# Patient Record
Sex: FEMALE | Race: WHITE | NOT HISPANIC OR LATINO | Employment: FULL TIME | URBAN - METROPOLITAN AREA
[De-identification: names, ages, dates, MRNs, and addresses within clinical notes are randomized per-mention and may not be internally consistent; named-entity substitution may affect disease eponyms.]

---

## 2023-04-12 LAB
ALANINE AMINOTRANSFERASE (SGPT) (U/L) IN SER/PLAS: 11 U/L (ref 7–45)
ALBUMIN (G/DL) IN SER/PLAS: 4.6 G/DL (ref 3.4–5)
ALKALINE PHOSPHATASE (U/L) IN SER/PLAS: 101 U/L (ref 33–136)
ANION GAP IN SER/PLAS: 17 MMOL/L (ref 10–20)
ASPARTATE AMINOTRANSFERASE (SGOT) (U/L) IN SER/PLAS: 22 U/L (ref 9–39)
BASOPHILS (10*3/UL) IN BLOOD BY AUTOMATED COUNT: 0.07 X10E9/L (ref 0–0.1)
BASOPHILS/100 LEUKOCYTES IN BLOOD BY AUTOMATED COUNT: 0.6 % (ref 0–2)
BILIRUBIN TOTAL (MG/DL) IN SER/PLAS: 0.9 MG/DL (ref 0–1.2)
C REACTIVE PROTEIN (MG/L) IN SER/PLAS: 0.49 MG/DL
CALCIUM (MG/DL) IN SER/PLAS: 10 MG/DL (ref 8.6–10.6)
CARBON DIOXIDE, TOTAL (MMOL/L) IN SER/PLAS: 25 MMOL/L (ref 21–32)
CHLORIDE (MMOL/L) IN SER/PLAS: 102 MMOL/L (ref 98–107)
CREATINE KINASE (U/L) IN SER/PLAS: 163 U/L (ref 0–215)
CREATININE (MG/DL) IN SER/PLAS: 1.2 MG/DL (ref 0.5–1.05)
EOSINOPHILS (10*3/UL) IN BLOOD BY AUTOMATED COUNT: 0.09 X10E9/L (ref 0–0.7)
EOSINOPHILS/100 LEUKOCYTES IN BLOOD BY AUTOMATED COUNT: 0.8 % (ref 0–6)
ERYTHROCYTE DISTRIBUTION WIDTH (RATIO) BY AUTOMATED COUNT: 13 % (ref 11.5–14.5)
ERYTHROCYTE MEAN CORPUSCULAR HEMOGLOBIN CONCENTRATION (G/DL) BY AUTOMATED: 33 G/DL (ref 32–36)
ERYTHROCYTE MEAN CORPUSCULAR VOLUME (FL) BY AUTOMATED COUNT: 95 FL (ref 80–100)
ERYTHROCYTES (10*6/UL) IN BLOOD BY AUTOMATED COUNT: 4.2 X10E12/L (ref 4–5.2)
GFR FEMALE: 50 ML/MIN/1.73M2
GLUCOSE (MG/DL) IN SER/PLAS: 98 MG/DL (ref 74–99)
HEMATOCRIT (%) IN BLOOD BY AUTOMATED COUNT: 40 % (ref 36–46)
HEMOGLOBIN (G/DL) IN BLOOD: 13.2 G/DL (ref 12–16)
IMMATURE GRANULOCYTES/100 LEUKOCYTES IN BLOOD BY AUTOMATED COUNT: 0.4 % (ref 0–0.9)
LEUKOCYTES (10*3/UL) IN BLOOD BY AUTOMATED COUNT: 11.5 X10E9/L (ref 4.4–11.3)
LYMPHOCYTES (10*3/UL) IN BLOOD BY AUTOMATED COUNT: 3.24 X10E9/L (ref 1.2–4.8)
LYMPHOCYTES/100 LEUKOCYTES IN BLOOD BY AUTOMATED COUNT: 28.2 % (ref 13–44)
MONOCYTES (10*3/UL) IN BLOOD BY AUTOMATED COUNT: 1.12 X10E9/L (ref 0.1–1)
MONOCYTES/100 LEUKOCYTES IN BLOOD BY AUTOMATED COUNT: 9.7 % (ref 2–10)
NEUTROPHILS (10*3/UL) IN BLOOD BY AUTOMATED COUNT: 6.93 X10E9/L (ref 1.2–7.7)
NEUTROPHILS/100 LEUKOCYTES IN BLOOD BY AUTOMATED COUNT: 60.3 % (ref 40–80)
NRBC (PER 100 WBCS) BY AUTOMATED COUNT: 0 /100 WBC (ref 0–0)
PLATELETS (10*3/UL) IN BLOOD AUTOMATED COUNT: 247 X10E9/L (ref 150–450)
POTASSIUM (MMOL/L) IN SER/PLAS: 4.6 MMOL/L (ref 3.5–5.3)
PROTEIN TOTAL: 7.3 G/DL (ref 6.4–8.2)
RHEUMATOID FACTOR (IU/ML) IN SERUM OR PLASMA: 11 IU/ML (ref 0–15)
SEDIMENTATION RATE, ERYTHROCYTE: 21 MM/H (ref 0–30)
SODIUM (MMOL/L) IN SER/PLAS: 139 MMOL/L (ref 136–145)
UREA NITROGEN (MG/DL) IN SER/PLAS: 21 MG/DL (ref 6–23)

## 2023-04-13 LAB — ANTI-NUCLEAR ANTIBODY (ANA): NEGATIVE

## 2023-04-15 LAB — CITRULLINE ANTIBODY, IGG: 2 UNITS (ref 0–19)

## 2023-04-18 ENCOUNTER — OFFICE VISIT (OUTPATIENT)
Dept: PRIMARY CARE | Facility: CLINIC | Age: 68
End: 2023-04-18
Payer: COMMERCIAL

## 2023-04-18 VITALS
SYSTOLIC BLOOD PRESSURE: 137 MMHG | WEIGHT: 124 LBS | BODY MASS INDEX: 23.41 KG/M2 | OXYGEN SATURATION: 97 % | HEIGHT: 61 IN | DIASTOLIC BLOOD PRESSURE: 73 MMHG | HEART RATE: 73 BPM | RESPIRATION RATE: 19 BRPM

## 2023-04-18 DIAGNOSIS — G43.809 OTHER MIGRAINE WITHOUT STATUS MIGRAINOSUS, NOT INTRACTABLE: ICD-10-CM

## 2023-04-18 DIAGNOSIS — M54.50 CHRONIC LOW BACK PAIN, UNSPECIFIED BACK PAIN LATERALITY, UNSPECIFIED WHETHER SCIATICA PRESENT: ICD-10-CM

## 2023-04-18 DIAGNOSIS — F31.60 BIPOLAR AFFECTIVE DISORDER, CURRENT EPISODE MIXED, CURRENT EPISODE SEVERITY UNSPECIFIED (MULTI): ICD-10-CM

## 2023-04-18 DIAGNOSIS — M79.7 FIBROMYALGIA: Primary | ICD-10-CM

## 2023-04-18 DIAGNOSIS — G89.29 CHRONIC LOW BACK PAIN, UNSPECIFIED BACK PAIN LATERALITY, UNSPECIFIED WHETHER SCIATICA PRESENT: ICD-10-CM

## 2023-04-18 PROCEDURE — 1036F TOBACCO NON-USER: CPT | Performed by: STUDENT IN AN ORGANIZED HEALTH CARE EDUCATION/TRAINING PROGRAM

## 2023-04-18 RX ORDER — DICLOFENAC SODIUM 10 MG/G
4 GEL TOPICAL 4 TIMES DAILY PRN
Qty: 100 G | Refills: 2 | Status: SHIPPED | OUTPATIENT
Start: 2023-04-18 | End: 2023-07-07

## 2023-04-18 RX ORDER — DULOXETIN HYDROCHLORIDE 30 MG/1
CAPSULE, DELAYED RELEASE ORAL
Qty: 49 CAPSULE | Refills: 0 | Status: SHIPPED | OUTPATIENT
Start: 2023-04-18 | End: 2023-05-08

## 2023-04-18 RX ORDER — CYCLOBENZAPRINE HCL 10 MG
10 TABLET ORAL NIGHTLY PRN
Qty: 30 TABLET | Refills: 0 | Status: SHIPPED | OUTPATIENT
Start: 2023-04-18 | End: 2023-05-15

## 2023-04-18 RX ORDER — TOPIRAMATE 25 MG/1
25 TABLET ORAL 2 TIMES DAILY
Qty: 60 TABLET | Refills: 0 | Status: SHIPPED | OUTPATIENT
Start: 2023-04-18 | End: 2023-05-11

## 2023-04-18 RX ORDER — CLONAZEPAM 0.5 MG/1
0.5 TABLET ORAL 2 TIMES DAILY
Qty: 15 TABLET | Refills: 0 | Status: SHIPPED | OUTPATIENT
Start: 2023-04-18 | End: 2023-10-17 | Stop reason: DRUGHIGH

## 2023-04-18 NOTE — PROGRESS NOTES
Judith Bush is a 67 y.o. female seen in Clinic at [facility] by Dr. Kyler Marks on 04/18/23 for routine care, as well as for management of the following chronic medical conditions: HTN, s/p Mitral Valve Replacement (severe MR), OP (on Tymlos), IBS, Chronic Pain (s/p L3-L4 hemilaminectomy and discectomy in 01/2023), Bipolar Disorder, ADHD, Migraines. Patient presents regarding possibility of establishing care.     She has many concerns today including widespread pain, MSK complaints, brain fog, poor mental health, limited social supports, headaches, and severe fatigue. She has not had any recent fevers or unintentional weight loss.     Many of these symptoms have persisted over decades. She describes her medical history as very complex and is looking for an answer to fix all her chronic problems. She has recently been seen by Rheumatology, Dr. Oro, and follows with Cardiology, Dr. Wood. Her spine surgery care and recent procedure was done at the [facility].     #HTN  - BP in office today 137/73  - Current regimen: Diltiazem 180 daily   - follows with cardiology     #Mitral Regurgitation s/p MVR  - ASA daily   - follows with cardiology     #DLD  - Statin intolerant  - ASA, Repatha   - Lipid panel 02/2023 excellent   - follows with cardiology     #Chronic Pain, concern for Fibromyalgia   - seen by Rheumatology   - complicated psychiatric history as well, Bipolar disorder not currently on treatment   - discussed at length  - recent opiate prescriptions through orthopedics and ED   - discussed Duloxetine, agreeable after discussion  - topical Voltaren, short term muscle relaxant as adjunct     #Headache  - previously on Topamax, would like to re-try    #Bipolar Disorder  - Psychiatry referral   - Duloxetine as above more for pain     #Osteoporosis  - follows with Rheum on Tymlos      Past Medical History: as above   Past Medical History:   Diagnosis Date    Abdominal distension (gaseous) 08/02/2018     Bloating    Abdominal distension (gaseous) 08/13/2018    Bloating    Age-related nuclear cataract, left eye 08/20/2015    Age-related nuclear cataract of left eye    Age-related nuclear cataract, right eye 09/03/2015    Age-related nuclear cataract of right eye    Anxiety disorder, unspecified 08/02/2018    Anxiety disorder    Anxiety disorder, unspecified 08/13/2018    Anxiety disorder    Carpal tunnel syndrome, right upper limb 08/13/2018    Carpal tunnel syndrome of right wrist    Collagenous colitis 04/04/2018    Collagenous colitis    Collagenous colitis 08/02/2018    Collagenous colitis    Collagenous colitis 08/13/2018    Collagenous colitis    Collapsed vertebra, not elsewhere classified, site unspecified, initial encounter for fracture (CMS/[facility]) 08/02/2018    Compression fracture of spine    Collapsed vertebra, not elsewhere classified, site unspecified, initial encounter for fracture (CMS/[facility]) 08/13/2018    Compression fracture of spine    Cortical age-related cataract, left eye 08/20/2015    Cortical age-related cataract of left eye    Cortical age-related cataract, right eye 09/03/2015    Cortical age-related cataract of right eye    Depression, unspecified 08/02/2018    Depression    Depression, unspecified 08/13/2018    Depression    Dermatitis, unspecified 04/04/2018    Dermatitis, eczematoid    Dermatitis, unspecified 07/09/2020    Dermatitis, eczematoid    Dermatochalasis of unspecified eye, unspecified eyelid 10/25/2022    Dermatochalasis    Displaced comminuted fracture of left patella, initial encounter for closed fracture 08/02/2018    Closed displaced comminuted fracture of left patella, initial encounter    Displaced comminuted fracture of left patella, initial encounter for closed fracture 08/13/2018    Closed displaced comminuted fracture of left patella, initial encounter    Displaced comminuted fracture of right patella, initial encounter for closed fracture 08/02/2018    Closed displaced  comminuted fracture of right patella, initial encounter    Displaced comminuted fracture of right patella, initial encounter for closed fracture 08/13/2018    Closed displaced comminuted fracture of right patella, initial encounter    Hypokalemia 04/04/2018    Hypokalemia    Hypokalemia 08/02/2018    Hypokalemia    Hypokalemia 08/13/2018    Hypokalemia    Low back pain, unspecified 08/02/2018    Low back pain    Low back pain, unspecified 08/13/2018    Low back pain    Noninfective gastroenteritis and colitis, unspecified 04/04/2018    Chronic colitis    Noninfective gastroenteritis and colitis, unspecified 08/02/2018    Chronic colitis    Noninfective gastroenteritis and colitis, unspecified 08/13/2018    Chronic colitis    Nutritional deficiency, unspecified 08/02/2018    Poor diet    Nutritional deficiency, unspecified 08/13/2018    Poor diet    Old myocardial infarction 03/31/2014    History of myocardial infarction    Other chest pain 08/28/2017    Atypical chest pain    Other conditions influencing health status     Denial Of Any Significant Medical History    Other fracture of right patella, sequela 08/02/2018    Patellar sleeve fracture, right, sequela    Other fracture of right patella, sequela 08/13/2018    Patellar sleeve fracture, right, sequela    Other specified diseases of intestine 08/02/2018    Small intestinal bacterial overgrowth    Other specified diseases of intestine 08/13/2018    Small intestinal bacterial overgrowth    Other visual disturbances 04/04/2018    Blurred vision, bilateral    Pain in unspecified hip 03/31/2014    Hip pain    Pain, unspecified 08/02/2018    Pain    Pain, unspecified 08/13/2018    Pain    Personal history of other diseases of the circulatory system 09/16/2019    History of pericarditis    Personal history of other diseases of the respiratory system 08/27/2019    History of pleural effusion    Personal history of other specified conditions 07/22/2016    History of  dizziness    Personal history of other specified conditions 06/11/2018    History of headache    Polyosteoarthritis, unspecified 08/02/2018    Generalized osteoarthritis of multiple sites    Polyosteoarthritis, unspecified 08/13/2018    Generalized osteoarthritis of multiple sites    Presence of intraocular lens 09/20/2018    Pseudophakia of right eye    Unspecified fall, sequela 08/02/2018    Fall in home, sequela    Unspecified fall, sequela 08/13/2018    Fall in home, sequela    Unspecified fracture of right patella, subsequent encounter for closed fracture with routine healing 08/02/2018    Fracture of right patella with routine healing    Unspecified fracture of right patella, subsequent encounter for closed fracture with routine healing 08/13/2018    Fracture of right patella with routine healing     Subspecialty Medical Care: Cardiology, Orthopedics, Psychiatry referral today; Rheumatology, Pain Medicine, ENT    Past Surgical History:   Past Surgical History:   Procedure Laterality Date    CATARACT EXTRACTION  07/16/2018    Cataract Surgery    CT ABDOMEN ANGIOGRAM W AND/OR WO IV CONTRAST  7/28/2022    CT ABDOMEN ANGIOGRAM W AND/OR WO IV CONTRAST 7/28/2022 [facility]    FEMUR FRACTURE SURGERY  07/16/2018    Femur Repair    HIP SURGERY  07/16/2018    Hip Surgery    HYSTERECTOMY  07/16/2018    Hysterectomy    OTHER SURGICAL HISTORY  07/16/2018    Oophorectomy - Bilateral (Removal Of Both Ovaries)    OTHER SURGICAL HISTORY  05/27/2020    Mitral valve replacement    OTHER SURGICAL HISTORY  05/27/2020    Radius fracture repair    SHOULDER SURGERY  07/16/2018    Shoulder Surgery     Medications:  Current Outpatient Medications:     albuterol 90 mcg/actuation inhaler, Inhale 2 puffs every 4 hours if needed., Disp: , Rfl:     aspirin 81 mg EC tablet, Take 1 tablet (81 mg) by mouth once daily., Disp: , Rfl:     cholecalciferol (Vitamin D-3) 1,250 mcg (50,000 unit) capsule, Take 1 capsule (50,000 Units) by  mouth., Disp: , Rfl:     dilTIAZem CD (Cardizem CD) 180 mg 24 hr capsule, Take 1 capsule (180 mg) by mouth once daily., Disp: , Rfl:     Lidocaine Pain Relief 4 % patch, APPLY TOPICALLY TO AFFECTED AREA ONCE A DAY FOR 12 HOURS AS NEEDED, Disp: , Rfl:     loratadine (Claritin) 10 mg tablet, Take 1 tablet (10 mg) by mouth once daily., Disp: , Rfl:     multivitamin with folic acid (One Daily Multivitamin) 400 mcg tablet, Take 1 tablet by mouth once daily., Disp: , Rfl:     oxybutynin XL (Ditropan-XL) 10 mg 24 hr tablet, Take 1 tablet (10 mg) by mouth in the morning and 1 tablet (10 mg) before bedtime., Disp: , Rfl:     Repatha SureClick 140 mg/mL injection, Inject under the skin., Disp: , Rfl:     SUMAtriptan (Imitrex) 50 mg tablet, Take by mouth., Disp: , Rfl:     Tymlos 80 mcg (3,120 mcg/1.56 mL) pen injector, , Disp: , Rfl:     celecoxib (CeleBREX) 200 mg capsule, TAKE 1 CAPSULE BY MOUTH ONCE DAILY AS NEEDED. WHEN NOT TAKING MOTRIN, Disp: , Rfl:     clonazePAM (KlonoPIN) 0.5 mg tablet, Take 1 tablet (0.5 mg) by mouth in the morning and 1 tablet (0.5 mg) before bedtime. Do all this for 5 days., Disp: 15 tablet, Rfl: 0    cyclobenzaprine (Flexeril) 10 mg tablet, Take 1 tablet (10 mg) by mouth as needed at bedtime for muscle spasms., Disp: 30 tablet, Rfl: 0    diclofenac sodium (Voltaren) 1 % gel gel, Apply 1 Application topically 4 times a day as needed (pain)., Disp: 100 g, Rfl: 2    DULoxetine (Cymbalta) 30 mg DR capsule, Take 1 capsule (30 mg) by mouth once daily for 7 days, THEN 2 capsules (60 mg) once daily for 21 days. Do not crush or chew.., Disp: 49 capsule, Rfl: 0    topiramate (Topamax) 25 mg tablet, Take 1 tablet (25 mg) by mouth in the morning and 1 tablet (25 mg) before bedtime., Disp: 60 tablet, Rfl: 0  Pharmacy: [facility] ([place])    Allergies:   Allergies   Allergen Reactions    Penicillins Anaphylaxis    Cephalosporins Rash    Neomycin-Polymyxin-Gramicidin Rash     Immunizations: Tdap 2021, Flu and COVID  "booster recommended, will be due for PCV-20 at well visit, Shingrix     Family History:   No family history on file.    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  Activities:   Drug Use: tobacco use   Diet: no concerns  Depression/Anxiety: Bipolar disorder not currently connected to psych   Sexuality/Contraception/Menstrual History:   Sleep: impacted by chronic medical conditions, pain, mental health     Patient Information:  Health Insurance:   Transportation:   Healthcare POA/Guardian:   Contact Information:     Visit Vitals  /73 (BP Location: Left arm, Patient Position: Sitting)   Pulse 73   Resp 19   Ht 1.549 m (5' 1\")   Wt 56.2 kg (124 lb)   SpO2 97%   BMI 23.43 kg/m²   Smoking Status Never   BSA 1.56 m²      PHYSICAL EXAM:   General: NAD, intermittently tearful during encounter   HEENT: NCAT, MMM  CV: RRR  PULM: non-labored respirations   ABD: soft, NT, ND  EXT: WWP, no significant edema   SKIN: no rashes noted   NEURO: A&Ox4, symmetric facies, no gross motor or sensory deficits, normal gait  PSYCH: intermittently tearful during encounter, frustrated with chronic medical conditions     Assessment/Plan    Judith Bush is a 67 y.o. female seen in Clinic at [facility] by Dr. Kyler Marks on 04/18/23 for routine care, as well as for management of the following chronic medical conditions: HTN, s/p Mitral Valve Replacement (severe MR), OP (on Tymlos), IBS, Chronic Pain (s/p L3-L4 hemilaminectomy and discectomy in 01/2023), Bipolar Disorder, ADHD, Migraines. Patient presents regarding possibility of establishing care.     She has many concerns today including widespread pain, MSK complaints, brain fog, poor mental health, limited social supports, headaches, and severe fatigue. She has not had any recent fevers or unintentional weight loss.     Many of these symptoms have persisted over decades. She " describes her medical history as very complex and is looking for an answer to fix all her chronic problems. She has recently been seen by Rheumatology, Dr. Oro, and follows with Cardiology, Dr. Wood. Her spine surgery care and recent procedure was done at the [facility].     #HTN  - BP in office today 137/73  - Current regimen: Diltiazem 180 daily   - follows with cardiology     #Mitral Regurgitation s/p MVR  - ASA daily   - follows with cardiology     #DLD  - Statin intolerant  - ASA, Repatha   - Lipid panel 2023 excellent   - follows with cardiology     #Chronic Pain, concern for Fibromyalgia   - seen by Rheumatology   - complicated psychiatric history as well, Bipolar disorder not currently on treatment   - discussed at length  - recent opiate prescriptions through orthopedics and ED   - discussed Duloxetine, agreeable after discussion  - topical Voltaren, short term muscle relaxant as adjunct     #Headache  - previously on Topamax, would like to re-try    #Bipolar Disorder  - Psychiatry referral   - Duloxetine as above more for pain     #Osteoporosis  - follows with Rheum on Tymlos     #Health Maintenance    Cancer Screening  - Cervical Cancer Screening: last pap smear/HPV testin with negative HPV co-testing  - Mammography: 2021, order at [facility]   - Colorectal Cancer Screening: Dr. Garcia in , repeat in  (poor bowel prep)  - Lung Cancer Screening:     Laboratory Screening  - Lipid Screen: follows with cards on treatment   - ASCVD Score: on treatment   - A1C, glucose screen: recent labs reassuring   - STI, HIV, Hep B screen: discuss at [facility]   - Hep C screen: discuss at [facility]     Imaging Screening  - Osteoporosis/DEXA screening: known diagnosis, follows with Rheum    Immunizations: discuss at [facility], Tdap , Flu and COVID booster recommended, will be due for PCV-20 at well visit, Shingrix   - Influenza: annual recommended  - COVID: annual recommended   - Tdap:   - Prevnar,  Pneumovax: due for PCV-20 at [facility]   - Shingrix: recommend at [facility]     Other Screening  - Health Literacy Assessment: adequate   - Depression screen: known diagnosis, referral   - Home safety/partner violence screen: lives alone  - Hearing/Vision screens:   - Alcohol/tobacco/drug use screen:   - Healthcare POA/Advanced Directives:     Referrals: Psychiatry     Patient Discussion:    Please call back the office with any questions at [phone]. In the case of an emergency, please call 911 or go to the nearest Emergency Department.      Kyler Marks MD  Internal Medicine-Pediatrics  [facility] [address], [address]  P: [phone], F: [phone]

## 2023-04-19 LAB — HLAB27 TYPING: NEGATIVE

## 2023-04-24 RX ORDER — MULTIVITAMIN
1 TABLET ORAL DAILY
COMMUNITY
Start: 2021-05-04

## 2023-04-24 RX ORDER — EVOLOCUMAB 140 MG/ML
INJECTION, SOLUTION SUBCUTANEOUS
COMMUNITY
Start: 2022-10-27 | End: 2023-11-20 | Stop reason: WASHOUT

## 2023-04-24 RX ORDER — OXYBUTYNIN CHLORIDE 10 MG/1
1 TABLET, EXTENDED RELEASE ORAL 2 TIMES DAILY
COMMUNITY
Start: 2020-08-14 | End: 2024-02-19 | Stop reason: SDUPTHER

## 2023-04-24 RX ORDER — LORATADINE 10 MG/1
1 TABLET ORAL DAILY
COMMUNITY
Start: 2022-10-11 | End: 2024-02-19 | Stop reason: ALTCHOICE

## 2023-04-24 RX ORDER — DILTIAZEM HYDROCHLORIDE 180 MG/1
1 CAPSULE, COATED, EXTENDED RELEASE ORAL DAILY
COMMUNITY
Start: 2022-10-16 | End: 2024-02-19 | Stop reason: SDUPTHER

## 2023-04-24 RX ORDER — ASPIRIN 325 MG
50000 TABLET, DELAYED RELEASE (ENTERIC COATED) ORAL
COMMUNITY
Start: 2022-11-10 | End: 2024-04-15 | Stop reason: ENTERED-IN-ERROR

## 2023-04-24 RX ORDER — SUMATRIPTAN 50 MG/1
TABLET, FILM COATED ORAL
COMMUNITY
Start: 2020-09-02 | End: 2024-02-19 | Stop reason: SDUPTHER

## 2023-04-24 RX ORDER — ASPIRIN 81 MG/1
1 TABLET ORAL DAILY
COMMUNITY
Start: 2020-09-16 | End: 2024-04-23 | Stop reason: SDUPTHER

## 2023-04-24 RX ORDER — ALBUTEROL SULFATE 90 UG/1
2 AEROSOL, METERED RESPIRATORY (INHALATION) EVERY 4 HOURS PRN
COMMUNITY
Start: 2021-02-03

## 2023-04-24 RX ORDER — LIDOCAINE 4 G/100G
1 PATCH TOPICAL DAILY PRN
COMMUNITY
Start: 2022-07-24

## 2023-04-24 RX ORDER — ABALOPARATIDE 2000 UG/ML
1 INJECTION, SOLUTION SUBCUTANEOUS DAILY
COMMUNITY
Start: 2023-03-09

## 2023-04-24 RX ORDER — CELECOXIB 200 MG/1
200 CAPSULE ORAL NIGHTLY PRN
COMMUNITY

## 2023-05-08 DIAGNOSIS — M79.7 FIBROMYALGIA: ICD-10-CM

## 2023-05-08 RX ORDER — DULOXETIN HYDROCHLORIDE 60 MG/1
60 CAPSULE, DELAYED RELEASE ORAL DAILY
Qty: 90 CAPSULE | Refills: 1 | Status: SHIPPED | OUTPATIENT
Start: 2023-05-08 | End: 2023-08-15 | Stop reason: SINTOL

## 2023-05-10 DIAGNOSIS — G43.809 OTHER MIGRAINE WITHOUT STATUS MIGRAINOSUS, NOT INTRACTABLE: ICD-10-CM

## 2023-05-11 RX ORDER — TOPIRAMATE 25 MG/1
TABLET ORAL
Qty: 60 TABLET | Refills: 0 | Status: SHIPPED | OUTPATIENT
Start: 2023-05-11 | End: 2023-06-07

## 2023-05-14 DIAGNOSIS — G89.29 CHRONIC LOW BACK PAIN, UNSPECIFIED BACK PAIN LATERALITY, UNSPECIFIED WHETHER SCIATICA PRESENT: ICD-10-CM

## 2023-05-14 DIAGNOSIS — M54.50 CHRONIC LOW BACK PAIN, UNSPECIFIED BACK PAIN LATERALITY, UNSPECIFIED WHETHER SCIATICA PRESENT: ICD-10-CM

## 2023-05-15 RX ORDER — CYCLOBENZAPRINE HCL 10 MG
10 TABLET ORAL NIGHTLY PRN
Qty: 30 TABLET | Refills: 0 | Status: SHIPPED | OUTPATIENT
Start: 2023-05-15 | End: 2023-06-12

## 2023-06-06 DIAGNOSIS — G43.809 OTHER MIGRAINE WITHOUT STATUS MIGRAINOSUS, NOT INTRACTABLE: ICD-10-CM

## 2023-06-07 RX ORDER — TOPIRAMATE 25 MG/1
TABLET ORAL
Qty: 60 TABLET | Refills: 0 | Status: SHIPPED | OUTPATIENT
Start: 2023-06-07 | End: 2023-07-09

## 2023-06-12 DIAGNOSIS — G89.29 CHRONIC LOW BACK PAIN, UNSPECIFIED BACK PAIN LATERALITY, UNSPECIFIED WHETHER SCIATICA PRESENT: ICD-10-CM

## 2023-06-12 DIAGNOSIS — M54.50 CHRONIC LOW BACK PAIN, UNSPECIFIED BACK PAIN LATERALITY, UNSPECIFIED WHETHER SCIATICA PRESENT: ICD-10-CM

## 2023-06-12 RX ORDER — CYCLOBENZAPRINE HCL 10 MG
10 TABLET ORAL NIGHTLY PRN
Qty: 30 TABLET | Refills: 0 | Status: SHIPPED | OUTPATIENT
Start: 2023-06-12 | End: 2023-07-27

## 2023-07-07 DIAGNOSIS — M79.7 FIBROMYALGIA: ICD-10-CM

## 2023-07-07 RX ORDER — DICLOFENAC SODIUM 10 MG/G
4 GEL TOPICAL 4 TIMES DAILY PRN
Qty: 100 G | Refills: 2 | Status: SHIPPED | OUTPATIENT
Start: 2023-07-07 | End: 2024-02-05

## 2023-07-08 DIAGNOSIS — G43.809 OTHER MIGRAINE WITHOUT STATUS MIGRAINOSUS, NOT INTRACTABLE: ICD-10-CM

## 2023-07-09 RX ORDER — TOPIRAMATE 25 MG/1
TABLET ORAL
Qty: 60 TABLET | Refills: 0 | Status: SHIPPED | OUTPATIENT
Start: 2023-07-09 | End: 2023-08-01

## 2023-07-27 DIAGNOSIS — M54.50 CHRONIC LOW BACK PAIN, UNSPECIFIED BACK PAIN LATERALITY, UNSPECIFIED WHETHER SCIATICA PRESENT: ICD-10-CM

## 2023-07-27 DIAGNOSIS — G89.29 CHRONIC LOW BACK PAIN, UNSPECIFIED BACK PAIN LATERALITY, UNSPECIFIED WHETHER SCIATICA PRESENT: ICD-10-CM

## 2023-07-27 RX ORDER — CYCLOBENZAPRINE HCL 10 MG
10 TABLET ORAL NIGHTLY PRN
Qty: 30 TABLET | Refills: 0 | Status: SHIPPED | OUTPATIENT
Start: 2023-07-27 | End: 2023-09-14 | Stop reason: SDUPTHER

## 2023-08-01 DIAGNOSIS — G43.809 OTHER MIGRAINE WITHOUT STATUS MIGRAINOSUS, NOT INTRACTABLE: ICD-10-CM

## 2023-08-01 RX ORDER — TOPIRAMATE 25 MG/1
TABLET ORAL
Qty: 60 TABLET | Refills: 0 | Status: SHIPPED | OUTPATIENT
Start: 2023-08-01 | End: 2023-08-15 | Stop reason: SDUPTHER

## 2023-08-14 NOTE — PROGRESS NOTES
"Subjective   Patient ID: Judith Bush is a 67 y.o. female who presents for Establish Care. I have reviewed her past social, surgical, medical and family history.    HPI   The patient reports that she is taking diltiazem for her hypertension, Repatha for hyperlipidemia and Sumatriptan for her migraines. She has no side effects from these medications. She smokes 3-4 cigarettes daily and has smoked this amount for the past 40 years, and also smokes some marijuana for medical purposes. She has a history of mild emphysema, bipolar disorder, anxiety and depression. The patient complains of fatigue, an ongoing cough, migraines and insomnia and follows up with sleep medicine. She has tried 2 courses of antibiotics to treat the cough but neither worked; the Albuterol inhaler helps a bit. She mentions that she broke her right hip and femur years ago and since then she has been experiencing pain. The patient also had a laminectomy 8 months ago and following her recovery she started experiencing joint pain, followed up with Rheumatology and was diagnosed with fibromyalgia. Additionally, her back pain has come back despite having surgery to address it. She was started on duloxetine however, she has not started taking it as she tried it in the past and it did not alleviate her pain. She also tried Topamax. The patient mentions that she has intermittent itching under her bilateral feet which came on randomly.     Review of Systems  Constitutional: + fatigue. No fever or chills  Cardiovascular: no chest pain, no palpitations and no syncope.   Respiratory: + cough, no shortness of breath during exertion and no shortness of breath at rest.   Gastrointestinal: no abdominal pain, no nausea and no vomiting.  Neuro: + Headache, no dizziness  Psych: + insomnia.  MSK: + multiple joint pain, right hip pain.    Objective   /63   Pulse 60   Resp 16   Ht 1.549 m (5' 1\")   Wt 56.2 kg (124 lb)   BMI 23.43 kg/m²     Physical " Exam  Constitutional: Alert and in no acute distress. Well developed, well nourished  Head and Face: Head and face: Normal.    Cardiovascular: Heart rate and rhythm were normal, normal S1 and S2. No peripheral edema.   Pulmonary: No respiratory distress. Clear bilateral breath sounds.  Musculoskeletal: Gait and station: Normal. Muscle strength/tone: Normal.   Skin: Normal skin color and pigmentation, normal skin turgor, and no rash.    Psychiatric: Judgment and insight: Intact. Mood and affect: Normal.    Lab Results   Component Value Date    WBC 12.5 (H) 04/15/2023    HGB 12.8 04/15/2023    HCT 37.6 04/15/2023     04/15/2023    CHOL 136 02/15/2023    TRIG 136 02/15/2023    HDL 75.3 02/15/2023    ALT 11 04/15/2023    AST 19 04/15/2023     04/15/2023    K 4.2 04/15/2023     04/15/2023    CREATININE 0.88 04/15/2023    BUN 15 04/15/2023    CO2 28 04/15/2023    TSH 1.18 04/15/2023    INR 1.0 05/16/2022    HGBA1C 5.0 07/22/2019       XR chest 2 view  Narrative: Interpreted By:  CLEO PITTS MD  MRN: [id number]  Patient Name: DEX BELLE     STUDY:  TH CHEST 2 VIEW PA AND LAT;  6/27/2023 1:27 pm     INDICATION:  cough  J44.9: COPD (chronic obstructive pulmonary disease).     COMPARISON:  04/15/2023     ACCESSION NUMBER(S):  [id number]     ORDERING CLINICIAN:  GRICELDA PACK     FINDINGS:  Median sternotomy wires present. The cardiac silhouette is within  normal limits for size. The lungs are hyperinflated. There is no  consolidation. There is no effusion. There is no edema.     Impression: Mediastinum wires present. No other abnormality      Assessment/Plan   Diagnoses and all orders for this visit:  Bipolar affective disorder, rapid cycling (CMS/HCC)  Other specified anxiety disorders  Chronic low back pain, unspecified back pain laterality, unspecified whether sciatica present  -     Lead, blood; Future  Primary insomnia  Simple chronic bronchitis (CMS/HCC)  -     benzonatate (Tessalon) 100 mg  capsule; Take 1 capsule (100 mg) by mouth 3 times a day as needed for cough. Do not crush or chew.  Visit for screening mammogram  -     BI mammo bilateral screening tomosynthesis; Future  Other migraine without status migrainosus, not intractable  -     topiramate (Topamax) 25 mg tablet; Take 1 tablet (25 mg) by mouth 2 times a day.  Encounter for immunization  -     Pneumococcal conjugate vaccine, 20-valent, adult (PREVNAR 20)  Chronic rhinitis  -     fluticasone (Flonase) 50 mcg/actuation nasal spray; Administer 1 spray into each nostril once daily. Shake gently. Before first use, prime pump. After use, clean tip and replace cap.        Dear Judith Bush     It was my pleasure to take care of you today in the office. Below are the things we discussed today:    1. Hypertension: Continue Diltiazem.    2. Hyperlipidemia: Continue Repatha.     3. Flu shot: Please get flu shot in the fall.    4. COVID: Please get booster in the fall.    5. Prevnar: Given today.    6. Shingrix: Please get from the pharmacy.    7. Mammogram: Ordered.    8. Migraines: Continue Sumatriptan.    9. Right hip pain: The patient will follow up with Orthopedics for an opinion.    10. Cough: Start Tessalon pills. Start Flonase nasal spray. If there is no improvement please let me know.    11. Chronic pain: Start 25 mg Topamax and gradually increase to 75 mg, then 100 mg. We will go up to 200 mg if needed.     12. Lead exposure: Blood lead level ordered.    When you call the office for your yearly Physical, please ask them to inform me to order your blood work, so that you can get the fasting blood work before your appointment and we can discuss the results at your physical.      Please call me if any questions arise from now until your next visit. I will call you after I am done seeing patients. A Doctor is always available by phone when the office is closed. Please feel free to call for help with any problem that you feel shouldn't wait until  the office re-opens.     Scribe Attestation  By signing my name below, I, Agata Strong, Abad   attest that this documentation has been prepared under the direction and in the presence of Cee Coates MD.

## 2023-08-15 ENCOUNTER — OFFICE VISIT (OUTPATIENT)
Dept: PRIMARY CARE | Facility: CLINIC | Age: 68
End: 2023-08-15
Payer: COMMERCIAL

## 2023-08-15 VITALS
HEART RATE: 60 BPM | HEIGHT: 61 IN | SYSTOLIC BLOOD PRESSURE: 150 MMHG | BODY MASS INDEX: 23.41 KG/M2 | WEIGHT: 124 LBS | DIASTOLIC BLOOD PRESSURE: 63 MMHG | RESPIRATION RATE: 16 BRPM

## 2023-08-15 DIAGNOSIS — Z23 ENCOUNTER FOR IMMUNIZATION: ICD-10-CM

## 2023-08-15 DIAGNOSIS — J41.0 SIMPLE CHRONIC BRONCHITIS (MULTI): ICD-10-CM

## 2023-08-15 DIAGNOSIS — F31.9 BIPOLAR AFFECTIVE DISORDER, RAPID CYCLING (MULTI): Primary | ICD-10-CM

## 2023-08-15 DIAGNOSIS — F41.8 OTHER SPECIFIED ANXIETY DISORDERS: ICD-10-CM

## 2023-08-15 DIAGNOSIS — G89.29 CHRONIC LOW BACK PAIN, UNSPECIFIED BACK PAIN LATERALITY, UNSPECIFIED WHETHER SCIATICA PRESENT: ICD-10-CM

## 2023-08-15 DIAGNOSIS — Z12.31 VISIT FOR SCREENING MAMMOGRAM: ICD-10-CM

## 2023-08-15 DIAGNOSIS — J31.0 CHRONIC RHINITIS: ICD-10-CM

## 2023-08-15 DIAGNOSIS — F51.01 PRIMARY INSOMNIA: ICD-10-CM

## 2023-08-15 DIAGNOSIS — M54.50 CHRONIC LOW BACK PAIN, UNSPECIFIED BACK PAIN LATERALITY, UNSPECIFIED WHETHER SCIATICA PRESENT: ICD-10-CM

## 2023-08-15 DIAGNOSIS — G43.809 OTHER MIGRAINE WITHOUT STATUS MIGRAINOSUS, NOT INTRACTABLE: ICD-10-CM

## 2023-08-15 PROBLEM — G43.009 MIGRAINE WITHOUT AURA AND WITHOUT STATUS MIGRAINOSUS, NOT INTRACTABLE: Status: ACTIVE | Noted: 2023-08-15

## 2023-08-15 PROBLEM — Z95.2 S/P MVR (MITRAL VALVE REPLACEMENT): Status: ACTIVE | Noted: 2023-08-15

## 2023-08-15 PROBLEM — M81.0 OSTEOPOROSIS: Status: ACTIVE | Noted: 2023-08-15

## 2023-08-15 PROBLEM — E53.8 VITAMIN B12 DEFICIENCY: Status: ACTIVE | Noted: 2023-08-15

## 2023-08-15 PROBLEM — I34.1 MITRAL VALVE PROLAPSE SYNDROME: Status: ACTIVE | Noted: 2023-08-15

## 2023-08-15 PROBLEM — E78.5 HYPERLIPIDEMIA: Status: ACTIVE | Noted: 2023-08-15

## 2023-08-15 PROBLEM — M46.1 SACROILIITIS (CMS-HCC): Status: ACTIVE | Noted: 2023-08-15

## 2023-08-15 PROBLEM — I25.2 HISTORY OF NON-ST ELEVATION MYOCARDIAL INFARCTION (NSTEMI): Status: ACTIVE | Noted: 2023-08-15

## 2023-08-15 PROBLEM — I10 HYPERTENSION: Status: ACTIVE | Noted: 2023-08-15

## 2023-08-15 PROBLEM — G47.33 OBSTRUCTIVE SLEEP APNEA: Status: ACTIVE | Noted: 2023-08-15

## 2023-08-15 PROBLEM — G47.00 INSOMNIA: Status: ACTIVE | Noted: 2023-08-15

## 2023-08-15 PROBLEM — J44.9 COPD (CHRONIC OBSTRUCTIVE PULMONARY DISEASE) (MULTI): Status: ACTIVE | Noted: 2023-08-15

## 2023-08-15 PROBLEM — E55.9 VITAMIN D DEFICIENCY: Status: ACTIVE | Noted: 2023-08-15

## 2023-08-15 PROBLEM — F41.9 ANXIETY DISORDER: Status: ACTIVE | Noted: 2023-08-15

## 2023-08-15 PROBLEM — M62.89 PELVIC FLOOR DYSFUNCTION: Status: ACTIVE | Noted: 2023-08-15

## 2023-08-15 PROBLEM — M54.16 LUMBAR RADICULOPATHY: Status: ACTIVE | Noted: 2023-08-15

## 2023-08-15 PROCEDURE — 3078F DIAST BP <80 MM HG: CPT | Performed by: FAMILY MEDICINE

## 2023-08-15 PROCEDURE — 3077F SYST BP >= 140 MM HG: CPT | Performed by: FAMILY MEDICINE

## 2023-08-15 PROCEDURE — 1160F RVW MEDS BY RX/DR IN RCRD: CPT | Performed by: FAMILY MEDICINE

## 2023-08-15 PROCEDURE — 99204 OFFICE O/P NEW MOD 45 MIN: CPT | Performed by: FAMILY MEDICINE

## 2023-08-15 PROCEDURE — 90677 PCV20 VACCINE IM: CPT | Performed by: FAMILY MEDICINE

## 2023-08-15 PROCEDURE — 1159F MED LIST DOCD IN RCRD: CPT | Performed by: FAMILY MEDICINE

## 2023-08-15 PROCEDURE — G0009 ADMIN PNEUMOCOCCAL VACCINE: HCPCS | Performed by: FAMILY MEDICINE

## 2023-08-15 PROCEDURE — 1126F AMNT PAIN NOTED NONE PRSNT: CPT | Performed by: FAMILY MEDICINE

## 2023-08-15 PROCEDURE — 4004F PT TOBACCO SCREEN RCVD TLK: CPT | Performed by: FAMILY MEDICINE

## 2023-08-15 RX ORDER — GLYCOPYRROLATE AND FORMOTEROL FUMARATE 9; 4.8 UG/1; UG/1
2 AEROSOL, METERED RESPIRATORY (INHALATION) 2 TIMES DAILY
COMMUNITY

## 2023-08-15 RX ORDER — FLUTICASONE PROPIONATE 50 MCG
1 SPRAY, SUSPENSION (ML) NASAL DAILY
Qty: 16 G | Refills: 5 | Status: SHIPPED | OUTPATIENT
Start: 2023-08-15 | End: 2024-08-14

## 2023-08-15 RX ORDER — TOPIRAMATE 25 MG/1
25 TABLET ORAL 2 TIMES DAILY
Qty: 180 TABLET | Refills: 0 | Status: ON HOLD | OUTPATIENT
Start: 2023-08-15 | End: 2024-04-15 | Stop reason: ALTCHOICE

## 2023-08-15 RX ORDER — BENZONATATE 100 MG/1
100 CAPSULE ORAL 3 TIMES DAILY PRN
Qty: 42 CAPSULE | Refills: 0 | Status: SHIPPED | OUTPATIENT
Start: 2023-08-15 | End: 2023-09-14

## 2023-08-21 ENCOUNTER — LAB (OUTPATIENT)
Dept: LAB | Facility: LAB | Age: 68
End: 2023-08-21
Payer: COMMERCIAL

## 2023-08-21 DIAGNOSIS — M54.50 CHRONIC LOW BACK PAIN, UNSPECIFIED BACK PAIN LATERALITY, UNSPECIFIED WHETHER SCIATICA PRESENT: ICD-10-CM

## 2023-08-21 DIAGNOSIS — G89.29 CHRONIC LOW BACK PAIN, UNSPECIFIED BACK PAIN LATERALITY, UNSPECIFIED WHETHER SCIATICA PRESENT: ICD-10-CM

## 2023-08-21 PROCEDURE — 83655 ASSAY OF LEAD: CPT

## 2023-08-21 PROCEDURE — 36415 COLL VENOUS BLD VENIPUNCTURE: CPT

## 2023-08-24 LAB — LEAD (UG/DL) IN BLOOD: 1.7 MCG/DL

## 2023-09-07 DIAGNOSIS — J31.0 CHRONIC RHINITIS: ICD-10-CM

## 2023-09-07 RX ORDER — FLUTICASONE PROPIONATE 50 MCG
1 SPRAY, SUSPENSION (ML) NASAL DAILY
Qty: 16 ML | Refills: 5 | OUTPATIENT
Start: 2023-09-07 | End: 2024-09-06

## 2023-09-10 PROBLEM — L57.0 ACTINIC KERATOSIS: Status: ACTIVE | Noted: 2022-09-15

## 2023-09-10 PROBLEM — D18.01 HEMANGIOMA OF SKIN AND SUBCUTANEOUS TISSUE: Status: ACTIVE | Noted: 2022-09-15

## 2023-09-10 PROBLEM — L30.9 DERMATITIS, UNSPECIFIED: Status: ACTIVE | Noted: 2022-09-15

## 2023-09-10 PROBLEM — L72.0 EPIDERMAL CYST: Status: ACTIVE | Noted: 2022-09-15

## 2023-09-10 PROBLEM — L90.5 SCAR CONDITION AND FIBROSIS OF SKIN: Status: ACTIVE | Noted: 2022-09-15

## 2023-09-10 PROBLEM — F90.9 ATTENTION DEFICIT HYPERACTIVITY DISORDER: Status: ACTIVE | Noted: 2023-09-10

## 2023-09-10 PROBLEM — L72.3 SEBACEOUS CYST: Status: ACTIVE | Noted: 2022-09-15

## 2023-09-10 PROBLEM — D22.4 MELANOCYTIC NEVI OF SCALP AND NECK: Status: ACTIVE | Noted: 2022-09-15

## 2023-09-10 PROBLEM — D22.60 MELANOCYTIC NEVI OF UNSPECIFIED UPPER LIMB, INCLUDING SHOULDER: Status: ACTIVE | Noted: 2022-09-15

## 2023-09-10 PROBLEM — N95.2 ATROPHIC VAGINITIS: Status: ACTIVE | Noted: 2023-09-10

## 2023-09-10 PROBLEM — M16.11 ARTHRITIS OF RIGHT HIP: Status: ACTIVE | Noted: 2023-09-10

## 2023-09-10 PROBLEM — D22.39 MELANOCYTIC NEVI OF OTHER PARTS OF FACE: Status: ACTIVE | Noted: 2022-09-15

## 2023-09-10 PROBLEM — L82.1 OTHER SEBORRHEIC KERATOSIS: Status: ACTIVE | Noted: 2022-09-15

## 2023-09-10 PROBLEM — L82.0 INFLAMED SEBORRHEIC KERATOSIS: Status: ACTIVE | Noted: 2022-09-15

## 2023-09-10 PROBLEM — D69.2 OTHER NONTHROMBOCYTOPENIC PURPURA (CMS-HCC): Status: ACTIVE | Noted: 2022-09-15

## 2023-09-10 PROBLEM — L57.9 SKIN CHANGES DUE TO CHRONIC EXPOSURE TO NONIONIZING RADIATION, UNSPECIFIED: Status: ACTIVE | Noted: 2022-09-15

## 2023-09-10 PROBLEM — H52.203 ASTIGMATISM, BILATERAL: Status: ACTIVE | Noted: 2023-09-10

## 2023-09-10 PROBLEM — I78.1 NEVUS, NON-NEOPLASTIC: Status: ACTIVE | Noted: 2022-09-15

## 2023-09-10 PROBLEM — D22.5 MELANOCYTIC NEVI OF TRUNK: Status: ACTIVE | Noted: 2022-09-15

## 2023-09-10 PROBLEM — D14.1 BENIGN NEOPLASM OF VOCAL CORD: Status: ACTIVE | Noted: 2023-09-10

## 2023-09-10 PROBLEM — F31.61: Status: ACTIVE | Noted: 2023-09-10

## 2023-09-10 RX ORDER — OMEPRAZOLE 40 MG/1
40 CAPSULE, DELAYED RELEASE ORAL
COMMUNITY
End: 2024-02-19 | Stop reason: SDUPTHER

## 2023-09-10 RX ORDER — HYDROCODONE BITARTRATE AND HOMATROPINE METHYLBROMIDE ORAL SOLUTION 5; 1.5 MG/5ML; MG/5ML
LIQUID ORAL
COMMUNITY
Start: 2023-07-12 | End: 2024-02-19 | Stop reason: ALTCHOICE

## 2023-09-10 RX ORDER — DOXYCYCLINE HYCLATE 100 MG
1 TABLET ORAL 2 TIMES DAILY
COMMUNITY
Start: 2023-07-10 | End: 2023-10-17 | Stop reason: ALTCHOICE

## 2023-09-10 RX ORDER — IBUPROFEN 600 MG/1
600 TABLET ORAL EVERY 8 HOURS PRN
COMMUNITY
Start: 2023-09-05 | End: 2024-04-16

## 2023-09-10 RX ORDER — PROPRANOLOL HYDROCHLORIDE 20 MG/1
20 TABLET ORAL 2 TIMES DAILY PRN
COMMUNITY
Start: 2023-07-11

## 2023-09-10 RX ORDER — MUPIROCIN 20 MG/G
1 OINTMENT TOPICAL 2 TIMES DAILY PRN
COMMUNITY
Start: 2023-08-09 | End: 2024-04-23 | Stop reason: ALTCHOICE

## 2023-09-10 RX ORDER — ONDANSETRON 4 MG/1
4 TABLET, ORALLY DISINTEGRATING ORAL 3 TIMES DAILY PRN
COMMUNITY
End: 2024-04-23 | Stop reason: ALTCHOICE

## 2023-09-10 RX ORDER — CLONIDINE HYDROCHLORIDE 0.1 MG/1
1 TABLET ORAL 2 TIMES DAILY
COMMUNITY
Start: 2023-03-14 | End: 2024-04-15 | Stop reason: ENTERED-IN-ERROR

## 2023-09-10 RX ORDER — HYDROXYUREA 500 MG/1
500 CAPSULE ORAL
COMMUNITY
Start: 2018-01-05 | End: 2024-02-19 | Stop reason: ALTCHOICE

## 2023-09-14 DIAGNOSIS — G89.29 CHRONIC LOW BACK PAIN, UNSPECIFIED BACK PAIN LATERALITY, UNSPECIFIED WHETHER SCIATICA PRESENT: ICD-10-CM

## 2023-09-14 DIAGNOSIS — M54.50 CHRONIC LOW BACK PAIN, UNSPECIFIED BACK PAIN LATERALITY, UNSPECIFIED WHETHER SCIATICA PRESENT: ICD-10-CM

## 2023-09-14 RX ORDER — CYCLOBENZAPRINE HCL 10 MG
10 TABLET ORAL NIGHTLY PRN
Qty: 30 TABLET | Refills: 0 | OUTPATIENT
Start: 2023-09-14 | End: 2023-10-14

## 2023-09-14 RX ORDER — CYCLOBENZAPRINE HCL 10 MG
10 TABLET ORAL NIGHTLY PRN
Qty: 30 TABLET | Refills: 0 | Status: SHIPPED | OUTPATIENT
Start: 2023-09-14 | End: 2024-01-08

## 2023-10-11 ENCOUNTER — TELEPHONE (OUTPATIENT)
Dept: PULMONOLOGY | Facility: CLINIC | Age: 68
End: 2023-10-11
Payer: COMMERCIAL

## 2023-10-11 NOTE — TELEPHONE ENCOUNTER
Patient sts she has been sick since seeing you back in June. Patient sts she was diagnosed with pneumonia on Monday through the [facility] and wanted to make you aware.

## 2023-10-11 NOTE — TELEPHONE ENCOUNTER
The patient went to the [facility] urgent care with a respiratory flaring she has underlying COPD.  She is felt to have a left-sided pneumonia and will take the Augmentin that was prescribed and follow-up with me.  She was smoking up until the time of the diagnosis.

## 2023-10-13 ENCOUNTER — OFFICE VISIT (OUTPATIENT)
Dept: SLEEP MEDICINE | Facility: CLINIC | Age: 68
End: 2023-10-13
Payer: COMMERCIAL

## 2023-10-17 ENCOUNTER — OFFICE VISIT (OUTPATIENT)
Dept: PRIMARY CARE | Facility: CLINIC | Age: 68
End: 2023-10-17
Payer: COMMERCIAL

## 2023-10-17 VITALS
WEIGHT: 121 LBS | HEIGHT: 61 IN | BODY MASS INDEX: 22.84 KG/M2 | OXYGEN SATURATION: 94 % | HEART RATE: 85 BPM | DIASTOLIC BLOOD PRESSURE: 67 MMHG | SYSTOLIC BLOOD PRESSURE: 130 MMHG

## 2023-10-17 DIAGNOSIS — D69.2 OTHER NONTHROMBOCYTOPENIC PURPURA (CMS-HCC): ICD-10-CM

## 2023-10-17 DIAGNOSIS — M46.1 SACROILIITIS (CMS-HCC): ICD-10-CM

## 2023-10-17 DIAGNOSIS — J44.9 CHRONIC OBSTRUCTIVE PULMONARY DISEASE, UNSPECIFIED COPD TYPE (MULTI): Primary | ICD-10-CM

## 2023-10-17 DIAGNOSIS — F51.01 PRIMARY INSOMNIA: ICD-10-CM

## 2023-10-17 DIAGNOSIS — J18.9 PNEUMONIA DUE TO INFECTIOUS ORGANISM, UNSPECIFIED LATERALITY, UNSPECIFIED PART OF LUNG: ICD-10-CM

## 2023-10-17 PROCEDURE — 99213 OFFICE O/P EST LOW 20 MIN: CPT | Performed by: FAMILY MEDICINE

## 2023-10-17 PROCEDURE — 1125F AMNT PAIN NOTED PAIN PRSNT: CPT | Performed by: FAMILY MEDICINE

## 2023-10-17 PROCEDURE — 4004F PT TOBACCO SCREEN RCVD TLK: CPT | Performed by: FAMILY MEDICINE

## 2023-10-17 PROCEDURE — 3075F SYST BP GE 130 - 139MM HG: CPT | Performed by: FAMILY MEDICINE

## 2023-10-17 PROCEDURE — 1160F RVW MEDS BY RX/DR IN RCRD: CPT | Performed by: FAMILY MEDICINE

## 2023-10-17 PROCEDURE — 3078F DIAST BP <80 MM HG: CPT | Performed by: FAMILY MEDICINE

## 2023-10-17 PROCEDURE — 1159F MED LIST DOCD IN RCRD: CPT | Performed by: FAMILY MEDICINE

## 2023-10-17 RX ORDER — TRAZODONE HYDROCHLORIDE 50 MG/1
50 TABLET ORAL NIGHTLY PRN
Qty: 90 TABLET | Refills: 1 | Status: SHIPPED | OUTPATIENT
Start: 2023-10-17

## 2023-10-17 RX ORDER — BENZONATATE 100 MG/1
100 CAPSULE ORAL 3 TIMES DAILY PRN
Qty: 42 CAPSULE | Refills: 0 | Status: SHIPPED | OUTPATIENT
Start: 2023-10-17 | End: 2023-11-16

## 2023-10-17 ASSESSMENT — PAIN SCALES - GENERAL: PAINLEVEL: 9

## 2023-10-17 NOTE — PROGRESS NOTES
"Subjective   Patient ID: Judith Bush is a 68 y.o. female who presents for Follow-up (Pneumonia ) and Anxiety.    HPI   The patient mentions that she has been experiencing lethargy, nasal congestion, a cough and generally not feeling well. She mentions that 1 week ago she went to urgent care and was diagnosed with pneumonia and started on Moxifloxacin and has been taking it for the past 7 days. She also followed up with Dr. Burns and mentions that her symptoms have improved a bit since starting this medication. She thinks she may have small fiber neuropathy and her anxiety and depression are worsening and she is interested in starting Clonazepam for her anxiety however, the last time she purchased medical marijuana was in July 2023. The patient reports that she is also having difficulty sleeping. I explained to her the  policy on [place] on Marijuana use. She was upset about not being able to get Clonazepam.    Review of Systems  Constitutional: No fever or chills  Cardiovascular: no chest pain, no palpitations and no syncope.   Respiratory: no cough, no shortness of breath during exertion and no shortness of breath at rest.   Gastrointestinal: no abdominal pain, no nausea and no vomiting.  Neuro: No Headache, no dizziness  Psych: + sleep issues    Objective   /67   Pulse 85   Ht 1.549 m (5' 1\")   Wt 54.9 kg (121 lb)   SpO2 94%   BMI 22.86 kg/m²     Physical Exam  Constitutional: Alert and in no acute distress. Well developed, well nourished  Head and Face: Head and face: Normal.    Cardiovascular: Heart rate and rhythm were normal, normal S1 and S2. No peripheral edema.   Pulmonary: No respiratory distress. Clear bilateral breath sounds.  Musculoskeletal: Gait and station: Normal. Muscle strength/tone: Normal.   Skin: Normal skin color and pigmentation, normal skin turgor, and no rash.    Psychiatric: Judgment and insight: Intact. Mood and affect: Normal.    Lab Results   Component Value Date    WBC " 12.5 (H) 04/15/2023    HGB 12.8 04/15/2023    HCT 37.6 04/15/2023     04/15/2023    CHOL 136 02/15/2023    TRIG 136 02/15/2023    HDL 75.3 02/15/2023    ALT 11 04/15/2023    AST 19 04/15/2023     04/15/2023    K 4.2 04/15/2023     04/15/2023    CREATININE 0.88 04/15/2023    BUN 15 04/15/2023    CO2 28 04/15/2023    TSH 1.18 04/15/2023    INR 1.0 05/16/2022    HGBA1C 5.0 07/22/2019       XR chest 2 view  Narrative: Interpreted By:  CLEO PITTS MD  MRN: [id number]  Patient Name: DEX BUSH     STUDY:  TH CHEST 2 VIEW PA AND LAT;  6/27/2023 1:27 pm     INDICATION:  cough  J44.9: COPD (chronic obstructive pulmonary disease).     COMPARISON:  04/15/2023     ACCESSION NUMBER(S):  [id number]     ORDERING CLINICIAN:  GRICELDA PACK     FINDINGS:  Median sternotomy wires present. The cardiac silhouette is within  normal limits for size. The lungs are hyperinflated. There is no  consolidation. There is no effusion. There is no edema.     Impression: Mediastinum wires present. No other abnormality      Assessment/Plan   Diagnoses and all orders for this visit:  Chronic obstructive pulmonary disease, unspecified COPD type (CMS/HCC)  Pneumonia due to infectious organism, unspecified laterality, unspecified part of lung  -     benzonatate (Tessalon) 100 mg capsule; Take 1 capsule (100 mg) by mouth 3 times a day as needed for cough. Do not crush or chew.  -     XR chest 2 views; Future  Sacroiliitis (CMS/HCC)  Other nonthrombocytopenic purpura (CMS/HCC)  Primary insomnia  -     traZODone (Desyrel) 50 mg tablet; Take 1 tablet (50 mg) by mouth as needed at bedtime for sleep.        Dear Dex Bush     It was my pleasure to take care of you today in the office. Below are the things we discussed today:    1. Cough: Start Tessalon pills.    2. Pneumonia: The patient will get a repeat chest x-ray in 1- 2 weeks.    3. Anxiety: Discussed my inability to prescribe Clonazepam. She was very upset about it. We  discussed other medications but she was not comfortable with that Recommended seeing psych.     4. Sleep issues: Start Trazodone.      Please call me if any questions arise from now until your next visit. I will call you after I am done seeing patients. A Doctor is always available by phone when the office is closed. Please feel free to call for help with any problem that you feel shouldn't wait until the office re-opens.     Scribe Attestation  By signing my name below, I, Abad Alvarado   attest that this documentation has been prepared under the direction and in the presence of Cee Coates MD.

## 2023-10-24 ENCOUNTER — ANCILLARY PROCEDURE (OUTPATIENT)
Dept: RADIOLOGY | Facility: CLINIC | Age: 68
End: 2023-10-24
Payer: COMMERCIAL

## 2023-10-24 DIAGNOSIS — J18.9 PNEUMONIA DUE TO INFECTIOUS ORGANISM, UNSPECIFIED LATERALITY, UNSPECIFIED PART OF LUNG: ICD-10-CM

## 2023-10-24 PROCEDURE — 71046 X-RAY EXAM CHEST 2 VIEWS: CPT | Performed by: RADIOLOGY

## 2023-10-24 PROCEDURE — 71046 X-RAY EXAM CHEST 2 VIEWS: CPT | Mod: FY

## 2023-10-26 ENCOUNTER — TELEPHONE (OUTPATIENT)
Dept: PRIMARY CARE | Facility: CLINIC | Age: 68
End: 2023-10-26

## 2023-10-30 ENCOUNTER — TELEPHONE (OUTPATIENT)
Dept: PRIMARY CARE | Facility: CLINIC | Age: 68
End: 2023-10-30
Payer: COMMERCIAL

## 2023-10-30 DIAGNOSIS — J44.9 CHRONIC OBSTRUCTIVE PULMONARY DISEASE, UNSPECIFIED COPD TYPE (MULTI): Primary | ICD-10-CM

## 2023-10-30 RX ORDER — PREDNISONE 5 MG/1
TABLET ORAL
Qty: 30 TABLET | Refills: 0 | Status: SHIPPED | OUTPATIENT
Start: 2023-10-30 | End: 2024-02-19 | Stop reason: ALTCHOICE

## 2023-11-13 ENCOUNTER — APPOINTMENT (OUTPATIENT)
Dept: ORTHOPEDIC SURGERY | Facility: CLINIC | Age: 68
End: 2023-11-13
Payer: COMMERCIAL

## 2023-11-14 DIAGNOSIS — G47.33 OBSTRUCTIVE SLEEP APNEA: Primary | ICD-10-CM

## 2023-11-14 DIAGNOSIS — F31.9 BIPOLAR AFFECTIVE DISORDER, RAPID CYCLING (MULTI): ICD-10-CM

## 2023-11-14 DIAGNOSIS — J44.9 CHRONIC OBSTRUCTIVE PULMONARY DISEASE, UNSPECIFIED COPD TYPE (MULTI): ICD-10-CM

## 2023-11-14 DIAGNOSIS — G47.00 INSOMNIA, UNSPECIFIED TYPE: ICD-10-CM

## 2023-11-14 NOTE — PROGRESS NOTES
Will try reordering PAP machine through [facility] with Diagnostic study done in Sept.    Discussed with Judith today via phone.

## 2023-11-18 DIAGNOSIS — E78.00 PURE HYPERCHOLESTEROLEMIA: Primary | ICD-10-CM

## 2023-11-20 ENCOUNTER — APPOINTMENT (OUTPATIENT)
Dept: ORTHOPEDIC SURGERY | Facility: CLINIC | Age: 68
End: 2023-11-20
Payer: COMMERCIAL

## 2023-11-20 ENCOUNTER — PHARMACY VISIT (OUTPATIENT)
Dept: PHARMACY | Facility: CLINIC | Age: 68
End: 2023-11-20
Payer: COMMERCIAL

## 2023-11-20 RX ORDER — EVOLOCUMAB 140 MG/ML
INJECTION, SOLUTION SUBCUTANEOUS
Qty: 6 ML | Refills: 3 | Status: SHIPPED | OUTPATIENT
Start: 2023-11-20 | End: 2024-11-18

## 2023-11-27 ENCOUNTER — PHARMACY VISIT (OUTPATIENT)
Dept: PHARMACY | Facility: CLINIC | Age: 68
End: 2023-11-27
Payer: COMMERCIAL

## 2023-11-27 PROCEDURE — RXMED WILLOW AMBULATORY MEDICATION CHARGE

## 2023-12-04 DIAGNOSIS — M54.50 CHRONIC LOW BACK PAIN, UNSPECIFIED BACK PAIN LATERALITY, UNSPECIFIED WHETHER SCIATICA PRESENT: ICD-10-CM

## 2023-12-04 DIAGNOSIS — G89.29 CHRONIC LOW BACK PAIN, UNSPECIFIED BACK PAIN LATERALITY, UNSPECIFIED WHETHER SCIATICA PRESENT: ICD-10-CM

## 2023-12-04 RX ORDER — CYCLOBENZAPRINE HCL 10 MG
10 TABLET ORAL NIGHTLY PRN
Qty: 30 TABLET | Refills: 0 | OUTPATIENT
Start: 2023-12-04 | End: 2024-01-03

## 2024-01-02 ENCOUNTER — APPOINTMENT (OUTPATIENT)
Dept: OPHTHALMOLOGY | Facility: CLINIC | Age: 69
End: 2024-01-02
Payer: COMMERCIAL

## 2024-01-08 DIAGNOSIS — M54.50 CHRONIC LOW BACK PAIN, UNSPECIFIED BACK PAIN LATERALITY, UNSPECIFIED WHETHER SCIATICA PRESENT: ICD-10-CM

## 2024-01-08 DIAGNOSIS — G89.29 CHRONIC LOW BACK PAIN, UNSPECIFIED BACK PAIN LATERALITY, UNSPECIFIED WHETHER SCIATICA PRESENT: ICD-10-CM

## 2024-01-08 RX ORDER — CYCLOBENZAPRINE HCL 10 MG
10 TABLET ORAL NIGHTLY PRN
Qty: 30 TABLET | Refills: 0 | Status: SHIPPED | OUTPATIENT
Start: 2024-01-08 | End: 2024-02-19 | Stop reason: SDUPTHER

## 2024-01-11 ASSESSMENT — EXTERNAL EXAM - LEFT EYE: OS_EXAM: NORMAL

## 2024-01-11 ASSESSMENT — CUP TO DISC RATIO
OD_RATIO: .15
OS_RATIO: .15

## 2024-01-11 ASSESSMENT — EXTERNAL EXAM - RIGHT EYE: OD_EXAM: NORMAL

## 2024-01-11 ASSESSMENT — SLIT LAMP EXAM - LIDS
COMMENTS: GOOD POSITION
COMMENTS: GOOD POSITION

## 2024-01-12 ENCOUNTER — OFFICE VISIT (OUTPATIENT)
Dept: OPHTHALMOLOGY | Facility: CLINIC | Age: 69
End: 2024-01-12
Payer: COMMERCIAL

## 2024-01-12 DIAGNOSIS — H52.03 HYPEROPIA, BILATERAL: ICD-10-CM

## 2024-01-12 DIAGNOSIS — H01.001 BLEPHARITIS OF UPPER EYELIDS OF BOTH EYES, UNSPECIFIED TYPE: ICD-10-CM

## 2024-01-12 DIAGNOSIS — H26.492 PCO (POSTERIOR CAPSULAR OPACIFICATION), LEFT: ICD-10-CM

## 2024-01-12 DIAGNOSIS — Z96.1 PSEUDOPHAKIA: ICD-10-CM

## 2024-01-12 DIAGNOSIS — H26.491 PCO (POSTERIOR CAPSULAR OPACIFICATION), RIGHT: ICD-10-CM

## 2024-01-12 DIAGNOSIS — H53.30 BINOCULAR VISUAL DISTURBANCE: Primary | ICD-10-CM

## 2024-01-12 DIAGNOSIS — H02.834 DERMATOCHALASIS OF BOTH UPPER EYELIDS: ICD-10-CM

## 2024-01-12 DIAGNOSIS — H02.831 DERMATOCHALASIS OF BOTH UPPER EYELIDS: ICD-10-CM

## 2024-01-12 DIAGNOSIS — H52.4 PRESBYOPIA: ICD-10-CM

## 2024-01-12 DIAGNOSIS — H01.004 BLEPHARITIS OF UPPER EYELIDS OF BOTH EYES, UNSPECIFIED TYPE: ICD-10-CM

## 2024-01-12 DIAGNOSIS — H52.203 ASTIGMATISM OF BOTH EYES, UNSPECIFIED TYPE: ICD-10-CM

## 2024-01-12 PROCEDURE — 92083 EXTENDED VISUAL FIELD XM: CPT | Performed by: OPHTHALMOLOGY

## 2024-01-12 PROCEDURE — 92014 COMPRE OPH EXAM EST PT 1/>: CPT | Performed by: OPHTHALMOLOGY

## 2024-01-12 PROCEDURE — 92015 DETERMINE REFRACTIVE STATE: CPT | Performed by: OPHTHALMOLOGY

## 2024-01-12 PROCEDURE — 92134 CPTRZ OPH DX IMG PST SGM RTA: CPT | Mod: BILATERAL PROCEDURE | Performed by: OPHTHALMOLOGY

## 2024-01-12 ASSESSMENT — REFRACTION_MANIFEST
OD_CYLINDER: -1.75
OD_ADD: +3.00
OD_AXIS: 090
OS_AXIS: 100
OS_CYLINDER: -1.50
OS_SPHERE: +1.75
OS_ADD: +3.00
OD_SPHERE: +1.75

## 2024-01-12 ASSESSMENT — TONOMETRY
IOP_METHOD: GOLDMANN APPLANATION
OD_IOP_MMHG: 13
OS_IOP_MMHG: 15

## 2024-01-12 ASSESSMENT — REFRACTION_WEARINGRX
OD_AXIS: 090
OD_SPHERE: +1.75
OD_CYLINDER: -1.75
OS_CYLINDER: -1.50
OS_ADD: +2.50
OS_AXIS: 100
OS_SPHERE: +1.75
OD_ADD: +2.50

## 2024-01-12 ASSESSMENT — VISUAL ACUITY
OD_BAT_MED: 20/20
OS_CC+: -2
METHOD: SNELLEN - LINEAR
OS_CC: 20/20
OS_BAT_MED: 20/20
OD_CC: 20/20

## 2024-01-12 ASSESSMENT — ENCOUNTER SYMPTOMS: EYES NEGATIVE: 1

## 2024-01-22 ENCOUNTER — TELEPHONE (OUTPATIENT)
Dept: SLEEP MEDICINE | Facility: HOSPITAL | Age: 69
End: 2024-01-22
Payer: COMMERCIAL

## 2024-01-22 NOTE — TELEPHONE ENCOUNTER
PAP order, Sleep Study and office note faxed to [place] at [phone] from [facility].

## 2024-01-22 NOTE — TELEPHONE ENCOUNTER
Patient called to tell Valencia Navarrete she still has not heard from anyone from [facility] regarding her new CPAP that she ordered. I let patient know that I ws going to re-fax her notes, order and sleep study to [facility] now and let her know if she has not heard from them in the next 2 weeks to call the sleep nurse back at [phone] and let us know. Patient reminded she needs a 30-90 day follow-up with Valencia Navarrete or on of the providers that are covering her maternity leave. Patient prefers to see Valencia and is scheduled for 6/5/2024. Patient verbalized understanding.

## 2024-01-31 DIAGNOSIS — M54.50 CHRONIC LOW BACK PAIN, UNSPECIFIED BACK PAIN LATERALITY, UNSPECIFIED WHETHER SCIATICA PRESENT: ICD-10-CM

## 2024-01-31 DIAGNOSIS — G89.29 CHRONIC LOW BACK PAIN, UNSPECIFIED BACK PAIN LATERALITY, UNSPECIFIED WHETHER SCIATICA PRESENT: ICD-10-CM

## 2024-02-01 RX ORDER — CYCLOBENZAPRINE HCL 10 MG
10 TABLET ORAL NIGHTLY PRN
Qty: 30 TABLET | Refills: 0 | OUTPATIENT
Start: 2024-02-01 | End: 2024-03-02

## 2024-02-05 DIAGNOSIS — M79.7 FIBROMYALGIA: ICD-10-CM

## 2024-02-05 RX ORDER — DICLOFENAC SODIUM 10 MG/G
4 GEL TOPICAL 4 TIMES DAILY PRN
Qty: 100 G | Refills: 2 | Status: SHIPPED | OUTPATIENT
Start: 2024-02-05 | End: 2024-05-13

## 2024-02-06 ENCOUNTER — TELEPHONE (OUTPATIENT)
Dept: OTOLARYNGOLOGY | Facility: HOSPITAL | Age: 69
End: 2024-02-06
Payer: COMMERCIAL

## 2024-02-06 RX ORDER — MUPIROCIN 20 MG/G
OINTMENT TOPICAL
Qty: 22 G | Refills: 1 | OUTPATIENT
Start: 2024-02-06

## 2024-02-06 NOTE — TELEPHONE ENCOUNTER
Result Communication    No results found from the In Basket message.    8:53 AM      Results {WERE / WERE NOT:09947} successfully communicated with the {RHEUM PARENT/PATIENT:[id number]} and they {AMB Acknowledged/Did Not Acknowledge:32778} their understanding.  Result Communication    No results found from the In Basket message.    8:53 AM      Results {WERE / WERE NOT:89858} successfully communicated with the {RHEUM PARENT/PATIENT:[id number]} and they {AMB Acknowledged/Did Not Acknowledge:59635} their understanding.  Result Communication    No results found from the In Basket message.    8:51 AM      Results {WERE / WERE NOT:67256} successfully communicated with the {RHEUM PARENT/PATIENT:[id number]} and they {AMB Acknowledged/Did Not Acknowledge:39914} their understanding.

## 2024-02-19 ENCOUNTER — OFFICE VISIT (OUTPATIENT)
Dept: PRIMARY CARE | Facility: CLINIC | Age: 69
End: 2024-02-19
Payer: COMMERCIAL

## 2024-02-19 VITALS
SYSTOLIC BLOOD PRESSURE: 128 MMHG | OXYGEN SATURATION: 96 % | WEIGHT: 128.2 LBS | DIASTOLIC BLOOD PRESSURE: 73 MMHG | HEIGHT: 61 IN | HEART RATE: 68 BPM | BODY MASS INDEX: 24.2 KG/M2

## 2024-02-19 DIAGNOSIS — D69.2 OTHER NONTHROMBOCYTOPENIC PURPURA (CMS-HCC): ICD-10-CM

## 2024-02-19 DIAGNOSIS — F99 INSOMNIA DUE TO OTHER MENTAL DISORDER: ICD-10-CM

## 2024-02-19 DIAGNOSIS — Z00.00 MEDICARE ANNUAL WELLNESS VISIT, SUBSEQUENT: Primary | ICD-10-CM

## 2024-02-19 DIAGNOSIS — J44.9 CHRONIC OBSTRUCTIVE PULMONARY DISEASE, UNSPECIFIED COPD TYPE (MULTI): ICD-10-CM

## 2024-02-19 DIAGNOSIS — I25.2 HISTORY OF NON-ST ELEVATION MYOCARDIAL INFARCTION (NSTEMI): ICD-10-CM

## 2024-02-19 DIAGNOSIS — R73.9 ELEVATED BLOOD SUGAR: ICD-10-CM

## 2024-02-19 DIAGNOSIS — B00.9 HERPES: ICD-10-CM

## 2024-02-19 DIAGNOSIS — K21.9 GASTROESOPHAGEAL REFLUX DISEASE WITHOUT ESOPHAGITIS: ICD-10-CM

## 2024-02-19 DIAGNOSIS — G43.009 MIGRAINE WITHOUT AURA AND WITHOUT STATUS MIGRAINOSUS, NOT INTRACTABLE: ICD-10-CM

## 2024-02-19 DIAGNOSIS — N95.2 ATROPHIC VAGINITIS: ICD-10-CM

## 2024-02-19 DIAGNOSIS — F51.05 INSOMNIA DUE TO OTHER MENTAL DISORDER: ICD-10-CM

## 2024-02-19 DIAGNOSIS — E55.9 VITAMIN D DEFICIENCY: ICD-10-CM

## 2024-02-19 DIAGNOSIS — E53.8 VITAMIN B12 DEFICIENCY: ICD-10-CM

## 2024-02-19 DIAGNOSIS — K64.8 OTHER HEMORRHOIDS: ICD-10-CM

## 2024-02-19 DIAGNOSIS — F31.9 BIPOLAR AFFECTIVE DISORDER, RAPID CYCLING (MULTI): ICD-10-CM

## 2024-02-19 DIAGNOSIS — R94.6 ABNORMAL THYROID EXAM: ICD-10-CM

## 2024-02-19 DIAGNOSIS — M46.1 SACROILIITIS (CMS-HCC): ICD-10-CM

## 2024-02-19 DIAGNOSIS — M81.0 AGE-RELATED OSTEOPOROSIS WITHOUT CURRENT PATHOLOGICAL FRACTURE: ICD-10-CM

## 2024-02-19 DIAGNOSIS — I10 PRIMARY HYPERTENSION: ICD-10-CM

## 2024-02-19 DIAGNOSIS — G47.33 OBSTRUCTIVE SLEEP APNEA: ICD-10-CM

## 2024-02-19 DIAGNOSIS — E78.00 PURE HYPERCHOLESTEROLEMIA: ICD-10-CM

## 2024-02-19 DIAGNOSIS — Z12.31 ENCOUNTER FOR SCREENING MAMMOGRAM FOR BREAST CANCER: ICD-10-CM

## 2024-02-19 PROBLEM — I78.1 NEVUS, NON-NEOPLASTIC: Status: RESOLVED | Noted: 2022-09-15 | Resolved: 2024-02-19

## 2024-02-19 PROBLEM — L57.9 SKIN CHANGES DUE TO CHRONIC EXPOSURE TO NONIONIZING RADIATION, UNSPECIFIED: Status: RESOLVED | Noted: 2022-09-15 | Resolved: 2024-02-19

## 2024-02-19 PROBLEM — M54.50 CHRONIC LOWER BACK PAIN: Status: RESOLVED | Noted: 2023-08-15 | Resolved: 2024-02-19

## 2024-02-19 PROBLEM — L82.1 OTHER SEBORRHEIC KERATOSIS: Status: RESOLVED | Noted: 2022-09-15 | Resolved: 2024-02-19

## 2024-02-19 PROBLEM — D22.5 MELANOCYTIC NEVI OF TRUNK: Status: RESOLVED | Noted: 2022-09-15 | Resolved: 2024-02-19

## 2024-02-19 PROBLEM — D22.4 MELANOCYTIC NEVI OF SCALP AND NECK: Status: RESOLVED | Noted: 2022-09-15 | Resolved: 2024-02-19

## 2024-02-19 PROBLEM — L72.3 SEBACEOUS CYST: Status: RESOLVED | Noted: 2022-09-15 | Resolved: 2024-02-19

## 2024-02-19 PROBLEM — D22.60 MELANOCYTIC NEVI OF UNSPECIFIED UPPER LIMB, INCLUDING SHOULDER: Status: RESOLVED | Noted: 2022-09-15 | Resolved: 2024-02-19

## 2024-02-19 PROBLEM — I34.1 MITRAL VALVE PROLAPSE SYNDROME: Status: RESOLVED | Noted: 2023-08-15 | Resolved: 2024-02-19

## 2024-02-19 PROBLEM — G89.29 CHRONIC LOWER BACK PAIN: Status: RESOLVED | Noted: 2023-08-15 | Resolved: 2024-02-19

## 2024-02-19 PROBLEM — L30.9 DERMATITIS, UNSPECIFIED: Status: RESOLVED | Noted: 2022-09-15 | Resolved: 2024-02-19

## 2024-02-19 PROCEDURE — 1159F MED LIST DOCD IN RCRD: CPT | Performed by: FAMILY MEDICINE

## 2024-02-19 PROCEDURE — 3078F DIAST BP <80 MM HG: CPT | Performed by: FAMILY MEDICINE

## 2024-02-19 PROCEDURE — 1160F RVW MEDS BY RX/DR IN RCRD: CPT | Performed by: FAMILY MEDICINE

## 2024-02-19 PROCEDURE — 1170F FXNL STATUS ASSESSED: CPT | Performed by: FAMILY MEDICINE

## 2024-02-19 PROCEDURE — 1125F AMNT PAIN NOTED PAIN PRSNT: CPT | Performed by: FAMILY MEDICINE

## 2024-02-19 PROCEDURE — G0439 PPPS, SUBSEQ VISIT: HCPCS | Performed by: FAMILY MEDICINE

## 2024-02-19 PROCEDURE — 99214 OFFICE O/P EST MOD 30 MIN: CPT | Performed by: FAMILY MEDICINE

## 2024-02-19 PROCEDURE — 1123F ACP DISCUSS/DSCN MKR DOCD: CPT | Performed by: FAMILY MEDICINE

## 2024-02-19 PROCEDURE — 99397 PER PM REEVAL EST PAT 65+ YR: CPT | Performed by: FAMILY MEDICINE

## 2024-02-19 PROCEDURE — 3074F SYST BP LT 130 MM HG: CPT | Performed by: FAMILY MEDICINE

## 2024-02-19 RX ORDER — OXYBUTYNIN CHLORIDE 10 MG/1
10 TABLET, EXTENDED RELEASE ORAL 2 TIMES DAILY
Qty: 180 TABLET | Refills: 1 | Status: SHIPPED | OUTPATIENT
Start: 2024-02-19

## 2024-02-19 RX ORDER — DILTIAZEM HYDROCHLORIDE 180 MG/1
180 CAPSULE, COATED, EXTENDED RELEASE ORAL DAILY
Qty: 90 CAPSULE | Refills: 1 | Status: SHIPPED | OUTPATIENT
Start: 2024-02-19 | End: 2024-04-23 | Stop reason: SDUPTHER

## 2024-02-19 RX ORDER — CYCLOBENZAPRINE HCL 10 MG
10 TABLET ORAL NIGHTLY PRN
Qty: 90 TABLET | Refills: 1 | Status: SHIPPED | OUTPATIENT
Start: 2024-02-19

## 2024-02-19 RX ORDER — OMEPRAZOLE 40 MG/1
40 CAPSULE, DELAYED RELEASE ORAL
Qty: 90 CAPSULE | Refills: 1 | Status: SHIPPED | OUTPATIENT
Start: 2024-02-19 | End: 2024-04-16

## 2024-02-19 RX ORDER — QUETIAPINE FUMARATE 50 MG/1
50 TABLET, FILM COATED ORAL NIGHTLY
COMMUNITY
End: 2024-04-15 | Stop reason: ENTERED-IN-ERROR

## 2024-02-19 RX ORDER — VALACYCLOVIR HYDROCHLORIDE 1 G/1
1000 TABLET, FILM COATED ORAL 3 TIMES DAILY
Qty: 21 TABLET | Refills: 4 | Status: SHIPPED | OUTPATIENT
Start: 2024-02-19 | End: 2024-03-08 | Stop reason: SDUPTHER

## 2024-02-19 RX ORDER — DIAPER,BRIEF,INFANT-TODD,DISP
1 EACH MISCELLANEOUS 2 TIMES DAILY PRN
Qty: 30 G | Refills: 3 | Status: SHIPPED | OUTPATIENT
Start: 2024-02-19

## 2024-02-19 RX ORDER — SUMATRIPTAN 50 MG/1
50 TABLET, FILM COATED ORAL ONCE AS NEEDED
Qty: 9 TABLET | Refills: 2 | Status: SHIPPED | OUTPATIENT
Start: 2024-02-19 | End: 2024-04-16

## 2024-02-19 ASSESSMENT — COLUMBIA-SUICIDE SEVERITY RATING SCALE - C-SSRS
2. HAVE YOU ACTUALLY HAD ANY THOUGHTS OF KILLING YOURSELF?: NO
1. IN THE PAST MONTH, HAVE YOU WISHED YOU WERE DEAD OR WISHED YOU COULD GO TO SLEEP AND NOT WAKE UP?: NO

## 2024-02-19 ASSESSMENT — PATIENT HEALTH QUESTIONNAIRE - PHQ9
1. LITTLE INTEREST OR PLEASURE IN DOING THINGS: NOT AT ALL
SUM OF ALL RESPONSES TO PHQ9 QUESTIONS 1 AND 2: 1
10. IF YOU CHECKED OFF ANY PROBLEMS, HOW DIFFICULT HAVE THESE PROBLEMS MADE IT FOR YOU TO DO YOUR WORK, TAKE CARE OF THINGS AT HOME, OR GET ALONG WITH OTHER PEOPLE: NOT DIFFICULT AT ALL
2. FEELING DOWN, DEPRESSED OR HOPELESS: SEVERAL DAYS

## 2024-02-19 ASSESSMENT — ACTIVITIES OF DAILY LIVING (ADL)
GROCERY_SHOPPING: INDEPENDENT
BATHING: INDEPENDENT
MANAGING_FINANCES: INDEPENDENT
DRESSING: INDEPENDENT
TAKING_MEDICATION: INDEPENDENT
DOING_HOUSEWORK: INDEPENDENT

## 2024-02-19 NOTE — PROGRESS NOTES
"Subjective   Patient ID: Judith Bush is a 68 y.o. female who presents for Med Refill, blood presure check, and Medicare Annual Wellness Visit Subsequent.      HPI   The patient states that she is taking oxybutynin for her vaginal atrophy and overactive bladder, Tymlos for her osteoporosis, Topamax, propanolol as well as Sumatriptan as needed for her migraines, Zofran as needed for nausea, Celebrex, a muscle relaxer and ibuprofen as needed for chronic lower back pain, Trazodone for her insomnia, Repatha for hyperlipidemia and coronary artery disease and Diltiazem and clonidine for her hypertension. She is taking these medications as prescribed and has no side effects from them. She mentions that she was recently started on Seroquel by Psychiatry. She states that she is scheduled for hip replacement surgery. She is currently using the Bevespi inhaler daily as well as Albuterol as needed and does not think that her COPD is well- controlled at this time. In addition, the patient is using a CPAP machine for obstructive sleep apnea. She reports that she has a herpes flare on her buttock area.    Review of Systems  Constitutional: No fever or chills, No Night Sweats  Eyes: No Blurry Vision or Eye sight problems  ENT: + nasal congestion. No Nasal Discharge, Hoarseness, sore throat  Cardiovascular: no chest pain, no palpitations and no syncope.   Respiratory: no cough, no shortness of breath during exertion and no shortness of breath at rest.   Gastrointestinal: no abdominal pain, no nausea and no vomiting.   : No vaginal discharge, burning with urination, no blood in urine or stools  Skin: No Skin rashes or Lesions  Neuro: + Headache, no dizziness or Numbness or tingling  Psych: + Anxiety, depression or sleeping problems  Heme: No Easy bleeding or brusing.     Objective   /73   Pulse 68   Ht 1.549 m (5' 1\")   Wt 58.2 kg (128 lb 3.2 oz)   SpO2 96%   BMI 24.22 kg/m²     Physical Exam  Constitutional: Alert " and in no acute distress. Well developed, well nourished.   Head and Face: Head and face: Normal.    Eyes: Normal external exam. Pupils were equal in size, round, reactive to light (PERRL) with normal accommodation and extraocular movements intact (EOMI).   Ears, Nose, Mouth, and Throat: External inspection of ears and nose: Normal.  Hearing: Normal.  Nasal mucosa, septum, and turbinates: Normal.  Lips, teeth, and gums: Normal.  Oropharynx: Normal.   Neck: No neck mass was observed. Supple. Thyroid not enlarged and there were no palpable thyroid nodules.   Cardiovascular: Heart rate and rhythm were normal, normal S1 and S2. Pedal pulses: Normal. No peripheral edema.   Pulmonary: No respiratory distress. Clear bilateral breath sounds.   Abdomen: Soft nontender; no abdominal mass palpated. Normal bowel sounds. No organomegaly.   Musculoskeletal: No joint swelling seen, normal movements of all extremities. Range of motion: Normal.  Muscle strength/tone: Normal.    Skin: Normal skin color and pigmentation, normal skin turgor, and no rash.   Neurologic: Deep tendon reflexes were 2+ and symmetric.   Psychiatric: Judgment and insight: Intact. Mood and affect: Normal.  Lymphatic: No cervical lymphadenopathy. Palpation of lymph nodes in axillae: Normal.  Palpation of lymph nodes in groin: Normal.    Lab Results   Component Value Date    WBC 12.5 (H) 04/15/2023    HGB 12.8 04/15/2023    HCT 37.6 04/15/2023     04/15/2023    CHOL 136 02/15/2023    TRIG 136 02/15/2023    HDL 75.3 02/15/2023    ALT 11 04/15/2023    AST 19 04/15/2023     04/15/2023    K 4.2 04/15/2023     04/15/2023    CREATININE 0.88 04/15/2023    BUN 15 04/15/2023    CO2 28 04/15/2023    TSH 1.18 04/15/2023    INR 1.0 05/16/2022    HGBA1C 5.0 07/22/2019       Springer Visual Field - OU - Both Eyes  Right Eye  Threshold was 24-2. Strategy was [place]. Reliability was good.     Left Eye  Threshold was 24-2. Strategy was [place]. Reliability was good.      Notes  HVF 24-2 (1/12/24) - OD: 3%FP 1%FN. Scattered nasal depression. OS: 2/16FL   4%FP 8%FN. Scattered. Similar, improved pattern from 3/17/22.   OCT, Retina - OU - Both Eyes  Right Eye  Quality was good. Progression has been stable.     Left Eye  Quality was good. Progression has been stable.     Notes  Retinal multimodal imaging including photography was completed, and the   findings are described in the examination.    OCT macula (1/12/24) - Normal thickness and contour OU. Intact IS-OS OU.   No edema OU. 241/240. Stable from 7/9/20.   OCT, Optic Nerve - OU - Both Eyes  Right Eye  Images reviewed and comparison made to baseline, Images reviewed. To   assess optic nerve function and for use in future follow-up. Reliability:   good and adequate.     Left Eye  Images reviewed and comparison made to baseline, Images reviewed. To   assess optic nerve function and for use in future follow-up. Reliability:   good and adequate.     Notes  OCT RNFL (1/12/24) - OD: Bord ST. OS: Bord ST. 84/86. Stable from 7/9/20.      Assessment/Plan   Diagnoses and all orders for this visit:  Medicare annual wellness visit, subsequent  Encounter for screening mammogram for breast cancer  -     BI mammo bilateral screening tomosynthesis; Future  Age-related osteoporosis without current pathological fracture  History of non-ST elevation myocardial infarction (NSTEMI)  Pure hypercholesterolemia  -     CBC; Future  -     Comprehensive Metabolic Panel; Future  -     Lipid Panel; Future  Insomnia due to other mental disorder  Obstructive sleep apnea  Migraine without aura and without status migrainosus, not intractable  -     SUMAtriptan (Imitrex) 50 mg tablet; Take 1 tablet (50 mg) by mouth 1 time if needed for migraine.  Primary hypertension  -     dilTIAZem CD (Cardizem CD) 180 mg 24 hr capsule; Take 1 capsule (180 mg) by mouth once daily.  -     Albumin , Urine Random; Future  Chronic obstructive pulmonary disease, unspecified COPD  type (CMS/HCC)  Sacroiliitis (CMS/HCC)  -     cyclobenzaprine (Flexeril) 10 mg tablet; Take 1 tablet (10 mg) by mouth as needed at bedtime for muscle spasms.  Bipolar affective disorder, rapid cycling (CMS/HCC)  Other nonthrombocytopenic purpura (CMS/HCC)  Vitamin D deficiency  -     Vitamin D 25-Hydroxy,Total (for eval of Vitamin D levels); Future  Vitamin B12 deficiency  -     Vitamin B12; Future  Elevated blood sugar  -     Hemoglobin A1C; Future  Abnormal thyroid exam  -     TSH with reflex to Free T4 if abnormal; Future  Atrophic vaginitis  -     oxybutynin XL (Ditropan-XL) 10 mg 24 hr tablet; Take 1 tablet (10 mg) by mouth 2 times a day.  Gastroesophageal reflux disease without esophagitis  -     omeprazole (PriLOSEC) 40 mg DR capsule; Take 1 capsule (40 mg) by mouth once daily in the morning. Take before meals. Take 30 minutes prior to dinner  Other hemorrhoids  -     hydrocortisone acetate 1 % ointment; Apply 1 Application topically 2 times a day as needed (for hemorrhoids).  Herpes  -     valACYclovir (Valtrex) 1 gram tablet; Take 1 tablet (1,000 mg) by mouth 3 times a day.        Dear Judith Bush     It was my pleasure to take care of you today in the office. Below are the things we discussed today:    1. 1. Immunizations: Yearly Flu shot is recommended.          a: COVID: Please get from the pharmacy         b: Tetanus: Up-to-date. Last done in 2021         c: Shingrix: Please get from the pharmacy          d: Pneumovax: Up-to-date         e: Prevnar: Up-to-date    2. Blood Work: Ordered   3. Seen your dentist twice a year  4. Yearly Eye exam is recommended    5. BMI: Normal   6: Diet recommendations:   Eat Clean, Try to have as many home cooked meals as possible  Avoid processed foods which contain excess calories, sugar, and sodium.    7. Exercise recommendations:   150 minutes a week to maintain your weight     If you have to loose weight, you need a better diet and exercise plan.     8.  Supplements recommended:  a - Calcium 600 mg up to twice a day to get a total of 1200 mg. Each 8 oz of milk or yogurt or 1 oz of cheese, 1 Banana, 1 serving of green Leafy vegetable has about 300 mg of Calcium, so you may subtract that amount. Calcium citrate is the only acceptable supplement to take if you take an acid suppressing medication like Prilosec; otherwise Calcium carbonate is acceptable too (It can cause Constipation).   b - Vitamin D - 2000 IU daily     9. Please get your Living will / Advance directive completed if you do not have one already. Please make sure our office has a copy of the latest one.     10. Colonoscopy: Uptodate. Last done in April 2023, repeat in April 2025  11. Mammogram: Ordered  12. PAP: Not indicated   13. DEXA: Up-to-date. The patient follows up with Rheumatology at the clinic  14: Skin Check: Please see Dermatology once a year for a Skin Check.     15. Hypertension: Continue clonidine and Diltiazem.     16. Hyperlipidemia and CAD: Continue Repatha.     17. GERD: Continue omeprazole.    18. Insomnia: Continue Trazodone.    19. Chronic lower back pain: Continue Celebrex , Flexeril and use Ibuprofen as needed.    20. Nausea: Continue Zofran as needed.    21. COPD: Continue Bevespi and use Albuterol as needed. The patient will let me know when she has transportation and would like to start Pulmonary rehab.    22. Migraines: Continue Topamax, propanolol and use sumatriptan.     23. Osteoporosis: Continue Tymlos.    24. SHON: The patient is using a CPAP machine.     25. Bipolar affective disorder: Continue Seroquel. The patient is being managed by Psych.    26. Vaginal atrophy and overactive bladder: Continue oxybutynin.     27. Herpes: Use Valtrex.    28. Hemorrhoids: Use hydrocortisone ointment.    Follow up in 6 months.     Follow up in one year for a Physical. Please call the office before your Physical to see if you need blood work completed prior to your physical.     Please  call me if any questions arise from now until your next visit. I will call you after I am done seeing patients. A Doctor is always available by phone when the office is closed. Please feel free to call for help with any problem that you feel shouldn't wait until the office re-opens.     Scribe Attestation  By signing my name below, IAgata, Abad   attest that this documentation has been prepared under the direction and in the presence of Cee Coates MD.

## 2024-03-06 ENCOUNTER — PATIENT MESSAGE (OUTPATIENT)
Dept: PRIMARY CARE | Facility: CLINIC | Age: 69
End: 2024-03-06
Payer: COMMERCIAL

## 2024-03-06 DIAGNOSIS — B00.9 HERPES: ICD-10-CM

## 2024-03-06 PROCEDURE — RXMED WILLOW AMBULATORY MEDICATION CHARGE

## 2024-03-07 ENCOUNTER — PHARMACY VISIT (OUTPATIENT)
Dept: PHARMACY | Facility: CLINIC | Age: 69
End: 2024-03-07
Payer: COMMERCIAL

## 2024-03-08 RX ORDER — VALACYCLOVIR HYDROCHLORIDE 1 G/1
1000 TABLET, FILM COATED ORAL 3 TIMES DAILY
Qty: 21 TABLET | Refills: 4 | Status: SHIPPED | OUTPATIENT
Start: 2024-03-08

## 2024-04-15 ENCOUNTER — HOSPITAL ENCOUNTER (OUTPATIENT)
Facility: HOSPITAL | Age: 69
Setting detail: OBSERVATION
Discharge: HOME | End: 2024-04-15
Attending: EMERGENCY MEDICINE | Admitting: INTERNAL MEDICINE
Payer: COMMERCIAL

## 2024-04-15 ENCOUNTER — APPOINTMENT (OUTPATIENT)
Dept: RADIOLOGY | Facility: HOSPITAL | Age: 69
End: 2024-04-15
Payer: COMMERCIAL

## 2024-04-15 ENCOUNTER — APPOINTMENT (OUTPATIENT)
Dept: CARDIOLOGY | Facility: HOSPITAL | Age: 69
End: 2024-04-15
Payer: COMMERCIAL

## 2024-04-15 VITALS
OXYGEN SATURATION: 97 % | BODY MASS INDEX: 25.68 KG/M2 | TEMPERATURE: 98.1 F | HEART RATE: 73 BPM | SYSTOLIC BLOOD PRESSURE: 144 MMHG | RESPIRATION RATE: 18 BRPM | HEIGHT: 61 IN | WEIGHT: 136 LBS | DIASTOLIC BLOOD PRESSURE: 78 MMHG

## 2024-04-15 DIAGNOSIS — I63.9 CEREBROVASCULAR ACCIDENT (CVA), UNSPECIFIED MECHANISM (MULTI): ICD-10-CM

## 2024-04-15 DIAGNOSIS — I63.412 CEREBROVASCULAR ACCIDENT (CVA) DUE TO EMBOLISM OF LEFT MIDDLE CEREBRAL ARTERY (MULTI): Primary | ICD-10-CM

## 2024-04-15 DIAGNOSIS — G45.9 TRANSIENT CEREBRAL ISCHEMIA, UNSPECIFIED TYPE: ICD-10-CM

## 2024-04-15 DIAGNOSIS — R41.82 AMS (ALTERED MENTAL STATUS): ICD-10-CM

## 2024-04-15 PROBLEM — Z91.89 STROKE RISK: Status: ACTIVE | Noted: 2024-04-15

## 2024-04-15 LAB
ALBUMIN SERPL BCP-MCNC: 4.8 G/DL (ref 3.4–5)
ALP SERPL-CCNC: 85 U/L (ref 33–136)
ALT SERPL W P-5'-P-CCNC: 11 U/L (ref 7–45)
ANION GAP SERPL CALC-SCNC: 15 MMOL/L (ref 10–20)
AORTIC VALVE MEAN GRADIENT: 5 MMHG
AORTIC VALVE PEAK VELOCITY: 1.57 M/S
APPEARANCE UR: CLEAR
AST SERPL W P-5'-P-CCNC: 21 U/L (ref 9–39)
AV PEAK GRADIENT: 9.8 MMHG
AVA (PEAK VEL): 1.82 CM2
AVA (VTI): 1.73 CM2
BILIRUB SERPL-MCNC: 0.6 MG/DL (ref 0–1.2)
BILIRUB UR STRIP.AUTO-MCNC: NEGATIVE MG/DL
BUN SERPL-MCNC: 26 MG/DL (ref 6–23)
CALCIUM SERPL-MCNC: 10 MG/DL (ref 8.6–10.3)
CARDIAC TROPONIN I PNL SERPL HS: 5 NG/L (ref 0–13)
CHLORIDE SERPL-SCNC: 102 MMOL/L (ref 98–107)
CHOLEST SERPL-MCNC: 175 MG/DL (ref 0–199)
CHOLESTEROL/HDL RATIO: 2.2
CO2 SERPL-SCNC: 22 MMOL/L (ref 21–32)
COLOR UR: COLORLESS
CREAT SERPL-MCNC: 0.92 MG/DL (ref 0.5–1.05)
EGFRCR SERPLBLD CKD-EPI 2021: 68 ML/MIN/1.73M*2
EJECTION FRACTION APICAL 4 CHAMBER: 61.9
ERYTHROCYTE [DISTWIDTH] IN BLOOD BY AUTOMATED COUNT: 13.1 % (ref 11.5–14.5)
EST. AVERAGE GLUCOSE BLD GHB EST-MCNC: 103 MG/DL
GLUCOSE BLD MANUAL STRIP-MCNC: 104 MG/DL (ref 74–99)
GLUCOSE BLD MANUAL STRIP-MCNC: 96 MG/DL (ref 74–99)
GLUCOSE SERPL-MCNC: 95 MG/DL (ref 74–99)
GLUCOSE UR STRIP.AUTO-MCNC: NORMAL MG/DL
HBA1C MFR BLD: 5.2 %
HCT VFR BLD AUTO: 44 % (ref 36–46)
HDLC SERPL-MCNC: 81 MG/DL
HGB BLD-MCNC: 15 G/DL (ref 12–16)
INR PPP: 0.9 (ref 0.9–1.1)
KETONES UR STRIP.AUTO-MCNC: NEGATIVE MG/DL
LDLC SERPL CALC-MCNC: 74 MG/DL
LEFT ATRIUM VOLUME AREA LENGTH INDEX BSA: 39 ML/M2
LEFT VENTRICLE INTERNAL DIMENSION DIASTOLE: 4.2 CM (ref 3.5–6)
LEFT VENTRICULAR OUTFLOW TRACT DIAMETER: 1.83 CM
LEUKOCYTE ESTERASE UR QL STRIP.AUTO: NEGATIVE
LV EJECTION FRACTION BIPLANE: 62 %
MCH RBC QN AUTO: 31.9 PG (ref 26–34)
MCHC RBC AUTO-ENTMCNC: 34.1 G/DL (ref 32–36)
MCV RBC AUTO: 94 FL (ref 80–100)
MITRAL VALVE E/A RATIO: 1.13
MITRAL VALVE E/E' RATIO: 18.08
NITRITE UR QL STRIP.AUTO: NEGATIVE
NON HDL CHOLESTEROL: 94 MG/DL (ref 0–149)
NRBC BLD-RTO: 0 /100 WBCS (ref 0–0)
PH UR STRIP.AUTO: 6.5 [PH]
PLATELET # BLD AUTO: 226 X10*3/UL (ref 150–450)
POTASSIUM SERPL-SCNC: 4.4 MMOL/L (ref 3.5–5.3)
PROT SERPL-MCNC: 7.9 G/DL (ref 6.4–8.2)
PROT UR STRIP.AUTO-MCNC: NEGATIVE MG/DL
PROTHROMBIN TIME: 10.5 SECONDS (ref 9.8–12.8)
RBC # BLD AUTO: 4.7 X10*6/UL (ref 4–5.2)
RBC # UR STRIP.AUTO: NEGATIVE /UL
RIGHT VENTRICLE FREE WALL PEAK S': 11 CM/S
RIGHT VENTRICLE PEAK SYSTOLIC PRESSURE: 33.7 MMHG
SODIUM SERPL-SCNC: 135 MMOL/L (ref 136–145)
SP GR UR STRIP.AUTO: 1.03
TRICUSPID ANNULAR PLANE SYSTOLIC EXCURSION: 1.8 CM
TRIGL SERPL-MCNC: 100 MG/DL (ref 0–149)
UROBILINOGEN UR STRIP.AUTO-MCNC: NORMAL MG/DL
VLDL: 20 MG/DL (ref 0–40)
WBC # BLD AUTO: 11.6 X10*3/UL (ref 4.4–11.3)

## 2024-04-15 PROCEDURE — 71045 X-RAY EXAM CHEST 1 VIEW: CPT

## 2024-04-15 PROCEDURE — 84484 ASSAY OF TROPONIN QUANT: CPT | Performed by: EMERGENCY MEDICINE

## 2024-04-15 PROCEDURE — 2500000001 HC RX 250 WO HCPCS SELF ADMINISTERED DRUGS (ALT 637 FOR MEDICARE OP): Performed by: PSYCHIATRY & NEUROLOGY

## 2024-04-15 PROCEDURE — 70450 CT HEAD/BRAIN W/O DYE: CPT | Performed by: RADIOLOGY

## 2024-04-15 PROCEDURE — 70551 MRI BRAIN STEM W/O DYE: CPT

## 2024-04-15 PROCEDURE — 70498 CT ANGIOGRAPHY NECK: CPT

## 2024-04-15 PROCEDURE — 70498 CT ANGIOGRAPHY NECK: CPT | Performed by: RADIOLOGY

## 2024-04-15 PROCEDURE — 99291 CRITICAL CARE FIRST HOUR: CPT | Performed by: EMERGENCY MEDICINE

## 2024-04-15 PROCEDURE — 2500000002 HC RX 250 W HCPCS SELF ADMINISTERED DRUGS (ALT 637 FOR MEDICARE OP, ALT 636 FOR OP/ED): Mod: MUE | Performed by: NURSE PRACTITIONER

## 2024-04-15 PROCEDURE — 70450 CT HEAD/BRAIN W/O DYE: CPT | Mod: 59

## 2024-04-15 PROCEDURE — G0378 HOSPITAL OBSERVATION PER HR: HCPCS

## 2024-04-15 PROCEDURE — 2500000002 HC RX 250 W HCPCS SELF ADMINISTERED DRUGS (ALT 637 FOR MEDICARE OP, ALT 636 FOR OP/ED): Performed by: NURSE PRACTITIONER

## 2024-04-15 PROCEDURE — 83036 HEMOGLOBIN GLYCOSYLATED A1C: CPT | Mod: AHULAB | Performed by: NURSE PRACTITIONER

## 2024-04-15 PROCEDURE — 80053 COMPREHEN METABOLIC PANEL: CPT | Performed by: EMERGENCY MEDICINE

## 2024-04-15 PROCEDURE — 82947 ASSAY GLUCOSE BLOOD QUANT: CPT | Mod: 59

## 2024-04-15 PROCEDURE — 81003 URINALYSIS AUTO W/O SCOPE: CPT | Performed by: NURSE PRACTITIONER

## 2024-04-15 PROCEDURE — 93306 TTE W/DOPPLER COMPLETE: CPT

## 2024-04-15 PROCEDURE — 70496 CT ANGIOGRAPHY HEAD: CPT | Performed by: RADIOLOGY

## 2024-04-15 PROCEDURE — 71045 X-RAY EXAM CHEST 1 VIEW: CPT | Performed by: RADIOLOGY

## 2024-04-15 PROCEDURE — 85027 COMPLETE CBC AUTOMATED: CPT | Performed by: EMERGENCY MEDICINE

## 2024-04-15 PROCEDURE — 2550000001 HC RX 255 CONTRASTS: Performed by: EMERGENCY MEDICINE

## 2024-04-15 PROCEDURE — 2500000004 HC RX 250 GENERAL PHARMACY W/ HCPCS (ALT 636 FOR OP/ED): Performed by: NURSE PRACTITIONER

## 2024-04-15 PROCEDURE — 93306 TTE W/DOPPLER COMPLETE: CPT | Performed by: INTERNAL MEDICINE

## 2024-04-15 PROCEDURE — 99222 1ST HOSP IP/OBS MODERATE 55: CPT | Performed by: NURSE PRACTITIONER

## 2024-04-15 PROCEDURE — 96372 THER/PROPH/DIAG INJ SC/IM: CPT | Performed by: NURSE PRACTITIONER

## 2024-04-15 PROCEDURE — 2500000001 HC RX 250 WO HCPCS SELF ADMINISTERED DRUGS (ALT 637 FOR MEDICARE OP): Performed by: NURSE PRACTITIONER

## 2024-04-15 PROCEDURE — 36415 COLL VENOUS BLD VENIPUNCTURE: CPT | Performed by: EMERGENCY MEDICINE

## 2024-04-15 PROCEDURE — 99222 1ST HOSP IP/OBS MODERATE 55: CPT | Performed by: PSYCHIATRY & NEUROLOGY

## 2024-04-15 PROCEDURE — 85610 PROTHROMBIN TIME: CPT | Performed by: EMERGENCY MEDICINE

## 2024-04-15 PROCEDURE — 70551 MRI BRAIN STEM W/O DYE: CPT | Performed by: STUDENT IN AN ORGANIZED HEALTH CARE EDUCATION/TRAINING PROGRAM

## 2024-04-15 PROCEDURE — 80061 LIPID PANEL: CPT | Performed by: NURSE PRACTITIONER

## 2024-04-15 PROCEDURE — 70496 CT ANGIOGRAPHY HEAD: CPT

## 2024-04-15 PROCEDURE — 99285 EMERGENCY DEPT VISIT HI MDM: CPT | Mod: 25

## 2024-04-15 PROCEDURE — 2500000001 HC RX 250 WO HCPCS SELF ADMINISTERED DRUGS (ALT 637 FOR MEDICARE OP): Performed by: EMERGENCY MEDICINE

## 2024-04-15 RX ORDER — NAPROXEN SODIUM 220 MG/1
324 TABLET, FILM COATED ORAL ONCE
Status: COMPLETED | OUTPATIENT
Start: 2024-04-15 | End: 2024-04-15

## 2024-04-15 RX ORDER — FLUTICASONE PROPIONATE 50 MCG
1 SPRAY, SUSPENSION (ML) NASAL DAILY
Status: DISCONTINUED | OUTPATIENT
Start: 2024-04-15 | End: 2024-04-15 | Stop reason: HOSPADM

## 2024-04-15 RX ORDER — PANTOPRAZOLE SODIUM 40 MG/1
40 TABLET, DELAYED RELEASE ORAL
Status: DISCONTINUED | OUTPATIENT
Start: 2024-04-16 | End: 2024-04-15 | Stop reason: HOSPADM

## 2024-04-15 RX ORDER — ONDANSETRON 4 MG/1
4 TABLET, ORALLY DISINTEGRATING ORAL EVERY 8 HOURS PRN
Status: DISCONTINUED | OUTPATIENT
Start: 2024-04-15 | End: 2024-04-15 | Stop reason: HOSPADM

## 2024-04-15 RX ORDER — MULTIVIT-MIN/IRON FUM/FOLIC AC 7.5 MG-4
1 TABLET ORAL DAILY
Status: DISCONTINUED | OUTPATIENT
Start: 2024-04-15 | End: 2024-04-15 | Stop reason: HOSPADM

## 2024-04-15 RX ORDER — TRAZODONE HYDROCHLORIDE 50 MG/1
100 TABLET ORAL NIGHTLY PRN
Status: DISCONTINUED | OUTPATIENT
Start: 2024-04-15 | End: 2024-04-15 | Stop reason: HOSPADM

## 2024-04-15 RX ORDER — ACETAMINOPHEN 650 MG/1
650 SUPPOSITORY RECTAL EVERY 4 HOURS PRN
Status: DISCONTINUED | OUTPATIENT
Start: 2024-04-15 | End: 2024-04-15 | Stop reason: HOSPADM

## 2024-04-15 RX ORDER — CLOPIDOGREL BISULFATE 75 MG/1
75 TABLET ORAL DAILY
Status: DISCONTINUED | OUTPATIENT
Start: 2024-04-15 | End: 2024-04-15

## 2024-04-15 RX ORDER — DILTIAZEM HYDROCHLORIDE 180 MG/1
180 CAPSULE, COATED, EXTENDED RELEASE ORAL DAILY
Status: DISCONTINUED | OUTPATIENT
Start: 2024-04-15 | End: 2024-04-15 | Stop reason: HOSPADM

## 2024-04-15 RX ORDER — CLONAZEPAM 0.5 MG/1
0.5 TABLET ORAL DAILY PRN
COMMUNITY

## 2024-04-15 RX ORDER — ACETAMINOPHEN 325 MG/1
650 TABLET ORAL EVERY 4 HOURS PRN
Status: DISCONTINUED | OUTPATIENT
Start: 2024-04-15 | End: 2024-04-15 | Stop reason: HOSPADM

## 2024-04-15 RX ORDER — CLONAZEPAM 0.5 MG/1
0.5 TABLET ORAL DAILY PRN
Status: DISCONTINUED | OUTPATIENT
Start: 2024-04-15 | End: 2024-04-15 | Stop reason: HOSPADM

## 2024-04-15 RX ORDER — ASPIRIN 81 MG/1
81 TABLET ORAL DAILY
Status: DISCONTINUED | OUTPATIENT
Start: 2024-04-15 | End: 2024-04-15 | Stop reason: HOSPADM

## 2024-04-15 RX ORDER — ONDANSETRON HYDROCHLORIDE 2 MG/ML
4 INJECTION, SOLUTION INTRAVENOUS EVERY 8 HOURS PRN
Status: DISCONTINUED | OUTPATIENT
Start: 2024-04-15 | End: 2024-04-15 | Stop reason: HOSPADM

## 2024-04-15 RX ORDER — ACETAMINOPHEN 160 MG/5ML
650 SOLUTION ORAL EVERY 4 HOURS PRN
Status: DISCONTINUED | OUTPATIENT
Start: 2024-04-15 | End: 2024-04-15 | Stop reason: HOSPADM

## 2024-04-15 RX ORDER — ENOXAPARIN SODIUM 100 MG/ML
40 INJECTION SUBCUTANEOUS EVERY 24 HOURS
Status: DISCONTINUED | OUTPATIENT
Start: 2024-04-15 | End: 2024-04-15 | Stop reason: HOSPADM

## 2024-04-15 RX ORDER — CLOPIDOGREL BISULFATE 75 MG/1
75 TABLET ORAL DAILY
Status: DISCONTINUED | OUTPATIENT
Start: 2024-04-15 | End: 2024-04-15 | Stop reason: HOSPADM

## 2024-04-15 RX ORDER — OXYBUTYNIN CHLORIDE 5 MG/1
5 TABLET ORAL 4 TIMES DAILY
Status: DISCONTINUED | OUTPATIENT
Start: 2024-04-15 | End: 2024-04-15 | Stop reason: HOSPADM

## 2024-04-15 RX ORDER — PROPRANOLOL HYDROCHLORIDE 10 MG/1
20 TABLET ORAL 2 TIMES DAILY PRN
Status: DISCONTINUED | OUTPATIENT
Start: 2024-04-15 | End: 2024-04-15 | Stop reason: HOSPADM

## 2024-04-15 RX ORDER — PANTOPRAZOLE SODIUM 40 MG/10ML
40 INJECTION, POWDER, LYOPHILIZED, FOR SOLUTION INTRAVENOUS
Status: DISCONTINUED | OUTPATIENT
Start: 2024-04-16 | End: 2024-04-15 | Stop reason: HOSPADM

## 2024-04-15 RX ORDER — DICLOFENAC SODIUM 10 MG/G
4 GEL TOPICAL 4 TIMES DAILY PRN
Status: DISCONTINUED | OUTPATIENT
Start: 2024-04-15 | End: 2024-04-15 | Stop reason: HOSPADM

## 2024-04-15 RX ORDER — FORMOTEROL FUMARATE DIHYDRATE 20 UG/2ML
20 SOLUTION RESPIRATORY (INHALATION)
Status: DISCONTINUED | OUTPATIENT
Start: 2024-04-15 | End: 2024-04-15 | Stop reason: HOSPADM

## 2024-04-15 RX ORDER — POLYETHYLENE GLYCOL 3350 17 G/17G
17 POWDER, FOR SOLUTION ORAL DAILY
Status: DISCONTINUED | OUTPATIENT
Start: 2024-04-15 | End: 2024-04-15 | Stop reason: HOSPADM

## 2024-04-15 RX ADMIN — ASPIRIN 81 MG: 81 TABLET, COATED ORAL at 18:29

## 2024-04-15 RX ADMIN — CLOPIDOGREL 75 MG: 75 TABLET ORAL at 18:29

## 2024-04-15 RX ADMIN — ENOXAPARIN SODIUM 40 MG: 40 INJECTION SUBCUTANEOUS at 18:29

## 2024-04-15 RX ADMIN — ASPIRIN 81 MG 324 MG: 81 TABLET ORAL at 11:57

## 2024-04-15 RX ADMIN — DILTIAZEM HYDROCHLORIDE 180 MG: 180 CAPSULE, COATED, EXTENDED RELEASE ORAL at 18:29

## 2024-04-15 RX ADMIN — OXYBUTYNIN CHLORIDE 5 MG: 5 TABLET ORAL at 18:29

## 2024-04-15 RX ADMIN — IOHEXOL 90 ML: 350 INJECTION, SOLUTION INTRAVENOUS at 10:52

## 2024-04-15 RX ADMIN — Medication 1 TABLET: at 18:29

## 2024-04-15 RX ADMIN — OXYBUTYNIN CHLORIDE 5 MG: 5 TABLET ORAL at 20:53

## 2024-04-15 SDOH — SOCIAL STABILITY: SOCIAL INSECURITY: ARE THERE ANY APPARENT SIGNS OF INJURIES/BEHAVIORS THAT COULD BE RELATED TO ABUSE/NEGLECT?: NO

## 2024-04-15 SDOH — SOCIAL STABILITY: SOCIAL INSECURITY: DO YOU FEEL ANYONE HAS EXPLOITED OR TAKEN ADVANTAGE OF YOU FINANCIALLY OR OF YOUR PERSONAL PROPERTY?: NO

## 2024-04-15 SDOH — SOCIAL STABILITY: SOCIAL INSECURITY: WERE YOU ABLE TO COMPLETE ALL THE BEHAVIORAL HEALTH SCREENINGS?: YES

## 2024-04-15 SDOH — SOCIAL STABILITY: SOCIAL INSECURITY: ARE YOU OR HAVE YOU BEEN THREATENED OR ABUSED PHYSICALLY, EMOTIONALLY, OR SEXUALLY BY ANYONE?: NO

## 2024-04-15 SDOH — SOCIAL STABILITY: SOCIAL INSECURITY: HAS ANYONE EVER THREATENED TO HURT YOUR FAMILY OR YOUR PETS?: NO

## 2024-04-15 SDOH — SOCIAL STABILITY: SOCIAL INSECURITY: DOES ANYONE TRY TO KEEP YOU FROM HAVING/CONTACTING OTHER FRIENDS OR DOING THINGS OUTSIDE YOUR HOME?: NO

## 2024-04-15 SDOH — SOCIAL STABILITY: SOCIAL INSECURITY: DO YOU FEEL UNSAFE GOING BACK TO THE PLACE WHERE YOU ARE LIVING?: NO

## 2024-04-15 SDOH — SOCIAL STABILITY: SOCIAL INSECURITY: HAVE YOU HAD THOUGHTS OF HARMING ANYONE ELSE?: NO

## 2024-04-15 SDOH — SOCIAL STABILITY: SOCIAL INSECURITY: ABUSE: ADULT

## 2024-04-15 ASSESSMENT — COGNITIVE AND FUNCTIONAL STATUS - GENERAL
DRESSING REGULAR UPPER BODY CLOTHING: A LITTLE
DAILY ACTIVITIY SCORE: 21
MOBILITY SCORE: 18
DRESSING REGULAR LOWER BODY CLOTHING: A LITTLE
MOVING FROM LYING ON BACK TO SITTING ON SIDE OF FLAT BED WITH BEDRAILS: A LITTLE
TURNING FROM BACK TO SIDE WHILE IN FLAT BAD: A LITTLE
HELP NEEDED FOR BATHING: A LITTLE
MOBILITY SCORE: 18
HELP NEEDED FOR BATHING: A LITTLE
CLIMB 3 TO 5 STEPS WITH RAILING: A LITTLE
DRESSING REGULAR LOWER BODY CLOTHING: A LITTLE
STANDING UP FROM CHAIR USING ARMS: A LITTLE
MOVING TO AND FROM BED TO CHAIR: A LITTLE
PATIENT BASELINE BEDBOUND: NO
MOVING FROM LYING ON BACK TO SITTING ON SIDE OF FLAT BED WITH BEDRAILS: A LITTLE
MOVING TO AND FROM BED TO CHAIR: A LITTLE
DRESSING REGULAR UPPER BODY CLOTHING: A LITTLE
STANDING UP FROM CHAIR USING ARMS: A LITTLE
WALKING IN HOSPITAL ROOM: A LITTLE
CLIMB 3 TO 5 STEPS WITH RAILING: A LITTLE
WALKING IN HOSPITAL ROOM: A LITTLE
DAILY ACTIVITIY SCORE: 21
TURNING FROM BACK TO SIDE WHILE IN FLAT BAD: A LITTLE

## 2024-04-15 ASSESSMENT — ENCOUNTER SYMPTOMS
DIAPHORESIS: 0
VOMITING: 0
CHEST TIGHTNESS: 0
FATIGUE: 1
LIGHT-HEADEDNESS: 0
FREQUENCY: 0
HEMATURIA: 0
COUGH: 0
ABDOMINAL DISTENTION: 0
FACIAL ASYMMETRY: 0
CHILLS: 0
SHORTNESS OF BREATH: 0
NAUSEA: 0
DIARRHEA: 0
CONFUSION: 0
ABDOMINAL PAIN: 0
SORE THROAT: 0
WEAKNESS: 1
DIZZINESS: 0
HEADACHES: 1
SPEECH DIFFICULTY: 1
NUMBNESS: 0
DYSURIA: 0
PALPITATIONS: 0
ACTIVITY CHANGE: 1
TROUBLE SWALLOWING: 0

## 2024-04-15 ASSESSMENT — PAIN SCALES - GENERAL: PAINLEVEL_OUTOF10: 0 - NO PAIN

## 2024-04-15 ASSESSMENT — ACTIVITIES OF DAILY LIVING (ADL)
HEARING - RIGHT EAR: FUNCTIONAL
ADEQUATE_TO_COMPLETE_ADL: YES
JUDGMENT_ADEQUATE_SAFELY_COMPLETE_DAILY_ACTIVITIES: YES
BATHING: INDEPENDENT
TOILETING: INDEPENDENT
GROOMING: INDEPENDENT
FEEDING YOURSELF: INDEPENDENT
PATIENT'S MEMORY ADEQUATE TO SAFELY COMPLETE DAILY ACTIVITIES?: YES
DRESSING YOURSELF: INDEPENDENT
WALKS IN HOME: INDEPENDENT
LACK_OF_TRANSPORTATION: NO
HEARING - LEFT EAR: FUNCTIONAL

## 2024-04-15 ASSESSMENT — PATIENT HEALTH QUESTIONNAIRE - PHQ9
2. FEELING DOWN, DEPRESSED OR HOPELESS: SEVERAL DAYS
1. LITTLE INTEREST OR PLEASURE IN DOING THINGS: NOT AT ALL
SUM OF ALL RESPONSES TO PHQ9 QUESTIONS 1 & 2: 1

## 2024-04-15 ASSESSMENT — LIFESTYLE VARIABLES
HOW OFTEN DO YOU HAVE A DRINK CONTAINING ALCOHOL: NEVER
HOW MANY STANDARD DRINKS CONTAINING ALCOHOL DO YOU HAVE ON A TYPICAL DAY: PATIENT DOES NOT DRINK
SKIP TO QUESTIONS 9-10: 1
HOW OFTEN DO YOU HAVE 6 OR MORE DRINKS ON ONE OCCASION: NEVER
AUDIT-C TOTAL SCORE: 0
AUDIT-C TOTAL SCORE: 0

## 2024-04-15 ASSESSMENT — COLUMBIA-SUICIDE SEVERITY RATING SCALE - C-SSRS
1. IN THE PAST MONTH, HAVE YOU WISHED YOU WERE DEAD OR WISHED YOU COULD GO TO SLEEP AND NOT WAKE UP?: NO
2. HAVE YOU ACTUALLY HAD ANY THOUGHTS OF KILLING YOURSELF?: NO
6. HAVE YOU EVER DONE ANYTHING, STARTED TO DO ANYTHING, OR PREPARED TO DO ANYTHING TO END YOUR LIFE?: NO

## 2024-04-15 ASSESSMENT — PAIN - FUNCTIONAL ASSESSMENT: PAIN_FUNCTIONAL_ASSESSMENT: 0-10

## 2024-04-15 NOTE — DISCHARGE SUMMARY
Discharge Diagnosis  Acute stroke    Discharge Meds     Your medication list        CONTINUE taking these medications        Instructions Last Dose Given Next Dose Due   albuterol 90 mcg/actuation inhaler           aspirin 81 mg EC tablet           Bevespi Aerosphere 9-4.8 mcg HFA aerosol inhaler  Generic drug: glycopyrrolate-formoteroL           celecoxib 200 mg capsule  Commonly known as: CeleBREX           clonazePAM 0.5 mg tablet  Commonly known as: KlonoPIN           cyclobenzaprine 10 mg tablet  Commonly known as: Flexeril      Take 1 tablet (10 mg) by mouth as needed at bedtime for muscle spasms.       diclofenac sodium 1 % gel  Commonly known as: Voltaren      APPLY 1 APPLICATION TOPICALLY 4 TIMES A DAY AS NEEDED (PAIN).       dilTIAZem  mg 24 hr capsule  Commonly known as: Cardizem CD      Take 1 capsule (180 mg) by mouth once daily.       fluticasone 50 mcg/actuation nasal spray  Commonly known as: Flonase      Administer 1 spray into each nostril once daily. Shake gently. Before first use, prime pump. After use, clean tip and replace cap.       hydrocortisone acetate 1 % ointment      Apply 1 Application topically 2 times a day as needed (for hemorrhoids).       ibuprofen 600 mg tablet           Lidocaine Pain Relief 4 % patch  Generic drug: lidocaine           mupirocin 2 % ointment  Commonly known as: Bactroban           omeprazole 40 mg DR capsule  Commonly known as: PriLOSEC      Take 1 capsule (40 mg) by mouth once daily in the morning. Take before meals. Take 30 minutes prior to dinner       ondansetron ODT 4 mg disintegrating tablet  Commonly known as: Zofran-ODT           One Daily Multivitamin tablet  Generic drug: multivitamin           oxybutynin XL 10 mg 24 hr tablet  Commonly known as: Ditropan-XL      Take 1 tablet (10 mg) by mouth 2 times a day.       propranolol 20 mg tablet  Commonly known as: Inderal           Repatha SureClick 140 mg/mL injection  Generic drug: evolocumab       INJECT THE CONTENTS OF 1 PEN UNDER THE SKIN EVERY 2 WEEKS       SUMAtriptan 50 mg tablet  Commonly known as: Imitrex      Take 1 tablet (50 mg) by mouth 1 time if needed for migraine.       traZODone 50 mg tablet  Commonly known as: Desyrel      Take 1 tablet (50 mg) by mouth as needed at bedtime for sleep.       Tymlos 80 mcg (3,120 mcg/1.56 mL) pen injector  Generic drug: abaloparatide           valACYclovir 1 gram tablet  Commonly known as: Valtrex      Take 1 tablet (1,000 mg) by mouth 3 times a day.                Test Results Pending At Discharge  Pending Labs       No current pending labs.            Hospital Course  Judith Bush is a 68 y.o. female presenting with complaint of left arm weakness and speech changes which began yesterday morning.  Patient reports she called PCP who told her to go to the ER given audible changes in speech and slurred speech.  Patient reports she has had a headache for the past 4 days which is not unusual for her.  Headache is dull and she has been taking Tylenol.  History of migraines that she takes Imitrex as needed.  She also takes baby aspirin and Repatha as she did not tolerate previous statins.  No history of pacemaker but did have a mitral valve replacement.  Yesterday she noted her left arm was weak and she called her primary care who noted she was having some slurred speech.  No trouble swallowing no vision changes.  No shortness of breath chest pain abdominal pain nausea vomiting.  Difficulty walking no dizziness     PMH: NSTEMI, hld, insomnia, SHON, migraine, HTN, COPD, bipolar, GERD, HSV, chronic low back pain, osteoporosis     Patient seen and evaluated by neurology.  MRI brain with acute stroke. Echo with no pfo or thrombus. Discussed with neuro, ok to dc with holter to be applied later this week. Asa and plavix. Fu with neuro in 1m. Ot and slp outpt     Pertinent Physical Exam At Time of Discharge  Physical Exam  Constitutional:       General: She is not in acute  distress.     Appearance: Normal appearance.   HENT:      Head: Normocephalic and atraumatic.      Nose: Nose normal.      Mouth/Throat:      Mouth: Mucous membranes are moist.      Pharynx: Oropharynx is clear. No oropharyngeal exudate or posterior oropharyngeal erythema.   Eyes:      Extraocular Movements: Extraocular movements intact.      Conjunctiva/sclera: Conjunctivae normal.      Pupils: Pupils are equal, round, and reactive to light.   Cardiovascular:      Rate and Rhythm: Normal rate and regular rhythm.      Pulses: Normal pulses.      Heart sounds: Normal heart sounds. No murmur heard.  Pulmonary:      Effort: Pulmonary effort is normal. No respiratory distress.      Breath sounds: Normal breath sounds. No wheezing or rhonchi.   Chest:      Chest wall: No tenderness.   Abdominal:      General: Bowel sounds are normal. There is no distension.      Palpations: Abdomen is soft.      Tenderness: There is no abdominal tenderness.   Musculoskeletal:         General: Normal range of motion.      Cervical back: Normal range of motion and neck supple.      Right lower leg: No edema.      Left lower leg: No edema.   Skin:     General: Skin is warm and dry.      Capillary Refill: Capillary refill takes less than 2 seconds.   Neurological:      General: No focal deficit present.      Mental Status: She is alert and oriented to person, place, and time.      Cranial Nerves: No cranial nerve deficit.      Sensory: No sensory deficit.      Motor: Weakness present.      Coordination: Coordination normal.      Comments: Slight left arm weakness 4/5 compared to right arm., no dysarthria noted at this time.    Psychiatric:         Mood and Affect: Mood normal.           Outpatient Follow-Up  Future Appointments   Date Time Provider Department [place]   4/17/2024 11:00 AM Samir Curry OD [id number] [facility]   4/23/2024  9:30 AM [facility] 1 [id number] [facility]   5/1/2024  3:20 PM Gurpreet Wood MD [id number]  [facility]   6/5/2024  4:00 PM Valencia Navarrete, APRN-[facility] [id number]   1/14/2025 11:15 AM Amanda Humphrey MD [id number] [facility]         Carol Waller, APRN-CNP

## 2024-04-15 NOTE — ED PROVIDER NOTES
HPI   Chief Complaint   Patient presents with    Stroke       68-year-old woman with past medical history of COPD, anxiety, depression who does present with complaint of left arm clumsiness and weakness as well as slurred speech.  Started 8 AM yesterday.  Does report headache x 4 days.  Denies any difficulty otherwise with arms or legs.  Was able to ambulate.  Denies any infectious symptoms.  No chest or abdominal pain.  No ripping sensation to the back    Van negative                      Khadijah Coma Scale Score: 15                     Patient History   Past Medical History:   Diagnosis Date    Abdominal distension (gaseous) 08/02/2018    Bloating    Abdominal distension (gaseous) 08/13/2018    Bloating    Age-related nuclear cataract, left eye 08/20/2015    Age-related nuclear cataract of left eye    Age-related nuclear cataract, right eye 09/03/2015    Age-related nuclear cataract of right eye    Anxiety disorder, unspecified 08/02/2018    Anxiety disorder    Anxiety disorder, unspecified 08/13/2018    Anxiety disorder    Carpal tunnel syndrome, right upper limb 08/13/2018    Carpal tunnel syndrome of right wrist    Collagenous colitis 04/04/2018    Collagenous colitis    Collagenous colitis 08/02/2018    Collagenous colitis    Collagenous colitis 08/13/2018    Collagenous colitis    Collapsed vertebra, not elsewhere classified, site unspecified, initial encounter for fracture (Multi) 08/02/2018    Compression fracture of spine    Collapsed vertebra, not elsewhere classified, site unspecified, initial encounter for fracture (Multi) 08/13/2018    Compression fracture of spine    Cortical age-related cataract, left eye 08/20/2015    Cortical age-related cataract of left eye    Cortical age-related cataract, right eye 09/03/2015    Cortical age-related cataract of right eye    Depression, unspecified 08/02/2018    Depression    Depression, unspecified 08/13/2018    Depression    Dermatitis, unspecified 04/04/2018     Dermatitis, eczematoid    Dermatitis, unspecified 07/09/2020    Dermatitis, eczematoid    Dermatochalasis of unspecified eye, unspecified eyelid 10/25/2022    Dermatochalasis    Displaced comminuted fracture of left patella, initial encounter for closed fracture 08/02/2018    Closed displaced comminuted fracture of left patella, initial encounter    Displaced comminuted fracture of left patella, initial encounter for closed fracture 08/13/2018    Closed displaced comminuted fracture of left patella, initial encounter    Displaced comminuted fracture of right patella, initial encounter for closed fracture 08/02/2018    Closed displaced comminuted fracture of right patella, initial encounter    Displaced comminuted fracture of right patella, initial encounter for closed fracture 08/13/2018    Closed displaced comminuted fracture of right patella, initial encounter    Hypokalemia 04/04/2018    Hypokalemia    Hypokalemia 08/02/2018    Hypokalemia    Hypokalemia 08/13/2018    Hypokalemia    Low back pain, unspecified 08/02/2018    Low back pain    Low back pain, unspecified 08/13/2018    Low back pain    Noninfective gastroenteritis and colitis, unspecified 04/04/2018    Chronic colitis    Noninfective gastroenteritis and colitis, unspecified 08/02/2018    Chronic colitis    Noninfective gastroenteritis and colitis, unspecified 08/13/2018    Chronic colitis    Nutritional deficiency, unspecified 08/02/2018    Poor diet    Nutritional deficiency, unspecified 08/13/2018    Poor diet    Old myocardial infarction 03/31/2014    History of myocardial infarction    Other chest pain 08/28/2017    Atypical chest pain    Other conditions influencing health status     Denial Of Any Significant Medical History    Other fracture of right patella, sequela 08/02/2018    Patellar sleeve fracture, right, sequela    Other fracture of right patella, sequela 08/13/2018    Patellar sleeve fracture, right, sequela    Other specified  diseases of intestine 08/02/2018    Small intestinal bacterial overgrowth    Other specified diseases of intestine 08/13/2018    Small intestinal bacterial overgrowth    Other visual disturbances 04/04/2018    Blurred vision, bilateral    Pain in unspecified hip 03/31/2014    Hip pain    Pain, unspecified 08/02/2018    Pain    Pain, unspecified 08/13/2018    Pain    Personal history of other diseases of the circulatory system 09/16/2019    History of pericarditis    Personal history of other diseases of the respiratory system 08/27/2019    History of pleural effusion    Personal history of other specified conditions 07/22/2016    History of dizziness    Personal history of other specified conditions 06/11/2018    History of headache    Polyosteoarthritis, unspecified 08/02/2018    Generalized osteoarthritis of multiple sites    Polyosteoarthritis, unspecified 08/13/2018    Generalized osteoarthritis of multiple sites    Presence of intraocular lens 09/20/2018    Pseudophakia of right eye    Unspecified fall, sequela 08/02/2018    Fall in home, sequela    Unspecified fall, sequela 08/13/2018    Fall in home, sequela    Unspecified fracture of right patella, subsequent encounter for closed fracture with routine healing 08/02/2018    Fracture of right patella with routine healing    Unspecified fracture of right patella, subsequent encounter for closed fracture with routine healing 08/13/2018    Fracture of right patella with routine healing     Past Surgical History:   Procedure Laterality Date    CATARACT EXTRACTION  07/16/2018    Cataract Surgery    CT ABDOMEN ANGIOGRAM W AND/OR WO IV CONTRAST  7/28/2022    CT ABDOMEN ANGIOGRAM W AND/OR WO IV CONTRAST 7/28/2022 [facility]    FEMUR FRACTURE SURGERY  07/16/2018    Femur Repair    HIP SURGERY  07/16/2018    Hip Surgery    HYSTERECTOMY  07/16/2018    Hysterectomy    OTHER SURGICAL HISTORY  07/16/2018    Oophorectomy - Bilateral (Removal Of Both Ovaries)     OTHER SURGICAL HISTORY  05/27/2020    Mitral valve replacement    OTHER SURGICAL HISTORY  05/27/2020    Radius fracture repair    SHOULDER SURGERY  07/16/2018    Shoulder Surgery     Family History   Adopted: Yes   Problem Relation Name Age of Onset    Other (Adopted) Mother      Other (Adopted child) Father       Social History     Tobacco Use    Smoking status: Every Day     Current packs/day: 0.25     Average packs/day: 0.3 packs/day for 40.0 years (10.0 ttl pk-yrs)     Types: Cigarettes    Smokeless tobacco: Never   Substance Use Topics    Alcohol use: Not Currently     Alcohol/week: 3.0 standard drinks of alcohol     Types: 3 Standard drinks or equivalent per week    Drug use: Yes     Types: Marijuana       Physical Exam   ED Triage Vitals [04/15/24 1025]   Temperature Heart Rate Respirations BP   36.2 °C (97.2 °F) 58 18 (!) 174/95      Pulse Ox Temp Source Heart Rate Source Patient Position   98 % Temporal -- Sitting      BP Location FiO2 (%)     Left arm --       Physical Exam  Vitals and nursing note reviewed.   Constitutional:       Appearance: Normal appearance. She is normal weight.   HENT:      Head: Normocephalic and atraumatic.      Right Ear: Tympanic membrane normal.      Left Ear: Tympanic membrane normal.      Nose: Nose normal.      Mouth/Throat:      Mouth: Mucous membranes are moist.      Pharynx: Oropharynx is clear.   Eyes:      Extraocular Movements: Extraocular movements intact.      Conjunctiva/sclera: Conjunctivae normal.      Pupils: Pupils are equal, round, and reactive to light.   Cardiovascular:      Rate and Rhythm: Normal rate and regular rhythm.      Pulses: Normal pulses.      Heart sounds: Normal heart sounds.   Pulmonary:      Effort: Pulmonary effort is normal. No respiratory distress.      Breath sounds: Normal breath sounds.   Abdominal:      General: Abdomen is flat. Bowel sounds are normal.      Palpations: Abdomen is soft.   Musculoskeletal:         General: No tenderness  or deformity. Normal range of motion.      Cervical back: Normal range of motion and neck supple.   Skin:     General: Skin is warm and dry.      Capillary Refill: Capillary refill takes less than 2 seconds.   Neurological:      General: No focal deficit present.      Mental Status: She is alert and oriented to person, place, and time. Mental status is at baseline.      Sensory: No sensory deficit.      Motor: No weakness.   Psychiatric:         Mood and Affect: Mood normal.         Behavior: Behavior normal.         Thought Content: Thought content normal.         Judgment: Judgment normal.       ED Course & MDM   Diagnoses as of 04/15/24 1112   Cerebrovascular accident (CVA) due to embolism of left middle cerebral artery (Multi)       Medical Decision Making  Patient was activated as stroke given reported slurred speech as well as clumsy hand syndrome questionable within the 24-hour window.  She given her headache she was sent emergently for CT head to rule out bleed.  She is outside of the TNK window.  She is Van negative.  No signs for large vessel occlusion.  Following discussion with patient the symptoms that occurred 8 which was greater than 24 hours prior to arrival in the ED.  IV was placed and labs are including CBC, CMP, troponin, chest x-ray.  The CT head was negative for acute pathology.  CTA did show concern for possible M2 occlusion.  She was outside of the window for intervention.  [facility] [place] [facility] concurs with management.  Patient does receive aspirin.  She will be admitted for further stroke workup to [facility] unit.  Neurology Dr. Hopkins was notified will see patient.\    EKG interpreted by me showed sinus bradycardia rate of 51 with no acute ischemic changes.  No STEMI.  No A-fib.    Amount and/or Complexity of Data Reviewed  Labs: ordered. Decision-making details documented in ED Course.  Radiology: ordered. Decision-making details documented in ED Course.  ECG/medicine  tests: ordered. Decision-making details documented in ED Course.        Procedure  Procedures     Leon Rojas MD  04/15/24 [id number]       Leon Rojas MD  04/15/24 [id number]

## 2024-04-15 NOTE — DISCHARGE INSTRUCTIONS
Aspirin and plavix for the next month.  Follow up with neurology in 1m.   Follow up with primary care   Call to schedule speech therapy and occupational therapy  Call to set up appt for holter monitor later this week. -- call [facility] at [phone]  You can  plavix at [facility] tomorrow

## 2024-04-15 NOTE — H&P
History Of Present Illness  Judith Bush is a 68 y.o. female presenting with complaint of left arm weakness and speech changes which began yesterday morning.  Patient reports she called PCP who told her to go to the ER given audible changes in speech and slurred speech.  Patient reports she has had a headache for the past 4 days which is not unusual for her.  Headache is dull and she has been taking Tylenol.  History of migraines that she takes Imitrex as needed.  She also takes baby aspirin and Repatha as she did not tolerate previous statins.  No history of pacemaker but did have a mitral valve replacement.  Yesterday she noted her left arm was weak and she called her primary care who noted she was having some slurred speech.  No trouble swallowing no vision changes.  No shortness of breath chest pain abdominal pain nausea vomiting.  Difficulty walking no dizziness    PMH: NSTEMI, hld, insomnia, SHON, migraine, HTN, COPD, bipolar, GERD, HSV, chronic low back pain, osteoporosis      Past Medical History  She has a past medical history of Abdominal distension (gaseous) (08/13/2018), Anxiety, Carpal tunnel syndrome, right upper limb (08/13/2018), Collagenous colitis (08/13/2018), Compression fracture of cervical spine (Multi), Depression, unspecified (08/13/2018), Dermatitis, unspecified (07/09/2020), Dermatochalasis of unspecified eye, unspecified eyelid (10/25/2022), Displaced comminuted fracture of left patella, initial encounter for closed fracture (08/02/2018), Displaced comminuted fracture of right patella, initial encounter for closed fracture (08/13/2018), Dizziness, Headache, Hypokalemia (08/13/2018), Low back pain, unspecified (08/13/2018), Noninfective gastroenteritis and colitis, unspecified (08/02/2018), Nutritional deficiency, unspecified (08/02/2018), Old myocardial infarction (03/31/2014), Other chest pain (08/28/2017), Other fracture of right patella, sequela (08/13/2018), Other visual disturbances  (04/04/2018), Pain in unspecified hip (03/31/2014), Pericarditis (HHS-HCC) (2019), Pleural effusion, Polyosteoarthritis, unspecified (08/02/2018), Presence of intraocular lens (09/20/2018), Small intestinal bacterial overgrowth (SIBO), and Unspecified fall, sequela (08/13/2018).    Surgical History  She has a past surgical history that includes Hip surgery (07/16/2018); Femur fracture surgery (07/16/2018); Hysterectomy (07/16/2018); Other surgical history (07/16/2018); Shoulder surgery (07/16/2018); Other surgical history (05/27/2020); Other surgical history (05/27/2020); Cataract extraction (07/16/2018); and CT angio abdomen w and or wo IV IV contrast (7/28/2022).     Social History  She reports that she has been smoking cigarettes. She has a 10 pack-year smoking history. She has never used smokeless tobacco. She reports that she does not currently use alcohol after a past usage of about 3.0 standard drinks of alcohol per week. She reports current drug use. Drug: Marijuana.    Family History  Family History   Adopted: Yes   Problem Relation Name Age of Onset    Other (Adopted) Mother      Other (Adopted child) Father          Allergies  Penicillins, Cephalosporins, and Neomycin-polymyxin-gramicidin    Review of Systems   Constitutional:  Positive for activity change and fatigue. Negative for chills and diaphoresis.   HENT:  Negative for congestion, drooling, sore throat and trouble swallowing.    Eyes:  Negative for visual disturbance.   Respiratory:  Negative for cough, chest tightness and shortness of breath.    Cardiovascular:  Negative for chest pain, palpitations and leg swelling.   Gastrointestinal:  Negative for abdominal distention, abdominal pain, diarrhea, nausea and vomiting.   Genitourinary:  Negative for dysuria, frequency, hematuria and urgency.   Musculoskeletal:  Negative for gait problem.   Neurological:  Positive for speech difficulty, weakness and headaches. Negative for dizziness, facial  asymmetry, light-headedness and numbness.   Psychiatric/Behavioral:  Negative for confusion.         Physical Exam  Constitutional:       General: She is not in acute distress.     Appearance: Normal appearance.   HENT:      Head: Normocephalic and atraumatic.      Nose: Nose normal.      Mouth/Throat:      Mouth: Mucous membranes are moist.      Pharynx: Oropharynx is clear. No oropharyngeal exudate or posterior oropharyngeal erythema.   Eyes:      Extraocular Movements: Extraocular movements intact.      Conjunctiva/sclera: Conjunctivae normal.      Pupils: Pupils are equal, round, and reactive to light.   Cardiovascular:      Rate and Rhythm: Normal rate and regular rhythm.      Pulses: Normal pulses.      Heart sounds: Normal heart sounds. No murmur heard.  Pulmonary:      Effort: Pulmonary effort is normal. No respiratory distress.      Breath sounds: Normal breath sounds. No wheezing or rhonchi.   Chest:      Chest wall: No tenderness.   Abdominal:      General: Bowel sounds are normal. There is no distension.      Palpations: Abdomen is soft.      Tenderness: There is no abdominal tenderness.   Musculoskeletal:         General: Normal range of motion.      Cervical back: Normal range of motion and neck supple.      Right lower leg: No edema.      Left lower leg: No edema.   Skin:     General: Skin is warm and dry.      Capillary Refill: Capillary refill takes less than 2 seconds.   Neurological:      General: No focal deficit present.      Mental Status: She is alert and oriented to person, place, and time.      Cranial Nerves: No cranial nerve deficit.      Sensory: No sensory deficit.      Motor: Weakness present.      Coordination: Coordination normal.      Comments: Slight left arm weakness 4/5 compared to right arm., no dysarthria noted at this time.    Psychiatric:         Mood and Affect: Mood normal.          Last Recorded Vitals  /74 (BP Location: Right arm, Patient Position: Lying)   Pulse  (!) 49   Temp 36.6 °C (97.9 °F) (Temporal)   Resp 17   Wt 62.1 kg (136 lb 12.8 oz)   SpO2 97%     Relevant Results  Results for orders placed or performed during the hospital encounter of 04/15/24 (from the past 24 hour(s))   POCT GLUCOSE   Result Value Ref Range    POCT Glucose 104 (H) 74 - 99 mg/dL   POCT GLUCOSE   Result Value Ref Range    POCT Glucose 96 74 - 99 mg/dL   CBC   Result Value Ref Range    WBC 11.6 (H) 4.4 - 11.3 x10*3/uL    nRBC 0.0 0.0 - 0.0 /100 WBCs    RBC 4.70 4.00 - 5.20 x10*6/uL    Hemoglobin 15.0 12.0 - 16.0 g/dL    Hematocrit 44.0 36.0 - 46.0 %    MCV 94 80 - 100 fL    MCH 31.9 26.0 - 34.0 pg    MCHC 34.1 32.0 - 36.0 g/dL    RDW 13.1 11.5 - 14.5 %    Platelets 226 150 - 450 x10*3/uL   Comprehensive Metabolic Panel   Result Value Ref Range    Glucose 95 74 - 99 mg/dL    Sodium 135 (L) 136 - 145 mmol/L    Potassium 4.4 3.5 - 5.3 mmol/L    Chloride 102 98 - 107 mmol/L    Bicarbonate 22 21 - 32 mmol/L    Anion Gap 15 10 - 20 mmol/L    Urea Nitrogen 26 (H) 6 - 23 mg/dL    Creatinine 0.92 0.50 - 1.05 mg/dL    eGFR 68 >60 mL/min/1.73m*2    Calcium 10.0 8.6 - 10.3 mg/dL    Albumin 4.8 3.4 - 5.0 g/dL    Alkaline Phosphatase 85 33 - 136 U/L    Total Protein 7.9 6.4 - 8.2 g/dL    AST 21 9 - 39 U/L    Bilirubin, Total 0.6 0.0 - 1.2 mg/dL    ALT 11 7 - 45 U/L   Protime-INR   Result Value Ref Range    Protime 10.5 9.8 - 12.8 seconds    INR 0.9 0.9 - 1.1   Troponin I, High Sensitivity   Result Value Ref Range    Troponin I, High Sensitivity 5 0 - 13 ng/L   Lipid panel   Result Value Ref Range    Cholesterol 175 0 - 199 mg/dL    HDL-Cholesterol 81.0 mg/dL    Cholesterol/HDL Ratio 2.2     LDL Calculated 74 <=99 mg/dL    VLDL 20 0 - 40 mg/dL    Triglycerides 100 0 - 149 mg/dL    Non HDL Cholesterol 94 0 - 149 mg/dL   Urinalysis with Reflex Culture and Microscopic   Result Value Ref Range    Color, Urine Colorless (N) Light-Yellow, Yellow, Dark-Yellow    Appearance, Urine Clear Clear    Specific Gravity,  Urine 1.026 1.005 - 1.035    pH, Urine 6.5 5.0, 5.5, 6.0, 6.5, 7.0, 7.5, 8.0    Protein, Urine NEGATIVE NEGATIVE, 10 (TRACE), 20 (TRACE) mg/dL    Glucose, Urine Normal Normal mg/dL    Blood, Urine NEGATIVE NEGATIVE    Ketones, Urine NEGATIVE NEGATIVE mg/dL    Bilirubin, Urine NEGATIVE NEGATIVE    Urobilinogen, Urine Normal Normal mg/dL    Nitrite, Urine NEGATIVE NEGATIVE    Leukocyte Esterase, Urine NEGATIVE NEGATIVE      XR chest 1 view    Result Date: 4/15/2024  Interpreted By:  Beka Han, STUDY: XR CHEST 1 VIEW;  4/15/2024 11:12 am   INDICATION: Signs/Symptoms:stroke.   COMPARISON: 10/24/2023   ACCESSION NUMBER(S): [id number]   ORDERING CLINICIAN: RAMONE KEITH   FINDINGS: The lungs are clear without apparent pleural effusion. Cardiomediastinal silhouette unchanged. Aortic atherosclerosis. No pulmonary vascular congestion. Sternotomy wires are present.       No active disease in the chest.   MACRO: None   Signed by: Beka Han 4/15/2024 11:25 AM Dictation workstation:   [id number]    CT angio brain attack head w IV contrast and post procedure    Result Date: 4/15/2024  Interpreted By:  Prakash Rai, STUDY: CT ANGIO BRAIN ATTACK NECK W IV CONTRAST AND POST PROCEDURE; CT ANGIO BRAIN ATTACK HEAD W IV CONTRAST AND POST PROCEDURE   INDICATION: Signs/Symptoms:stroke   Slurring, left-sided weakness.   COMPARISON: Head CT performed concurrently.   ACCESSION NUMBER(S): [id number]; [id number]   ORDERING CLINICIAN: RAMONE KEITH   TECHNIQUE: CTA of the head and neck was performed after the intravenous administration of 75 mL Omnipaque 350 nonionic IV contrast. 3-D reconstructions were performed under physician supervision on a separate workstation and reviewed.   FINDINGS: CTA NECK:   AORTIC ARCH: The visualized portion of the aortic arch is unremarkable in appearance. The origins of the great vessels and the vertebral arteries are normal without evidence of stenosis.   CERVICAL CAROTID ARTERIES:  The common carotid arteries are patent bilaterally without evidence of stenosis. Atherosclerotic disease of bilateral carotid bifurcations resulting in mild (less than 50%) luminal stenosis of the proximal cervical ICAs. Remainder of the cervical ICAs are normal.   VERTEBRAL ARTERIES: The vertebral arteries are patent bilaterally throughout their course.   CTA HEAD:   INTERNAL CAROTID ARTERIES: Normal in course and caliber.   CEREBRAL ARTERIES: Occlusion versus high-grade stenosis of a branch of the right M2 inferior division within the right sylvian fissure (series 401, image 657). Distal right M3/M4 branches are symmetric with the contralateral side. Mild narrowing of the A2 branch of the left CINDY (series 401, image 635). Remainder of the left CINDY is normal. Left MCA, right CINDY, and bilateral PCAs are normal.   VERTEBROBASILAR SYSTEM: Normal in course and caliber.   There is no evidence for saccular aneurysm or vascular malformation.       Medium vessel occlusion versus high-grade stenosis of a right M2 inferior division branch within the right sylvian fissure.   Mild atherosclerotic narrowing of the A2 branch of the left ICA.   Remaining intracranial vasculature is within normal limits.   Mild atherosclerotic narrowing of bilateral proximal cervical ICAs. Otherwise, cervical vasculature is within normal limits.   Findings were discussed with Dr. Rojas at 11 a.m. on 04/15/2024.   _____________ NASCET criteria for internal carotid artery stenosis: Mild: 0% to 49% Moderate: 50% to 69% Severe: 70% to 99% Complete Occlusion   Signed by: Prakash Rai 4/15/2024 11:12 AM Dictation workstation:   [id number]    CT angio brain attack neck w IV contrast and post procedure    Result Date: 4/15/2024  Interpreted By:  Prakash Rai, STUDY: CT ANGIO BRAIN ATTACK NECK W IV CONTRAST AND POST PROCEDURE; CT ANGIO BRAIN ATTACK HEAD W IV CONTRAST AND POST PROCEDURE   INDICATION: Signs/Symptoms:stroke   Slurring, left-sided  weakness.   COMPARISON: Head CT performed concurrently.   ACCESSION NUMBER(S): [id number]; [id number]   ORDERING CLINICIAN: RAMONE KEITH   TECHNIQUE: CTA of the head and neck was performed after the intravenous administration of 75 mL Omnipaque 350 nonionic IV contrast. 3-D reconstructions were performed under physician supervision on a separate workstation and reviewed.   FINDINGS: CTA NECK:   AORTIC ARCH: The visualized portion of the aortic arch is unremarkable in appearance. The origins of the great vessels and the vertebral arteries are normal without evidence of stenosis.   CERVICAL CAROTID ARTERIES: The common carotid arteries are patent bilaterally without evidence of stenosis. Atherosclerotic disease of bilateral carotid bifurcations resulting in mild (less than 50%) luminal stenosis of the proximal cervical ICAs. Remainder of the cervical ICAs are normal.   VERTEBRAL ARTERIES: The vertebral arteries are patent bilaterally throughout their course.   CTA HEAD:   INTERNAL CAROTID ARTERIES: Normal in course and caliber.   CEREBRAL ARTERIES: Occlusion versus high-grade stenosis of a branch of the right M2 inferior division within the right sylvian fissure (series 401, image 657). Distal right M3/M4 branches are symmetric with the contralateral side. Mild narrowing of the A2 branch of the left CINDY (series 401, image 635). Remainder of the left CINDY is normal. Left MCA, right CINDY, and bilateral PCAs are normal.   VERTEBROBASILAR SYSTEM: Normal in course and caliber.   There is no evidence for saccular aneurysm or vascular malformation.       Medium vessel occlusion versus high-grade stenosis of a right M2 inferior division branch within the right sylvian fissure.   Mild atherosclerotic narrowing of the A2 branch of the left ICA.   Remaining intracranial vasculature is within normal limits.   Mild atherosclerotic narrowing of bilateral proximal cervical ICAs. Otherwise, cervical vasculature is within  normal limits.   Findings were discussed with Dr. Rojas at 11 a.m. on 04/15/2024.   _____________ NASCET criteria for internal carotid artery stenosis: Mild: 0% to 49% Moderate: 50% to 69% Severe: 70% to 99% Complete Occlusion   Signed by: Prakash Rai 4/15/2024 11:12 AM Dictation workstation:   [id number]    CT brain attack head wo IV contrast    Result Date: 4/15/2024  Interpreted By:  Sharan Monsalve, STUDY: CT BRAIN ATTACK HEAD WO IV CONTRAST;  4/15/2024 10:34 am   INDICATION: Signs/Symptoms:stroke.   COMPARISON: Previous exam is from 11/26/2012   ACCESSION NUMBER(S): [id number]   ORDERING CLINICIAN: RAMONE ROJAS   TECHNIQUE: Routine axial images were obtained from the skull base through the vertex. Sagittal and coronal reconstruction images were generated. Brain, subdural, and bone windows were reviewed.   FINDINGS: INTRACRANIAL: Mild prominence of ventricles and sulci. No acute intracranial bleed, midline shift, or focal mass effect. No destructive bone lesion. No depressed skull fracture. No abnormal skull base arterial calcifications.   EXTRACRANIAL: Visualized paranasal sinuses were clear. Visualized mastoid air cells were clear.       Mild volume loss.  Otherwise, negative exam.   MACRO: Sharan Monsalve discussed the significance and urgency of this critical finding by epic secure chat with  RAMONE ROJAS on 4/15/2024 at 10:42 am.  (**-RCF-**) Findings:  See findings.   Signed by: Sharan Monsalve 4/15/2024 10:42 AM Dictation workstation:   [id number]           Assessment/Plan   Judith Bush is a 68 y.o. female presenting with complaint of left arm weakness and speech changes which began yesterday morning.  Patient reports she called PCP who told her to go to the ER given audible changes in speech and slurred speech.  Patient reports she has had a headache for the past 4 days which is not unusual for her.  Headache is dull and she has been taking Tylenol.  History of migraines that she  takes Imitrex as needed.  She also takes baby aspirin and Repatha as she did not tolerate previous statins.  No history of pacemaker but did have a mitral valve replacement.  Yesterday she noted her left arm was weak and she called her primary care who noted she was having some slurred speech.  No trouble swallowing no vision changes.  No shortness of breath chest pain abdominal pain nausea vomiting.  Difficulty walking no dizziness    PMH: NSTEMI, hld, insomnia, SHON, migraine, HTN, COPD, bipolar, GERD, HSV, chronic low back pain, osteoporosis       Right arm weakness, dysarthria     Intracranial stenosis   - trop 5  - lipid panel  - A1c   - wbc 11.6   - ua neg  - xr chest nothing acute   - cta h/n - Medium vessel occlusion versus high-grade stenosis of a right M2  inferior division branch within the right sylvian fissure. Mild atherosclerotic narrowing of the A2 branch of the left ICA.  - ct brain - no acute infarct or bleed   - cont asa and repatha  - neuro consult, apprec recs  - mr brain --- need ankle bracelet off for mri, nursing to arrange  - echo w/ bubble study  - outpt pt/ot  - nursing bedside swallow eval     Dvt prophylaxis   - lovenox  - scd/ambulate     Gi prophylaxis   - pantopraxole   - miralax    DC plan  - DC home when medically stable    Labs/Testing reviewed:   Interdisciplinary team rounding completed with hospitalist, nurse, TCC  NP discussed plan & lab/testing results with Dr. flowers  --- pending mr brain, neuro, echo  45 min spent on professional & overall care of this patient   Carol Waller, LOIDA-CNP

## 2024-04-15 NOTE — CONSULTS
Inpatient consult to Neurology  Consult performed by: Richard Hopkins MD  Consult ordered by: Leon Rojas MD          History Of Present Illness  Judith Bush is a 68 y.o. right-handed female presenting with dysarthria and left upper extremity motor dysfunction.     She has past medical history significant for coronary artery disease status post NSTEMI and CABG, COPD with ongoing smoking, anxiety and depression, migraine managed in the past with botulinum toxin injections, bipolar affective disorder.    She presented to the [facility] ED today, apparently brought in from court (wearing a left ankle monitoring bracelet).  She indicates onset of symptoms at around 8 AM yesterday.  She awoke yesterday morning in her usual state of health and it was while she was trying to get dressed that she noted difficulty using her left arm.  The arm was not densely plegic but she could not control the hand and was dropping things frequently.  She felt weak distal to the elbow.  She did not note any dense numbness of the hand or arm.    Subsequently yesterday she was out with a friend who noted that she was talking like she was drunk.  She was nonetheless able to go out to lunch and had no difficulty swallowing solids or liquids.    She has continued to note impaired coordination and subjective weakness of the left upper extremity, and an abnormal quality to her speech.  She does not feel that there has been obvious improvement since yesterday.    She endorses about a 5-day history of relatively mild but holoacranial headache on and off.  As above she does have a significant history of migraine.    She endorses no gait or balance difficulty and no subjective leg weakness or leg numbness.    She denies recent vision change.    She is not aware of any past history of stroke or TIA.  She does take daily aspirin and has done so for years because of her coronary history.  She additionally takes Repatha.    I reviewed a  noncontrast head CT which shows small patchy foci of hypodensity high in the posterior aspect of the left corona radiata.  No Chiari malformation.    I reviewed CT angiogram of the head and neck which shows no hemodynamically significant cervical carotid stenosis.  High-grade stenosis versus occlusion of a right M2 branch without significant M1 stenosis.  Patent basilar artery.    She is currently evaluated in the observation unit and is pending a brain MRI.  MRI could not be done until clearance was obtained by the [facility] to remove the left ankle monitoring bracelet for the procedure.      Past Medical History  Past Medical History:   Diagnosis Date    Abdominal distension (gaseous) 08/02/2018    Bloating    Abdominal distension (gaseous) 08/13/2018    Bloating    Age-related nuclear cataract, left eye 08/20/2015    Age-related nuclear cataract of left eye    Age-related nuclear cataract, right eye 09/03/2015    Age-related nuclear cataract of right eye    Anxiety disorder, unspecified 08/02/2018    Anxiety disorder    Anxiety disorder, unspecified 08/13/2018    Anxiety disorder    Carpal tunnel syndrome, right upper limb 08/13/2018    Carpal tunnel syndrome of right wrist    Collagenous colitis 04/04/2018    Collagenous colitis    Collagenous colitis 08/02/2018    Collagenous colitis    Collagenous colitis 08/13/2018    Collagenous colitis    Collapsed vertebra, not elsewhere classified, site unspecified, initial encounter for fracture (Multi) 08/02/2018    Compression fracture of spine    Collapsed vertebra, not elsewhere classified, site unspecified, initial encounter for fracture (Multi) 08/13/2018    Compression fracture of spine    Cortical age-related cataract, left eye 08/20/2015    Cortical age-related cataract of left eye    Cortical age-related cataract, right eye 09/03/2015    Cortical age-related cataract of right eye    Depression, unspecified 08/02/2018    Depression    Depression, unspecified  08/13/2018    Depression    Dermatitis, unspecified 04/04/2018    Dermatitis, eczematoid    Dermatitis, unspecified 07/09/2020    Dermatitis, eczematoid    Dermatochalasis of unspecified eye, unspecified eyelid 10/25/2022    Dermatochalasis    Displaced comminuted fracture of left patella, initial encounter for closed fracture 08/02/2018    Closed displaced comminuted fracture of left patella, initial encounter    Displaced comminuted fracture of left patella, initial encounter for closed fracture 08/13/2018    Closed displaced comminuted fracture of left patella, initial encounter    Displaced comminuted fracture of right patella, initial encounter for closed fracture 08/02/2018    Closed displaced comminuted fracture of right patella, initial encounter    Displaced comminuted fracture of right patella, initial encounter for closed fracture 08/13/2018    Closed displaced comminuted fracture of right patella, initial encounter    Hypokalemia 04/04/2018    Hypokalemia    Hypokalemia 08/02/2018    Hypokalemia    Hypokalemia 08/13/2018    Hypokalemia    Low back pain, unspecified 08/02/2018    Low back pain    Low back pain, unspecified 08/13/2018    Low back pain    Noninfective gastroenteritis and colitis, unspecified 04/04/2018    Chronic colitis    Noninfective gastroenteritis and colitis, unspecified 08/02/2018    Chronic colitis    Noninfective gastroenteritis and colitis, unspecified 08/13/2018    Chronic colitis    Nutritional deficiency, unspecified 08/02/2018    Poor diet    Nutritional deficiency, unspecified 08/13/2018    Poor diet    Old myocardial infarction 03/31/2014    History of myocardial infarction    Other chest pain 08/28/2017    Atypical chest pain    Other conditions influencing health status     Denial Of Any Significant Medical History    Other fracture of right patella, sequela 08/02/2018    Patellar sleeve fracture, right, sequela    Other fracture of right patella, sequela 08/13/2018     Patellar sleeve fracture, right, sequela    Other specified diseases of intestine 08/02/2018    Small intestinal bacterial overgrowth    Other specified diseases of intestine 08/13/2018    Small intestinal bacterial overgrowth    Other visual disturbances 04/04/2018    Blurred vision, bilateral    Pain in unspecified hip 03/31/2014    Hip pain    Pain, unspecified 08/02/2018    Pain    Pain, unspecified 08/13/2018    Pain    Personal history of other diseases of the circulatory system 09/16/2019    History of pericarditis    Personal history of other diseases of the respiratory system 08/27/2019    History of pleural effusion    Personal history of other specified conditions 07/22/2016    History of dizziness    Personal history of other specified conditions 06/11/2018    History of headache    Polyosteoarthritis, unspecified 08/02/2018    Generalized osteoarthritis of multiple sites    Polyosteoarthritis, unspecified 08/13/2018    Generalized osteoarthritis of multiple sites    Presence of intraocular lens 09/20/2018    Pseudophakia of right eye    Unspecified fall, sequela 08/02/2018    Fall in home, sequela    Unspecified fall, sequela 08/13/2018    Fall in home, sequela    Unspecified fracture of right patella, subsequent encounter for closed fracture with routine healing 08/02/2018    Fracture of right patella with routine healing    Unspecified fracture of right patella, subsequent encounter for closed fracture with routine healing 08/13/2018    Fracture of right patella with routine healing     Surgical History  Past Surgical History:   Procedure Laterality Date    CATARACT EXTRACTION  07/16/2018    Cataract Surgery    CT ABDOMEN ANGIOGRAM W AND/OR WO IV CONTRAST  7/28/2022    CT ABDOMEN ANGIOGRAM W AND/OR WO IV CONTRAST 7/28/2022 [facility]    FEMUR FRACTURE SURGERY  07/16/2018    Femur Repair    HIP SURGERY  07/16/2018    Hip Surgery    HYSTERECTOMY  07/16/2018    Hysterectomy    OTHER SURGICAL  HISTORY  07/16/2018    Oophorectomy - Bilateral (Removal Of Both Ovaries)    OTHER SURGICAL HISTORY  05/27/2020    Mitral valve replacement    OTHER SURGICAL HISTORY  05/27/2020    Radius fracture repair    SHOULDER SURGERY  07/16/2018    Shoulder Surgery     Social History  Social History     Tobacco Use    Smoking status: Every Day     Current packs/day: 0.25     Average packs/day: 0.3 packs/day for 40.0 years (10.0 ttl pk-yrs)     Types: Cigarettes    Smokeless tobacco: Never   Substance Use Topics    Alcohol use: Not Currently     Alcohol/week: 3.0 standard drinks of alcohol     Types: 3 Standard drinks or equivalent per week    Drug use: Yes     Types: Marijuana     Allergies  Penicillins, Cephalosporins, and Neomycin-polymyxin-gramicidin  Medications Prior to Admission   Medication Sig Dispense Refill Last Dose    aspirin 81 mg EC tablet Take 1 tablet (81 mg) by mouth once daily.   Past Week    celecoxib (CeleBREX) 200 mg capsule Take 1 capsule (200 mg) by mouth as needed at bedtime for moderate pain (4 - 6).   4/14/2024    dilTIAZem CD (Cardizem CD) 180 mg 24 hr capsule Take 1 capsule (180 mg) by mouth once daily. 90 capsule 1 Past Week    fluticasone (Flonase) 50 mcg/actuation nasal spray Administer 1 spray into each nostril once daily. Shake gently. Before first use, prime pump. After use, clean tip and replace cap. 16 g 5 Past Week    glycopyrrolate-formoteroL (Bevespi Aerosphere) 9-4.8 mcg HFA aerosol inhaler Inhale 2 puffs 2 times a day.   Past Week    ibuprofen 600 mg tablet Take 1 tablet (600 mg) by mouth every 8 hours if needed for moderate pain (4 - 6).   Past Week    multivitamin with folic acid (One Daily Multivitamin) 400 mcg tablet Take 1 tablet by mouth once daily.   Past Week    omeprazole (PriLOSEC) 40 mg DR capsule Take 1 capsule (40 mg) by mouth once daily in the morning. Take before meals. Take 30 minutes prior to dinner 90 capsule 1 Past Week    oxybutynin XL (Ditropan-XL) 10 mg 24 hr  tablet Take 1 tablet (10 mg) by mouth 2 times a day. 180 tablet 1 Past Week    traZODone (Desyrel) 50 mg tablet Take 1 tablet (50 mg) by mouth as needed at bedtime for sleep. (Patient taking differently: Take 2 tablets (100 mg) by mouth as needed at bedtime for sleep.) 90 tablet 1 Past Week    Tymlos 80 mcg (3,120 mcg/1.56 mL) pen injector Inject 1 Dose as directed once daily.   4/14/2024    albuterol 90 mcg/actuation inhaler Inhale 2 puffs every 4 hours if needed for wheezing or shortness of breath.       clonazePAM (KlonoPIN) 0.5 mg tablet Take 1 tablet (0.5 mg) by mouth once daily as needed for anxiety.       cyclobenzaprine (Flexeril) 10 mg tablet Take 1 tablet (10 mg) by mouth as needed at bedtime for muscle spasms. 90 tablet 1     diclofenac sodium (Voltaren) 1 % gel APPLY 1 APPLICATION TOPICALLY 4 TIMES A DAY AS NEEDED (PAIN). 100 g 2     evolocumab (Repatha SureClick) 140 mg/mL injection INJECT THE CONTENTS OF 1 PEN UNDER THE SKIN EVERY 2 WEEKS (Patient taking differently: Inject 1 mL (140 mg) under the skin every 14 (fourteen) days.) 6 mL 3     hydrocortisone acetate 1 % ointment Apply 1 Application topically 2 times a day as needed (for hemorrhoids). 30 g 3     Lidocaine Pain Relief 4 % patch Place 1 patch on the skin once daily as needed for mild pain (1 - 3).       mupirocin (Bactroban) 2 % ointment Apply 1 Application topically 2 times a day as needed. SQUEEZE SMALL AMOUNT ONTO PAD OF THUMB AND INSERT INTO EACH NOSTRIL       ondansetron ODT (Zofran-ODT) 4 mg disintegrating tablet Take 1 tablet (4 mg) by mouth 3 times a day as needed for nausea.       propranolol (Inderal) 20 mg tablet Take 1 tablet (20 mg) by mouth 2 times a day as needed (For anxiety).       SUMAtriptan (Imitrex) 50 mg tablet Take 1 tablet (50 mg) by mouth 1 time if needed for migraine. 9 tablet 2     topiramate (Topamax) 25 mg tablet Take 1 tablet (25 mg) by mouth 2 times a day. (Patient not taking: Reported on 4/15/2024) 180 tablet 0  Not Taking    valACYclovir (Valtrex) 1 gram tablet Take 1 tablet (1,000 mg) by mouth 3 times a day. 21 tablet 4        Review of Systems    Review of Systems:  Neurologic:  As per the history of present illness.  Constitutional:  Negative for fevers.  Musculoskeletal: Positive for chronic low back pain. Cardiovascular:  Negative for chest pain.  Respiratory:  Negative for dyspnea.  Eyes:  Negative for vision loss or diplopia.  ENT:  Negative for acute hearing loss.  GI:  Negative for vomiting.  :  Negative for bladder incontinence.  Dermatologic:  Negative for rash.        Neurological Exam  Physical Exam    Physical Examination:    General: Alert, lying in bed in no acute distress.  Left ankle monitoring bracelet.    Mental Status: Clear sensorium without fluctuation.  Appropriate in conversation.  Oriented to self, age, date and suburb.  Recent and remote recall intact for details of medical history.  Attention and concentration intact during interview.  Fund of knowledge fair for medical information.  Language intact and fluent without paraphasic errors.    Cranial Nerves:  Funduscopic exam was not well visualized bilaterally on nondilated exam.  Pupils were equal, round and reactive to light with no relative afferent pupillary defect.  Extraocular movements were intact and conjugate without nystagmus.  No ptosis.  Visual fields were full to confrontation tested binocularly.  Facial sensation was symmetric to pin.  Facial motor function was symmetrically intact.  Hearing was grossly intact.  Mild cerebellar type dysarthria resembling (mild) inebriated speech.  Shoulder shrug was symmetric.  Tongue protrusion was midline.    Motor: Muscle bulk and tone were normal throughout. There was mild downward drift of the left upper extremity without pronation and without contacting the bed within 10 seconds.  There was no drift of any other limb. Confrontation strength was symmetrically 5/5 throughout the upper and lower  "extremities.     Coordination: Finger to finger was accurate bilaterally without dysmetria or intention tremor.  No postural or rest tremor, myoclonus or dystonic posturing.    Tendon Reflexes: Symmetrically trace biceps, brachioradialis and patellar, neutral plantars.    Sensation: Pin and light touch were mildly diminished over the left dorsal hand compared to the right.  Pin and light touch were symmetric over the forelegs.    Gait/Station: Deferred as patient was seen while in observation unit and under police observation.      Last Recorded Vitals  Blood pressure 151/74, pulse (!) 49, temperature 36.6 °C (97.9 °F), temperature source Temporal, resp. rate 17, height 1.549 m (5' 1\"), weight 62.1 kg (136 lb 12.8 oz), SpO2 97%.    Relevant Results        NIH Stroke Scale  1A. Level of Consciousness: Alert, Keenly Responsive  1B. Ask Month and Age: Both Questions Right  1C. Blink Eyes & Squeeze Hands: Performs Both Tasks  2. Best Gaze: Normal  3. Visual: No Visual Loss  4. Facial Palsy: Normal Symmetrical Movements  5A. Motor - Left Arm: Drift  5B. Motor - Right Arm: No Drift  6A. Motor - Left Leg: No Drift  6B. Motor - Right Leg: No Drift  7. Limb Ataxia: Absent  8. Sensory Loss: Normal  9. Best Language: No Aphasia  10. Dysarthria: Mild-to-Moderate Dysarthria  11. Extinction and Inattention: No Abnormality  NIH Stroke Scale: 2           [place] Coma Scale  Best Eye Response: Spontaneous  Best Verbal Response: Oriented  Best Motor Response: Follows commands  [place] Coma Scale Score: 15                 I have personally reviewed the following imaging results XR chest 1 view    Result Date: 4/15/2024  Interpreted By:  Beka Han, STUDY: XR CHEST 1 VIEW;  4/15/2024 11:12 am   INDICATION: Signs/Symptoms:stroke.   COMPARISON: 10/24/2023   ACCESSION NUMBER(S): [id number]   ORDERING CLINICIAN: RAMONE KEITH   FINDINGS: The lungs are clear without apparent pleural effusion. Cardiomediastinal silhouette " unchanged. Aortic atherosclerosis. No pulmonary vascular congestion. Sternotomy wires are present.       No active disease in the chest.   MACRO: None   Signed by: Beka Han 4/15/2024 11:25 AM Dictation workstation:   [id number]    CT angio brain attack head w IV contrast and post procedure    Result Date: 4/15/2024  Interpreted By:  Prakash Rai, STUDY: CT ANGIO BRAIN ATTACK NECK W IV CONTRAST AND POST PROCEDURE; CT ANGIO BRAIN ATTACK HEAD W IV CONTRAST AND POST PROCEDURE   INDICATION: Signs/Symptoms:stroke   Slurring, left-sided weakness.   COMPARISON: Head CT performed concurrently.   ACCESSION NUMBER(S): [id number]; [id number]   ORDERING CLINICIAN: RAMONE KEITH   TECHNIQUE: CTA of the head and neck was performed after the intravenous administration of 75 mL Omnipaque 350 nonionic IV contrast. 3-D reconstructions were performed under physician supervision on a separate workstation and reviewed.   FINDINGS: CTA NECK:   AORTIC ARCH: The visualized portion of the aortic arch is unremarkable in appearance. The origins of the great vessels and the vertebral arteries are normal without evidence of stenosis.   CERVICAL CAROTID ARTERIES: The common carotid arteries are patent bilaterally without evidence of stenosis. Atherosclerotic disease of bilateral carotid bifurcations resulting in mild (less than 50%) luminal stenosis of the proximal cervical ICAs. Remainder of the cervical ICAs are normal.   VERTEBRAL ARTERIES: The vertebral arteries are patent bilaterally throughout their course.   CTA HEAD:   INTERNAL CAROTID ARTERIES: Normal in course and caliber.   CEREBRAL ARTERIES: Occlusion versus high-grade stenosis of a branch of the right M2 inferior division within the right sylvian fissure (series 401, image 657). Distal right M3/M4 branches are symmetric with the contralateral side. Mild narrowing of the A2 branch of the left CINDY (series 401, image 635). Remainder of the left CINDY is normal. Left MCA,  right CINDY, and bilateral PCAs are normal.   VERTEBROBASILAR SYSTEM: Normal in course and caliber.   There is no evidence for saccular aneurysm or vascular malformation.       Medium vessel occlusion versus high-grade stenosis of a right M2 inferior division branch within the right sylvian fissure.   Mild atherosclerotic narrowing of the A2 branch of the left ICA.   Remaining intracranial vasculature is within normal limits.   Mild atherosclerotic narrowing of bilateral proximal cervical ICAs. Otherwise, cervical vasculature is within normal limits.   Findings were discussed with Dr. Rojas at 11 a.m. on 04/15/2024.   _____________ NASCET criteria for internal carotid artery stenosis: Mild: 0% to 49% Moderate: 50% to 69% Severe: 70% to 99% Complete Occlusion   Signed by: Prakash Rai 4/15/2024 11:12 AM Dictation workstation:   [id number]    CT angio brain attack neck w IV contrast and post procedure    Result Date: 4/15/2024  Interpreted By:  Prakash Rai, STUDY: CT ANGIO BRAIN ATTACK NECK W IV CONTRAST AND POST PROCEDURE; CT ANGIO BRAIN ATTACK HEAD W IV CONTRAST AND POST PROCEDURE   INDICATION: Signs/Symptoms:stroke   Slurring, left-sided weakness.   COMPARISON: Head CT performed concurrently.   ACCESSION NUMBER(S): [id number]; [id number]   ORDERING CLINICIAN: RAMONE ROJAS   TECHNIQUE: CTA of the head and neck was performed after the intravenous administration of 75 mL Omnipaque 350 nonionic IV contrast. 3-D reconstructions were performed under physician supervision on a separate workstation and reviewed.   FINDINGS: CTA NECK:   AORTIC ARCH: The visualized portion of the aortic arch is unremarkable in appearance. The origins of the great vessels and the vertebral arteries are normal without evidence of stenosis.   CERVICAL CAROTID ARTERIES: The common carotid arteries are patent bilaterally without evidence of stenosis. Atherosclerotic disease of bilateral carotid bifurcations resulting in mild  (less than 50%) luminal stenosis of the proximal cervical ICAs. Remainder of the cervical ICAs are normal.   VERTEBRAL ARTERIES: The vertebral arteries are patent bilaterally throughout their course.   CTA HEAD:   INTERNAL CAROTID ARTERIES: Normal in course and caliber.   CEREBRAL ARTERIES: Occlusion versus high-grade stenosis of a branch of the right M2 inferior division within the right sylvian fissure (series 401, image 657). Distal right M3/M4 branches are symmetric with the contralateral side. Mild narrowing of the A2 branch of the left CINDY (series 401, image 635). Remainder of the left CINDY is normal. Left MCA, right CINDY, and bilateral PCAs are normal.   VERTEBROBASILAR SYSTEM: Normal in course and caliber.   There is no evidence for saccular aneurysm or vascular malformation.       Medium vessel occlusion versus high-grade stenosis of a right M2 inferior division branch within the right sylvian fissure.   Mild atherosclerotic narrowing of the A2 branch of the left ICA.   Remaining intracranial vasculature is within normal limits.   Mild atherosclerotic narrowing of bilateral proximal cervical ICAs. Otherwise, cervical vasculature is within normal limits.   Findings were discussed with Dr. Rojas at 11 a.m. on 04/15/2024.   _____________ NASCET criteria for internal carotid artery stenosis: Mild: 0% to 49% Moderate: 50% to 69% Severe: 70% to 99% Complete Occlusion   Signed by: Prakash Rai 4/15/2024 11:12 AM Dictation workstation:   [id number]    CT brain attack head wo IV contrast    Result Date: 4/15/2024  Interpreted By:  Sharan Monsalve, STUDY: CT BRAIN ATTACK HEAD WO IV CONTRAST;  4/15/2024 10:34 am   INDICATION: Signs/Symptoms:stroke.   COMPARISON: Previous exam is from 11/26/2012   ACCESSION NUMBER(S): [id number]   ORDERING CLINICIAN: RAMONE ROJAS   TECHNIQUE: Routine axial images were obtained from the skull base through the vertex. Sagittal and coronal reconstruction images were  generated. Brain, subdural, and bone windows were reviewed.   FINDINGS: INTRACRANIAL: Mild prominence of ventricles and sulci. No acute intracranial bleed, midline shift, or focal mass effect. No destructive bone lesion. No depressed skull fracture. No abnormal skull base arterial calcifications.   EXTRACRANIAL: Visualized paranasal sinuses were clear. Visualized mastoid air cells were clear.       Mild volume loss.  Otherwise, negative exam.   MACRO: Sharan Monsalve discussed the significance and urgency of this critical finding by epic secure chat with  RAMONE KEITH on 4/15/2024 at 10:42 am.  (**-RCF-**) Findings:  See findings.   Signed by: Sharan Monsalve 4/15/2024 10:42 AM Dictation workstation:   [id number]       Assessment/Plan     Suspected minor stroke, presenting with mild dysarthria and subtle left upper extremity sensorimotor dysfunction, in this woman with multiple risk factors including hypertension, hyperlipidemia, smoking.    The CT angiogram finding of a right MCA M2 branch occlusion versus high-grade stenosis may be pertinent.    She is pending MRI which will confirm or refute suspicion of stroke.    Recommend:    Will review brain MRI when completed.    Continue aspirin and add clopidogrel 75 mg daily.    Smoking cessation    Echocardiogram    OT and speech consults    DVT prophylaxis               Richard Hopkins MD

## 2024-04-15 NOTE — CARE PLAN
Over the shift, the patient did make progress toward the following goals.   Problem: General Stroke  Goal: Establish a mutual long term goal with patient by discharge  4/15/2024 1957 by Dara Francis LPN  Outcome: Met  4/15/2024 1957 by Dara Francis LPN  Outcome: Progressing  Goal: Demonstrate improvement in neurological exam throughout the shift  4/15/2024 1957 by Dara Francis LPN  Outcome: Met  4/15/2024 1957 by Dara Francis LPN  Outcome: Progressing  Goal: Maintain BP within ordered limits throughout shift  4/15/2024 1957 by Dara Francis LPN  Outcome: Met  4/15/2024 1957 by Dara Francis LPN  Outcome: Progressing  Goal: Participate in treatment (ie., meds, therapy) throughout shift  4/15/2024 1957 by Dara Francis LPN  Outcome: Met  4/15/2024 1957 by Dara Francis LPN  Outcome: Progressing  Goal: No symptoms of aspiration throughout shift  4/15/2024 1957 by Dara Francis LPN  Outcome: Met  4/15/2024 1957 by Dara Francis LPN  Outcome: Progressing  Goal: No symptoms of hemorrhage throughout shift  4/15/2024 1957 by Dara Francis LPN  Outcome: Met  4/15/2024 1957 by Dara Francis LPN  Outcome: Progressing  Goal: Tolerate enteral feeding throughout shift  4/15/2024 1957 by Dara Francis LPN  Outcome: Met  4/15/2024 1957 by Dara Francis LPN  Outcome: Progressing  Goal: Decreased nausea/vomiting throughout shift  4/15/2024 1957 by Dara Francis LPN  Outcome: Met  4/15/2024 1957 by Dara Francis LPN  Outcome: Progressing  Goal: Controlled blood glucose throughout shift  4/15/2024 1957 by Dara Francis LPN  Outcome: Met  4/15/2024 1957 by Dara Francis LPN  Outcome: Progressing  Goal: Out of bed three times today  4/15/2024 1957 by Dara Francis LPN  Outcome: Met  4/15/2024 1957 by Dara Francis LPN  Outcome: Progressing     Problem: ICU Stroke  Goal: Maintain ICP within ordered limits throughout shift  4/15/2024  1957 by Dara Francis LPN  Outcome: Met  4/15/2024 1957 by Dara Francis LPN  Outcome: Progressing  Goal: Tolerate EVD clamping trial throughout shift  4/15/2024 1957 by Dara Francis LPN  Outcome: Met  4/15/2024 1957 by Dara Francis LPN  Outcome: Progressing  Goal: Tolerate ventilator weaning trial during shift  4/15/2024 1957 by Dara Francis LPN  Outcome: Met  4/15/2024 1957 by Dara Francis LPN  Outcome: Progressing  Goal: Maintain patent airway throughout shift  4/15/2024 1957 by Dara Francis LPN  Outcome: Met  4/15/2024 1957 by Dara Francis LPN  Outcome: Progressing  Goal: Achieve/maintain targeted sodium level throughout shift  4/15/2024 1957 by Dara Francis LPN  Outcome: Met  4/15/2024 1957 by Dara Francis LPN  Outcome: Progressing

## 2024-04-15 NOTE — PROGRESS NOTES
Pharmacy Medication History Review    Judith Bush is a 68 y.o. female admitted for Stroke risk. Pharmacy reviewed the patient's [id number] medications and allergies for accuracy.    The list below reflectives the updated PTA list. Please review each medication in order reconciliation for additional clarification and justification.  Prior to Admission medications    Medication Sig Start Date End Date Taking? Authorizing Provider                                                                                                                                                                                                                                The list below reflectives the updated allergy list. Please review each documented allergy for additional clarification and justification.  Allergies  Reviewed by DENISE Caldwell on 4/15/2024        Severity Reactions Comments    Penicillins High Anaphylaxis     Cephalosporins Low Rash     Neomycin-polymyxin-gramicidin Low Rash             Below are additional concerns with the patient's PTA list.  Prior to Admission Medications   Prescriptions Last Dose Informant   Lidocaine Pain Relief 4 % patch     Sig: Place 1 patch on the skin once daily as needed for mild pain (1 - 3).   SUMAtriptan (Imitrex) 50 mg tablet     Sig: Take 1 tablet (50 mg) by mouth 1 time if needed for migraine.   Tymlos 80 mcg (3,120 mcg/1.56 mL) pen injector 4/14/2024    Sig: Inject 1 Dose as directed once daily.   albuterol 90 mcg/actuation inhaler     Sig: Inhale 2 puffs every 4 hours if needed for wheezing or shortness of breath.   aspirin 81 mg EC tablet Past Week    Sig: Take 1 tablet (81 mg) by mouth once daily.   celecoxib (CeleBREX) 200 mg capsule 4/14/2024    Sig: Take 1 capsule (200 mg) by mouth as needed at bedtime for moderate pain (4 - 6).   clonazePAM (KlonoPIN) 0.5 mg tablet     Sig: Take 1 tablet (0.5 mg) by mouth once daily as needed for anxiety.   cyclobenzaprine  (Flexeril) 10 mg tablet     Sig: Take 1 tablet (10 mg) by mouth as needed at bedtime for muscle spasms.   diclofenac sodium (Voltaren) 1 % gel     Sig: APPLY 1 APPLICATION TOPICALLY 4 TIMES A DAY AS NEEDED (PAIN).   dilTIAZem CD (Cardizem CD) 180 mg 24 hr capsule Past Week    Sig: Take 1 capsule (180 mg) by mouth once daily.   evolocumab (Repatha SureClick) 140 mg/mL injection     Sig: INJECT THE CONTENTS OF 1 PEN UNDER THE SKIN EVERY 2 WEEKS   Patient taking differently: Inject 1 mL (140 mg) under the skin every 14 (fourteen) days.   fluticasone (Flonase) 50 mcg/actuation nasal spray Past Week    Sig: Administer 1 spray into each nostril once daily. Shake gently. Before first use, prime pump. After use, clean tip and replace cap.   glycopyrrolate-formoteroL (Bevespi Aerosphere) 9-4.8 mcg HFA aerosol inhaler Past Week    Sig: Inhale 2 puffs 2 times a day.   hydrocortisone acetate 1 % ointment     Sig: Apply 1 Application topically 2 times a day as needed (for hemorrhoids).   ibuprofen 600 mg tablet Past Week    Sig: Take 1 tablet (600 mg) by mouth every 8 hours if needed for moderate pain (4 - 6).   multivitamin with folic acid (One Daily Multivitamin) 400 mcg tablet Past Week    Sig: Take 1 tablet by mouth once daily.   mupirocin (Bactroban) 2 % ointment     Sig: Apply 1 Application topically 2 times a day as needed. SQUEEZE SMALL AMOUNT ONTO PAD OF THUMB AND INSERT INTO EACH NOSTRIL   omeprazole (PriLOSEC) 40 mg DR capsule Past Week    Sig: Take 1 capsule (40 mg) by mouth once daily in the morning. Take before meals. Take 30 minutes prior to dinner   ondansetron ODT (Zofran-ODT) 4 mg disintegrating tablet     Sig: Take 1 tablet (4 mg) by mouth 3 times a day as needed for nausea.   oxybutynin XL (Ditropan-XL) 10 mg 24 hr tablet Past Week    Sig: Take 1 tablet (10 mg) by mouth 2 times a day.   propranolol (Inderal) 20 mg tablet     Sig: Take 1 tablet (20 mg) by mouth 2 times a day as needed (For anxiety).            traZODone (Desyrel) 50 mg tablet Past Week    Sig: Take 1 tablet (50 mg) by mouth as needed at bedtime for sleep.   Patient taking differently: Take 2 tablets (100 mg) by mouth as needed at bedtime for sleep.   valACYclovir (Valtrex) 1 gram tablet     Sig: Take 1 tablet (1,000 mg) by mouth 3 times a day.      Facility-Administered Medications: None      Per patient.     Rossy Breen

## 2024-04-15 NOTE — PROGRESS NOTES
Home alone      04/15/24 1259   Current Planned Discharge Disposition   Current Planned Discharge Disposition Home

## 2024-04-15 NOTE — PROGRESS NOTES
04/15/24 1300   ACS Disability Status   Are you deaf or do you have serious difficulty hearing? N   Are you blind or do you have serious difficulty seeing, even when wearing glasses? N   Because of a physical, mental, or emotional condition, do you have serious difficulty concentrating, remembering, or making decisions? (5 years old or older) N   Do you have serious difficulty walking or climbing stairs? Y  (weakness)   Do you have serious difficulty dressing or bathing? Y  (left arm weak)   Because of a physical, mental, or emotional condition, do you have serious difficulty doing errands alone such as visiting the doctor? Y

## 2024-04-15 NOTE — PROGRESS NOTES
Transitional Care Coordination Progress Note:  Plan per Medical/Surgical team: treatment of CVA with ASA, neuro consult, ECHO pending  Status: Observation  Payor source: united dual complete  Discharge disposition: Home alone ?HHC  Potential Barriers: left arm clumsiness and weakness, slurred speech  ADOD: 4/17/2024  MASHA Martinez RN, BSN Transitional Care Coordinator ED# [phone]      04/15/24 1300   Discharge Planning   Living Arrangements Alone   Support Systems Friends/neighbors   Assistance Needed neuro work up   Type of Residence Private residence   Number of Stairs to Enter Residence 3   Number of Stairs Within Residence 14   Home or Post Acute Services In home services   Patient expects to be discharged to: Home alone, ?HHC vs SNF/REHAB   Does the patient need discharge transport arranged? Yes   RoundTrip coordination needed? Yes   Has discharge transport been arranged? No   Financial Resource Strain   How hard is it for you to pay for the very basics like food, housing, medical care, and heating? Not hard   Housing Stability   In the last 12 months, was there a time when you were not able to pay the mortgage or rent on time? N   In the last 12 months, how many places have you lived? 1   In the last 12 months, was there a time when you did not have a steady place to sleep or slept in a shelter (including now)? N   Transportation Needs   In the past 12 months, has lack of transportation kept you from medical appointments or from getting medications? no   In the past 12 months, has lack of transportation kept you from meetings, work, or from getting things needed for daily living? No

## 2024-04-16 ENCOUNTER — TELEPHONE (OUTPATIENT)
Dept: SCHEDULING | Age: 69
End: 2024-04-16
Payer: COMMERCIAL

## 2024-04-16 ENCOUNTER — DOCUMENTATION (OUTPATIENT)
Dept: PRIMARY CARE | Facility: CLINIC | Age: 69
End: 2024-04-16
Payer: COMMERCIAL

## 2024-04-16 LAB — HOLD SPECIMEN: NORMAL

## 2024-04-16 RX ORDER — CLOPIDOGREL BISULFATE 75 MG/1
75 TABLET ORAL DAILY
Qty: 30 TABLET | Refills: 0 | Status: SHIPPED | OUTPATIENT
Start: 2024-04-16 | End: 2024-04-29 | Stop reason: SDUPTHER

## 2024-04-16 RX ORDER — PANTOPRAZOLE SODIUM 40 MG/1
40 TABLET, DELAYED RELEASE ORAL
Qty: 30 TABLET | Refills: 0 | Status: SHIPPED | OUTPATIENT
Start: 2024-04-16 | End: 2024-05-16

## 2024-04-17 ENCOUNTER — APPOINTMENT (OUTPATIENT)
Dept: OPHTHALMOLOGY | Facility: CLINIC | Age: 69
End: 2024-04-17
Payer: COMMERCIAL

## 2024-04-18 ENCOUNTER — HOSPITAL ENCOUNTER (OUTPATIENT)
Dept: CARDIOLOGY | Facility: CLINIC | Age: 69
Discharge: HOME | End: 2024-04-18
Payer: COMMERCIAL

## 2024-04-18 DIAGNOSIS — G45.9 TRANSIENT CEREBRAL ISCHEMIA, UNSPECIFIED TYPE: ICD-10-CM

## 2024-04-18 DIAGNOSIS — I63.9 CEREBROVASCULAR ACCIDENT (CVA), UNSPECIFIED MECHANISM (MULTI): ICD-10-CM

## 2024-04-18 PROCEDURE — 93246 EXT ECG>7D<15D RECORDING: CPT

## 2024-04-22 ENCOUNTER — APPOINTMENT (OUTPATIENT)
Dept: CARDIOLOGY | Facility: CLINIC | Age: 69
End: 2024-04-22
Payer: COMMERCIAL

## 2024-04-23 ENCOUNTER — APPOINTMENT (OUTPATIENT)
Dept: RADIOLOGY | Facility: CLINIC | Age: 69
End: 2024-04-23
Payer: COMMERCIAL

## 2024-04-23 ENCOUNTER — OFFICE VISIT (OUTPATIENT)
Dept: PRIMARY CARE | Facility: CLINIC | Age: 69
End: 2024-04-23
Payer: COMMERCIAL

## 2024-04-23 ENCOUNTER — TELEPHONE (OUTPATIENT)
Dept: PRIMARY CARE | Facility: CLINIC | Age: 69
End: 2024-04-23

## 2024-04-23 ENCOUNTER — LAB (OUTPATIENT)
Dept: LAB | Facility: LAB | Age: 69
End: 2024-04-23
Payer: COMMERCIAL

## 2024-04-23 VITALS
OXYGEN SATURATION: 98 % | SYSTOLIC BLOOD PRESSURE: 126 MMHG | WEIGHT: 133 LBS | HEART RATE: 67 BPM | BODY MASS INDEX: 25.11 KG/M2 | HEIGHT: 61 IN | DIASTOLIC BLOOD PRESSURE: 78 MMHG

## 2024-04-23 DIAGNOSIS — E78.00 PURE HYPERCHOLESTEROLEMIA: ICD-10-CM

## 2024-04-23 DIAGNOSIS — R94.6 ABNORMAL THYROID EXAM: ICD-10-CM

## 2024-04-23 DIAGNOSIS — E55.9 VITAMIN D DEFICIENCY: ICD-10-CM

## 2024-04-23 DIAGNOSIS — G47.33 OBSTRUCTIVE SLEEP APNEA: ICD-10-CM

## 2024-04-23 DIAGNOSIS — F51.05 INSOMNIA DUE TO OTHER MENTAL DISORDER: ICD-10-CM

## 2024-04-23 DIAGNOSIS — F31.9 BIPOLAR AFFECTIVE DISORDER, RAPID CYCLING (MULTI): ICD-10-CM

## 2024-04-23 DIAGNOSIS — I10 PRIMARY HYPERTENSION: ICD-10-CM

## 2024-04-23 DIAGNOSIS — E53.8 VITAMIN B12 DEFICIENCY: ICD-10-CM

## 2024-04-23 DIAGNOSIS — R73.9 ELEVATED BLOOD SUGAR: ICD-10-CM

## 2024-04-23 DIAGNOSIS — F99 INSOMNIA DUE TO OTHER MENTAL DISORDER: ICD-10-CM

## 2024-04-23 DIAGNOSIS — M81.0 AGE-RELATED OSTEOPOROSIS WITHOUT CURRENT PATHOLOGICAL FRACTURE: ICD-10-CM

## 2024-04-23 DIAGNOSIS — G43.009 MIGRAINE WITHOUT AURA AND WITHOUT STATUS MIGRAINOSUS, NOT INTRACTABLE: ICD-10-CM

## 2024-04-23 DIAGNOSIS — I10 PRIMARY HYPERTENSION: Primary | ICD-10-CM

## 2024-04-23 DIAGNOSIS — Z86.73 HX OF COMPLETED STROKE: ICD-10-CM

## 2024-04-23 DIAGNOSIS — J44.9 CHRONIC OBSTRUCTIVE PULMONARY DISEASE, UNSPECIFIED COPD TYPE (MULTI): ICD-10-CM

## 2024-04-23 PROBLEM — Z91.89 STROKE RISK: Status: RESOLVED | Noted: 2024-04-15 | Resolved: 2024-04-23

## 2024-04-23 LAB
25(OH)D3 SERPL-MCNC: 35 NG/ML (ref 30–100)
ALBUMIN SERPL BCP-MCNC: 4.5 G/DL (ref 3.4–5)
ALP SERPL-CCNC: 84 U/L (ref 33–136)
ALT SERPL W P-5'-P-CCNC: 10 U/L (ref 7–45)
ANION GAP SERPL CALC-SCNC: 16 MMOL/L (ref 10–20)
AST SERPL W P-5'-P-CCNC: 18 U/L (ref 9–39)
BILIRUB SERPL-MCNC: 0.9 MG/DL (ref 0–1.2)
BUN SERPL-MCNC: 16 MG/DL (ref 6–23)
CALCIUM SERPL-MCNC: 10.1 MG/DL (ref 8.6–10.6)
CHLORIDE SERPL-SCNC: 99 MMOL/L (ref 98–107)
CHOLEST SERPL-MCNC: 157 MG/DL (ref 0–199)
CHOLESTEROL/HDL RATIO: 2
CO2 SERPL-SCNC: 26 MMOL/L (ref 21–32)
CORTIS SERPL-MCNC: 8.3 UG/DL (ref 2.5–20)
CREAT SERPL-MCNC: 0.92 MG/DL (ref 0.5–1.05)
EGFRCR SERPLBLD CKD-EPI 2021: 68 ML/MIN/1.73M*2
ERYTHROCYTE [DISTWIDTH] IN BLOOD BY AUTOMATED COUNT: 12.9 % (ref 11.5–14.5)
EST. AVERAGE GLUCOSE BLD GHB EST-MCNC: 103 MG/DL
GLUCOSE SERPL-MCNC: 86 MG/DL (ref 74–99)
HBA1C MFR BLD: 5.2 %
HCT VFR BLD AUTO: 43.7 % (ref 36–46)
HDLC SERPL-MCNC: 77 MG/DL
HGB BLD-MCNC: 14 G/DL (ref 12–16)
LDLC SERPL CALC-MCNC: 57 MG/DL
MCH RBC QN AUTO: 30.4 PG (ref 26–34)
MCHC RBC AUTO-ENTMCNC: 32 G/DL (ref 32–36)
MCV RBC AUTO: 95 FL (ref 80–100)
NON HDL CHOLESTEROL: 80 MG/DL (ref 0–149)
NRBC BLD-RTO: 0 /100 WBCS (ref 0–0)
PLATELET # BLD AUTO: 267 X10*3/UL (ref 150–450)
POTASSIUM SERPL-SCNC: 4.4 MMOL/L (ref 3.5–5.3)
PROT SERPL-MCNC: 7.3 G/DL (ref 6.4–8.2)
RBC # BLD AUTO: 4.6 X10*6/UL (ref 4–5.2)
SODIUM SERPL-SCNC: 137 MMOL/L (ref 136–145)
TRIGL SERPL-MCNC: 114 MG/DL (ref 0–149)
TSH SERPL-ACNC: 1.98 MIU/L (ref 0.44–3.98)
VIT B12 SERPL-MCNC: 427 PG/ML (ref 211–911)
VLDL: 23 MG/DL (ref 0–40)
WBC # BLD AUTO: 9.1 X10*3/UL (ref 4.4–11.3)

## 2024-04-23 PROCEDURE — 3074F SYST BP LT 130 MM HG: CPT | Performed by: FAMILY MEDICINE

## 2024-04-23 PROCEDURE — 1160F RVW MEDS BY RX/DR IN RCRD: CPT | Performed by: FAMILY MEDICINE

## 2024-04-23 PROCEDURE — 36415 COLL VENOUS BLD VENIPUNCTURE: CPT

## 2024-04-23 PROCEDURE — 82306 VITAMIN D 25 HYDROXY: CPT

## 2024-04-23 PROCEDURE — 80061 LIPID PANEL: CPT

## 2024-04-23 PROCEDURE — 99495 TRANSJ CARE MGMT MOD F2F 14D: CPT | Performed by: FAMILY MEDICINE

## 2024-04-23 PROCEDURE — 83036 HEMOGLOBIN GLYCOSYLATED A1C: CPT

## 2024-04-23 PROCEDURE — 1123F ACP DISCUSS/DSCN MKR DOCD: CPT | Performed by: FAMILY MEDICINE

## 2024-04-23 PROCEDURE — 84443 ASSAY THYROID STIM HORMONE: CPT

## 2024-04-23 PROCEDURE — 1159F MED LIST DOCD IN RCRD: CPT | Performed by: FAMILY MEDICINE

## 2024-04-23 PROCEDURE — 3078F DIAST BP <80 MM HG: CPT | Performed by: FAMILY MEDICINE

## 2024-04-23 PROCEDURE — 82607 VITAMIN B-12: CPT

## 2024-04-23 PROCEDURE — 80053 COMPREHEN METABOLIC PANEL: CPT

## 2024-04-23 PROCEDURE — 82533 TOTAL CORTISOL: CPT

## 2024-04-23 PROCEDURE — 85027 COMPLETE CBC AUTOMATED: CPT

## 2024-04-23 RX ORDER — DILTIAZEM HYDROCHLORIDE 180 MG/1
180 CAPSULE, COATED, EXTENDED RELEASE ORAL DAILY
Qty: 90 CAPSULE | Refills: 1 | Status: SHIPPED | OUTPATIENT
Start: 2024-04-23 | End: 2024-05-01 | Stop reason: WASHOUT

## 2024-04-23 RX ORDER — ASPIRIN 81 MG/1
81 TABLET ORAL DAILY
Qty: 90 TABLET | Refills: 0 | Status: SHIPPED | OUTPATIENT
Start: 2024-04-23

## 2024-04-23 NOTE — PROGRESS NOTES
"Subjective   Patient ID: Judith Bush is a 68 y.o. female who presents for Blood Pressure Check and Hospital Follow-up.    HPI   The patient states that she was discharged from the hospital 8 days ago for a stroke. She is scheduled to have a total hip replacement done in Oct 2024 and is using a CPAP machine for obstructive sleep apnea. She is currently taking Diltiazem for hypertension, Aspirin, Plavix and Repatha for her history of stroke, Trazodone for insomnia, Flexeril daily as well as Celebrex as needed for her chronic lower back pain, Valtrex as needed for herpes, the Bevespi inhaler as well as Albuterol as needed, oxybutynin as needed for vaginal atrophy and her overactive bladder and pantoprazole for GERD. She is taking these medications as prescribed and is tolerating them well. She has been significantly stressed out lately. The patient reports that she is scheduled to follow up with Psychiatry for her bipolar affective disorder.    Review of Systems  Constitutional: No fever or chills  Cardiovascular: no chest pain, no palpitations and no syncope.   Respiratory: no cough, no shortness of breath during exertion and no shortness of breath at rest.   Gastrointestinal: no abdominal pain, no nausea and no vomiting.  Neuro: No Headache, no dizziness    Objective   /78   Pulse 67   Ht 1.549 m (5' 0.98\")   Wt 60.3 kg (133 lb)   SpO2 98%   BMI 25.15 kg/m²     Physical Exam  Constitutional: Alert and in no acute distress. Well developed, well nourished  Head and Face: Head and face: Normal.    Cardiovascular: Heart rate and rhythm were normal, normal S1 and S2. No peripheral edema.   Pulmonary: No respiratory distress. Clear bilateral breath sounds.  Musculoskeletal: Gait and station: Normal. Muscle strength/tone: Normal.   Skin: Normal skin color and pigmentation, normal skin turgor, and no rash.    Psychiatric: Judgment and insight: Intact. Mood and affect: Normal.    Lab Results   Component Value " Date    WBC 11.6 (H) 04/15/2024    HGB 15.0 04/15/2024    HCT 44.0 04/15/2024     04/15/2024    CHOL 175 04/15/2024    TRIG 100 04/15/2024    HDL 81.0 04/15/2024    ALT 11 04/15/2024    AST 21 04/15/2024     (L) 04/15/2024    K 4.4 04/15/2024     04/15/2024    CREATININE 0.92 04/15/2024    BUN 26 (H) 04/15/2024    CO2 22 04/15/2024    TSH 1.18 04/15/2023    INR 0.9 04/15/2024    HGBA1C 5.2 04/15/2024       MR brain wo IV contrast  Narrative: Interpreted By:  Mason Agarwal,   STUDY:  MR BRAIN WO IV CONTRAST;  4/15/2024 6:06 pm      INDICATION:  Signs/Symptoms: stroke.      COMPARISON:  Head CT and CTA, 04/15/2024 and brain MRI, 10/04/2016      ACCESSION NUMBER(S):  [id number]      ORDERING CLINICIAN:  HARRISON PRECIADO      TECHNIQUE:  Axial T2, FLAIR, DWI, gradient echo T2 and sagittal and coronal T1  weighted images of the brain were acquired.      FINDINGS:  CSF Spaces: The ventricles, sulci and basal cisterns are appropriate  for the degree of volume loss. No abnormal extra-axial collection.      Parenchyma: There is restricted diffusion with associated T2  hyperintense signal in the right centrum semiovale. There is also  subtle increased diffusion signal in the white matter adjacent to the  right thalamus with mildly decreased signal intensity on the  corresponding ADC map, possibly an additional subacute infarct or  developing wallerian degeneration. No other diffusion signal  abnormality is identified. Generalized parenchymal volume loss is  unchanged from the most recent comparison. Additional nonspecific T2  hyperintense signal in the white matter is likely secondary to  chronic small vessel ischemic disease. No evidence of intracranial  hemorrhage. There is no mass effect or midline shift.      Paranasal Sinuses and Mastoids: Unremarkable.      Orbits: Bilateral lens replacement.      Impression: Acute/subacute infarct in the right centrum semiovale without  evidence of hemorrhagic  transformation.      MACRO:  None      Signed by: Mason Agarwal 4/15/2024 6:13 PM  Dictation workstation:   [id number]  Transthoracic Echo (TTE) Complete     [facility], [address] [place] [address]               Tel [phone] and Fax [phone]    TRANSTHORACIC ECHOCARDIOGRAM REPORT       Patient Name:      DEX BELLE     Reading Physician:    [id number] Ace Guardado MD  Study Date:        4/15/2024           Ordering Provider:    [id number] HARRISON PRECIADO  MRN/PID:           [id number]            Fellow:  Accession#:        [id number]        Nurse:  Date of Birth/Age: 1955 / 68      Sonographer:          Lucy Pang RDCS                     years  Gender:            F                   Additional Staff:  Height:            154.94 cm           Admit Date:           4/15/2024  Weight:            61.69 kg            Admission Status:     Observation -                                                               Priority discharge  BSA / BMI:         1.60 m2 / 25.70     Encounter#:           [id number]                     kg/m2                                         Department Location:  [facility] Non                                                               Invasive  Blood Pressure: 151 /74 mmHg    Study Type:    TRANSTHORACIC ECHO (TTE) COMPLETE  Diagnosis/ICD: Cerebral infarction due to embolism of left middle cerebral                 artery-I63.412  Indication:    Cerebrovascular Accident  CPT Code:      Echo Complete w Full Doppler-17277    Patient History:  MI Location/Type:  Non-ST Elevation MI  Smoker:            Current.  Valve Disorders:   Mitral Valve Replacement.  Pertinent History: HTN, Hyperlipidemia and COPD. 29mm Epic Porcine MVR 8/7/19.    Study Detail: The following Echo studies were performed: 2D, M-Mode, Doppler and                color  flow. Agitated saline used as a contrast agent for                intraseptal flow evaluation.       PHYSICIAN INTERPRETATION:  Left Ventricle: The left ventricular systolic function is normal, with an estimated ejection fraction of 60%. There are no regional wall motion abnormalities. The left ventricular cavity size is normal. There is left ventricular concentric remodeling. Abnormal (paradoxical) septal motion consistent with post-operative status. Spectral Doppler shows a pseudonormal pattern of left ventricular diastolic filling.  Left Atrium: The left atrium is moderately dilated. A bubble study using agitated saline was performed. Bubble study is negative.  Right Ventricle: The right ventricle is normal in size. There is normal right ventricular global systolic function.  Right Atrium: The right atrium is normal in size.  Aortic Valve: The aortic valve is trileaflet. There is no evidence of aortic valve regurgitation. The peak instantaneous gradient of the aortic valve is 9.8 mmHg. The mean gradient of the aortic valve is 5.0 mmHg.  Mitral Valve: There is a prosthetic mitral valve present. Echo findings are consistent with normal mitral valve prosthesis structure and function. There is a Epic mitral valve bioprosthesis with a 29 mm reported size. There is no evidence of mitral valve regurgitation. Chordae tendinae / papillary muscle remnant noted attached to the left ventricular wall (not significantly changed compared with TTE 12/2022).  Tricuspid Valve: The tricuspid valve is structurally normal. There is trace to mild tricuspid regurgitation. The Doppler estimated RVSP is slightly elevated at 33.7 mmHg.  Pulmonic Valve: The pulmonic valve is structurally normal. There is no indication of pulmonic valve regurgitation.  Pericardium: There is no pericardial effusion noted.  Aorta: The aortic root is normal.  Systemic Veins: The inferior vena cava appears to be of normal size. There is IVC inspiratory collapse  greater than 50%.  In comparison to the previous echocardiogram(s): Compared with study from 12/30/2022, no significant change.       CONCLUSIONS:   1. Left ventricular systolic function is normal with a 60% estimated ejection fraction.   2. Abnormal septal motion consistent with post-operative status.   3. Spectral Doppler shows a pseudonormal pattern of left ventricular diastolic filling.   4. The left atrium is moderately dilated.   5. Chordae tendinae / papillary muscle remnant noted attached to the left ventricular wall (not significantly changed compared with TTE 12/2022).   6. Slightly elevated RVSP.    QUANTITATIVE DATA SUMMARY:  2D MEASUREMENTS:                           Normal Ranges:  LAs:           4.08 cm   (2.7-4.0cm)  IVSd:          0.95 cm   (0.6-1.1cm)  LVPWd:         0.95 cm   (0.6-1.1cm)  LVIDd:         4.20 cm   (3.9-5.9cm)  LVIDs:         2.99 cm  LV Mass Index: 79.9 g/m2  LV % FS        28.7 %    LA VOLUME:                                Normal Ranges:  LA Vol A4C:        63.3 ml    (22+/-6mL/m2)  LA Vol A2C:        57.9 ml  LA Vol BP:         62.5 ml  LA Vol Index A4C:  39.5ml/m2  LA Vol Index A2C:  36.1 ml/m2  LA Vol Index BP:   39.0 ml/m2  LA Area A4C:       19.1 cm2  LA Area A2C:       17.7 cm2  LA Major Axis A4C: 4.9 cm  LA Major Axis A2C: 4.6 cm  LA Volume Index:   38.8 ml/m2  LA Vol A4C:        57.6 ml  LA Vol A2C:        56.4 ml    RA VOLUME BY A/L METHOD:                                Normal Ranges:  RA Vol A4C:        30.0 ml    (8.3-19.5ml)  RA Vol Index A4C:  18.7 ml/m2  RA Area A4C:       12.6 cm2  RA Major Axis A4C: 4.5 cm    M-MODE MEASUREMENTS:                   Normal Ranges:  Ao Root: 3.20 cm (2.0-3.7cm)  LAs:     4.13 cm (2.7-4.0cm)    AORTA MEASUREMENTS:                       Normal Ranges:  Ao Sinus, d: 3.00 cm (2.1-3.5cm)  Ao STJ, d:   2.00 cm (1.7-3.4cm)  Asc Ao, d:   2.90 cm (2.1-3.4cm)    LV SYSTOLIC FUNCTION BY 2D PLANIMETRY (MOD):                      Normal  Ranges:  EF-A4C View: 61.9 % (>=55%)  EF-A2C View: 62.2 %  EF-Biplane:  61.9 %    LV DIASTOLIC FUNCTION:                                Normal Ranges:  MV Peak E:        1.45 m/s    (0.7-1.2 m/s)  MV Peak A:        1.28 m/s    (0.42-0.7 m/s)  E/A Ratio:        1.13        (1.0-2.2)  MV e'             0.08 m/s    (>8.0)  MV lateral e'     0.08 m/s  MV medial e'      0.05 m/s  MV A Dur:         108.42 msec  E/e' Ratio:       18.08       (<8.0)  a'                0.03 m/s  PulmV Sys Jamarcus:    41.69 cm/s  PulmV Swift Jamarcus:   40.88 cm/s  PulmV S/D Jamarcus:    1.02  PulmV A Revs Jamarcus: 21.55 cm/s  PulmV A Revs Dur: 85.35 msec    MITRAL VALVE:                        Normal Ranges:  MV Vmax:    1.71 m/s  (<=1.3m/s)  MV peak P.6 mmHg (<5mmHg)  MV mean PG: 3.3 mmHg  (<2mmHg)  MV VTI:     57.19 cm  (10-13cm)  MV DT:      332 msec  (150-240msec)    AORTIC VALVE:                                    Normal Ranges:  AoV Vmax:                1.57 m/s (<=1.7m/s)  AoV Peak P.8 mmHg (<20mmHg)  AoV Mean P.0 mmHg (1.7-11.5mmHg)  LVOT Max Jamarcus:            1.09 m/s (<=1.1m/s)  AoV VTI:                 36.90 cm (18-25cm)  LVOT VTI:                24.28 cm  LVOT Diameter:           1.83 cm  (1.8-2.4cm)  AoV Area, VTI:           1.73 cm2 (2.5-5.5cm2)  AoV Area,Vmax:           1.82 cm2 (2.5-4.5cm2)  AoV Dimensionless Index: 0.66       RIGHT VENTRICLE:  RV Basal 3.50 cm  RV Mid   3.10 cm  RV Major 6.2 cm  TAPSE:   18.0 mm  RV s'    0.11 m/s    TRICUSPID VALVE/RVSP:                              Normal Ranges:  Peak TR Velocity: 2.77 m/s  Est. RA Pressure: 3 mmHg  RV Syst Pressure: 33.7 mmHg (< 30mmHg)  IVC Diam:         1.40 cm    PULMONIC VALVE:                       Normal Ranges:  RVOT Vmax:  0.61 m/s (0.6-0.9m/s)  PV Max Jamarcus: 0.7 m/s  (0.6-0.9m/s)  PV Max P.8 mmHg    Pulmonary Veins:  PulmV A Revs Dur: 85.35 msec  PulmV A Revs Jamarcus: 21.55 cm/s  PulmV Swift Jamarcus:   40.88 cm/s  PulmV S/D Jamarcus:    1.02  PulmV Sys Jamarcus:     41.69 cm/s    AORTA:  Asc Ao Diam 2.94 cm       [id number] Ace Guardado MD  Electronically signed on 4/15/2024 at 5:44:33 PM       ** Final **  XR chest 1 view  Narrative: Interpreted By:  Beka Han,   STUDY:  XR CHEST 1 VIEW;  4/15/2024 11:12 am      INDICATION:  Signs/Symptoms:stroke.      COMPARISON:  10/24/2023      ACCESSION NUMBER(S):  [id number]      ORDERING CLINICIAN:  RAMONE KEITH      FINDINGS:  The lungs are clear without apparent pleural effusion.  Cardiomediastinal silhouette unchanged. Aortic atherosclerosis. No  pulmonary vascular congestion. Sternotomy wires are present.      Impression: No active disease in the chest.      MACRO:  None      Signed by: Beka Han 4/15/2024 11:25 AM  Dictation workstation:   [id number]  CT angio brain attack head w IV contrast and post procedure, CT angio brain attack neck w IV contrast and post procedure  Narrative: Interpreted By:  Prakash Rai,   STUDY:  CT ANGIO BRAIN ATTACK NECK W IV CONTRAST AND POST PROCEDURE; CT ANGIO  BRAIN ATTACK HEAD W IV CONTRAST AND POST PROCEDURE      INDICATION:  Signs/Symptoms:stroke      Slurring, left-sided weakness.      COMPARISON:  Head CT performed concurrently.      ACCESSION NUMBER(S):  [id number]; [id number]      ORDERING CLINICIAN:  RAMONE KEITH      TECHNIQUE:  CTA of the head and neck was performed after the intravenous  administration of 75 mL Omnipaque 350 nonionic IV contrast. 3-D  reconstructions were performed under physician supervision on a  separate workstation and reviewed.      FINDINGS:  CTA NECK:      AORTIC ARCH: The visualized portion of the aortic arch is  unremarkable in appearance. The origins of the great vessels and the  vertebral arteries are normal without evidence of stenosis.      CERVICAL CAROTID ARTERIES: The common carotid arteries are patent  bilaterally without evidence of stenosis. Atherosclerotic disease of  bilateral carotid bifurcations resulting in mild (less than  50%)  luminal stenosis of the proximal cervical ICAs. Remainder of the  cervical ICAs are normal.      VERTEBRAL ARTERIES: The vertebral arteries are patent bilaterally  throughout their course.      CTA HEAD:      INTERNAL CAROTID ARTERIES: Normal in course and caliber.      CEREBRAL ARTERIES: Occlusion versus high-grade stenosis of a branch  of the right M2 inferior division within the right sylvian fissure  (series 401, image 657). Distal right M3/M4 branches are symmetric  with the contralateral side. Mild narrowing of the A2 branch of the  left CINDY (series 401, image 635). Remainder of the left CINDY is  normal. Left MCA, right CINDY, and bilateral PCAs are normal.      VERTEBROBASILAR SYSTEM: Normal in course and caliber.      There is no evidence for saccular aneurysm or vascular malformation.      Impression: Medium vessel occlusion versus high-grade stenosis of a right M2  inferior division branch within the right sylvian fissure.      Mild atherosclerotic narrowing of the A2 branch of the left ICA.      Remaining intracranial vasculature is within normal limits.      Mild atherosclerotic narrowing of bilateral proximal cervical ICAs.  Otherwise, cervical vasculature is within normal limits.      Findings were discussed with Dr. Rojas at 11 a.m. on 04/15/2024.      _____________  NASCET criteria for internal carotid artery stenosis:  Mild: 0% to 49%  Moderate: 50% to 69%  Severe: 70% to 99%  Complete Occlusion      Signed by: Prakash Rai 4/15/2024 11:12 AM  Dictation workstation:   [id number]  CT brain attack head wo IV contrast  Narrative: Interpreted By:  Sharan Monsalve,   STUDY:  CT BRAIN ATTACK HEAD WO IV CONTRAST;  4/15/2024 10:34 am      INDICATION:  Signs/Symptoms:stroke.      COMPARISON:  Previous exam is from 11/26/2012      ACCESSION NUMBER(S):  [id number]      ORDERING CLINICIAN:  RAMONE ROAJS      TECHNIQUE:  Routine axial images were obtained from the skull base through  the  vertex. Sagittal and coronal reconstruction images were generated.  Brain, subdural, and bone windows were reviewed.      FINDINGS:  INTRACRANIAL:  Mild prominence of ventricles and sulci.  No acute intracranial bleed, midline shift, or focal mass effect.  No destructive bone lesion. No depressed skull fracture.  No abnormal skull base arterial calcifications.      EXTRACRANIAL:  Visualized paranasal sinuses were clear.  Visualized mastoid air cells were clear.      Impression: Mild volume loss.  Otherwise, negative exam.      MACRO:  Sharan Monsalve discussed the significance and urgency of this critical  finding by epic secure chat with  RAMONE KEITH on 4/15/2024 at  10:42 am.  (**-RCF-**) Findings:  See findings.      Signed by: Sharan Monsalve 4/15/2024 10:42 AM  Dictation workstation:   [id number]      Assessment/Plan   Diagnoses and all orders for this visit:  Primary hypertension  -     dilTIAZem CD (Cardizem CD) 180 mg 24 hr capsule; Take 1 capsule (180 mg) by mouth once daily.  -     Cortisol; Future  Pure hypercholesterolemia  Age-related osteoporosis without current pathological fracture  Obstructive sleep apnea  Insomnia due to other mental disorder  Chronic obstructive pulmonary disease, unspecified COPD type (Multi)  Migraine without aura and without status migrainosus, not intractable  Bipolar affective disorder, rapid cycling (Multi)  Hx of completed stroke  -     aspirin 81 mg EC tablet; Take 1 tablet (81 mg) by mouth once daily.        Dear Judith Bush     It was my pleasure to take care of you today in the office. Below are the things we discussed today:    1. Hypertension: Continue Diltiazem.     2. History of stroke: Continue Repatha, Plavix and Aspirin. The patient is scheduled to follow up with Cardiology.     3. GERD: Continue pantoprazole.     4. Insomnia: Continue Trazodone.     5. Chronic lower back pain: Continue Celebrex as needed and Flexeril  daily.     6. Herpes: Use  Valtrex as needed.     7. COPD: Continue Bevespi and use Albuterol as needed.      8. Vaginal atrophy and overactive bladder: Continue oxybutynin as needed.     9. Bipolar affective disorder: The patient is scheduled to follow up with Psych.     10. SHON: The patient is using a CPAP machine.     11. Total hip replacement: Scheduled for Oct 2024.     Your yearly Physical is due in: Feb 2024   When you call the office for your yearly Physical, please ask them to inform me to order your blood work, so that you can get the fasting blood work before your appointment and we can discuss the results at your physical.      Please call me if any questions arise from now until your next visit. I will call you after I am done seeing patients. A Doctor is always available by phone when the office is closed. Please feel free to call for help with any problem that you feel shouldn't wait until the office re-opens.     Scribe Attestation  By signing my name below, IAgata Scribe   attest that this documentation has been prepared under the direction and in the presence of Cee Coates MD.

## 2024-04-25 ENCOUNTER — PATIENT OUTREACH (OUTPATIENT)
Dept: CARE COORDINATION | Facility: CLINIC | Age: 69
End: 2024-04-25

## 2024-04-25 ENCOUNTER — APPOINTMENT (OUTPATIENT)
Dept: PRIMARY CARE | Facility: CLINIC | Age: 69
End: 2024-04-25
Payer: COMMERCIAL

## 2024-04-25 NOTE — PROGRESS NOTES
Referral received for care management services.  Outreach call to introduce self, available services, and assess needs.  Voicemail message left with my contact information.   ZOYA Paige  O    Contact: [phone]

## 2024-04-29 ENCOUNTER — PATIENT MESSAGE (OUTPATIENT)
Dept: PRIMARY CARE | Facility: CLINIC | Age: 69
End: 2024-04-29

## 2024-04-29 ENCOUNTER — OFFICE VISIT (OUTPATIENT)
Dept: NEUROLOGY | Facility: CLINIC | Age: 69
End: 2024-04-29
Payer: COMMERCIAL

## 2024-04-29 ENCOUNTER — PATIENT OUTREACH (OUTPATIENT)
Dept: CARE COORDINATION | Facility: CLINIC | Age: 69
End: 2024-04-29
Payer: COMMERCIAL

## 2024-04-29 VITALS — SYSTOLIC BLOOD PRESSURE: 125 MMHG | DIASTOLIC BLOOD PRESSURE: 71 MMHG | HEART RATE: 71 BPM

## 2024-04-29 DIAGNOSIS — I63.9 CEREBROVASCULAR ACCIDENT (CVA), UNSPECIFIED MECHANISM (MULTI): ICD-10-CM

## 2024-04-29 DIAGNOSIS — E78.00 PURE HYPERCHOLESTEROLEMIA: Primary | ICD-10-CM

## 2024-04-29 DIAGNOSIS — G47.33 OSA (OBSTRUCTIVE SLEEP APNEA): ICD-10-CM

## 2024-04-29 DIAGNOSIS — G45.9 TRANSIENT CEREBRAL ISCHEMIA, UNSPECIFIED TYPE: ICD-10-CM

## 2024-04-29 DIAGNOSIS — Z72.0 TOBACCO USE: ICD-10-CM

## 2024-04-29 DIAGNOSIS — E78.5 DYSLIPIDEMIA: Primary | ICD-10-CM

## 2024-04-29 DIAGNOSIS — J44.9 CHRONIC OBSTRUCTIVE PULMONARY DISEASE, UNSPECIFIED COPD TYPE (MULTI): Primary | ICD-10-CM

## 2024-04-29 PROCEDURE — 3078F DIAST BP <80 MM HG: CPT | Performed by: PSYCHIATRY & NEUROLOGY

## 2024-04-29 PROCEDURE — 1123F ACP DISCUSS/DSCN MKR DOCD: CPT | Performed by: PSYCHIATRY & NEUROLOGY

## 2024-04-29 PROCEDURE — 99215 OFFICE O/P EST HI 40 MIN: CPT | Mod: GC | Performed by: PSYCHIATRY & NEUROLOGY

## 2024-04-29 PROCEDURE — 1159F MED LIST DOCD IN RCRD: CPT | Performed by: PSYCHIATRY & NEUROLOGY

## 2024-04-29 PROCEDURE — 3074F SYST BP LT 130 MM HG: CPT | Performed by: PSYCHIATRY & NEUROLOGY

## 2024-04-29 PROCEDURE — 99215 OFFICE O/P EST HI 40 MIN: CPT | Performed by: PSYCHIATRY & NEUROLOGY

## 2024-04-29 PROCEDURE — 4004F PT TOBACCO SCREEN RCVD TLK: CPT | Performed by: PSYCHIATRY & NEUROLOGY

## 2024-04-29 PROCEDURE — 1160F RVW MEDS BY RX/DR IN RCRD: CPT | Performed by: PSYCHIATRY & NEUROLOGY

## 2024-04-29 RX ORDER — CLOPIDOGREL BISULFATE 75 MG/1
75 TABLET ORAL DAILY
Qty: 60 TABLET | Refills: 0 | Status: SHIPPED | OUTPATIENT
Start: 2024-04-29 | End: 2024-05-03 | Stop reason: SDUPTHER

## 2024-04-29 ASSESSMENT — ACTIVITIES OF DAILY LIVING (ADL)
BOWELS: INDEPENDENT (INCLUDING BUTTONS, ZIPS, LACES, ETC.)
MOBILITY_LEVEL_SURFACES: INDEPENDENT (BUT MAY USE ANY AID FOR EXAMPLE, STICK) > 50 YARDS
DRESSING: INDEPENDENT (INCLUDING BUTTONS, ZIPS, LACES,ETC.)
TOTAL_SCORE: 100
STAIRS: INDEPENDENT
BLADDER: CONTINENT
BED_TO_CHAIR_AND_BACK: INDEPENDENT
GROOMING: INDEPENDENT FACE/HAIR/TEETH.SHAVING (IMPLEMENTS PROVIDED)
FEEDING: INDEPENDENT
TOILET_USE: INDEPENDENT (ON AND OFF, DRESSING, WIPING)
BATHING: INDEPENDENT (OR IN SHOWER)

## 2024-04-29 NOTE — PATIENT INSTRUCTIONS
It was a pleasure meeting you.    You were seen in stroke clinic for your stroke diagnosed in [facility]. We think your stroke may have been due to narrowing of distal small arteries in the brain.   Please continue aspirin and Plavix as it is for 90 days. Optimization of vascular risk factors is going to be very important as well.  For your case, your cholesterol number and glucose number was at goal. Smoking cessation would be very important as well.   Please follow up with your cardiologist regarding your Holter monitor.     We would recommend against taking Imitrex. This may worsen your stroke risk.     Antithrombic Therapy/Blood Thinners : Recommended to prevent a stroke or other vascular event  Plan: continue aspirin 81mg and plavix 75mg for 90 days, until 7/14th/2024.     Blood Pressure : Goal is less than 130/80 mmHg  At goal, continue current plan  Cholesterol : Goal is TC < 200, Trig < 150; HDL > 45  LDL - Cholesterol < 70 mg/dl   At goal, continue current plan  Blood Sugar : Goal is fasting sugar  mg/dl, after eating less than 180 mg/dl; HbA1c less than 7%  At goal, continue current plan  Smoking : Goal is not to smoke and avoid second-hand smoke:   Physical Activity : Goal is to improve physical fitness by exercising at a moderate level for at least 30 minutes most days of the week:  Alcohol/Drug Use : Goal is to consume no more than two servings of alcohol per day:   Stroke Warning Signs : Know the warning signs of stroke and the importance of calling 911 to get EMR treatment quickly:

## 2024-04-29 NOTE — PROGRESS NOTES
Subjective     Judith Bush is a 68 y.o. year old right handed female with hx of NSTEMI, mitral valve replacement, HTN, COPD, SHON, migraine, chronic low back pain, bipolar presenting for follow-up from a recent hospitalization .     HPI    4/15/2024: admitted at [facility] after presenting with dysarthria and difficulty using LUE x 1 day. Evaluated by Dr. Hopkins at [facility].  ./95. NIHSS 2 for L arm drift and mild dysarthria. 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 Holter done, pending results. Discharged on DAPT    4/29/2024: presents to stroke prevention clinic. She has been doing well. Her L hand still is clumbsy and somewhat weak, may have difficulty holding things (dishes) still. Dysarthria resolved. She has been taking asa/plavix without issues.   She used to have migraines, using sumatriptan before. Now stopped since stroke.     MRI: Small R centrum semiovale infarct  CTA: A1 segment and distal RM2 segment narrowing per radiology, <50% stenosis of bilateral ICAs   Echo: Ejection fraction: 60%            Left Atrium: moderately dilated             Patent foramen ovale: negative   Holter monitor: results pending   LDL: 57  HbA1C: 5.2   BP: 120s / 70-80s  BMI 25  Active smoking, 1 pack lasts ~4 days         Patient Active Problem List   Diagnosis    Chronic rhinitis    Anxiety disorder    Lumbar radiculopathy    COPD (chronic obstructive pulmonary disease) (Multi)    Bipolar affective disorder, rapid cycling (Multi)    Hyperlipidemia    History of non-ST elevation myocardial infarction (NSTEMI)    Hypertension    Migraine without aura and without status migrainosus, not intractable    Insomnia    Obstructive sleep apnea    Osteoporosis    Pelvic floor dysfunction    S/P MVR (mitral valve replacement)    Sacroiliitis (CMS-HCC)    Vitamin B12 deficiency    Vitamin D deficiency    Scar condition and fibrosis of skin    Hemangioma of skin and subcutaneous tissue    Melanocytic nevi of other parts of face     Other nonthrombocytopenic purpura (CMS-[facility])    Actinic keratosis    Inflamed seborrheic keratosis    Epidermal cyst    Arthritis of right hip    Astigmatism, bilateral    Atrophic vaginitis    Attention deficit hyperactivity disorder    Benign neoplasm of vocal cord    Bipolar 1 disorder, mixed, mild (Multi)    Binocular visual disturbance    Blepharitis of upper eyelids of both eyes    Dermatochalasis of both upper eyelids    PCO (posterior capsular opacification), right    PCO (posterior capsular opacification), left    Pseudophakia    Hyperopia, bilateral    Presbyopia    Medicare annual wellness visit, subsequent     Past Medical History:   Diagnosis Date    Abdominal distension (gaseous) 08/13/2018    Anxiety     Carpal tunnel syndrome, right upper limb 08/13/2018    Collagenous colitis 08/13/2018    Compression fracture of cervical spine (Multi)     Depression, unspecified 08/13/2018    Dermatitis, unspecified 07/09/2020    eczematoid    Dermatochalasis of unspecified eye, unspecified eyelid 10/25/2022    Displaced comminuted fracture of left patella, initial encounter for closed fracture 08/02/2018    Displaced comminuted fracture of right patella, initial encounter for closed fracture 08/13/2018    Dizziness     Headache     Hypokalemia 08/13/2018    Low back pain, unspecified 08/13/2018    Noninfective gastroenteritis and colitis, unspecified 08/02/2018    Chronic colitis    Nutritional deficiency, unspecified 08/02/2018    Poor diet    Old myocardial infarction 03/31/2014    Other chest pain 08/28/2017    Atypical chest pain    Other fracture of right patella, sequela 08/13/2018    Patellar sleeve fracture, right, sequela    Other visual disturbances 04/04/2018    Blurred vision, bilateral    Pain in unspecified hip 03/31/2014    Pericarditis ([facility]) 2019    Pleural effusion     Polyosteoarthritis, unspecified 08/02/2018    Presence of intraocular lens 09/20/2018    Pseudophakia of right eye    Small  intestinal bacterial overgrowth (SIBO)     Unspecified fall, sequela 08/13/2018     Past Surgical History:   Procedure Laterality Date    CATARACT EXTRACTION  07/16/2018    Cataract Surgery    CT ABDOMEN ANGIOGRAM W AND/OR WO IV CONTRAST  7/28/2022    CT ABDOMEN ANGIOGRAM W AND/OR WO IV CONTRAST 7/28/2022 [facility]CILLARY LEGAC[facility]    FEMUR FRACTURE SURGERY  07/16/2018    Femur Repair    HIP SURGERY  07/16/2018    Hip Surgery    HYSTERECTOMY  07/16/2018    Hysterectomy    OTHER SURGICAL HISTORY  07/16/2018    Oophorectomy - Bilateral (Removal Of Both Ovaries)    OTHER SURGICAL HISTORY  05/27/2020    Mitral valve replacement    OTHER SURGICAL HISTORY  05/27/2020    Radius fracture repair    SHOULDER SURGERY  07/16/2018    Shoulder Surgery     Social History     Tobacco Use    Smoking status: Every Day     Current packs/day: 0.25     Average packs/day: 0.3 packs/day for 40.0 years (10.0 ttl pk-yrs)     Types: Cigarettes    Smokeless tobacco: Never   Substance Use Topics    Alcohol use: Not Currently     Alcohol/week: 3.0 standard drinks of alcohol     Types: 3 Standard drinks or equivalent per week     family history includes Adopted in her mother; Adopted child in her father. She was adopted.    Current Outpatient Medications:     aspirin 81 mg EC tablet, Take 1 tablet (81 mg) by mouth once daily., Disp: 90 tablet, Rfl: 0    celecoxib (CeleBREX) 200 mg capsule, Take 1 capsule (200 mg) by mouth as needed at bedtime for moderate pain (4 - 6)., Disp: , Rfl:     clonazePAM (KlonoPIN) 0.5 mg tablet, Take 1 tablet (0.5 mg) by mouth once daily as needed for anxiety., Disp: , Rfl:     cyclobenzaprine (Flexeril) 10 mg tablet, Take 1 tablet (10 mg) by mouth as needed at bedtime for muscle spasms., Disp: 90 tablet, Rfl: 1    diclofenac sodium (Voltaren) 1 % gel, APPLY 1 APPLICATION TOPICALLY 4 TIMES A DAY AS NEEDED (PAIN)., Disp: 100 g, Rfl: 2    dilTIAZem CD (Cardizem CD) 180 mg 24 hr capsule, Take 1 capsule (180 mg) by mouth once  daily., Disp: 90 capsule, Rfl: 1    evolocumab (Repatha SureClick) 140 mg/mL injection, INJECT THE CONTENTS OF 1 PEN UNDER THE SKIN EVERY 2 WEEKS (Patient taking differently: Inject 1 mL (140 mg) under the skin every 14 (fourteen) days.), Disp: 6 mL, Rfl: 3    fluticasone (Flonase) 50 mcg/actuation nasal spray, Administer 1 spray into each nostril once daily. Shake gently. Before first use, prime pump. After use, clean tip and replace cap., Disp: 16 g, Rfl: 5    multivitamin with folic acid (One Daily Multivitamin) 400 mcg tablet, Take 1 tablet by mouth once daily., Disp: , Rfl:     oxybutynin XL (Ditropan-XL) 10 mg 24 hr tablet, Take 1 tablet (10 mg) by mouth 2 times a day., Disp: 180 tablet, Rfl: 1    pantoprazole (ProtoNix) 40 mg EC tablet, Take 1 tablet (40 mg) by mouth once daily in the morning. Take before meals. Do not crush, chew, or split., Disp: 30 tablet, Rfl: 0    propranolol (Inderal) 20 mg tablet, Take 1 tablet (20 mg) by mouth 2 times a day as needed (For anxiety)., Disp: , Rfl:     traZODone (Desyrel) 50 mg tablet, Take 1 tablet (50 mg) by mouth as needed at bedtime for sleep. (Patient taking differently: Take 2 tablets (100 mg) by mouth as needed at bedtime for sleep.), Disp: 90 tablet, Rfl: 1    Tymlos 80 mcg (3,120 mcg/1.56 mL) pen injector, Inject 1 Dose as directed once daily., Disp: , Rfl:     albuterol 90 mcg/actuation inhaler, Inhale 2 puffs every 4 hours if needed for wheezing or shortness of breath., Disp: , Rfl:     clopidogrel (Plavix) 75 mg tablet, Take 1 tablet (75 mg) by mouth once daily., Disp: 60 tablet, Rfl: 0    glycopyrrolate-formoteroL (Bevespi Aerosphere) 9-4.8 mcg HFA aerosol inhaler, Inhale 2 puffs 2 times a day., Disp: , Rfl:     hydrocortisone acetate 1 % ointment, Apply 1 Application topically 2 times a day as needed (for hemorrhoids)., Disp: 30 g, Rfl: 3    Lidocaine Pain Relief 4 % patch, Place 1 patch on the skin once daily as needed for mild pain (1 - 3)., Disp: , Rfl:      valACYclovir (Valtrex) 1 gram tablet, Take 1 tablet (1,000 mg) by mouth 3 times a day., Disp: 21 tablet, Rfl: 4  Allergies   Allergen Reactions    Penicillins Anaphylaxis    Cephalosporins Rash    Neomycin-Polymyxin-Gramicidin Rash       Modified [place] (mRS) Modified [place] Score: No symptoms.   Barthel Index  Feeding: independent  Bathing: independent (or in shower)  Grooming: independent face/hair/teeth.shaving (implements provided)  Dressing : independent (including buttons, zips, laces,etc.)  Bowels: independent (including buttons, zips, laces, etc.)  Bladder: continent  Toilet Use : independent (on and off, dressing, wiping)  Transfers (Bed to chair and back) : independent  Mobility (On level surfaces): independent (but may use any aid for example, stick) > 50 yards  Stairs: independent  Total (0-100): 100           Objective     Visit Vitals  /71   Pulse 71   OB Status Postmenopausal   Smoking Status Every Day     There is no height or weight on file to calculate BMI.    NIH Stroke Scale 1A. Level of Consciousness: Alert, Keenly Responsive, 1B. Ask Month and Age: Both Questions Right, 1C. Blink Eyes & Squeeze Hands: Performs Both Tasks, 2. Best Gaze: Normal, 3. Visual: No Visual Loss, 4. Facial Palsy: Normal Symmetrical Movements, 5A. Motor - Left Arm: No Drift, 5B. Motor - Right Arm: No Drift, 6A. Motor - Left Leg: No Drift, 6B. Motor - Right Leg: No Drift, 7. Limb Ataxia: Absent, 8. Sensory Loss: Normal, 9. Best Language: No Aphasia, 10. Dysarthria: Normal, 11. Extinction and Inattention: No Abnormality, NIH Stroke Scale: 0             Neurological Exam  Physical Exam  Neurological exam: Alert attentive with normal language, orientation, and speech. Reasonable fund of knowledge and memory (recent and remote). Funduscopic exam shows no evidence of papilledema. Pupils equal, round, reactive to light. Visual fields are full to confrontation.  Extraocular eye movements are intact without nystagmus. V1  to 3 is intact to light touch. The face is symmetric.  Hearing is intact to soft voice.  Palate elevates symmetrically. Sternocleidomastoid and trapezius muscles are 5 out of 5 and the tongue is midline. Motor exam: 5 out of 5 throughout normal bulk and tone. No drift. No orbiting. Mildly slowed L hand fine finger movements.  Sensory exam: intact to light touch, pinprick, temperature, joint position sense and vibration. Deep tendon reflexes: +2 throughout symmetric. Plantar responses are flexor bilaterally. Cerebellar exam: no evidence of ataxia on finger-nose-finger or heel-knee-shin maneuver. Casual gait and station are normal.      Extensive review of notes in EMR, labs, tests   HDL-Cholesterol   Date Value Ref Range Status   04/23/2024 77.0 mg/dL Final     Comment:       Age       Very Low   Low     Normal    High    0-19 Y    < 35      < 40     40-45     ----  20-24 Y    ----     < 40      >45      ----        >24 Y      ----     < 40     40-60      >60       Cholesterol/HDL Ratio   Date Value Ref Range Status   04/23/2024 2.0  Final     Comment:       Ref Values  Desirable  < 3.4  High Risk  > 5.0     VLDL   Date Value Ref Range Status   04/23/2024 23 0 - 40 mg/dL Final     Triglycerides   Date Value Ref Range Status   04/23/2024 114 0 - 149 mg/dL Final     Comment:        Age         Desirable   Borderline High   High     Very High   0 D-90 D    19 - 174         ----         ----        ----  91 D- 9 Y     0 -  74        75 -  99     >/= 100      ----    10-19 Y     0 -  89        90 - 129     >/= 130      ----    20-24 Y     0 - 114       115 - 149     >/= 150      ----         >24 Y     0 - 149       150 - 199    200- 499    >/= 500    Venipuncture immediately after or during the administration of Metamizole may lead to falsely low results. Testing should be performed immediately prior to Metamizole dosing.     Non HDL Cholesterol   Date Value Ref Range Status   04/23/2024 80 0 - 149 mg/dL Final      Comment:           Age       Desirable   Borderline High   High     Very High     0-19 Y     0 - 119       120 - 144     >/= 145    >/= 160    20-24 Y     0 - 149       150 - 189     >/= 190      ----         >24 Y    30 mg/dL above LDL Cholesterol goal       Hemoglobin A1C   Date Value Ref Range Status   04/23/2024 5.2 see below % Final     Estimated Average Glucose   Date Value Ref Range Status   04/23/2024 103 Not Established mg/dL Final     Creatinine   Date Value Ref Range Status   04/23/2024 0.92 0.50 - 1.05 mg/dL Final     eGFR   Date Value Ref Range Status   04/23/2024 68 >60 mL/min/1.73m*2 Final     Comment:     Calculations of estimated GFR are performed using the 2021 CKD-EPI Study Refit equation without the race variable for the IDMS-Traceable creatinine methods.  https://jasn.asnjournals.org/content/early/2021/09/22/ASN.9493589221     Protime   Date Value Ref Range Status   04/15/2024 10.5 9.8 - 12.8 seconds Final     INR   Date Value Ref Range Status   04/15/2024 0.9 0.9 - 1.1 Final     Results for orders placed during the hospital encounter of 04/15/24    Transthoracic Echo (TTE) Complete    [facility], [address] [place] [address]  Tel [phone] and Fax [phone]    TRANSTHORACIC ECHOCARDIOGRAM REPORT      Patient Name:      DEX Sweeney Physician:    [id number] Ace Guardado MD  Study Date:        4/15/2024           Ordering Provider:    [id number] HARRISON PRECIADO  MRN/PID:           [id number]            Fellow:  Accession#:        [id number]        Nurse:  Date of Birth/Age: 1955 / 68      Sonographer:          Lucy Pang RDCS  years  Gender:            F                   Additional Staff:  Height:            154.94 cm           Admit Date:           4/15/2024  Weight:            61.69 kg            Admission Status:     Observation -  Priority discharge  BSA / BMI:         1.60 m2 / 25.70     Encounter#:            [id number]  kg/m2  Department Location:  [facility] Non  Invasive  Blood Pressure: 151 /74 mmHg    Study Type:    TRANSTHORACIC ECHO (TTE) COMPLETE  Diagnosis/ICD: Cerebral infarction due to embolism of left middle cerebral  artery-I63.412  Indication:    Cerebrovascular Accident  CPT Code:      Echo Complete w Full Doppler-14670    Patient History:  MI Location/Type:  Non-ST Elevation MI  Smoker:            Current.  Valve Disorders:   Mitral Valve Replacement.  Pertinent History: HTN, Hyperlipidemia and COPD. 29mm Epic Porcine MVR 8/7/19.    Study Detail: The following Echo studies were performed: 2D, M-Mode, Doppler and  color flow. Agitated saline used as a contrast agent for  intraseptal flow evaluation.      PHYSICIAN INTERPRETATION:  Left Ventricle: The left ventricular systolic function is normal, with an estimated ejection fraction of 60%. There are no regional wall motion abnormalities. The left ventricular cavity size is normal. There is left ventricular concentric remodeling. Abnormal (paradoxical) septal motion consistent with post-operative status. Spectral Doppler shows a pseudonormal pattern of left ventricular diastolic filling.  Left Atrium: The left atrium is moderately dilated. A bubble study using agitated saline was performed. Bubble study is negative.  Right Ventricle: The right ventricle is normal in size. There is normal right ventricular global systolic function.  Right Atrium: The right atrium is normal in size.  Aortic Valve: The aortic valve is trileaflet. There is no evidence of aortic valve regurgitation. The peak instantaneous gradient of the aortic valve is 9.8 mmHg. The mean gradient of the aortic valve is 5.0 mmHg.  Mitral Valve: There is a prosthetic mitral valve present. Echo findings are consistent with normal mitral valve prosthesis structure and function. There is a Epic mitral valve bioprosthesis with a 29 mm reported size. There is no evidence of mitral valve regurgitation.  Chordae tendinae / papillary muscle remnant noted attached to the left ventricular wall (not significantly changed compared with TTE 12/2022).  Tricuspid Valve: The tricuspid valve is structurally normal. There is trace to mild tricuspid regurgitation. The Doppler estimated RVSP is slightly elevated at 33.7 mmHg.  Pulmonic Valve: The pulmonic valve is structurally normal. There is no indication of pulmonic valve regurgitation.  Pericardium: There is no pericardial effusion noted.  Aorta: The aortic root is normal.  Systemic Veins: The inferior vena cava appears to be of normal size. There is IVC inspiratory collapse greater than 50%.  In comparison to the previous echocardiogram(s): Compared with study from 12/30/2022, no significant change.      CONCLUSIONS:  1. Left ventricular systolic function is normal with a 60% estimated ejection fraction.  2. Abnormal septal motion consistent with post-operative status.  3. Spectral Doppler shows a pseudonormal pattern of left ventricular diastolic filling.  4. The left atrium is moderately dilated.  5. Chordae tendinae / papillary muscle remnant noted attached to the left ventricular wall (not significantly changed compared with TTE 12/2022).  6. Slightly elevated RVSP.    QUANTITATIVE DATA SUMMARY:  2D MEASUREMENTS:  Normal Ranges:  LAs:           4.08 cm   (2.7-4.0cm)  IVSd:          0.95 cm   (0.6-1.1cm)  LVPWd:         0.95 cm   (0.6-1.1cm)  LVIDd:         4.20 cm   (3.9-5.9cm)  LVIDs:         2.99 cm  LV Mass Index: 79.9 g/m2  LV % FS        28.7 %    LA VOLUME:  Normal Ranges:  LA Vol A4C:        63.3 ml    (22+/-6mL/m2)  LA Vol A2C:        57.9 ml  LA Vol BP:         62.5 ml  LA Vol Index A4C:  39.5ml/m2  LA Vol Index A2C:  36.1 ml/m2  LA Vol Index BP:   39.0 ml/m2  LA Area A4C:       19.1 cm2  LA Area A2C:       17.7 cm2  LA Major Axis A4C: 4.9 cm  LA Major Axis A2C: 4.6 cm  LA Volume Index:   38.8 ml/m2  LA Vol A4C:        57.6 ml  LA Vol A2C:        56.4 ml    RA  VOLUME BY A/L METHOD:  Normal Ranges:  RA Vol A4C:        30.0 ml    (8.3-19.5ml)  RA Vol Index A4C:  18.7 ml/m2  RA Area A4C:       12.6 cm2  RA Major Axis A4C: 4.5 cm    M-MODE MEASUREMENTS:  Normal Ranges:  Ao Root: 3.20 cm (2.0-3.7cm)  LAs:     4.13 cm (2.7-4.0cm)    AORTA MEASUREMENTS:  Normal Ranges:  Ao Sinus, d: 3.00 cm (2.1-3.5cm)  Ao STJ, d:   2.00 cm (1.7-3.4cm)  Asc Ao, d:   2.90 cm (2.1-3.4cm)    LV SYSTOLIC FUNCTION BY 2D PLANIMETRY (MOD):  Normal Ranges:  EF-A4C View: 61.9 % (>=55%)  EF-A2C View: 62.2 %  EF-Biplane:  61.9 %    LV DIASTOLIC FUNCTION:  Normal Ranges:  MV Peak E:        1.45 m/s    (0.7-1.2 m/s)  MV Peak A:        1.28 m/s    (0.42-0.7 m/s)  E/A Ratio:        1.13        (1.0-2.2)  MV e'             0.08 m/s    (>8.0)  MV lateral e'     0.08 m/s  MV medial e'      0.05 m/s  MV A Dur:         108.42 msec  E/e' Ratio:       18.08       (<8.0)  a'                0.03 m/s  PulmV Sys Jamarcus:    41.69 cm/s  PulmV Swift Jamarcus:   40.88 cm/s  PulmV S/D Jamarcus:    1.02  PulmV A Revs Jamarcus: 21.55 cm/s  PulmV A Revs Dur: 85.35 msec    MITRAL VALVE:  Normal Ranges:  MV Vmax:    1.71 m/s  (<=1.3m/s)  MV peak P.6 mmHg (<5mmHg)  MV mean PG: 3.3 mmHg  (<2mmHg)  MV VTI:     57.19 cm  (10-13cm)  MV DT:      332 msec  (150-240msec)    AORTIC VALVE:  Normal Ranges:  AoV Vmax:                1.57 m/s (<=1.7m/s)  AoV Peak P.8 mmHg (<20mmHg)  AoV Mean P.0 mmHg (1.7-11.5mmHg)  LVOT Max Jamarcus:            1.09 m/s (<=1.1m/s)  AoV VTI:                 36.90 cm (18-25cm)  LVOT VTI:                24.28 cm  LVOT Diameter:           1.83 cm  (1.8-2.4cm)  AoV Area, VTI:           1.73 cm2 (2.5-5.5cm2)  AoV Area,Vmax:           1.82 cm2 (2.5-4.5cm2)  AoV Dimensionless Index: 0.66      RIGHT VENTRICLE:  RV Basal 3.50 cm  RV Mid   3.10 cm  RV Major 6.2 cm  TAPSE:   18.0 mm  RV s'    0.11 m/s    TRICUSPID VALVE/RVSP:  Normal Ranges:  Peak TR Velocity: 2.77 m/s  Est. RA Pressure: 3 mmHg  RV Syst  Pressure: 33.7 mmHg (< 30mmHg)  IVC Diam:         1.40 cm    PULMONIC VALVE:  Normal Ranges:  RVOT Vmax:  0.61 m/s (0.6-0.9m/s)  PV Max Jamarcus: 0.7 m/s  (0.6-0.9m/s)  PV Max P.8 mmHg    Pulmonary Veins:  PulmV A Revs Dur: 85.35 msec  PulmV A Revs Jamarcus: 21.55 cm/s  PulmV Swift Jamarcus:   40.88 cm/s  PulmV S/D Jamarcus:    1.02  PulmV Sys Jamarcus:    41.69 cm/s    AORTA:  Asc Ao Diam 2.94 cm      [id number] Ace Guardado MD  Electronically signed on 4/15/2024 at 5:44:33 PM        ** Final **    MRI / CTA results reviewed and summarized as above      Assessment/Plan   Stroke Subtype:   Ischemic Stroke: intracranial atherosclerosis  1. Cerebrovascular accident (CVA), unspecified mechanism (Multi)    2. Transient cerebral ischemia, unspecified type      No orders of the defined types were placed in this encounter.    R centrum semiovale small infarct  Intracranial (RM2) focal stenosis  Left atrial dilation, moderate  Vascular risk factors: hx of NSTEMI, smoking     Continue DAPT with aspirin and plavix for 90 days, end date 2024. Await holter monitor results to ensure no atrial fibrilation. Smoking cessation discussed today.   Vascular risk factor optimization: BP <130/80, LDL <70, A1c <7 (all at goal)   Advised to avoid sumatriptan given this stroke.     Antithrombic Therapy/Blood Thinners : Recommended to prevent a stroke or other vascular event  Plan continue aspirin 81mg and plavix 75mg for 90 days  Blood Pressure : Goal is less than 130/80 mmHg  At goal, continue current plan  Cholesterol : Goal is TC < 200, Trig < 150; HDL > 45  LDL - Cholesterol < 70 mg/dl   At goal, continue current plan  Blood Sugar : Goal is fasting sugar  mg/dl, after eating less than 180 mg/dl; HbA1c less than 7%  At goal, continue current plan  Smoking : Goal is not to smoke and avoid second-hand smoke:   Physical Activity : Goal is to improve physical fitness by exercising at a moderate level for at least 30 minutes most days of the  week:  Alcohol/Drug Use : Goal is to consume no more than two servings of alcohol per day:   Stroke Warning Signs : Know the warning signs of stroke and the importance of calling 911 to get EMR treatment quickly:    Patient seen, evaluated and discussed with attending physician, Dr. Garza.    Deacon Vera PGY5  Vascular Neurology Fellow

## 2024-04-29 NOTE — PROGRESS NOTES
Outreach call to patient to support a smooth transition of care from recent admission.  Spoke with patient who was waiting on a ride post an appointment follow up. Requested a call back after 3 pm.

## 2024-04-30 ENCOUNTER — APPOINTMENT (OUTPATIENT)
Dept: PRIMARY CARE | Facility: CLINIC | Age: 69
End: 2024-04-30
Payer: COMMERCIAL

## 2024-04-30 ENCOUNTER — PATIENT OUTREACH (OUTPATIENT)
Dept: PRIMARY CARE | Facility: CLINIC | Age: 69
End: 2024-04-30

## 2024-04-30 ENCOUNTER — EVALUATION (OUTPATIENT)
Dept: OCCUPATIONAL THERAPY | Facility: CLINIC | Age: 69
End: 2024-04-30
Payer: COMMERCIAL

## 2024-04-30 ENCOUNTER — APPOINTMENT (OUTPATIENT)
Dept: SPEECH THERAPY | Facility: CLINIC | Age: 69
End: 2024-04-30

## 2024-04-30 DIAGNOSIS — I63.9 CEREBROVASCULAR ACCIDENT (CVA), UNSPECIFIED MECHANISM (MULTI): ICD-10-CM

## 2024-04-30 DIAGNOSIS — R29.898 LUE WEAKNESS: Primary | ICD-10-CM

## 2024-04-30 PROBLEM — G47.33 OSA (OBSTRUCTIVE SLEEP APNEA): Status: ACTIVE | Noted: 2024-04-30

## 2024-04-30 PROBLEM — G45.9 TRANSIENT CEREBRAL ISCHEMIA: Status: ACTIVE | Noted: 2024-04-30

## 2024-04-30 PROBLEM — Z72.0 TOBACCO USE: Status: ACTIVE | Noted: 2024-04-30

## 2024-04-30 PROBLEM — E78.5 DYSLIPIDEMIA: Status: ACTIVE | Noted: 2024-04-30

## 2024-04-30 PROCEDURE — 97165 OT EVAL LOW COMPLEX 30 MIN: CPT | Mod: GO

## 2024-04-30 PROCEDURE — 97110 THERAPEUTIC EXERCISES: CPT | Mod: GO

## 2024-04-30 ASSESSMENT — PAIN - FUNCTIONAL ASSESSMENT: PAIN_FUNCTIONAL_ASSESSMENT: 0-10

## 2024-04-30 ASSESSMENT — ENCOUNTER SYMPTOMS
OCCASIONAL FEELINGS OF UNSTEADINESS: 0
DEPRESSION: 0
LOSS OF SENSATION IN FEET: 0

## 2024-04-30 ASSESSMENT — PAIN SCALES - GENERAL: PAINLEVEL_OUTOF10: 0 - NO PAIN

## 2024-04-30 NOTE — ED PROCEDURE NOTE
Procedure  Critical Care    Performed by: Leon Rojas MD  Authorized by: Leon Rojas MD    Critical care provider statement:     Critical care time (minutes):  35    Critical care time was exclusive of:  Separately billable procedures and treating other patients and teaching time    Critical care was necessary to treat or prevent imminent or life-threatening deterioration of the following conditions:  CNS failure or compromise    Critical care was time spent personally by me on the following activities:  Blood draw for specimens, development of treatment plan with patient or surrogate, discussions with consultants, evaluation of patient's response to treatment, examination of patient, ordering and performing treatments and interventions, ordering and review of laboratory studies, ordering and review of radiographic studies, re-evaluation of patient's condition and review of old charts    Care discussed with: admitting provider                 Leon Rojas MD  04/30/24 0912       Leon Rojas MD  04/30/24 0912

## 2024-04-30 NOTE — PROGRESS NOTES
"Occupational Therapy    Occupational Therapy Evaluation    Name: Judith Bush  MRN: [id number]  : 1955  Date: 24  Time Calculation  Start Time: 0200  Stop Time: 024  Time Calculation (min): 45 min  OT Evaluation Time Entry  OT Evaluation (Low) Time Entry: 30  OT Therapeutic Procedures Time Entry  Therapeutic Exercise Time Entry: 15                  Visit: 1  [facility]  24-24  Assessment:   Patient is a 68 year old female s/p stroke. Patient is doing very well functionally, and does not report any difficulty with daily activities. I had given patient a home program for hand coordination and strengthening to continue at home. At this time, patient was in agreement continued skilled OT was not needed. If patient had a change in status in the future, patient was educated on getting a new referral from physician.   Plan:   No further visits indicated.  Patient in agreement with plan    Subjective  Patient agreeable to OT evaluation. Patient reports initially LUE weakness and coordination deficits after the stroke, however reports it is already improving.   Current Problem:  1. LUE weakness        2. Cerebrovascular accident (CVA), unspecified mechanism (Multi)  Referral to Occupational Therapy        General:      General  Reason for Referral: OT eval and treat  Referred By:  (Carol Waller, APRN-CNP)  Patient went to ED on 4/15/24 due to c/o left arm weakness and slurred speech. Discharged with diagnosis of acute stroke.   Precautions:  Precautions  STEADI Fall Risk Score (The score of 4 or more indicates an increased risk of falling): 4  Vital Signs:   Not taken  Pain Assessment:  Pain Assessment  Pain Assessment: 0-10  Pain Score: 0 - No pain  Work History:  ; 3 days a week   Prior Level of Function:  Independent  Roles/Routines:  Has a dog  Patient Goal:  \"To get back to work\"  Personal Factors that my impact Care:   No driving   Objective   Neuro:  Observation:  R handed  Walks without " device   Palpation:  H/o B shoulder repairs in the late 90's   ROM:  BUE WNL  Strength:  BUE: grossly 4/5  R : 40#  L : 35#  Coordination:  B digit opposition intact; and finger to nose intact  Sensation:  Reports had initial parathesias in the LUE, however subsided   Tremor   No tremor  Outcome Measures:  OT Adult Other Outcome Measures  9 Hole Peg Test:  (R: 21 seconds L: 24 seconds)    OP EDUCATION:/Treatment: 15 mins   Patient was educated on hand strengthening exercises for home using putty with handouts. Patient also given handouts for coordination/dexterity for home.     Goals:  Active       OT Goals       HEP       Start:  04/30/24    Expected End:  04/30/24       Patient will be independent with HEP:  GOAL MET

## 2024-04-30 NOTE — PROGRESS NOTES
Call regarding appt. with PCP on  4-  after hospitalization.  At time of outreach call the patient feels as if their condition has improved.  She has had Neurology fu yesterday and is participating in in OT. Still wearing heart monitor and has cardiology fu tomorrow.  Reports having issues with his diltiazem prescription. Unable to obtain- [facility] had [facility] a 90 day script but patient states she does not have. Agreeable to referral to pharmacy.  They  deny any questions or concerns regarding  the recovery period  or follow up appointment,  Agreeable to continued outreach. They have my direct phone # and were encouraged to please reach out should they need any assistance or have any non emergent concerns prior to my next outreach.

## 2024-05-01 ENCOUNTER — PHARMACY VISIT (OUTPATIENT)
Dept: PHARMACY | Facility: CLINIC | Age: 69
End: 2024-05-01
Payer: COMMERCIAL

## 2024-05-01 ENCOUNTER — TELEPHONE (OUTPATIENT)
Dept: NEUROLOGY | Facility: CLINIC | Age: 69
End: 2024-05-01

## 2024-05-01 ENCOUNTER — OFFICE VISIT (OUTPATIENT)
Dept: CARDIOLOGY | Facility: CLINIC | Age: 69
End: 2024-05-01
Payer: COMMERCIAL

## 2024-05-01 VITALS
HEART RATE: 60 BPM | SYSTOLIC BLOOD PRESSURE: 133 MMHG | BODY MASS INDEX: 25.74 KG/M2 | HEIGHT: 61 IN | WEIGHT: 136.31 LBS | OXYGEN SATURATION: 94 % | DIASTOLIC BLOOD PRESSURE: 73 MMHG

## 2024-05-01 DIAGNOSIS — E78.5 DYSLIPIDEMIA: ICD-10-CM

## 2024-05-01 DIAGNOSIS — I10 PRIMARY HYPERTENSION: ICD-10-CM

## 2024-05-01 DIAGNOSIS — I63.9 CEREBROVASCULAR ACCIDENT (CVA), UNSPECIFIED MECHANISM (MULTI): Primary | ICD-10-CM

## 2024-05-01 DIAGNOSIS — Z95.2 S/P MVR (MITRAL VALVE REPLACEMENT): ICD-10-CM

## 2024-05-01 DIAGNOSIS — Z72.0 TOBACCO USE: ICD-10-CM

## 2024-05-01 PROCEDURE — 3075F SYST BP GE 130 - 139MM HG: CPT | Performed by: INTERNAL MEDICINE

## 2024-05-01 PROCEDURE — 99215 OFFICE O/P EST HI 40 MIN: CPT | Performed by: INTERNAL MEDICINE

## 2024-05-01 PROCEDURE — 1160F RVW MEDS BY RX/DR IN RCRD: CPT | Performed by: INTERNAL MEDICINE

## 2024-05-01 PROCEDURE — 1123F ACP DISCUSS/DSCN MKR DOCD: CPT | Performed by: INTERNAL MEDICINE

## 2024-05-01 PROCEDURE — 93005 ELECTROCARDIOGRAM TRACING: CPT | Performed by: INTERNAL MEDICINE

## 2024-05-01 PROCEDURE — 1126F AMNT PAIN NOTED NONE PRSNT: CPT | Performed by: INTERNAL MEDICINE

## 2024-05-01 PROCEDURE — 4004F PT TOBACCO SCREEN RCVD TLK: CPT | Performed by: INTERNAL MEDICINE

## 2024-05-01 PROCEDURE — 93010 ELECTROCARDIOGRAM REPORT: CPT | Performed by: INTERNAL MEDICINE

## 2024-05-01 PROCEDURE — RXMED WILLOW AMBULATORY MEDICATION CHARGE

## 2024-05-01 PROCEDURE — 3078F DIAST BP <80 MM HG: CPT | Performed by: INTERNAL MEDICINE

## 2024-05-01 PROCEDURE — 1159F MED LIST DOCD IN RCRD: CPT | Performed by: INTERNAL MEDICINE

## 2024-05-01 RX ORDER — DILTIAZEM HYDROCHLORIDE 180 MG/1
180 CAPSULE, COATED, EXTENDED RELEASE ORAL DAILY
Qty: 30 CAPSULE | Refills: 3 | Status: SHIPPED | OUTPATIENT
Start: 2024-05-01 | End: 2024-05-01 | Stop reason: SDUPTHER

## 2024-05-01 RX ORDER — DILTIAZEM HYDROCHLORIDE 180 MG/1
180 CAPSULE, COATED, EXTENDED RELEASE ORAL DAILY
COMMUNITY
End: 2024-05-01 | Stop reason: SDUPTHER

## 2024-05-01 RX ORDER — DILTIAZEM HYDROCHLORIDE 180 MG/1
180 CAPSULE, COATED, EXTENDED RELEASE ORAL DAILY
Qty: 30 CAPSULE | Refills: 3 | Status: SHIPPED | OUTPATIENT
Start: 2024-05-01

## 2024-05-01 ASSESSMENT — COLUMBIA-SUICIDE SEVERITY RATING SCALE - C-SSRS
2. HAVE YOU ACTUALLY HAD ANY THOUGHTS OF KILLING YOURSELF?: NO
1. IN THE PAST MONTH, HAVE YOU WISHED YOU WERE DEAD OR WISHED YOU COULD GO TO SLEEP AND NOT WAKE UP?: NO
6. HAVE YOU EVER DONE ANYTHING, STARTED TO DO ANYTHING, OR PREPARED TO DO ANYTHING TO END YOUR LIFE?: NO

## 2024-05-01 ASSESSMENT — PAIN SCALES - GENERAL: PAINLEVEL: 0-NO PAIN

## 2024-05-01 NOTE — TELEPHONE ENCOUNTER
Hi,    Judith left a message that she is still waiting for something to be called in for her migraines, to replace the Imitrex. She stated that she has been dealing with a migraine for 2 days now.  She is also trying to get her BP meds filled.    [facility]: [phone]    Judith: [phone]

## 2024-05-01 NOTE — PROGRESS NOTES
Primary Care Physician: Cee Coates MD  Date of Visit: 05/01/2024  3:20 PM EDT  [place] [facility] [place] [address]   Last office visit: December 30, 2022    Chief Complaint:     Hospital follow-up.    HPI/Summary  Judith Bush is a 68 y.o. female who presents for followup cardiology evaluation.     The patient underwent mitral valve replacement on August 7, 2019 for management of severe MR.  Her course was complicated by postoperative pericarditis and by recurrent right pleural effusion that required a thoracentesis.  She required colchicine and nonsteroidals.  Preoperative coronary angiography was normal.  The problem list includes hypertension, COPD, migraine headache, chronic low back pain and tobacco abuse.  At her last visit, she was smoking 1/2 pack of cigarettes daily.  An echocardiogram on December 30, 2022 showed normal LV systolic function, mildly reduced RV systolic function, and there was an epic porcine mitral valve bioprosthesis.  Mild TR.    She presented on April 15, 2024 with left arm clumsiness and weakness, and slurred speech.  CT angiography showed distal right M2 segment narrowing.  MRI showed a small right centrum semiovale infarct.  Symptom onset was greater than 24 hours.  Plan was to add clopidogrel to aspirin, place a Holter monitor, follow-up with neurology and arrange for outpatient PT and OT.  She was seen by neurology on April 29, 2024, not certain whether this was  atherosclerosis versus embolic.  Recommended for DAPT for 90 days, cardiology for follow-up of monitor, smoking cessation.    Current cardiac regimen includes aspirin and clopidogrel, diltiazem 180 mg daily, Repatha, and propranolol as needed.  History of statin intolerance.    Pertinent laboratory review: Cholesterol 157, HDL 77, LDL 57, triglycerides 114.  Hemoglobin A1c 5.2%.  Creatinine 0.92.    Further history reveals that the patient had been under a great deal of personal stress.  She was arrested for  O VI in November, 2023, has been unable to drive, and had a court appearance 3 days prior to the stroke.  During the court appearance, she was sentenced to 2 years probation and limited restricted driving.  She has been wearing an ankle monitor for alcohol for 6 months.    No angina.  Still smoking cigarettes.  Continues with substantial life stress.  Her stroke symptoms have largely resolved.  Specialty Problems          Cardiology Problems    Other nonthrombocytopenic purpura (CMS-HCC)    History of non-ST elevation myocardial infarction (NSTEMI)    Hyperlipidemia    Hypertension    S/P MVR (mitral valve replacement)    Dyslipidemia    Tobacco use        Past Medical History:   Diagnosis Date    Abdominal distension (gaseous) 08/13/2018    Anxiety     Carpal tunnel syndrome, right upper limb 08/13/2018    Collagenous colitis 08/13/2018    Compression fracture of cervical spine (Multi)     Depression, unspecified 08/13/2018    Dermatitis, unspecified 07/09/2020    eczematoid    Dermatochalasis of unspecified eye, unspecified eyelid 10/25/2022    Displaced comminuted fracture of left patella, initial encounter for closed fracture 08/02/2018    Displaced comminuted fracture of right patella, initial encounter for closed fracture 08/13/2018    Dizziness     Headache     Hypokalemia 08/13/2018    Low back pain, unspecified 08/13/2018    Noninfective gastroenteritis and colitis, unspecified 08/02/2018    Chronic colitis    Nutritional deficiency, unspecified 08/02/2018    Poor diet    Old myocardial infarction 03/31/2014    Other chest pain 08/28/2017    Atypical chest pain    Other fracture of right patella, sequela 08/13/2018    Patellar sleeve fracture, right, sequela    Other visual disturbances 04/04/2018    Blurred vision, bilateral    Pain in unspecified hip 03/31/2014    Pericarditis ([facility]) 2019    Pleural effusion     Polyosteoarthritis, unspecified 08/02/2018    Presence of intraocular lens 09/20/2018     Pseudophakia of right eye    Small intestinal bacterial overgrowth (SIBO)     Unspecified fall, sequela 08/13/2018          Past Surgical History:   Procedure Laterality Date    CATARACT EXTRACTION  07/16/2018    Cataract Surgery    CT ABDOMEN ANGIOGRAM W AND/OR WO IV CONTRAST  7/28/2022    CT ABDOMEN ANGIOGRAM W AND/OR WO IV CONTRAST 7/28/2022 [facility]CILL[facility]    FEMUR FRACTURE SURGERY  07/16/2018    Femur Repair    HIP SURGERY  07/16/2018    Hip Surgery    HYSTERECTOMY  07/16/2018    Hysterectomy    OTHER SURGICAL HISTORY  07/16/2018    Oophorectomy - Bilateral (Removal Of Both Ovaries)    OTHER SURGICAL HISTORY  05/27/2020    Mitral valve replacement    OTHER SURGICAL HISTORY  05/27/2020    Radius fracture repair    SHOULDER SURGERY  07/16/2018    Shoulder Surgery            Social History     Tobacco Use    Smoking status: Every Day     Current packs/day: 0.25     Average packs/day: 0.3 packs/day for 40.0 years (10.0 ttl pk-yrs)     Types: Cigarettes    Smokeless tobacco: Never   Substance Use Topics    Alcohol use: Not Currently     Alcohol/week: 3.0 standard drinks of alcohol     Types: 3 Standard drinks or equivalent per week    Drug use: Yes     Types: Marijuana        Physical Activity: Not on file            Allergies   Allergen Reactions    Penicillins Anaphylaxis    Cephalosporins Rash    Neomycin-Polymyxin-Gramicidin Rash         Current Outpatient Medications   Medication Instructions    albuterol 90 mcg/actuation inhaler 2 puffs, inhalation, Every 4 hours PRN    aspirin 81 mg, oral, Daily    celecoxib (CeleBREX) 200 mg capsule Take 1 capsule (200 mg) by mouth as needed at bedtime for moderate pain (4 - 6).    clonazePAM (KLONOPIN) 0.5 mg, oral, Daily PRN    clopidogrel (PLAVIX) 75 mg, oral, Daily    cyclobenzaprine (FLEXERIL) 10 mg, oral, Nightly PRN    diclofenac sodium (VOLTAREN) 4 g, Topical, 4 times daily PRN    dilTIAZem CD (CARDIZEM CD) 180 mg, oral, Daily    evolocumab (Repatha SureClick)  140 mg/mL injection INJECT THE CONTENTS OF 1 PEN UNDER THE SKIN EVERY 2 WEEKS    fluticasone (Flonase) 50 mcg/actuation nasal spray 1 spray, Each Nostril, Daily, Shake gently. Before first use, prime pump. After use, clean tip and replace cap.    glycopyrrolate-formoteroL (Bevespi Aerosphere) 9-4.8 mcg HFA aerosol inhaler 2 puffs, inhalation, 2 times daily    hydrocortisone acetate 1 % ointment 1 Application, Topical, 2 times daily PRN    Lidocaine Pain Relief 4 % patch Place 1 patch on the skin once daily as needed for mild pain (1 - 3).    multivitamin with folic acid (One Daily Multivitamin) 400 mcg tablet 1 tablet, oral, Daily    oxybutynin XL (DITROPAN-XL) 10 mg, oral, 2 times daily    pantoprazole (PROTONIX) 40 mg, oral, Daily before breakfast, Do not crush, chew, or split.    propranolol (INDERAL) 20 mg, oral, 2 times daily PRN    traZODone (DESYREL) 50 mg, oral, Nightly PRN    Tymlos 80 mcg (3,120 mcg/1.56 mL) pen injector Inject 1 Dose as directed once daily.    valACYclovir (VALTREX) 1,000 mg, oral, 3 times daily       ROS     Vital Signs:  There were no vitals filed for this visit.  Wt Readings from Last 2 Encounters:   04/23/24 60.3 kg (133 lb)   04/15/24 61.7 kg (136 lb)     There is no height or weight on file to calculate BMI.       Physical Exam:    She was mildly anxious, but not in any acute distress.  No carotid bruits.  Clear lung fields.  Regular heart sounds, no murmurs or gallops.  No peripheral edema.  Otherwise, not examined.     Last Labs:  CMP:  Recent Labs     04/23/24  1300 04/15/24  1052 04/15/23  1057 04/12/23  0416 07/13/22  1532    135* 138 139 142   K 4.4 4.4 4.2 4.6 4.4   CL 99 102 103 102 103   CO2 26 22 28 25 31   ANIONGAP 16 15 11 17 12   BUN 16 26* 15 21 16   CREATININE 0.92 0.92 0.88 1.20* 0.83   EGFR 68 68  --   --   --    GLUCOSE 86 95 103* 98 87     Recent Labs     04/23/24  1300 04/15/24  1052 04/15/23  1057 04/12/23  0416 05/16/22  1434   ALBUMIN 4.5 4.8 4.3 4.6 4.0    ALKPHOS 84 85 84 101 78   ALT 10 11 11 11 11   AST 18 21 19 22 15   BILITOT 0.9 0.6 1.0 0.9 0.6   LIPASE  --   --   --   --  10     CBC:  Recent Labs     04/23/24  1300 04/15/24  1052 04/15/23  1057 04/12/23  0416 05/16/22  1434   WBC 9.1 11.6* 12.5* 11.5* 9.7   HGB 14.0 15.0 12.8 13.2 13.0   HCT 43.7 44.0 37.6 40.0 39.7    226 247 247 276   MCV 95 94 92 95 98     COAG:   Recent Labs     04/15/24  1052 05/16/22  1213 10/02/19  0008 09/15/19  1046 08/27/19  0359 12/10/18  0820 05/18/18  0912   INR 0.9 1.0 2.4* 2.4* 1.3*   < > 1.0   DDIMERVTE  --   --   --   --   --   --  2,562*    < > = values in this interval not displayed.     HEME/ENDO:  Recent Labs     04/23/24  1300 04/15/24  1052 04/15/23  1057 05/16/22  1434 06/08/21  1006 08/24/19  0111 07/22/19  1602   TSH 1.98  --  1.18 1.89 1.75   < >  --    HGBA1C 5.2 5.2  --   --   --   --  5.0    < > = values in this interval not displayed.      CARDIAC:   Recent Labs     04/15/24  1052 10/02/19  0008 09/15/19  1046 08/26/19  0308 08/24/19  1038 08/24/19  0110 08/23/19  1325 07/12/19  2341   LDH  --   --   --  289*  --   --  436*  --    TROPHS 5  --   --   --   --   --   --   --    BNP  --  231* 166*  --  369* 137*  --  92     Recent Labs     04/23/24  1300 04/15/24  1052 02/15/23  1256 10/25/22  0953 08/19/22  1037   CHOL 157 175 136 260* 231*   LDLF  --   --  34 152* 111*   HDL 77.0 81.0 75.3 89.2 104.0   TRIG 114 100 136 96 81       Last Cardiology Tests:    ECG:    Sinus bradycardia, septal infarction age undetermined.    Echo:  Echo Results:  Transthoracic Echo (TTE) Complete 04/15/2024    [facility], [address] [place] [address]  Tel [phone] and Fax [phone]    TRANSTHORACIC ECHOCARDIOGRAM REPORT      Patient Name:      DEX Sweeney Physician:    [id number] Ace Guardado MD  Study Date:        4/15/2024           Ordering Provider:    [id number] HARRISON PRECIADO  MRN/PID:           [id number]             Fellow:  Accession#:        [id number]        Nurse:  Date of Birth/Age: 1955 / 68      Sonographer:          Lucy Pang RDCS  years  Gender:            F                   Additional Staff:  Height:            154.94 cm           Admit Date:           4/15/2024  Weight:            61.69 kg            Admission Status:     Observation -  Priority discharge  BSA / BMI:         1.60 m2 / 25.70     Encounter#:           [id number]  kg/m2  Department Location:  [facility]  Invasive  Blood Pressure: 151 /74 mmHg    Study Type:    TRANSTHORACIC ECHO (TTE) COMPLETE  Diagnosis/ICD: Cerebral infarction due to embolism of left middle cerebral  artery-I63.412  Indication:    Cerebrovascular Accident  CPT Code:      Echo Complete w Full Doppler-[id number]    Patient History:  MI Location/Type:  Non-ST Elevation MI  Smoker:            Current.  Valve Disorders:   Mitral Valve Replacement.  Pertinent History: HTN, Hyperlipidemia and COPD. 29mm Epic Porcine MVR 8/7/19.    Study Detail: The following Echo studies were performed: 2D, M-Mode, Doppler and  color flow. Agitated saline used as a contrast agent for  intraseptal flow evaluation.      PHYSICIAN INTERPRETATION:  Left Ventricle: The left ventricular systolic function is normal, with an estimated ejection fraction of 60%. There are no regional wall motion abnormalities. The left ventricular cavity size is normal. There is left ventricular concentric remodeling. Abnormal (paradoxical) septal motion consistent with post-operative status. Spectral Doppler shows a pseudonormal pattern of left ventricular diastolic filling.  Left Atrium: The left atrium is moderately dilated. A bubble study using agitated saline was performed. Bubble study is negative.  Right Ventricle: The right ventricle is normal in size. There is normal right ventricular global systolic function.  Right Atrium: The right atrium is normal in size.  Aortic Valve: The aortic valve is trileaflet. There is  no evidence of aortic valve regurgitation. The peak instantaneous gradient of the aortic valve is 9.8 mmHg. The mean gradient of the aortic valve is 5.0 mmHg.  Mitral Valve: There is a prosthetic mitral valve present. Echo findings are consistent with normal mitral valve prosthesis structure and function. There is a Epic mitral valve bioprosthesis with a 29 mm reported size. There is no evidence of mitral valve regurgitation. Chordae tendinae / papillary muscle remnant noted attached to the left ventricular wall (not significantly changed compared with TTE 12/2022).  Tricuspid Valve: The tricuspid valve is structurally normal. There is trace to mild tricuspid regurgitation. The Doppler estimated RVSP is slightly elevated at 33.7 mmHg.  Pulmonic Valve: The pulmonic valve is structurally normal. There is no indication of pulmonic valve regurgitation.  Pericardium: There is no pericardial effusion noted.  Aorta: The aortic root is normal.  Systemic Veins: The inferior vena cava appears to be of normal size. There is IVC inspiratory collapse greater than 50%.  In comparison to the previous echocardiogram(s): Compared with study from 12/30/2022, no significant change.      CONCLUSIONS:  1. Left ventricular systolic function is normal with a 60% estimated ejection fraction.  2. Abnormal septal motion consistent with post-operative status.  3. Spectral Doppler shows a pseudonormal pattern of left ventricular diastolic filling.  4. The left atrium is moderately dilated.  5. Chordae tendinae / papillary muscle remnant noted attached to the left ventricular wall (not significantly changed compared with TTE 12/2022).  6. Slightly elevated RVSP.    QUANTITATIVE DATA SUMMARY:  2D MEASUREMENTS:  Normal Ranges:  LAs:           4.08 cm   (2.7-4.0cm)  IVSd:          0.95 cm   (0.6-1.1cm)  LVPWd:         0.95 cm   (0.6-1.1cm)  LVIDd:         4.20 cm   (3.9-5.9cm)  LVIDs:         2.99 cm  LV Mass Index: 79.9 g/m2  LV % FS        28.7  %    LA VOLUME:  Normal Ranges:  LA Vol A4C:        63.3 ml    (22+/-6mL/m2)  LA Vol A2C:        57.9 ml  LA Vol BP:         62.5 ml  LA Vol Index A4C:  39.5ml/m2  LA Vol Index A2C:  36.1 ml/m2  LA Vol Index BP:   39.0 ml/m2  LA Area A4C:       19.1 cm2  LA Area A2C:       17.7 cm2  LA Major Axis A4C: 4.9 cm  LA Major Axis A2C: 4.6 cm  LA Volume Index:   38.8 ml/m2  LA Vol A4C:        57.6 ml  LA Vol A2C:        56.4 ml    RA VOLUME BY A/L METHOD:  Normal Ranges:  RA Vol A4C:        30.0 ml    (8.3-19.5ml)  RA Vol Index A4C:  18.7 ml/m2  RA Area A4C:       12.6 cm2  RA Major Axis A4C: 4.5 cm    M-MODE MEASUREMENTS:  Normal Ranges:  Ao Root: 3.20 cm (2.0-3.7cm)  LAs:     4.13 cm (2.7-4.0cm)    AORTA MEASUREMENTS:  Normal Ranges:  Ao Sinus, d: 3.00 cm (2.1-3.5cm)  Ao STJ, d:   2.00 cm (1.7-3.4cm)  Asc Ao, d:   2.90 cm (2.1-3.4cm)    LV SYSTOLIC FUNCTION BY 2D PLANIMETRY (MOD):  Normal Ranges:  EF-A4C View: 61.9 % (>=55%)  EF-A2C View: 62.2 %  EF-Biplane:  61.9 %    LV DIASTOLIC FUNCTION:  Normal Ranges:  MV Peak E:        1.45 m/s    (0.7-1.2 m/s)  MV Peak A:        1.28 m/s    (0.42-0.7 m/s)  E/A Ratio:        1.13        (1.0-2.2)  MV e'             0.08 m/s    (>8.0)  MV lateral e'     0.08 m/s  MV medial e'      0.05 m/s  MV A Dur:         108.42 msec  E/e' Ratio:       18.08       (<8.0)  a'                0.03 m/s  PulmV Sys Jamarcus:    41.69 cm/s  PulmV Swift Jamarcus:   40.88 cm/s  PulmV S/D Jamarcus:    1.02  PulmV A Revs Jamarcus: 21.55 cm/s  PulmV A Revs Dur: 85.35 msec    MITRAL VALVE:  Normal Ranges:  MV Vmax:    1.71 m/s  (<=1.3m/s)  MV peak P.6 mmHg (<5mmHg)  MV mean PG: 3.3 mmHg  (<2mmHg)  MV VTI:     57.19 cm  (10-13cm)  MV DT:      332 msec  (150-240msec)    AORTIC VALVE:  Normal Ranges:  AoV Vmax:                1.57 m/s (<=1.7m/s)  AoV Peak P.8 mmHg (<20mmHg)  AoV Mean P.0 mmHg (1.7-11.5mmHg)  LVOT Max Jamarcus:            1.09 m/s (<=1.1m/s)  AoV VTI:                 36.90 cm  (18-25cm)  LVOT VTI:                24.28 cm  LVOT Diameter:           1.83 cm  (1.8-2.4cm)  AoV Area, VTI:           1.73 cm2 (2.5-5.5cm2)  AoV Area,Vmax:           1.82 cm2 (2.5-4.5cm2)  AoV Dimensionless Index: 0.66      RIGHT VENTRICLE:  RV Basal 3.50 cm  RV Mid   3.10 cm  RV Major 6.2 cm  TAPSE:   18.0 mm  RV s'    0.11 m/s    TRICUSPID VALVE/RVSP:  Normal Ranges:  Peak TR Velocity: 2.77 m/s  Est. RA Pressure: 3 mmHg  RV Syst Pressure: 33.7 mmHg (< 30mmHg)  IVC Diam:         1.40 cm    PULMONIC VALVE:  Normal Ranges:  RVOT Vmax:  0.61 m/s (0.6-0.9m/s)  PV Max Jamarcus: 0.7 m/s  (0.6-0.9m/s)  PV Max P.8 mmHg    Pulmonary Veins:  PulmV A Revs Dur: 85.35 msec  PulmV A Revs Jamarcus: 21.55 cm/s  PulmV Swift Jamarcus:   40.88 cm/s  PulmV S/D Jamarcus:    1.02  PulmV Sys Jamarcus:    41.69 cm/s    AORTA:  Asc Ao Diam 2.94 cm      [id number] Ace Guardado MD  Electronically signed on 4/15/2024 at 5:44:33 PM        ** Final **       Cath:      Stress Test:  Stress Results:  No results found for this or any previous visit from the past 365 days.         Cardiac Imaging:        Assessment/Plan     The patient sustained a small stroke, probably atherosclerotic but embolic cannot be excluded.  Now on DAPT.  She was under a great deal of personal stress, was smoking cigarettes, blood pressures were probably not well-controlled and she claims that she has been out of diltiazem for a few days.  She just completed a 14-day monitor, which will be reviewed.  She is on an appropriate medication regimen.  Repatha has been effective.  We discussed risk factor modification, but this will be difficult with her life stress.  She is going to be resuming limited driving towards the end of the month.    We will call her after we review the monitor.  We will then decide on timing of a follow-up office visit.      Orders:  No orders of the defined types were placed in this encounter.     Followup Appts:  Future Appointments   Date Time Provider Department Center    5/1/2024  3:20 PM Gurpreet Wood MD [id number] [facility]   5/6/2024  9:00 AM Shanelle Taveras PA-C [id number] [facility]   5/28/2024 10:30 AM [facility] [id number] 1 [id number] [facility]   8/21/2024 11:00 AM LOIDA [facility] [id number]   1/14/2025 11:15 AM Amanda Humphrey MD [id number] [facility]           ____________________________________________________________  Gurpreet Wood MD    Senior Attending Physician  [facility] [place]  [facility]    [facility] Chair for Cardiovascular Excellence  [employer] [facility] [employer] [facility]

## 2024-05-02 DIAGNOSIS — G43.009 MIGRAINE WITHOUT AURA AND WITHOUT STATUS MIGRAINOSUS, NOT INTRACTABLE: ICD-10-CM

## 2024-05-02 DIAGNOSIS — G45.9 TRANSIENT CEREBRAL ISCHEMIA, UNSPECIFIED TYPE: ICD-10-CM

## 2024-05-02 DIAGNOSIS — I63.9 CEREBROVASCULAR ACCIDENT (CVA), UNSPECIFIED MECHANISM (MULTI): Primary | ICD-10-CM

## 2024-05-03 DIAGNOSIS — G43.009 MIGRAINE WITHOUT AURA AND WITHOUT STATUS MIGRAINOSUS, NOT INTRACTABLE: ICD-10-CM

## 2024-05-03 DIAGNOSIS — I63.9 CEREBROVASCULAR ACCIDENT (CVA), UNSPECIFIED MECHANISM (MULTI): ICD-10-CM

## 2024-05-03 RX ORDER — CLOPIDOGREL BISULFATE 75 MG/1
75 TABLET ORAL DAILY
Qty: 60 TABLET | Refills: 0 | Status: SHIPPED | OUTPATIENT
Start: 2024-05-03 | End: 2024-07-18

## 2024-05-03 NOTE — TELEPHONE ENCOUNTER
I spoke to patient. Per Dr. Garza can try nurtec as needed for migraine rescue medicine. Patient agreeable with this will send rx to [facility] pharmacy as well as refill for plavix. Patient denies any further questions.

## 2024-05-06 ENCOUNTER — OFFICE VISIT (OUTPATIENT)
Dept: ORTHOPEDIC SURGERY | Facility: CLINIC | Age: 69
End: 2024-05-06
Payer: COMMERCIAL

## 2024-05-06 VITALS — HEIGHT: 61 IN | WEIGHT: 136 LBS | BODY MASS INDEX: 25.68 KG/M2

## 2024-05-06 DIAGNOSIS — M65.30 TRIGGER FINGER, ACQUIRED: Primary | ICD-10-CM

## 2024-05-06 PROCEDURE — 1123F ACP DISCUSS/DSCN MKR DOCD: CPT | Performed by: PHYSICIAN ASSISTANT

## 2024-05-06 PROCEDURE — 99213 OFFICE O/P EST LOW 20 MIN: CPT | Performed by: PHYSICIAN ASSISTANT

## 2024-05-06 PROCEDURE — 1159F MED LIST DOCD IN RCRD: CPT | Performed by: PHYSICIAN ASSISTANT

## 2024-05-06 PROCEDURE — 1160F RVW MEDS BY RX/DR IN RCRD: CPT | Performed by: PHYSICIAN ASSISTANT

## 2024-05-06 PROCEDURE — 20550 NJX 1 TENDON SHEATH/LIGAMENT: CPT | Performed by: PHYSICIAN ASSISTANT

## 2024-05-06 PROCEDURE — 4004F PT TOBACCO SCREEN RCVD TLK: CPT | Performed by: PHYSICIAN ASSISTANT

## 2024-05-06 RX ORDER — LIDOCAINE HYDROCHLORIDE 10 MG/ML
0.5 INJECTION INFILTRATION; PERINEURAL
Status: COMPLETED | OUTPATIENT
Start: 2024-05-06 | End: 2024-05-06

## 2024-05-06 RX ORDER — TRIAMCINOLONE ACETONIDE 40 MG/ML
20 INJECTION, SUSPENSION INTRA-ARTICULAR; INTRAMUSCULAR
Status: COMPLETED | OUTPATIENT
Start: 2024-05-06 | End: 2024-05-06

## 2024-05-06 RX ADMIN — TRIAMCINOLONE ACETONIDE 20 MG: 40 INJECTION, SUSPENSION INTRA-ARTICULAR; INTRAMUSCULAR at 08:51

## 2024-05-06 RX ADMIN — LIDOCAINE HYDROCHLORIDE 0.5 ML: 10 INJECTION INFILTRATION; PERINEURAL at 08:51

## 2024-05-06 ASSESSMENT — PAIN - FUNCTIONAL ASSESSMENT: PAIN_FUNCTIONAL_ASSESSMENT: 0-10

## 2024-05-06 ASSESSMENT — PAIN SCALES - GENERAL: PAINLEVEL_OUTOF10: 5 - MODERATE PAIN

## 2024-05-06 ASSESSMENT — PAIN DESCRIPTION - DESCRIPTORS: DESCRIPTORS: SORE

## 2024-05-06 NOTE — PROGRESS NOTES
Patient returns to clinic today for pain of her left thumb.  Has been treated in the past by Dr. Hansen for trigger thumb.  She also noticed recurrence of her symptoms however has not noticed any clicking pain at the palmar base of the left thumb.  No known injury or trauma.  Recently had a stroke back in April and is still recovering from this however is set to go back to work this week and is hoping for an injection today.  Denies any numbness or tingling.    Patient's self reported past medical history, medications, allergies, surgical history, family and social history as well as a 10 point review of systems has been documented in the new patient intake form and scanned into the patient's electronic medical record. Pertinent findings are documented in the HPI.    Physical Examination Findings:  Constitutional: Appears well-developed and well-nourished.  Head: Normocephalic and atraumatic.  Eyes: Pupils are equal and round.  Cardiovascular: Intact distal pulses.   Respiratory: Effort normal. No respiratory distress.  Neurologic: Alert and oriented to person, place, and time.  Skin: Skin is warm and dry.  Hematologic / Lymphatic: No lymphedema, lymphangitis.  Psychiatric: normal mood and affect. Behavior is normal.   Musculoskeletal: Left thumb without any pain to palpation over the CMC joint negative CMC grind pain with palpation over the palmar base over the A1 pulley of the left thumb no obvious triggering on exam no significant pain with palpation of the MP joint.  No pain with palpation of the IP joint.    Impression left thumb trigger finger    Plan: Repeat steroid injection was performed today and tolerated well she will continue to monitor symptoms return to our office for recurrence or progression of symptoms.    Patient ID: Judith Bush is a 68 y.o. female.    Hand / UE Inj/Asp: L thumb A1 for trigger finger on 5/6/2024 8:51 AM  Details: 25 G needle  Medications: 20 mg triamcinolone acetonide 40  mg/mL; 0.5 mL lidocaine 10 mg/mL (1 %)  Procedure, treatment alternatives, risks and benefits explained, specific risks discussed. Immediately prior to procedure a time out was called to verify the correct patient, procedure, equipment, support staff and site/side marked as required.         Shanelle Taveras PA-C  Department of Orthopaedic Surgery  [facility]    Dictation performed with the use of voice recognition software. Syntax and grammatical errors may exist.

## 2024-05-07 ENCOUNTER — PATIENT OUTREACH (OUTPATIENT)
Dept: CARE COORDINATION | Facility: CLINIC | Age: 69
End: 2024-05-07
Payer: COMMERCIAL

## 2024-05-07 NOTE — PROGRESS NOTES
Pt. Identified for post discharge services. Final outreach call to introduce self, available services, and assess needs.  Voicemail message left with my contact information.   ZOYA Paige  [facility]    Contact: [phone]

## 2024-05-09 LAB
ATRIAL RATE: 57 BPM
P AXIS: 26 DEGREES
P OFFSET: 213 MS
P ONSET: 173 MS
PR INTERVAL: 108 MS
Q ONSET: 227 MS
QRS COUNT: 9 BEATS
QRS DURATION: 74 MS
QT INTERVAL: 428 MS
QTC CALCULATION(BAZETT): 416 MS
QTC FREDERICIA: 420 MS
R AXIS: 63 DEGREES
T AXIS: 63 DEGREES
T OFFSET: 441 MS
VENTRICULAR RATE: 57 BPM

## 2024-05-13 DIAGNOSIS — M79.7 FIBROMYALGIA: ICD-10-CM

## 2024-05-13 RX ORDER — DICLOFENAC SODIUM 10 MG/G
4 GEL TOPICAL 4 TIMES DAILY PRN
Qty: 100 G | Refills: 2 | Status: SHIPPED | OUTPATIENT
Start: 2024-05-13

## 2024-05-15 DIAGNOSIS — R19.4 CHANGE IN BOWEL HABITS: ICD-10-CM

## 2024-05-15 RX ORDER — POLYETHYLENE GLYCOL 3350 17 G/17G
POWDER, FOR SOLUTION ORAL
Qty: 1054 G | Refills: 0 | Status: SHIPPED | OUTPATIENT
Start: 2024-05-15

## 2024-05-17 ENCOUNTER — TELEPHONE (OUTPATIENT)
Dept: CARDIOLOGY | Facility: CLINIC | Age: 69
End: 2024-05-17
Payer: COMMERCIAL

## 2024-05-17 NOTE — TELEPHONE ENCOUNTER
I attempted to reach out to Ms. Bush in regards to the status of her holter monitor. I left a message instructing her to call the [facility] holter lab at her earliest convenience to discuss and schedule a re-hook of her monitor that did not record enough data during the period she had worn it.

## 2024-05-21 DIAGNOSIS — G45.9 TRANSIENT CEREBRAL ISCHEMIA, UNSPECIFIED TYPE: ICD-10-CM

## 2024-05-21 DIAGNOSIS — I63.9 CEREBROVASCULAR ACCIDENT (CVA), UNSPECIFIED MECHANISM (MULTI): Primary | ICD-10-CM

## 2024-05-22 ENCOUNTER — HOSPITAL ENCOUNTER (OUTPATIENT)
Dept: CARDIOLOGY | Facility: CLINIC | Age: 69
Discharge: HOME | End: 2024-05-22
Payer: COMMERCIAL

## 2024-05-22 ENCOUNTER — PHARMACY VISIT (OUTPATIENT)
Dept: PHARMACY | Facility: CLINIC | Age: 69
End: 2024-05-22
Payer: COMMERCIAL

## 2024-05-22 DIAGNOSIS — G45.9 TRANSIENT CEREBRAL ISCHEMIA, UNSPECIFIED TYPE: ICD-10-CM

## 2024-05-22 DIAGNOSIS — I63.9 CEREBROVASCULAR ACCIDENT (CVA), UNSPECIFIED MECHANISM (MULTI): ICD-10-CM

## 2024-05-22 PROCEDURE — RXMED WILLOW AMBULATORY MEDICATION CHARGE

## 2024-05-28 ENCOUNTER — APPOINTMENT (OUTPATIENT)
Dept: RADIOLOGY | Facility: CLINIC | Age: 69
End: 2024-05-28
Payer: COMMERCIAL

## 2024-05-30 ENCOUNTER — APPOINTMENT (OUTPATIENT)
Dept: PRIMARY CARE | Facility: CLINIC | Age: 69
End: 2024-05-30
Payer: COMMERCIAL

## 2024-05-30 ENCOUNTER — PATIENT OUTREACH (OUTPATIENT)
Dept: PRIMARY CARE | Facility: CLINIC | Age: 69
End: 2024-05-30

## 2024-05-30 NOTE — PROGRESS NOTES
Unable to reach patient for discharge follow up call.   LVM with call back number for patient to call if needed   I

## 2024-06-05 ENCOUNTER — APPOINTMENT (OUTPATIENT)
Dept: SLEEP MEDICINE | Facility: CLINIC | Age: 69
End: 2024-06-05
Payer: COMMERCIAL

## 2024-06-12 ENCOUNTER — APPOINTMENT (OUTPATIENT)
Dept: RADIOLOGY | Facility: CLINIC | Age: 69
End: 2024-06-12
Payer: COMMERCIAL

## 2024-06-12 ENCOUNTER — HOSPITAL ENCOUNTER (OUTPATIENT)
Dept: RADIOLOGY | Facility: CLINIC | Age: 69
Discharge: HOME | End: 2024-06-12
Payer: COMMERCIAL

## 2024-06-12 VITALS — HEIGHT: 61 IN | BODY MASS INDEX: 25.68 KG/M2 | WEIGHT: 136 LBS

## 2024-06-12 DIAGNOSIS — Z12.31 ENCOUNTER FOR SCREENING MAMMOGRAM FOR BREAST CANCER: ICD-10-CM

## 2024-06-12 PROCEDURE — 77067 SCR MAMMO BI INCL CAD: CPT

## 2024-06-12 PROCEDURE — 77063 BREAST TOMOSYNTHESIS BI: CPT

## 2024-06-26 ENCOUNTER — APPOINTMENT (OUTPATIENT)
Dept: NUTRITION | Facility: CLINIC | Age: 69
End: 2024-06-26
Payer: COMMERCIAL

## 2024-07-02 ENCOUNTER — APPOINTMENT (OUTPATIENT)
Dept: NUTRITION | Facility: CLINIC | Age: 69
End: 2024-07-02
Payer: COMMERCIAL

## 2024-07-02 DIAGNOSIS — I10 PRIMARY HYPERTENSION: ICD-10-CM

## 2024-07-02 DIAGNOSIS — I63.9 CEREBROVASCULAR ACCIDENT (CVA), UNSPECIFIED MECHANISM (MULTI): ICD-10-CM

## 2024-07-04 RX ORDER — DILTIAZEM HYDROCHLORIDE 180 MG/1
180 CAPSULE, COATED, EXTENDED RELEASE ORAL DAILY
Qty: 90 CAPSULE | Refills: 1 | Status: SHIPPED | OUTPATIENT
Start: 2024-07-04

## 2024-07-05 DIAGNOSIS — J44.9 CHRONIC OBSTRUCTIVE PULMONARY DISEASE, UNSPECIFIED (MULTI): ICD-10-CM

## 2024-07-08 RX ORDER — GLYCOPYRROLATE AND FORMOTEROL FUMARATE 9; 4.8 UG/1; UG/1
2 AEROSOL, METERED RESPIRATORY (INHALATION) 2 TIMES DAILY
Qty: 10.7 G | Refills: 11 | Status: SHIPPED | OUTPATIENT
Start: 2024-07-08

## 2024-07-10 ENCOUNTER — PATIENT OUTREACH (OUTPATIENT)
Dept: ENDOCRINOLOGY | Facility: CLINIC | Age: 69
End: 2024-07-10
Payer: COMMERCIAL

## 2024-07-10 NOTE — PROGRESS NOTES
Pt declines nutrition education for hypercholesteremia at this time. Referral to be closed per policy.

## 2024-07-16 ENCOUNTER — PATIENT OUTREACH (OUTPATIENT)
Dept: PRIMARY CARE | Facility: CLINIC | Age: 69
End: 2024-07-16
Payer: COMMERCIAL

## 2024-07-22 ENCOUNTER — OFFICE VISIT (OUTPATIENT)
Dept: ORTHOPEDIC SURGERY | Facility: HOSPITAL | Age: 69
End: 2024-07-22
Payer: COMMERCIAL

## 2024-07-22 ENCOUNTER — APPOINTMENT (OUTPATIENT)
Dept: ORTHOPEDIC SURGERY | Facility: CLINIC | Age: 69
End: 2024-07-22
Payer: COMMERCIAL

## 2024-07-22 ENCOUNTER — HOSPITAL ENCOUNTER (OUTPATIENT)
Dept: RADIOLOGY | Facility: CLINIC | Age: 69
Discharge: HOME | End: 2024-07-22
Payer: COMMERCIAL

## 2024-07-22 ENCOUNTER — HOSPITAL ENCOUNTER (OUTPATIENT)
Dept: RADIOLOGY | Facility: HOSPITAL | Age: 69
Discharge: HOME | End: 2024-07-22
Payer: COMMERCIAL

## 2024-07-22 VITALS — HEIGHT: 60 IN | WEIGHT: 136 LBS | BODY MASS INDEX: 26.7 KG/M2

## 2024-07-22 DIAGNOSIS — M25.511 RIGHT SHOULDER PAIN, UNSPECIFIED CHRONICITY: ICD-10-CM

## 2024-07-22 DIAGNOSIS — M25.531 RIGHT WRIST PAIN: ICD-10-CM

## 2024-07-22 DIAGNOSIS — G56.21 LESION OF RIGHT ULNAR NERVE: ICD-10-CM

## 2024-07-22 DIAGNOSIS — M25.521 RIGHT ELBOW PAIN: Primary | ICD-10-CM

## 2024-07-22 DIAGNOSIS — G56.01 CARPAL TUNNEL SYNDROME ON RIGHT: ICD-10-CM

## 2024-07-22 DIAGNOSIS — M25.521 RIGHT ELBOW PAIN: ICD-10-CM

## 2024-07-22 PROCEDURE — 1123F ACP DISCUSS/DSCN MKR DOCD: CPT | Performed by: ORTHOPAEDIC SURGERY

## 2024-07-22 PROCEDURE — 4004F PT TOBACCO SCREEN RCVD TLK: CPT | Performed by: ORTHOPAEDIC SURGERY

## 2024-07-22 PROCEDURE — 99213 OFFICE O/P EST LOW 20 MIN: CPT | Performed by: ORTHOPAEDIC SURGERY

## 2024-07-22 PROCEDURE — 1159F MED LIST DOCD IN RCRD: CPT | Performed by: ORTHOPAEDIC SURGERY

## 2024-07-22 PROCEDURE — 20610 DRAIN/INJ JOINT/BURSA W/O US: CPT | Mod: RT | Performed by: ORTHOPAEDIC SURGERY

## 2024-07-22 PROCEDURE — 73080 X-RAY EXAM OF ELBOW: CPT | Mod: RIGHT SIDE | Performed by: RADIOLOGY

## 2024-07-22 PROCEDURE — 73110 X-RAY EXAM OF WRIST: CPT | Mod: RT

## 2024-07-22 PROCEDURE — 3008F BODY MASS INDEX DOCD: CPT | Performed by: ORTHOPAEDIC SURGERY

## 2024-07-22 PROCEDURE — 73030 X-RAY EXAM OF SHOULDER: CPT | Mod: RIGHT SIDE | Performed by: RADIOLOGY

## 2024-07-22 PROCEDURE — 2500000005 HC RX 250 GENERAL PHARMACY W/O HCPCS: Performed by: ORTHOPAEDIC SURGERY

## 2024-07-22 PROCEDURE — 73110 X-RAY EXAM OF WRIST: CPT | Mod: RIGHT SIDE | Performed by: RADIOLOGY

## 2024-07-22 PROCEDURE — 99213 OFFICE O/P EST LOW 20 MIN: CPT | Mod: 25 | Performed by: ORTHOPAEDIC SURGERY

## 2024-07-22 PROCEDURE — 73080 X-RAY EXAM OF ELBOW: CPT | Mod: RT

## 2024-07-22 PROCEDURE — 2500000004 HC RX 250 GENERAL PHARMACY W/ HCPCS (ALT 636 FOR OP/ED): Performed by: ORTHOPAEDIC SURGERY

## 2024-07-22 PROCEDURE — 73030 X-RAY EXAM OF SHOULDER: CPT | Mod: RT

## 2024-07-22 RX ORDER — TRIAMCINOLONE ACETONIDE 40 MG/ML
40 INJECTION, SUSPENSION INTRA-ARTICULAR; INTRAMUSCULAR
Status: COMPLETED | OUTPATIENT
Start: 2024-07-22 | End: 2024-07-22

## 2024-07-22 RX ORDER — LIDOCAINE HYDROCHLORIDE 10 MG/ML
4 INJECTION INFILTRATION; PERINEURAL
Status: COMPLETED | OUTPATIENT
Start: 2024-07-22 | End: 2024-07-22

## 2024-07-22 ASSESSMENT — PAIN DESCRIPTION - DESCRIPTORS: DESCRIPTORS: ACHING;SORE

## 2024-07-22 ASSESSMENT — PAIN - FUNCTIONAL ASSESSMENT: PAIN_FUNCTIONAL_ASSESSMENT: 0-10

## 2024-07-22 ASSESSMENT — PAIN SCALES - GENERAL: PAINLEVEL_OUTOF10: 5 - MODERATE PAIN

## 2024-07-22 NOTE — LETTER
July 22, 2024     Cee Coates MD  [address]  [address]  [facility] [address]    Patient: Judith Bush   YOB: 1955   Date of Visit: 7/22/2024       Dear Dr. Cee Coates MD:    Thank you for referring Judith Bush to me for evaluation. Below are my notes for this consultation.  If you have questions, please do not hesitate to call me. I look forward to following your patient along with you.       Sincerely,     Samir Hansen MD      CC: No Recipients  ______________________________________________________________________________________    Judith presents today for right arm pain.  I last saw her in May for a left thumb trigger finger problem.  She reports that she had a stroke in April related to a uncontrolled hypertension and stress which initially caused speech issues and some left hand dysfunction but those symptoms have now all resolved with time and therapy.  She was back to work 4 weeks after the stroke occurred.  She is still on anticoagulation medication until late July.  She is here today for achiness in her right arm with occasional numbness and tingling in the fingers.  She has pain over the posterior medial aspect of the elbow.  She has some pain over the anterolateral aspect of the elbow as well that she thought was perhaps tennis elbow.  She has been using a tennis elbow strap with mild improvement of symptoms.  She is also having some recurrence of some right shoulder symptoms.  She had surgery with Dr. Lynch on her right shoulder many years ago but has not made an appointment to see him.  She feels that her symptoms are aggravated by work.  She occasionally has some sleep disturbances because of her hand symptoms as well.    Past medical history, medications, allergies, surgical history and review of systems have been reviewed with the patient. Pertinent changes are documented in the HPI. Otherwise they are unchanged when compared to last visit on May 6,  2024.    Physical Examination Findings:  Constitutional: Appears well-developed and well-nourished.  Head: Normocephalic and atraumatic.  Eyes: Pupils are equal and round.  Cardiovascular: Intact distal pulses.   Respiratory: Effort normal. No respiratory distress.  Neurologic: Alert and oriented to person, place, and time.  Skin: Skin is warm and dry.  Hematologic / Lymphatic: No lymphedema, lymphangitis.  Psychiatric: normal mood and affect. Behavior is normal.   Musculoskeletal: Right upper extremity examination reveals normal appearance.  Healed surgical incisions from prior corrective osteotomy with bone grafting of the distal radius.  No swelling skin changes or deformities.  Full elbow range of motion.  Full forearm range of motion.  Full digital range of motion.  Near full wrist range of motion.  Mild pain mobile wad muscle group and over the supinator but no increased symptoms with combination of elbow extension and forearm pronation.  Positive Tinel sign over the cubital tunnel.  Positive elbow flexion compression test over the cubital tunnel.  Positive Larisa median nerve compression test carpal tunnel.  Positive Tinel sign carpal tunnel.  Diminished sensation all 5 fingers right hand.    Review of x-rays right elbow taken today demonstrate essentially normal findings.  No significant degenerative changes.    Review of x-rays right wrist obtained today demonstrate postsurgical changes after corrective osteotomy the distal radius.  Slight secondary ulnar positive variance and a hypertrophic nonunion of the ulnar styloid.    Impression: Right carpal tunnel syndrome and right cubital tunnel syndrome.    Plan: We have described treatment options for these conditions.  She has braces at home that she can wear while sleeping.  She has declined injections into the carpal tunnel.  She has declined any further treatment including surgery for the carpal and/or cubital tunnel syndrome.  She wants to simply monitor  her symptoms.  If her symptoms persist we would recommend neuromuscular ultrasound examination for confirmation of the diagnosis and then a follow-up visit to discuss potential surgical intervention.  For her ongoing right shoulder issues we have referred her back to her shoulder surgeon.    Return as needed for recurrence or progression of problems .    Samir Hansen MD    [employer] [facility] [employer]  Department of Orthopaedic Surgery  Chief of Hand and Upper Extremity Surgery  [facility]    Dictation performed with the use of voice recognition software. Syntax and grammatical errors may exist.

## 2024-07-22 NOTE — PROGRESS NOTES
Patient was sent to see me today from Dr. Hansen's office she was seen him for carpal tunnel symptoms she also has elbow and shoulder pain.  She finds it difficult to elevate her arm without pain.  She is concerned that she is having wasting of the muscle.  She had subacromial decompression remotely.    The patient is pleasant and cooperative.  The patient is alert and oriented ×3.  Auditory function is intact.  The patient is a good historian.  The patient is not in acute distress.  Eye exam significant for nonicteric sclera, intact ocular muscle movement.  Breathing is rhythmic symmetric and nonlabored.  Patient has abrasion on the lateral epicondyle is wearing a tennis elbow brace radial pulses easily palpable light touch sensation is grossly intact  strength 5/5 motor power in abduction 5 external rotation 5 internal rotation 5 pain on internal rotation in an abducted position and external rotation abduction past 9090.  No apprehension.  There is no obvious atrophy of the supra and infraspinatus muscle belly.    X-rays of the right shoulder demonstrate subacromial glenohumeral spaces well-preserved no evidence of fracture.  No arthritic degenerative changes.    Right shoulder impingement we discussed options for treatment and I recommended subacromial injection.  Injection was performed today and tolerated well the patient has been asked to call next week to report results        L Inj/Asp: R subacromial bursa on 7/22/2024 2:41 PM  Indications: pain  Details: 22 G needle, posterior approach  Medications: 40 mg triamcinolone acetonide 40 mg/mL; 4 mL lidocaine 10 mg/mL (1 %)  Outcome: tolerated well, no immediate complications  Procedure, treatment alternatives, risks and benefits explained, specific risks discussed. Consent was given by the patient. Immediately prior to procedure a time out was called to verify the correct patient, procedure, equipment, support staff and site/side marked as required.

## 2024-07-22 NOTE — PROGRESS NOTES
Judith presents today for right arm pain.  I last saw her in May for a left thumb trigger finger problem.  She reports that she had a stroke in April related to a uncontrolled hypertension and stress which initially caused speech issues and some left hand dysfunction but those symptoms have now all resolved with time and therapy.  She was back to work 4 weeks after the stroke occurred.  She is still on anticoagulation medication until late July.  She is here today for achiness in her right arm with occasional numbness and tingling in the fingers.  She has pain over the posterior medial aspect of the elbow.  She has some pain over the anterolateral aspect of the elbow as well that she thought was perhaps tennis elbow.  She has been using a tennis elbow strap with mild improvement of symptoms.  She is also having some recurrence of some right shoulder symptoms.  She had surgery with Dr. Lynch on her right shoulder many years ago but has not made an appointment to see him.  She feels that her symptoms are aggravated by work.  She occasionally has some sleep disturbances because of her hand symptoms as well.    Past medical history, medications, allergies, surgical history and review of systems have been reviewed with the patient. Pertinent changes are documented in the HPI. Otherwise they are unchanged when compared to last visit on May 6, 2024.    Physical Examination Findings:  Constitutional: Appears well-developed and well-nourished.  Head: Normocephalic and atraumatic.  Eyes: Pupils are equal and round.  Cardiovascular: Intact distal pulses.   Respiratory: Effort normal. No respiratory distress.  Neurologic: Alert and oriented to person, place, and time.  Skin: Skin is warm and dry.  Hematologic / Lymphatic: No lymphedema, lymphangitis.  Psychiatric: normal mood and affect. Behavior is normal.   Musculoskeletal: Right upper extremity examination reveals normal appearance.  Healed surgical incisions from prior  corrective osteotomy with bone grafting of the distal radius.  No swelling skin changes or deformities.  Full elbow range of motion.  Full forearm range of motion.  Full digital range of motion.  Near full wrist range of motion.  Mild pain mobile wad muscle group and over the supinator but no increased symptoms with combination of elbow extension and forearm pronation.  Positive Tinel sign over the cubital tunnel.  Positive elbow flexion compression test over the cubital tunnel.  Positive Larisa median nerve compression test carpal tunnel.  Positive Tinel sign carpal tunnel.  Diminished sensation all 5 fingers right hand.    Review of x-rays right elbow taken today demonstrate essentially normal findings.  No significant degenerative changes.    Review of x-rays right wrist obtained today demonstrate postsurgical changes after corrective osteotomy the distal radius.  Slight secondary ulnar positive variance and a hypertrophic nonunion of the ulnar styloid.    Impression: Right carpal tunnel syndrome and right cubital tunnel syndrome.    Plan: We have described treatment options for these conditions.  She has braces at home that she can wear while sleeping.  She has declined injections into the carpal tunnel.  She has declined any further treatment including surgery for the carpal and/or cubital tunnel syndrome.  She wants to simply monitor her symptoms.  If her symptoms persist we would recommend neuromuscular ultrasound examination for confirmation of the diagnosis and then a follow-up visit to discuss potential surgical intervention.  For her ongoing right shoulder issues we have referred her back to her shoulder surgeon.    Return as needed for recurrence or progression of problems .    Samir Hansen MD    [facility] [employer]  Department of Orthopaedic Surgery  Chief of Hand and Upper Extremity Surgery  [facility]    Dictation  performed with the use of voice recognition software. Syntax and grammatical errors may exist.

## 2024-07-25 DIAGNOSIS — F17.210 NICOTINE DEPENDENCE, CIGARETTES, UNCOMPLICATED: Primary | ICD-10-CM

## 2024-08-01 ENCOUNTER — APPOINTMENT (OUTPATIENT)
Dept: BEHAVIORAL HEALTH | Facility: CLINIC | Age: 69
End: 2024-08-01
Payer: COMMERCIAL

## 2024-08-05 ENCOUNTER — APPOINTMENT (OUTPATIENT)
Dept: BEHAVIORAL HEALTH | Facility: CLINIC | Age: 69
End: 2024-08-05
Payer: COMMERCIAL

## 2024-08-05 DIAGNOSIS — F10.20 ALCOHOL USE DISORDER, SEVERE (MULTI): Primary | ICD-10-CM

## 2024-08-05 DIAGNOSIS — Z76.89 ENCOUNTER FOR ASSESSMENT OF ALCOHOL AND DRUG USE: ICD-10-CM

## 2024-08-05 LAB
AMPHETAMINES UR QL SCN: ABNORMAL
BARBITURATES UR QL SCN: ABNORMAL
BENZODIAZ UR QL SCN: ABNORMAL
BZE UR QL SCN: ABNORMAL
CANNABINOIDS UR QL SCN: ABNORMAL
FENTANYL+NORFENTANYL UR QL SCN: ABNORMAL
METHADONE UR QL SCN: ABNORMAL
OPIATES UR QL SCN: ABNORMAL
OXYCODONE+OXYMORPHONE UR QL SCN: ABNORMAL
PCP UR QL SCN: ABNORMAL

## 2024-08-05 PROCEDURE — 80346 BENZODIAZEPINES1-12: CPT | Mod: ELYLAB | Performed by: COUNSELOR

## 2024-08-05 PROCEDURE — 90791 PSYCH DIAGNOSTIC EVALUATION: CPT | Mod: HE | Performed by: COUNSELOR

## 2024-08-05 PROCEDURE — 80307 DRUG TEST PRSMV CHEM ANLYZR: CPT | Performed by: COUNSELOR

## 2024-08-05 PROCEDURE — 80365 DRUG SCREENING OXYCODONE: CPT | Mod: ELYLAB | Performed by: COUNSELOR

## 2024-08-05 PROCEDURE — 80349 CANNABINOIDS NATURAL: CPT | Performed by: COUNSELOR
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
## 2024-08-07 ENCOUNTER — DOCUMENTATION (OUTPATIENT)
Dept: BEHAVIORAL HEALTH | Facility: CLINIC | Age: 69
End: 2024-08-07
Payer: COMMERCIAL

## 2024-08-07 LAB — ETHYL GLUCURONIDE UR QL SCN: NEGATIVE NG/ML

## 2024-08-07 NOTE — PROGRESS NOTES
Clinician called patient to invite her to begin IOP on Friday, August 8, 2024. Patient did not answer and clinician left a message to return the call tomorrow with any questions.

## 2024-08-08 ENCOUNTER — DOCUMENTATION (OUTPATIENT)
Dept: BEHAVIORAL HEALTH | Facility: CLINIC | Age: 69
End: 2024-08-08
Payer: COMMERCIAL

## 2024-08-08 LAB
1OH-MIDAZOLAM UR CFM-MCNC: <25 NG/ML
6MAM UR CFM-MCNC: <25 NG/ML
7AMINOCLONAZEPAM UR CFM-MCNC: <25 NG/ML
A-OH ALPRAZ UR CFM-MCNC: <25 NG/ML
ALPRAZ UR CFM-MCNC: <25 NG/ML
CARBOXYTHC UR-MCNC: 189 NG/ML
CHLORDIAZEP UR CFM-MCNC: <25 NG/ML
CLONAZEPAM UR CFM-MCNC: <25 NG/ML
CODEINE UR CFM-MCNC: <50 NG/ML
DIAZEPAM UR CFM-MCNC: <25 NG/ML
HYDROCODONE CTO UR CFM-MCNC: <25 NG/ML
HYDROMORPHONE UR CFM-MCNC: <25 NG/ML
LORAZEPAM UR CFM-MCNC: <25 NG/ML
MIDAZOLAM UR CFM-MCNC: <25 NG/ML
MORPHINE UR CFM-MCNC: <50 NG/ML
NORDIAZEPAM UR CFM-MCNC: <25 NG/ML
NORHYDROCODONE UR CFM-MCNC: <25 NG/ML
NOROXYCODONE UR CFM-MCNC: <25 NG/ML
OXAZEPAM UR CFM-MCNC: <25 NG/ML
OXYCODONE UR CFM-MCNC: <25 NG/ML
OXYMORPHONE UR CFM-MCNC: <25 NG/ML
TEMAZEPAM UR CFM-MCNC: <25 NG/ML

## 2024-08-09 ENCOUNTER — DOCUMENTATION (OUTPATIENT)
Dept: BEHAVIORAL HEALTH | Facility: CLINIC | Age: 69
End: 2024-08-09
Payer: COMMERCIAL

## 2024-08-09 ENCOUNTER — TELEPHONE (OUTPATIENT)
Dept: BEHAVIORAL HEALTH | Facility: CLINIC | Age: 69
End: 2024-08-09
Payer: COMMERCIAL

## 2024-08-09 NOTE — PROGRESS NOTES
Clinician called and left a message for patient to return the call regarding beginning IOP group virtually on Monday, 8-12-24 and retrieving her email to send the link so she can join.

## 2024-08-11 ENCOUNTER — DOCUMENTATION (OUTPATIENT)
Dept: BEHAVIORAL HEALTH | Facility: CLINIC | Age: 69
End: 2024-08-11
Payer: COMMERCIAL

## 2024-08-11 NOTE — PROGRESS NOTES
"Clinician contacted patient by phone to inform that power had returned to the [place] office where we can conduct service on Monday. Patient shared,\" Thank you for calling but I will be conducting an assessment with another facility that provides dual diagnosis services, because that's what I need. Clinician stated we offer other mental health services also. She insisted she wanted something closer to [place] on the [facility]. She reported, \"[place] is 50 minutes away and just too far. \" Clinician informed patient if it doesn't work out she will be welcomed back.  "

## 2024-08-12 ENCOUNTER — HOSPITAL ENCOUNTER (OUTPATIENT)
Dept: RADIOLOGY | Facility: CLINIC | Age: 69
Discharge: HOME | End: 2024-08-12
Payer: COMMERCIAL

## 2024-08-12 ENCOUNTER — APPOINTMENT (OUTPATIENT)
Dept: RADIOLOGY | Facility: CLINIC | Age: 69
End: 2024-08-12
Payer: COMMERCIAL

## 2024-08-12 DIAGNOSIS — F17.210 NICOTINE DEPENDENCE, CIGARETTES, UNCOMPLICATED: ICD-10-CM

## 2024-08-12 PROCEDURE — 71271 CT THORAX LUNG CANCER SCR C-: CPT

## 2024-08-14 ENCOUNTER — APPOINTMENT (OUTPATIENT)
Dept: BEHAVIORAL HEALTH | Facility: CLINIC | Age: 69
End: 2024-08-14
Payer: COMMERCIAL

## 2024-08-16 ENCOUNTER — APPOINTMENT (OUTPATIENT)
Dept: BEHAVIORAL HEALTH | Facility: CLINIC | Age: 69
End: 2024-08-16
Payer: COMMERCIAL

## 2024-08-21 ENCOUNTER — APPOINTMENT (OUTPATIENT)
Dept: SLEEP MEDICINE | Facility: CLINIC | Age: 69
End: 2024-08-21
Payer: COMMERCIAL

## 2024-08-26 ENCOUNTER — PATIENT MESSAGE (OUTPATIENT)
Dept: PRIMARY CARE | Facility: CLINIC | Age: 69
End: 2024-08-26
Payer: COMMERCIAL

## 2024-08-26 ENCOUNTER — TELEPHONE (OUTPATIENT)
Dept: NEUROLOGY | Facility: CLINIC | Age: 69
End: 2024-08-26
Payer: COMMERCIAL

## 2024-08-26 DIAGNOSIS — G43.009 MIGRAINE WITHOUT AURA AND WITHOUT STATUS MIGRAINOSUS, NOT INTRACTABLE: Primary | ICD-10-CM

## 2024-08-26 RX ORDER — SUMATRIPTAN 50 MG/1
50 TABLET, FILM COATED ORAL ONCE AS NEEDED
Qty: 27 TABLET | Refills: 3 | Status: SHIPPED | OUTPATIENT
Start: 2024-08-26 | End: 2025-08-26

## 2024-08-26 NOTE — TELEPHONE ENCOUNTER
Judith left a message stating that she would like to discuss a few issues:    1) Medication  2) Clearance for surgery in November    Judith: [phone]

## 2024-08-27 ENCOUNTER — HOSPITAL ENCOUNTER (EMERGENCY)
Facility: HOSPITAL | Age: 69
Discharge: HOME | End: 2024-08-27
Attending: EMERGENCY MEDICINE
Payer: COMMERCIAL

## 2024-08-27 VITALS
RESPIRATION RATE: 16 BRPM | SYSTOLIC BLOOD PRESSURE: 131 MMHG | HEIGHT: 61 IN | BODY MASS INDEX: 22.47 KG/M2 | HEART RATE: 73 BPM | TEMPERATURE: 97.3 F | WEIGHT: 119 LBS | OXYGEN SATURATION: 99 % | DIASTOLIC BLOOD PRESSURE: 77 MMHG

## 2024-08-27 DIAGNOSIS — G43.009 MIGRAINE WITHOUT AURA AND WITHOUT STATUS MIGRAINOSUS, NOT INTRACTABLE: Primary | ICD-10-CM

## 2024-08-27 PROCEDURE — 2500000004 HC RX 250 GENERAL PHARMACY W/ HCPCS (ALT 636 FOR OP/ED): Performed by: EMERGENCY MEDICINE

## 2024-08-27 PROCEDURE — 96374 THER/PROPH/DIAG INJ IV PUSH: CPT

## 2024-08-27 PROCEDURE — 99284 EMERGENCY DEPT VISIT MOD MDM: CPT | Mod: 25

## 2024-08-27 PROCEDURE — 96375 TX/PRO/DX INJ NEW DRUG ADDON: CPT

## 2024-08-27 RX ORDER — METOCLOPRAMIDE HYDROCHLORIDE 5 MG/ML
10 INJECTION INTRAMUSCULAR; INTRAVENOUS ONCE
Status: COMPLETED | OUTPATIENT
Start: 2024-08-27 | End: 2024-08-27

## 2024-08-27 RX ORDER — DIPHENHYDRAMINE HYDROCHLORIDE 50 MG/ML
50 INJECTION INTRAMUSCULAR; INTRAVENOUS ONCE
Status: COMPLETED | OUTPATIENT
Start: 2024-08-27 | End: 2024-08-27

## 2024-08-27 RX ORDER — KETOROLAC TROMETHAMINE 30 MG/ML
15 INJECTION, SOLUTION INTRAMUSCULAR; INTRAVENOUS ONCE
Status: COMPLETED | OUTPATIENT
Start: 2024-08-27 | End: 2024-08-27

## 2024-08-27 ASSESSMENT — COLUMBIA-SUICIDE SEVERITY RATING SCALE - C-SSRS
6. HAVE YOU EVER DONE ANYTHING, STARTED TO DO ANYTHING, OR PREPARED TO DO ANYTHING TO END YOUR LIFE?: NO
2. HAVE YOU ACTUALLY HAD ANY THOUGHTS OF KILLING YOURSELF?: NO
1. IN THE PAST MONTH, HAVE YOU WISHED YOU WERE DEAD OR WISHED YOU COULD GO TO SLEEP AND NOT WAKE UP?: NO

## 2024-08-27 ASSESSMENT — PAIN - FUNCTIONAL ASSESSMENT: PAIN_FUNCTIONAL_ASSESSMENT: 0-10

## 2024-08-27 ASSESSMENT — PAIN SCALES - GENERAL: PAINLEVEL_OUTOF10: 3

## 2024-08-27 NOTE — ED TRIAGE NOTES
TRIAGE NOTE   I saw the patient as the Clinician in Triage and performed a brief history and physical exam, established acuity, and ordered appropriate tests to develop basic plan of care. Patient will be seen by an [facility], resident and/or physician who will independently evaluate the patient. Please see subsequent provider notes for further details and disposition.     Brief HPI: In brief, Judith Bush is a 68 y.o. female that presents for headache.  The patient has a history of headaches.  She, however, had a stroke and has been unable to take her usual medications, including the triptans.  She tried to use Nurtec but she has not had improvement.  Her pain has been present for 3 days.  She reports nausea.  Denies blurry vision.  Denies numbness or weakness in her arms or legs.  The pain starts in her neck and goes all over her head.    Focused Physical exam:   General: Alert and interactive  HEENT: PERRL, full extraocular movements  Resp: CTA  CV: RRR  Neuro: NIH 0    Plan/MDM:   Patient presents with exacerbation of migraine headache.  She will be treated with Reglan and Benadryl.    Please see subsequent provider note for further details and disposition

## 2024-08-27 NOTE — TELEPHONE ENCOUNTER
I spoke to patient and advised Dr. Garza does not recommend taking triptans with her history and I placed order for Headache specialist. I suggested she schedule a virtual visit with us just prior to her November surgery if she needs neuro clearance for that procedure. Patient is agreeable with this plan of care and denies any further questions.

## 2024-08-28 ENCOUNTER — TELEPHONE (OUTPATIENT)
Dept: NEUROLOGY | Facility: CLINIC | Age: 69
End: 2024-08-28
Payer: COMMERCIAL

## 2024-08-28 RX ORDER — AMITRIPTYLINE HYDROCHLORIDE 25 MG/1
25 TABLET, FILM COATED ORAL NIGHTLY
COMMUNITY
End: 2024-08-28

## 2024-08-28 NOTE — TELEPHONE ENCOUNTER
Judith left a message stating she went to ED yesterday, was put in a conference room, not even an exam room, was there for 4 hours and received the 3 med cocktail. She states she still has a headache this morning, was hoping for a complete resolution and is now wondering if there is anything else that can be done.    Judith: [phone]

## 2024-08-28 NOTE — ED NOTES
NOTICED THAT PT WAS NOT ON HER BED AROUND 7.05 PM ALTHOUGH HER NAME BAND WAS ON THE BED,  NURSE ANNMARIE TRIED TO CALL THE PT WITH NO ANSWER, I ALSO TRIED TO CALL THE PT AT 7;30 WITH NO ANSWER. WE BELIEVE THAT THE PT LWTC      Barrie Whitaker RN  08/27/24 2003

## 2024-08-29 ENCOUNTER — PATIENT MESSAGE (OUTPATIENT)
Dept: ORTHOPEDIC SURGERY | Facility: HOSPITAL | Age: 69
End: 2024-08-29
Payer: COMMERCIAL

## 2024-08-29 DIAGNOSIS — M75.101 TEAR OF RIGHT ROTATOR CUFF, UNSPECIFIED TEAR EXTENT, UNSPECIFIED WHETHER TRAUMATIC: ICD-10-CM

## 2024-08-29 RX ORDER — AMITRIPTYLINE HYDROCHLORIDE 25 MG/1
25 TABLET, FILM COATED ORAL DAILY
Qty: 30 TABLET | Refills: 2 | Status: SHIPPED | OUTPATIENT
Start: 2024-08-29 | End: 2025-08-29

## 2024-09-06 DIAGNOSIS — Z01.818 PREOP TESTING: Primary | ICD-10-CM

## 2024-09-09 ENCOUNTER — DOCUMENTATION (OUTPATIENT)
Dept: CARDIOLOGY | Facility: CLINIC | Age: 69
End: 2024-09-09

## 2024-09-09 ENCOUNTER — PRE-ADMISSION TESTING (OUTPATIENT)
Dept: PREADMISSION TESTING | Facility: HOSPITAL | Age: 69
End: 2024-09-09
Payer: COMMERCIAL

## 2024-09-09 VITALS
SYSTOLIC BLOOD PRESSURE: 143 MMHG | HEART RATE: 58 BPM | RESPIRATION RATE: 16 BRPM | BODY MASS INDEX: 23.31 KG/M2 | OXYGEN SATURATION: 98 % | HEIGHT: 61 IN | DIASTOLIC BLOOD PRESSURE: 82 MMHG | WEIGHT: 123.46 LBS | TEMPERATURE: 96.4 F

## 2024-09-09 DIAGNOSIS — M79.672 LEFT FOOT PAIN: Primary | ICD-10-CM

## 2024-09-09 DIAGNOSIS — M89.372 HYPERTROPHY OF BONE OF LEFT FOOT: ICD-10-CM

## 2024-09-09 DIAGNOSIS — Z01.818 PREOP TESTING: ICD-10-CM

## 2024-09-09 DIAGNOSIS — M20.5X2 ACQUIRED HALLUX EXTENSUS OF LEFT FOOT: ICD-10-CM

## 2024-09-09 LAB
ANION GAP SERPL CALC-SCNC: 14 MMOL/L (ref 10–20)
BUN SERPL-MCNC: 11 MG/DL (ref 6–23)
CALCIUM SERPL-MCNC: 9.9 MG/DL (ref 8.6–10.3)
CHLORIDE SERPL-SCNC: 103 MMOL/L (ref 98–107)
CO2 SERPL-SCNC: 22 MMOL/L (ref 21–32)
CREAT SERPL-MCNC: 0.81 MG/DL (ref 0.5–1.05)
EGFRCR SERPLBLD CKD-EPI 2021: 79 ML/MIN/1.73M*2
GLUCOSE SERPL-MCNC: 104 MG/DL (ref 74–99)
POTASSIUM SERPL-SCNC: 5.1 MMOL/L (ref 3.5–5.3)
SODIUM SERPL-SCNC: 134 MMOL/L (ref 136–145)

## 2024-09-09 PROCEDURE — 36415 COLL VENOUS BLD VENIPUNCTURE: CPT

## 2024-09-09 PROCEDURE — 99214 OFFICE O/P EST MOD 30 MIN: CPT | Performed by: NURSE PRACTITIONER

## 2024-09-09 PROCEDURE — 82374 ASSAY BLOOD CARBON DIOXIDE: CPT

## 2024-09-09 ASSESSMENT — DUKE ACTIVITY SCORE INDEX (DASI)
CAN YOU RUN A SHORT DISTANCE: YES
CAN YOU DO HEAVY WORK AROUND THE HOUSE LIKE SCRUBBING FLOORS OR LIFTING AND MOVING HEAVY FURNITURE: YES
DASI METS SCORE: 7.6
CAN YOU WALK INDOORS, SUCH AS AROUND YOUR HOUSE: YES
CAN YOU DO MODERATE WORK AROUND THE HOUSE LIKE VACUUMING, SWEEPING FLOORS OR CARRYING GROCERIES: YES
CAN YOU DO LIGHT WORK AROUND THE HOUSE LIKE DUSTING OR WASHING DISHES: YES
CAN YOU DO YARD WORK LIKE RAKING LEAVES, WEEDING OR PUSHING A MOWER: YES
CAN YOU HAVE SEXUAL RELATIONS: NO
CAN YOU CLIMB A FLIGHT OF STAIRS OR WALK UP A HILL: YES
TOTAL_SCORE: 39.45
CAN YOU WALK A BLOCK OR TWO ON LEVEL GROUND: YES
CAN YOU PARTICIPATE IN STRENOUS SPORTS LIKE SWIMMING, SINGLES TENNIS, FOOTBALL, BASKETBALL, OR SKIING: NO
CAN YOU PARTICIPATE IN MODERATE RECREATIONAL ACTIVITIES LIKE GOLF, BOWLING, DANCING, DOUBLES TENNIS OR THROWING A BASEBALL OR FOOTBALL: NO
CAN YOU TAKE CARE OF YOURSELF (EAT, DRESS, BATHE, OR USE TOILET): YES

## 2024-09-09 NOTE — CPM/PAT H&P
CPM/PAT Evaluation       Name: Judtih Bush (Judith Bush)  /Age: 1955/69 y.o.     In-Person       Chief Complaint: L foot pain        HPI  This is a 68 yo F who is experiencing L foot pain and is scheduled for a left proximal hallux partial excision on 24 with Dr. Abraham Mccord under MAC.  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  Currently has an ankle monitor on.  Got an CLINT recently.  Appears very overwhelmed with stress.  States she has had pain for a long time and takes Tylenol.          Past Medical History:   Diagnosis Date    Abdominal distension (gaseous) 2018    Anxiety     Carpal tunnel syndrome, right upper limb 2018    Collagenous colitis 2018    Compression fracture of cervical spine (Multi)     COPD (chronic obstructive pulmonary disease) (Multi)     Depression, unspecified 2018    Dermatitis, unspecified 2020    eczematoid    Dermatochalasis of unspecified eye, unspecified eyelid 10/25/2022    Displaced comminuted fracture of left patella, initial encounter for closed fracture 2018    Displaced comminuted fracture of right patella, initial encounter for closed fracture 2018    Dizziness     GERD (gastroesophageal reflux disease)     Headache     Hypokalemia 2018    Low back pain, unspecified 2018    Noninfective gastroenteritis and colitis, unspecified 2018    Chronic colitis    Nutritional deficiency, unspecified 2018    Poor diet    Old myocardial infarction 2014    Other chest pain 2017    Atypical chest pain    Other fracture of right patella, sequela 2018    Patellar sleeve fracture, right, sequela    Other visual disturbances 2018    Blurred vision, bilateral    Pain in unspecified hip 2014    Pericarditis ([facility]) 2019    Pleural effusion     Polyosteoarthritis, unspecified 2018    Presence of intraocular lens 2018    Pseudophakia of right eye    Sleep apnea     Small  intestinal bacterial overgrowth (SIBO)     Stroke (Multi)     Unspecified fall, sequela 08/13/2018       Past Surgical History:   Procedure Laterality Date    CATARACT EXTRACTION  07/16/2018    Cataract Surgery    CHOLECYSTECTOMY      COLONOSCOPY      CT ABDOMEN ANGIOGRAM W AND/OR WO IV CONTRAST  07/28/2022    CT ABDOMEN ANGIOGRAM W AND/OR WO IV CONTRAST 7/28/2022 [facility]    FEMUR FRACTURE SURGERY  07/16/2018    Femur Repair    HIP SURGERY  07/16/2018    Hip Surgery    HYSTERECTOMY  07/16/2018    Hysterectomy    MITRAL VALVE REPLACEMENT      OTHER SURGICAL HISTORY  07/16/2018    Oophorectomy - Bilateral (Removal Of Both Ovaries)    OTHER SURGICAL HISTORY  05/27/2020    Mitral valve replacement    OTHER SURGICAL HISTORY  05/27/2020    Radius fracture repair    SHOULDER SURGERY  07/16/2018    Shoulder Surgery    UPPER GASTROINTESTINAL ENDOSCOPY         Patient  reports that she is not currently sexually active.    Family History   Adopted: Yes   Problem Relation Name Age of Onset    Other (Adopted) Mother      Other (Adopted child) Father         Allergies   Allergen Reactions    Penicillins Anaphylaxis    Cephalosporins Rash    Neomycin-Polymyxin-Gramicidin Rash       Prior to Admission medications    Medication Sig Start Date End Date Taking? Authorizing Provider   albuterol 90 mcg/actuation inhaler Inhale 2 puffs every 4 hours if needed for wheezing or shortness of breath. 2/3/21   Historical Provider, MD   amitriptyline (Elavil) 25 mg tablet Take 1 tablet (25 mg) by mouth once daily. 8/29/24 8/29/25  Clarissa Garza MD   aspirin 81 mg EC tablet Take 1 tablet (81 mg) by mouth once daily. 4/23/24   Cee Coates MD   celecoxib (CeleBREX) 200 mg capsule Take 1 capsule (200 mg) by mouth as needed at bedtime for moderate pain (4 - 6).    Historical Provider, MD   clonazePAM (KlonoPIN) 0.5 mg tablet Take 1 tablet (0.5 mg) by mouth once daily as needed for anxiety.    Historical Provider, MD    cyclobenzaprine (Flexeril) 10 mg tablet Take 1 tablet (10 mg) by mouth as needed at bedtime for muscle spasms. 2/19/24   Cee Coates MD   diclofenac sodium (Voltaren) 1 % gel APPLY 1 APPLICATION TOPICALLY 4 TIMES A DAY AS NEEDED (PAIN). 5/13/24   Kyler Marks MD   dilTIAZem CD (Cardizem CD) 180 mg 24 hr capsule Take 1 capsule (180 mg) by mouth once daily. 7/4/24   Cee Coates MD   evolocumab (Repatha SureClick) 140 mg/mL injection INJECT THE CONTENTS OF 1 PEN UNDER THE SKIN EVERY 2 WEEKS 11/20/23 11/18/24  Gurpreet Wood MD   fluticasone (Flonase) 50 mcg/actuation nasal spray Administer 1 spray into each nostril once daily. Shake gently. Before first use, prime pump. After use, clean tip and replace cap. 8/15/23 8/14/24  Cee Coates MD   glycopyrrolate-formoteroL (Bevespi Aerosphere) 9-4.8 mcg HFA aerosol inhaler TAKE 2 PUFFS BY MOUTH TWICE A DAY 7/8/24   Azam Burns MD MPH   hydrocortisone acetate 1 % ointment Apply 1 Application topically 2 times a day as needed (for hemorrhoids). 2/19/24   Cee Coates MD   Lidocaine Pain Relief 4 % patch Place 1 patch on the skin once daily as needed for mild pain (1 - 3). 7/24/22   Historical Provider, MD   multivitamin with folic acid (One Daily Multivitamin) 400 mcg tablet Take 1 tablet by mouth once daily. 5/4/21   Historical Provider, MD   oxybutynin XL (Ditropan-XL) 10 mg 24 hr tablet Take 1 tablet (10 mg) by mouth 2 times a day. 2/19/24   Cee Cotaes MD   pantoprazole (ProtoNix) 40 mg EC tablet Take 1 tablet (40 mg) by mouth once daily in the morning. Take before meals. Do not crush, chew, or split. 4/16/24 5/16/24  Carol Waller, APRN-CNP   polyethylene glycol (Glycolax, Miralax) 17 gram/dose powder Use up to 3 times daily as directed as needed 5/15/24   Pedro Garcia MD   propranolol (Inderal) 20 mg tablet Take 1 tablet (20 mg) by mouth 2 times a day as needed (For anxiety). 7/11/23   Historical Provider, MD moyerepant ([facility]  ODT) 75 mg tablet,disintegrating TAKE ONE TABLET AT ONSET OF MIGRAINE 5/3/24   Clarissa Garza MD   SUMAtriptan (Imitrex) 50 mg tablet Take 1 tablet (50 mg) by mouth 1 time if needed for migraine. May repeat after 2 hours. 8/26/24 8/26/25  Cee Coates MD   traZODone (Desyrel) 50 mg tablet Take 1 tablet (50 mg) by mouth as needed at bedtime for sleep.  Patient taking differently: Take 2 tablets (100 mg) by mouth as needed at bedtime for sleep. 10/17/23   Cee Coates MD   Tymlos 80 mcg (3,120 mcg/1.56 mL) pen injector Inject 1 Dose as directed once daily. 3/9/23   Historical Provider, MD   valACYclovir (Valtrex) 1 gram tablet Take 1 tablet (1,000 mg) by mouth 3 times a day. 3/8/24   Cee Coates MD        Review of Systems  Constitutional:  No unintentional weight loss, fevers, chills, pain, weakness.  Eyes:  No vision changes.  ENT:  No nasal congestion, postnasal drip, sore throat, tinnitus, loose teeth or dentures.  Respiratory:  No cough or dyspnea, wheezing, hemoptysis, or sputum production.  Cardiovascular:  No chest pain, heart palpitations, difficulty climbing steps or lying down, or lower extremity edema.  Gastrointestinal:  No dysphagia, nausea, vomiting, heartburn, abdominal pain, bloody stools, diarrhea, orconstipation.  Genitourinary:  No dysuria, hematuria, or flank pain.  Integumentary:  No skin rashes, or lesions.  Musculoskeletal:  No joint swelling or unusual joint pain.  Neurological:  No headache, or weakness.  Does get numbness throughout.  Psychiatric:  No depression.  Endocrine:  No polyuria, polydipsia, tremor, temperature intolerance.  Hematologic/Lymphatic:  No lymph node enlargement, or easy bleeding/bruising.  Immunologic: No environmental  allergies.      Physical Exam:  General: Polite, and moderately anxious  Skin:  Warm and dry, no jaundice  HEENT: No scleral icterus, no conjunctival pallor, normocephalic, atraumatic, mucous membranes moist  Neck:  atraumatic, trachea midline,  no JVD  Chest:  Clear to auscultation bilaterally. No wheezes, rales, or rhonchi  CV:  Regular rate and rhythm.  Positive S1/S2  Abdomen: no distension, +BS, soft, non-tender to palpation, no rebound tenderness, no guarding, no rigidity, no discernible ascites   Extremities: no lower extremity edema, chronic pigmentary changes, no cyanosis, +L ankle monitor present  Neurological:  A&Ox3  Psychiatric: cooperative      PAT AIRWAY:   Airway:     Mallampati::  III    TM distance::  <3 FB    Neck ROM::  Full      Visit Vitals  /82   Pulse 58   Temp 35.8 °C (96.4 °F)   Resp 16       DASI Risk Score      Flowsheet Row Most Recent Value   DASI SCORE 39.45   METS Score (Will be calculated only when all the questions are answered) 7.6          Caprini DVT Assessment    No data to display       Modified Frailty Index    No data to display       CHADS2 Stroke Risk  Current as of 26 minutes ago        N/A 3 to 100%: High Risk   2 to < 3%: Medium Risk   0 to < 2%: Low Risk     Last Change: N/A          This score determines the patient's risk of having a stroke if the patient has atrial fibrillation.        This score is not applicable to this patient. Components are not calculated.          Revised Cardiac Risk Index    No data to display       Apfel Simplified Score    No data to display       Risk Analysis Index Results This Encounter    No data found in the last 1 encounters.         Assessment and Plan:     This is a 68 yo F who is experiencing L foot pain and is scheduled for a left proximal hallux partial excision on 9/13/24 with Dr. Abraham Mccord under MAC.  She has a PMH of migraines, R shoulder impingement, mitral valve replacement (2019), NSTEMI x2 (2009 and 2013), spine surgery (1/23), stroke (4/13/24 was on Plavix), postoperative pericarditis, recurrent R pleural effusion s/p thoracentesis, COPD, HTN, tobacco use, and chronic low back pain. Experiencing L foot pain for some time.  Currently has a L ankle monitor  on for a recent CLINT.  Explained she should likely have that removed for the surgery and to discuss with the surgeon and she stated understanding.  Last saw Cardiology Dr. Gurpreet Wood on 5/1/24.  Was previously on Plavix for stroke in April with last dose being 7/30/24.  ECHO 4/15/24 showed 60% EF.  EKG 8/29/24 NSR rate of 70 BPM  No recent labs done in the last 120 days.        She feels like her breathing is good even though she smokes. No SOB when she lies down or walks up steps.     Should have cardiac clearance.  I will have the nursing staff reach out to Dr. Gurpreet Wood's office for this.  I personally reached out to Dr. Wood through secure chat in regards to clearance as well and have asked for it to be faxed to [facility].  Recommended tobacco cessation.  Overall she looks good, feels good and appears optimized for surgery.

## 2024-09-09 NOTE — PROGRESS NOTES
The patient is under my care for management of cardiovascular disease.  She was in the office on May 1, 2024.  She underwent mitral valve replacement in 2019.  Prior to surgery, cardiac catheterization was performed that was normal.  In April, 2024 she sustained a small stroke.  A Holter monitor showed no evidence of atrial fibrillation.    She is stable to undergo elective foot surgery as outlined under MAC.  No additional testing is required before surgery.    Sincerely,    Gurpreet Wood M.D.     Senior Attending Physician, [facility] [place]   [facility]    [facility] Chair for Cardiovascular Excellence  [employer]  [place], [place]

## 2024-09-09 NOTE — PREPROCEDURE INSTRUCTIONS
Medication List            Accurate as of September 9, 2024 10:22 AM. Always use your most recent med list.                albuterol 90 mcg/actuation inhaler  Medication Adjustments for Surgery: Take on the morning of surgery     amitriptyline 25 mg tablet  Commonly known as: Elavil  Take 1 tablet (25 mg) by mouth once daily.  Medication Adjustments for Surgery: Take on the morning of surgery     aspirin 81 mg EC tablet  Take 1 tablet (81 mg) by mouth once daily.  Medication Adjustments for Surgery: Take on the morning of surgery     Bevespi Aerosphere 9-4.8 mcg HFA aerosol inhaler  Generic drug: glycopyrrolate-formoteroL  TAKE 2 PUFFS BY MOUTH TWICE A DAY  Medication Adjustments for Surgery: Take on the morning of surgery     celecoxib 200 mg capsule  Commonly known as: CeleBREX  Additional Medication Adjustments for Surgery: Take last dose 7 days before surgery     clonazePAM 0.5 mg tablet  Commonly known as: KlonoPIN  Medication Adjustments for Surgery: Take on the morning of surgery     cyclobenzaprine 10 mg tablet  Commonly known as: Flexeril  Take 1 tablet (10 mg) by mouth as needed at bedtime for muscle spasms.  Medication Adjustments for Surgery: Take last dose 1 day (24 hours) before surgery     diclofenac sodium 1 % gel  Commonly known as: Voltaren  APPLY 1 APPLICATION TOPICALLY 4 TIMES A DAY AS NEEDED (PAIN).  Additional Medication Adjustments for Surgery: Take last dose 7 days before surgery     dilTIAZem  mg 24 hr capsule  Commonly known as: Cardizem CD  Take 1 capsule (180 mg) by mouth once daily.  Medication Adjustments for Surgery: Take on the morning of surgery     fluticasone 50 mcg/actuation nasal spray  Commonly known as: Flonase  Administer 1 spray into each nostril once daily. Shake gently. Before first use, prime pump. After use, clean tip and replace cap.  Medication Adjustments for Surgery: Take/Use as prescribed  Notes to patient: Take the morning of if needed.     hydrocortisone  acetate 1 % ointment  Apply 1 Application topically 2 times a day as needed (for hemorrhoids).  Medication Adjustments for Surgery: Take/Use as prescribed  Notes to patient: Take as needed.     Lidocaine Pain Relief 4 % patch  Generic drug: lidocaine  Medication Adjustments for Surgery: Take/Use as prescribed  Notes to patient: Take as needed even if morning of surgery.     One Daily Multivitamin tablet  Generic drug: multivitamin  Additional Medication Adjustments for Surgery: Take last dose 7 days before surgery     oxybutynin XL 10 mg 24 hr tablet  Commonly known as: Ditropan-XL  Take 1 tablet (10 mg) by mouth 2 times a day.  Medication Adjustments for Surgery: Take last dose 1 day (24 hours) before surgery     pantoprazole 40 mg EC tablet  Commonly known as: ProtoNix  Take 1 tablet (40 mg) by mouth once daily in the morning. Take before meals. Do not crush, chew, or split.  Medication Adjustments for Surgery: Take on the morning of surgery     polyethylene glycol 17 gram/dose powder  Commonly known as: Glycolax, Miralax  Use up to 3 times daily as directed as needed     propranolol 20 mg tablet  Commonly known as: Inderal  Medication Adjustments for Surgery: Take last dose 1 day (24 hours) before surgery     Repatha SureClick 140 mg/mL injection  Generic drug: evolocumab  INJECT THE CONTENTS OF 1 PEN UNDER THE SKIN EVERY 2 WEEKS  Medication Adjustments for Surgery: Take/Use as prescribed  Notes to patient: Don't use the day of surgery.     rimegepant 75 mg tablet,disintegrating  Commonly known as: Nurtec ODT  TAKE ONE TABLET AT ONSET OF MIGRAINE  Medication Adjustments for Surgery: Take last dose 1 day (24 hours) before surgery     SUMAtriptan 50 mg tablet  Commonly known as: Imitrex  Take 1 tablet (50 mg) by mouth 1 time if needed for migraine. May repeat after 2 hours.  Medication Adjustments for Surgery: Take last dose 1 day (24 hours) before surgery     traZODone 50 mg tablet  Commonly known as:  Desyrel  Take 1 tablet (50 mg) by mouth as needed at bedtime for sleep.     Tymlos 80 mcg (3,120 mcg/1.56 mL) pen injector  Generic drug: abaloparatide  Medication Adjustments for Surgery: Take on the morning of surgery     valACYclovir 1 gram tablet  Commonly known as: Valtrex  Take 1 tablet (1,000 mg) by mouth 3 times a day.  Medication Adjustments for Surgery: Take last dose 1 day (24 hours) before surgery            One of our staff members will call you one business day before your surgery between 12-4pm to let you know the time to arrive at the hospital. If you have not received a phone call by 2pm call [phone].     When you arrive at the hospital, go to Registration on the ground floor.   You will need a responsible adult to drive you home.     Prior to surgery date:  One (1) week prior to surgery STOP:  -Stop aspirin products. Do not take NSAIDS/ Ibuprofen, Aleve, Motrin. Okay to take Tylenol or Acetaminophen. Do not take vitamins, supplements, herbals, diet pills.   -Stop these medications: __________________________________________________________  -No alcohol for 24 hours prior to surgery.  -No smoking 24 hours prior. No Marijuana, CBD oil, or Vaping 48 hours prior to surgery.  -Enjoy a light supper the evening before surgery.    Day of Surgery:  -Nothing to eat or drink after midnight. This includes food of any kind (including hard candy, cough drops, mints and gum, coffee).   -No acrylic nails or nail polish on at least one fingernail; no polish on toes for foot surgery.   -No body piercing or jewelry.   -Shower as directed. No deodorant, lotion, power, oils, perfume, make-up.   -Wear loose comfortable clothing to accommodate your bandages.     -Bring CPAP machine  -Bring as needed inhalers  -Bring crutches/ walker if directed         NPO Instructions:    Do not eat any food after midnight the night before your surgery/procedure.    Additional Instructions:     The Day before Surgery:  Review your  medication instructions, stop indicated medications  You will be contacted regarding the time of your arrival to facility and surgery time  Do not eat any food after Midnight

## 2024-09-10 ENCOUNTER — TELEPHONE (OUTPATIENT)
Dept: SCHEDULING | Age: 69
End: 2024-09-10

## 2024-09-10 ENCOUNTER — OFFICE VISIT (OUTPATIENT)
Dept: NEUROLOGY | Facility: CLINIC | Age: 69
End: 2024-09-10
Payer: MEDICAID

## 2024-09-10 VITALS
SYSTOLIC BLOOD PRESSURE: 122 MMHG | HEART RATE: 77 BPM | TEMPERATURE: 98.2 F | BODY MASS INDEX: 22.66 KG/M2 | RESPIRATION RATE: 18 BRPM | WEIGHT: 120 LBS | HEIGHT: 61 IN | DIASTOLIC BLOOD PRESSURE: 68 MMHG

## 2024-09-10 DIAGNOSIS — G43.719 INTRACTABLE CHRONIC MIGRAINE WITHOUT AURA AND WITHOUT STATUS MIGRAINOSUS: Primary | ICD-10-CM

## 2024-09-10 PROCEDURE — 1125F AMNT PAIN NOTED PAIN PRSNT: CPT | Performed by: STUDENT IN AN ORGANIZED HEALTH CARE EDUCATION/TRAINING PROGRAM

## 2024-09-10 PROCEDURE — 1159F MED LIST DOCD IN RCRD: CPT | Performed by: STUDENT IN AN ORGANIZED HEALTH CARE EDUCATION/TRAINING PROGRAM

## 2024-09-10 PROCEDURE — 99205 OFFICE O/P NEW HI 60 MIN: CPT | Performed by: STUDENT IN AN ORGANIZED HEALTH CARE EDUCATION/TRAINING PROGRAM

## 2024-09-10 PROCEDURE — 1123F ACP DISCUSS/DSCN MKR DOCD: CPT | Performed by: STUDENT IN AN ORGANIZED HEALTH CARE EDUCATION/TRAINING PROGRAM

## 2024-09-10 PROCEDURE — 3078F DIAST BP <80 MM HG: CPT | Performed by: STUDENT IN AN ORGANIZED HEALTH CARE EDUCATION/TRAINING PROGRAM

## 2024-09-10 PROCEDURE — 3074F SYST BP LT 130 MM HG: CPT | Performed by: STUDENT IN AN ORGANIZED HEALTH CARE EDUCATION/TRAINING PROGRAM

## 2024-09-10 PROCEDURE — 3008F BODY MASS INDEX DOCD: CPT | Performed by: STUDENT IN AN ORGANIZED HEALTH CARE EDUCATION/TRAINING PROGRAM

## 2024-09-10 RX ORDER — METHYLPREDNISOLONE 8 MG/1
TABLET ORAL
Qty: 15 TABLET | Refills: 0 | Status: SHIPPED | OUTPATIENT
Start: 2024-09-10 | End: 2024-09-15

## 2024-09-10 ASSESSMENT — PATIENT HEALTH QUESTIONNAIRE - PHQ9
2. FEELING DOWN, DEPRESSED OR HOPELESS: NOT AT ALL
1. LITTLE INTEREST OR PLEASURE IN DOING THINGS: NOT AT ALL
SUM OF ALL RESPONSES TO PHQ9 QUESTIONS 1 AND 2: 0

## 2024-09-10 ASSESSMENT — PAIN SCALES - GENERAL: PAINLEVEL: 6

## 2024-09-10 ASSESSMENT — ENCOUNTER SYMPTOMS
LOSS OF SENSATION IN FEET: 0
OCCASIONAL FEELINGS OF UNSTEADINESS: 0
DEPRESSION: 0

## 2024-09-10 NOTE — PATIENT INSTRUCTIONS
It was a pleasure meeting you. You were evaluated for your migraines today.     For your continuous headache, we will try a medrol taper dose pack. Take the taper dose as prescribed over 5 days.    We will try to get Ubrelvy approved for rescue medication to be used without sumatriptan.    Meanwhile, I will see if I can get botox for migraine approved.       ----------------------------------------------------------------------    Keep a calendar of your migraines.    Wear hair loose.      Exercise regularly, get plenty of fresh air and regular, sleep.    Preventative medication should be taken every day regardless of whether or not, you have a headache.  It is to prevent headaches.    Medication to treat the actual headache should be taken as soon as you realize you have a headache.    Medications such as Maxalt, Imitrex, Relpax, Zomig, can be taken only twice a day and they're only nine pills per month.  If you have frequent headaches may need to save these for the worst headaches.    Do not take pain medication every day.  Your body can become addicted to this medication and may make the headaches worse.  If you are having headaches every day, we need to increase the preventative medication, call me if this is becoming a problem.    Avoid foods that can cause migraines.  These include: Red wine, MSG, nitrates (hot dogs, or lunch meats), caffeine, chocolate, cheese.    Monosodium Glutamate (MSG)  MSG also known as Monosodium glutamate is a common food additive. MSG is used to make food taste better, and can be found in processed products, fast food, and canned soups. MSG can cause headaches when consumed in large amounts.    Fast Foods that Contain MSG  Chick[employer]jun[employer]'s Chicken [place]   [employer] Chicken's Extra Crispy Chicken Breast    Chips  (Will be listed on ingredient list Monosodium Glutamate)  Doritos  Pringles  Potato Chips    Meats  Hot dogs    Lunch Meats  Beef jerky    Sausages  Smoked  meats    Pepperoni  Meat snack sticks    Coe  Patrasmi    Hamburgers  Cold cuts    Salami  Fried Chicken      Chicken Nuggets  Burgers    Sauces  Ketchup    Mayonnaise  Barbecue sauce   Salad dressing  Soy sauce    Mustard    Miscellaneous  Bodybuilding protein powder  Instant Ramen  Spices    Items that cause headaches without MSG  Alcohol (red wine, beer, whiskey, Scotch, and champagne )  Cheese (Aged Cheeses: including Parmesan, Swiss, Brie, Cheddar)          Thank you for choosing the [facility] for your healthcare.    It was a pleasure to see you in clinic today and be able to participate in your care. I hope your questions were fully answered but if there are questions or concerns in the future, please feel to call [phone] or leave [employer] messages. Please allow 24-28 hours to return your call. Please be ware of stroke warning symptoms and call 911 with any of acute stroke symptoms.

## 2024-09-10 NOTE — H&P (VIEW-ONLY)
[facility]   Judith Bush is a 68 y.o. year old right handed female with hx of NSTEMI, mitral valve replacement, HTN, COPD with smoking, SHON, migraine, chronic low back pain, bipolar presenting for neurologic evaluation.     Referred by: Carol Waller APRN-*  PCP: Cee Coates MD     4/15/2024: admitted at [facility] after presenting with dysarthria and difficulty using LUE x 1 day. Evaluated by Dr. Hopkins at [facility].  ./95. NIHSS 2 for L arm drift and mild dysarthria. 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 Holter done, pending results. Discharged on DAPT     4/29/2024: presents to stroke prevention clinic. She has been doing well. Her L hand still is clumbsy and somewhat weak, may have difficulty holding things (dishes) still. Dysarthria resolved. She has been taking asa/plavix without issues.   She used to have migraines, using sumatriptan before. Now stopped since stroke. DAPT 90d for intracranial athero     9/10/2024: presents to [facility] for headache. Has been having daily persistent headache for about 3 weeks now. Headache is located in bifrontal area, with neck tension, symmetric and pressure like but also sharp and stabbing. Associated with photophobia and worsens with exertion. Typically lasts all day, pt has to find a dark room and rest, either take tylenol or try massage but nothing helps. She used to take sumartriptan prior to stroke -- however now that she has had a stroke, it is now contraindicated. Intensity 10/10, currently 21 day straight of headache / month. Triggers include stress and poor sleep. She has tried Nurtec without relief in headaches, despite taking 2nd dose.       Work attendance or other daily activities are affected by the headaches.    Current Acute Headache Treatment None   Current Preventative Headache Treatment None   Previous Acute Headache Treatment Sumatriptan, Nurtec    Previous Preventative Headache  "Treatment Amitriptyline   Propranolol   Pt is also on diltiazem calcium channel blocker   Topiramate          Review of imaging, echocardiogram, labs and other data:   MRI: Small R centrum semiovale infarct  CTA: A1 segment and distal RM2 segment narrowing per radiology, <50% stenosis of bilateral ICAs   Echo: Ejection fraction: 60%            Left Atrium: moderately dilated             Patent foramen ovale: negative   Holter monitor: 14d monitor, no Afib (0%)  LDL: 57  HbA1C: 5.2   BP: 120s / 70-80s  BMI 25  Active smoking, 1 pack lasts ~4 days       Relevant ROS, Problem list, Past Medical/ Surgical/ Family/ Social history- reviewed and pertinent details noted in history.     Objective     Visit Vitals  /68   Pulse 77   Temp 36.8 °C (98.2 °F) (Temporal)   Resp 18   Ht 1.549 m (5' 1\")   Wt 54.4 kg (120 lb)   BMI 22.67 kg/m²   OB Status Postmenopausal   Smoking Status Every Day   BSA 1.53 m²       Neurological exam: Alert attentive with normal language, orientation, and speech. Reasonable fund of knowledge and memory (recent and remote). Funduscopic exam shows no evidence of papilledema. Pupils equal, round, reactive to light. Visual fields are full to confrontation.  Extraocular eye movements are intact without nystagmus. V1 to 3 is intact to light touch. The face is symmetric.  Hearing is intact to soft voice.  Palate elevates symmetrically. Sternocleidomastoid and trapezius muscles are 5 out of 5 and the tongue is midline. Motor exam: 5 out of 5 throughout normal bulk and tone. No drift. No orbiting. Sensory exam: intact to light touch, pinprick, temperature, joint position sense and vibration. Deep tendon reflexes: +2 throughout symmetric. Plantar responses are flexor bilaterally. Cerebellar exam: no evidence of ataxia on finger-nose-finger or heel-knee-shin maneuver. Casual gait and station are normal.     No CT head results found for the past 12 months  MR brain wo IV contrast    Result Date: " 4/15/2024  Acute/subacute infarct in the right centrum semiovale without evidence of hemorrhagic transformation.   MACRO: None   Signed by: Mason Agarwal 4/15/2024 6:13 PM Dictation workstation:   [id number]   Encounter Date: 05/01/24   ECG 12 lead (Clinic Performed)   Result Value    Ventricular Rate 57    Atrial Rate 57    DE Interval 108    QRS Duration 74    QT Interval 428    QTC Calculation(Bazett) 416    P Axis 26    R Axis 63    T Axis 63    QRS Count 9    Q Onset 227    P Onset 173    P Offset 213    T Offset 441    QTC Fredericia 420     No echocardiogram results found for the past 12 months     Lab Results   Component Value Date    CHOL 157 04/23/2024    TRIG 114 04/23/2024    HDL 77.0 04/23/2024    CHHDL 2.0 04/23/2024    LDLF 34 02/15/2023    VLDL 23 04/23/2024    NHDL 80 04/23/2024     Lab Results   Component Value Date     (H) 10/02/2019    HGBA1C 5.2 04/23/2024     Lab Results   Component Value Date    GLUCOSE 104 (H) 09/09/2024     (L) 09/09/2024    K 5.1 09/09/2024     09/09/2024    CO2 22 09/09/2024    ANIONGAP 14 09/09/2024    BUN 11 09/09/2024    CREATININE 0.81 09/09/2024    CALCIUM 9.9 09/09/2024    PHOS 2.7 08/26/2019    ALBUMIN 4.5 04/23/2024     Lab Results   Component Value Date    CALCIUM 9.9 09/09/2024    PHOS 2.7 08/26/2019    PROT 7.3 04/23/2024    ALBUMIN 4.5 04/23/2024    AST 18 04/23/2024    ALT 10 04/23/2024    ALKPHOS 84 04/23/2024    BILITOT 0.9 04/23/2024     Lab Results   Component Value Date    WBC 9.1 04/23/2024    HGB 14.0 04/23/2024    HCT 43.7 04/23/2024    MCV 95 04/23/2024     04/23/2024     INR   Date Value Ref Range Status   04/15/2024 0.9 0.9 - 1.1 Final     Lab Results   Component Value Date    TSH 1.98 04/23/2024       Assessment/Plan   1. Intractable chronic migraine without aura and without status migrainosus        PLAN   Chronic Migraine without aura, intractable despite beta blocker and calcium channel blocker. Not a candidate for  antidepressants due to hx of bipolar putting her at risk of precipitating manic episode, has already failed amitriptyline (ineffective). Nurtec was ineffective. Given migraine headaches with neck pain, pt would be a good candidate for botox for preventative measures. Will get this process started to have botox procedures approved.  For abortive measures, sumatriptan is contraindicated due to hx of stroke. Nurtec was ineffective as abortive as well. Will start ubrelvy for acute headache treatment.       No orders of the defined types were placed in this encounter.          I personally spent 68 minutes today, exclusive of procedures, providing care for this patient, including preparation, face to face time, documentation and other services such as review of medical records, diagnostic result, patient education, counseling, coordination of care as specified in the encounter.

## 2024-09-10 NOTE — PROGRESS NOTES
[facility]   Judith Bush is a 68 y.o. year old right handed female with hx of NSTEMI, mitral valve replacement, HTN, COPD with smoking, SHON, migraine, chronic low back pain, bipolar presenting for neurologic evaluation.     Referred by: Carol Waller APRN-*  PCP: Cee Coaets MD     4/15/2024: admitted at [facility] after presenting with dysarthria and difficulty using LUE x 1 day. Evaluated by Dr. Hopkins at [facility].  ./95. NIHSS 2 for L arm drift and mild dysarthria. 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 Holter done, pending results. Discharged on DAPT     4/29/2024: presents to stroke prevention clinic. She has been doing well. Her L hand still is clumbsy and somewhat weak, may have difficulty holding things (dishes) still. Dysarthria resolved. She has been taking asa/plavix without issues.   She used to have migraines, using sumatriptan before. Now stopped since stroke. DAPT 90d for intracranial athero     9/10/2024: presents to [facility] for headache. Has been having daily persistent headache for about 3 weeks now. Headache is located in bifrontal area, with neck tension, symmetric and pressure like but also sharp and stabbing. Associated with photophobia and worsens with exertion. Typically lasts all day, pt has to find a dark room and rest, either take tylenol or try massage but nothing helps. She used to take sumartriptan prior to stroke -- however now that she has had a stroke, it is now contraindicated. Intensity 10/10, currently 21 day straight of headache / month. Triggers include stress and poor sleep. She has tried Nurtec without relief in headaches, despite taking 2nd dose.       Work attendance or other daily activities are affected by the headaches.    Current Acute Headache Treatment None   Current Preventative Headache Treatment None   Previous Acute Headache Treatment Sumatriptan, Nurtec    Previous Preventative Headache  "Treatment Amitriptyline   Propranolol   Pt is also on diltiazem calcium channel blocker   Topiramate          Review of imaging, echocardiogram, labs and other data:   MRI: Small R centrum semiovale infarct  CTA: A1 segment and distal RM2 segment narrowing per radiology, <50% stenosis of bilateral ICAs   Echo: Ejection fraction: 60%            Left Atrium: moderately dilated             Patent foramen ovale: negative   Holter monitor: 14d monitor, no Afib (0%)  LDL: 57  HbA1C: 5.2   BP: 120s / 70-80s  BMI 25  Active smoking, 1 pack lasts ~4 days       Relevant ROS, Problem list, Past Medical/ Surgical/ Family/ Social history- reviewed and pertinent details noted in history.     Objective     Visit Vitals  /68   Pulse 77   Temp 36.8 °C (98.2 °F) (Temporal)   Resp 18   Ht 1.549 m (5' 1\")   Wt 54.4 kg (120 lb)   BMI 22.67 kg/m²   OB Status Postmenopausal   Smoking Status Every Day   BSA 1.53 m²       Neurological exam: Alert attentive with normal language, orientation, and speech. Reasonable fund of knowledge and memory (recent and remote). Funduscopic exam shows no evidence of papilledema. Pupils equal, round, reactive to light. Visual fields are full to confrontation.  Extraocular eye movements are intact without nystagmus. V1 to 3 is intact to light touch. The face is symmetric.  Hearing is intact to soft voice.  Palate elevates symmetrically. Sternocleidomastoid and trapezius muscles are 5 out of 5 and the tongue is midline. Motor exam: 5 out of 5 throughout normal bulk and tone. No drift. No orbiting. Sensory exam: intact to light touch, pinprick, temperature, joint position sense and vibration. Deep tendon reflexes: +2 throughout symmetric. Plantar responses are flexor bilaterally. Cerebellar exam: no evidence of ataxia on finger-nose-finger or heel-knee-shin maneuver. Casual gait and station are normal.     No CT head results found for the past 12 months  MR brain wo IV contrast    Result Date: " 4/15/2024  Acute/subacute infarct in the right centrum semiovale without evidence of hemorrhagic transformation.   MACRO: None   Signed by: Mason Agarwal 4/15/2024 6:13 PM Dictation workstation:   [id number]   Encounter Date: 05/01/24   ECG 12 lead (Clinic Performed)   Result Value    Ventricular Rate 57    Atrial Rate 57    MI Interval 108    QRS Duration 74    QT Interval 428    QTC Calculation(Bazett) 416    P Axis 26    R Axis 63    T Axis 63    QRS Count 9    Q Onset 227    P Onset 173    P Offset 213    T Offset 441    QTC Fredericia 420     No echocardiogram results found for the past 12 months     Lab Results   Component Value Date    CHOL 157 04/23/2024    TRIG 114 04/23/2024    HDL 77.0 04/23/2024    CHHDL 2.0 04/23/2024    LDLF 34 02/15/2023    VLDL 23 04/23/2024    NHDL 80 04/23/2024     Lab Results   Component Value Date     (H) 10/02/2019    HGBA1C 5.2 04/23/2024     Lab Results   Component Value Date    GLUCOSE 104 (H) 09/09/2024     (L) 09/09/2024    K 5.1 09/09/2024     09/09/2024    CO2 22 09/09/2024    ANIONGAP 14 09/09/2024    BUN 11 09/09/2024    CREATININE 0.81 09/09/2024    CALCIUM 9.9 09/09/2024    PHOS 2.7 08/26/2019    ALBUMIN 4.5 04/23/2024     Lab Results   Component Value Date    CALCIUM 9.9 09/09/2024    PHOS 2.7 08/26/2019    PROT 7.3 04/23/2024    ALBUMIN 4.5 04/23/2024    AST 18 04/23/2024    ALT 10 04/23/2024    ALKPHOS 84 04/23/2024    BILITOT 0.9 04/23/2024     Lab Results   Component Value Date    WBC 9.1 04/23/2024    HGB 14.0 04/23/2024    HCT 43.7 04/23/2024    MCV 95 04/23/2024     04/23/2024     INR   Date Value Ref Range Status   04/15/2024 0.9 0.9 - 1.1 Final     Lab Results   Component Value Date    TSH 1.98 04/23/2024       Assessment/Plan   1. Intractable chronic migraine without aura and without status migrainosus        PLAN   Chronic Migraine without aura, intractable despite beta blocker and calcium channel blocker. Not a candidate for  antidepressants due to hx of bipolar putting her at risk of precipitating manic episode, has already failed amitriptyline (ineffective). Nurtec was ineffective. Given migraine headaches with neck pain, pt would be a good candidate for botox for preventative measures. Will get this process started to have botox procedures approved.  For abortive measures, sumatriptan is contraindicated due to hx of stroke. Nurtec was ineffective as abortive as well. Will start ubrelvy for acute headache treatment.       No orders of the defined types were placed in this encounter.          I personally spent 68 minutes today, exclusive of procedures, providing care for this patient, including preparation, face to face time, documentation and other services such as review of medical records, diagnostic result, patient education, counseling, coordination of care as specified in the encounter.

## 2024-09-11 ENCOUNTER — PATIENT MESSAGE (OUTPATIENT)
Dept: NEUROLOGY | Facility: CLINIC | Age: 69
End: 2024-09-11
Payer: COMMERCIAL

## 2024-09-11 NOTE — PROGRESS NOTES
Patient contacted [facility] requesting Substance Abuse Evaluation be sent to her PO. Patient did sign a CINDI on 8/5/2024, after assessment.

## 2024-09-12 ENCOUNTER — ANESTHESIA EVENT (OUTPATIENT)
Dept: OPERATING ROOM | Facility: HOSPITAL | Age: 69
End: 2024-09-12
Payer: COMMERCIAL

## 2024-09-12 SDOH — HEALTH STABILITY: MENTAL HEALTH: CURRENT SMOKER: 1

## 2024-09-12 NOTE — ANESTHESIA PREPROCEDURE EVALUATION
Patient: Judith Bush    Procedure Information       Date/Time: 09/13/24 6381    Procedure: LEFT PROXIMAL  HALLUX PARTIAL EXCISION (Left)    Location: [facility] 02 / [facility]    Surgeons: Abraham Mccord DPM            Relevant Problems   Cardiac   (+) Hyperlipidemia   (+) Hypertension      Pulmonary   (+) COPD (chronic obstructive pulmonary disease) (Multi)   (+) SHON (obstructive sleep apnea)   (+) Obstructive sleep apnea      Neuro   (+) Anxiety disorder   (+) Bipolar 1 disorder, mixed, mild (Multi)   (+) Bipolar affective disorder, rapid cycling (Multi)   (+) Cerebrovascular accident (CVA) (Multi)   (+) Lumbar radiculopathy       Clinical information reviewed:      Problems              NPO Detail:  No data recorded     Physical Exam    Airway  Mallampati: III  TM distance: <3 FB  Neck ROM: full     Cardiovascular - normal exam  Rhythm: regular  Rate: normal     Dental - normal exam     Pulmonary   Breath sounds clear to auscultation  (+) decreased breath sounds     Abdominal            Anesthesia Plan    History of general anesthesia?: yes  History of complications of general anesthesia?: no    ASA 3     MAC     The patient is a current smoker.  Patient was previously instructed to abstain from smoking on day of procedure.  Patient did not smoke on day of procedure.  Education provided regarding risk of obstructive sleep apnea.  intravenous induction   Postoperative administration of opioids is intended.  Anesthetic plan and risks discussed with patient.    Plan discussed with CRNA.

## 2024-09-13 ENCOUNTER — ANESTHESIA (OUTPATIENT)
Dept: OPERATING ROOM | Facility: HOSPITAL | Age: 69
End: 2024-09-13
Payer: COMMERCIAL

## 2024-09-13 ENCOUNTER — HOSPITAL ENCOUNTER (OUTPATIENT)
Facility: HOSPITAL | Age: 69
Setting detail: OUTPATIENT SURGERY
Discharge: HOME | End: 2024-09-13
Attending: PODIATRIST | Admitting: PODIATRIST
Payer: COMMERCIAL

## 2024-09-13 VITALS
BODY MASS INDEX: 22.66 KG/M2 | TEMPERATURE: 96.8 F | DIASTOLIC BLOOD PRESSURE: 65 MMHG | RESPIRATION RATE: 16 BRPM | SYSTOLIC BLOOD PRESSURE: 118 MMHG | WEIGHT: 120 LBS | OXYGEN SATURATION: 97 % | HEIGHT: 61 IN | HEART RATE: 64 BPM

## 2024-09-13 DIAGNOSIS — G89.18 POST-OPERATIVE PAIN: Primary | ICD-10-CM

## 2024-09-13 PROCEDURE — 2500000004 HC RX 250 GENERAL PHARMACY W/ HCPCS (ALT 636 FOR OP/ED): Performed by: PODIATRIST

## 2024-09-13 PROCEDURE — 7100000010 HC PHASE TWO TIME - EACH INCREMENTAL 1 MINUTE: Performed by: PODIATRIST

## 2024-09-13 PROCEDURE — 3600000008 HC OR TIME - EACH INCREMENTAL 1 MINUTE - PROCEDURE LEVEL THREE: Performed by: PODIATRIST

## 2024-09-13 PROCEDURE — 7100000009 HC PHASE TWO TIME - INITIAL BASE CHARGE: Performed by: PODIATRIST

## 2024-09-13 PROCEDURE — 2500000004 HC RX 250 GENERAL PHARMACY W/ HCPCS (ALT 636 FOR OP/ED): Performed by: NURSE ANESTHETIST, CERTIFIED REGISTERED

## 2024-09-13 PROCEDURE — 2720000007 HC OR 272 NO HCPCS: Performed by: PODIATRIST

## 2024-09-13 PROCEDURE — 3600000003 HC OR TIME - INITIAL BASE CHARGE - PROCEDURE LEVEL THREE: Performed by: PODIATRIST

## 2024-09-13 PROCEDURE — 2500000005 HC RX 250 GENERAL PHARMACY W/O HCPCS: Performed by: NURSE ANESTHETIST, CERTIFIED REGISTERED

## 2024-09-13 PROCEDURE — 2500000005 HC RX 250 GENERAL PHARMACY W/O HCPCS: Performed by: PODIATRIST

## 2024-09-13 PROCEDURE — 3700000001 HC GENERAL ANESTHESIA TIME - INITIAL BASE CHARGE: Performed by: PODIATRIST

## 2024-09-13 PROCEDURE — 3700000002 HC GENERAL ANESTHESIA TIME - EACH INCREMENTAL 1 MINUTE: Performed by: PODIATRIST

## 2024-09-13 RX ORDER — LIDOCAINE HCL/PF 100 MG/5ML
SYRINGE (ML) INTRAVENOUS AS NEEDED
Status: DISCONTINUED | OUTPATIENT
Start: 2024-09-13 | End: 2024-09-13

## 2024-09-13 RX ORDER — FENTANYL CITRATE 50 UG/ML
INJECTION, SOLUTION INTRAMUSCULAR; INTRAVENOUS AS NEEDED
Status: DISCONTINUED | OUTPATIENT
Start: 2024-09-13 | End: 2024-09-13

## 2024-09-13 RX ORDER — DEXAMETHASONE SODIUM PHOSPHATE 10 MG/ML
6 INJECTION INTRAMUSCULAR; INTRAVENOUS ONCE
Status: DISCONTINUED | OUTPATIENT
Start: 2024-09-13 | End: 2024-09-13 | Stop reason: HOSPADM

## 2024-09-13 RX ORDER — VANCOMYCIN HYDROCHLORIDE 1 G/200ML
1000 INJECTION, SOLUTION INTRAVENOUS ONCE
Status: COMPLETED | OUTPATIENT
Start: 2024-09-13 | End: 2024-09-13

## 2024-09-13 RX ORDER — MIDAZOLAM HYDROCHLORIDE 1 MG/ML
INJECTION, SOLUTION INTRAMUSCULAR; INTRAVENOUS AS NEEDED
Status: DISCONTINUED | OUTPATIENT
Start: 2024-09-13 | End: 2024-09-13

## 2024-09-13 RX ORDER — OXYCODONE AND ACETAMINOPHEN 10; 325 MG/1; MG/1
1 TABLET ORAL EVERY 6 HOURS PRN
Qty: 20 TABLET | Refills: 0 | Status: SHIPPED | OUTPATIENT
Start: 2024-09-13 | End: 2024-09-18

## 2024-09-13 RX ORDER — SODIUM CHLORIDE, SODIUM LACTATE, POTASSIUM CHLORIDE, CALCIUM CHLORIDE 600; 310; 30; 20 MG/100ML; MG/100ML; MG/100ML; MG/100ML
INJECTION, SOLUTION INTRAVENOUS CONTINUOUS PRN
Status: DISCONTINUED | OUTPATIENT
Start: 2024-09-13 | End: 2024-09-13

## 2024-09-13 RX ORDER — ALBUTEROL SULFATE 0.83 MG/ML
2.5 SOLUTION RESPIRATORY (INHALATION) ONCE AS NEEDED
Status: DISCONTINUED | OUTPATIENT
Start: 2024-09-13 | End: 2024-09-13 | Stop reason: HOSPADM

## 2024-09-13 RX ORDER — LIDOCAINE HYDROCHLORIDE 20 MG/ML
INJECTION, SOLUTION INFILTRATION; PERINEURAL AS NEEDED
Status: DISCONTINUED | OUTPATIENT
Start: 2024-09-13 | End: 2024-09-13 | Stop reason: HOSPADM

## 2024-09-13 RX ORDER — ONDANSETRON HYDROCHLORIDE 2 MG/ML
INJECTION, SOLUTION INTRAVENOUS AS NEEDED
Status: DISCONTINUED | OUTPATIENT
Start: 2024-09-13 | End: 2024-09-13

## 2024-09-13 RX ORDER — BUPIVACAINE HYDROCHLORIDE 5 MG/ML
INJECTION, SOLUTION EPIDURAL; INTRACAUDAL AS NEEDED
Status: DISCONTINUED | OUTPATIENT
Start: 2024-09-13 | End: 2024-09-13 | Stop reason: HOSPADM

## 2024-09-13 RX ORDER — LIDOCAINE HYDROCHLORIDE 20 MG/ML
INJECTION, SOLUTION INFILTRATION; PERINEURAL AS NEEDED
Status: DISCONTINUED | OUTPATIENT
Start: 2024-09-13 | End: 2024-09-13

## 2024-09-13 RX ORDER — MEPERIDINE HYDROCHLORIDE 25 MG/ML
12.5 INJECTION INTRAMUSCULAR; INTRAVENOUS; SUBCUTANEOUS EVERY 10 MIN PRN
Status: DISCONTINUED | OUTPATIENT
Start: 2024-09-13 | End: 2024-09-13 | Stop reason: HOSPADM

## 2024-09-13 RX ORDER — PHENYLEPHRINE HCL IN 0.9% NACL 1 MG/10 ML
SYRINGE (ML) INTRAVENOUS AS NEEDED
Status: DISCONTINUED | OUTPATIENT
Start: 2024-09-13 | End: 2024-09-13

## 2024-09-13 RX ORDER — SODIUM CHLORIDE, SODIUM LACTATE, POTASSIUM CHLORIDE, CALCIUM CHLORIDE 600; 310; 30; 20 MG/100ML; MG/100ML; MG/100ML; MG/100ML
100 INJECTION, SOLUTION INTRAVENOUS CONTINUOUS
Status: DISCONTINUED | OUTPATIENT
Start: 2024-09-13 | End: 2024-09-13 | Stop reason: HOSPADM

## 2024-09-13 RX ORDER — IPRATROPIUM BROMIDE 0.5 MG/2.5ML
500 SOLUTION RESPIRATORY (INHALATION) ONCE
Status: DISCONTINUED | OUTPATIENT
Start: 2024-09-13 | End: 2024-09-13 | Stop reason: HOSPADM

## 2024-09-13 RX ORDER — ACETAMINOPHEN 325 MG/1
650 TABLET ORAL EVERY 4 HOURS PRN
Status: DISCONTINUED | OUTPATIENT
Start: 2024-09-13 | End: 2024-09-13 | Stop reason: HOSPADM

## 2024-09-13 RX ORDER — NORETHINDRONE AND ETHINYL ESTRADIOL 0.5-0.035
KIT ORAL AS NEEDED
Status: DISCONTINUED | OUTPATIENT
Start: 2024-09-13 | End: 2024-09-13

## 2024-09-13 RX ORDER — ONDANSETRON HYDROCHLORIDE 2 MG/ML
4 INJECTION, SOLUTION INTRAVENOUS ONCE AS NEEDED
Status: DISCONTINUED | OUTPATIENT
Start: 2024-09-13 | End: 2024-09-13 | Stop reason: HOSPADM

## 2024-09-13 RX ORDER — SODIUM CHLORIDE 9 MG/ML
20 INJECTION, SOLUTION INTRAVENOUS CONTINUOUS
Status: DISCONTINUED | OUTPATIENT
Start: 2024-09-13 | End: 2024-09-13 | Stop reason: HOSPADM

## 2024-09-13 RX ORDER — LIDOCAINE HYDROCHLORIDE 10 MG/ML
0.1 INJECTION, SOLUTION INFILTRATION; PERINEURAL ONCE
Status: DISCONTINUED | OUTPATIENT
Start: 2024-09-13 | End: 2024-09-13 | Stop reason: HOSPADM

## 2024-09-13 RX ORDER — PROPOFOL 10 MG/ML
INJECTION, EMULSION INTRAVENOUS AS NEEDED
Status: DISCONTINUED | OUTPATIENT
Start: 2024-09-13 | End: 2024-09-13

## 2024-09-13 ASSESSMENT — PAIN SCALES - GENERAL
PAINLEVEL_OUTOF10: 0 - NO PAIN

## 2024-09-13 ASSESSMENT — PAIN - FUNCTIONAL ASSESSMENT
PAIN_FUNCTIONAL_ASSESSMENT: 0-10

## 2024-09-13 NOTE — ANESTHESIA POSTPROCEDURE EVALUATION
Patient: Judith uBsh    Procedure Summary       Date: 09/13/24 Room / Location: [facility] OR 02 / [facility]    Anesthesia Start: 0733 Anesthesia Stop: 0832    Procedure: LEFT PROXIMAL  HALLUX PARTIAL EXCISION (Left: Foot) Diagnosis:       Acquired hallux limitus of left foot      Hypertrophy of bone of left foot      Toe pain, left      (Acquired hallux limitus of left foot [M20.5X2])      (Hypertrophy of bone of left foot [M89.372])      (Toe pain, left [M79.675])    Surgeons: Abraham Mccord DPM Responsible Provider: Jon Bryant MD    Anesthesia Type: MAC ASA Status: 3            Anesthesia Type: MAC    Vitals Value Taken Time   BP 90/51 09/13/24 0830   Temp 36 09/13/24 0832   Pulse 70 09/13/24 0830   Resp 15 09/13/24 0832   SpO2 96 % 09/13/24 0830   Vitals shown include unfiled device data.    Anesthesia Post Evaluation    Patient location during evaluation: PACU  Patient participation: complete - patient participated  Level of consciousness: awake and alert  Pain management: adequate  Airway patency: patent  Cardiovascular status: acceptable  Respiratory status: acceptable and spontaneous ventilation  Hydration status: acceptable  Postoperative Nausea and Vomiting: none      No notable events documented.

## 2024-09-13 NOTE — DISCHARGE INSTRUCTIONS
PODIATRY DISCHARGE INSTRUCTIONS  Please call and schedule an appointment with  for a post-operative follow up either Wedneday 9/18/24 or Friday 9/20/24 of next week. Please call sooner if any concerns arise.   Bear weight as tolerated in surgical shoe.  Keep dressing clean and dry until your first follow up appointment.  Do not shower unless using a cast protector to protect dressing.  If dressing gets wet, change or call office immediately as this can lead to increased risk of infection.  Place ice pack behind knee of extremity.  Elevate surgical extremity as much as possible to help with pain and swelling.  Take pain medications as instructed and as needed.   Should any problems, questions, or concerns arise, please call the clinic office.

## 2024-09-13 NOTE — OP NOTE
LEFT PROXIMAL  HALLUX PARTIAL EXCISION (L) Operative Note     Date: 2024  OR Location: [facility]    Name: Judith Bush, : 1955, Age: 69 y.o., MRN: [id number], Sex: female    Diagnosis  Pre-op Diagnosis      * Acquired hallux limitus of left foot [M20.5X2]     * Hypertrophy of bone of left foot [M89.372]     * Toe pain, left [M79.675] Post-op Diagnosis     * Acquired hallux limitus of left foot [M20.5X2]     * Hypertrophy of bone of left foot [M89.372]     * Toe pain, left [M79.675]     Procedures  LEFT PROXIMAL  HALLUX PARTIAL EXCISION  88587 - ND PARTICAL EXCISION BONE PHALANX TOE      Surgeons      * Abraham Mccord - Primary    Resident/Fellow/Other Assistant:  Surgeons and Role:  * No surgeons found with a matching role *    Procedure Summary  Anesthesia: Monitor Anesthesia Care  ASA: III  Anesthesia Staff: Anesthesiologist: Jon Bryant MD  CRNA: LOIDA Lan-PRIMO BYRD  Estimated Blood Loss: <2mL  Intra-op Medications: * Intraprocedure medication information is unavailable because the case start and end events have not been set *           Anesthesia Record               Intraprocedure I/O Totals          Intake    LR infusion 800.00 mL    Total Intake 800 mL          Specimen: No specimens collected     Staff:   Scrub Person: Yoselin  Circulator: Valencia         Drains and/or Catheters: * None in log *    Tourniquet Times:     Total Tourniquet Time Documented:  Leg (Left) - 26 minutes  Total: Leg (Left) - 26 minutes      Implants: None    Findings: Osseous prominence dorsal medial left hallux IPJ    Indications: Judith Bush is an 69 y.o. female who is having surgery for Acquired hallux limitus of left foot [M20.5X2]  Hypertrophy of bone of left foot [M89.372]  Toe pain, left [M79.675].     Surgical indications: Patient is being followed my office with a painful osseous prominence of the left hallux IPJ.  Patient underwent an exhaustive conservative treatment options without improvement.   Discussed surgical management patient and patient opted for surgical management.  Discussed potential complications include not limited to pain numbness scarring infection bleeding delayed healing return deformity and need for further surgery.  No guarantees were given or implied    Preoperative considerations: The patient was seen in the preoperative area. The risks, benefits, complications, treatment options, non-operative alternatives, expected recovery and outcomes were discussed with the patient. The possibilities of reaction to medication, pulmonary aspiration, injury to surrounding structures, bleeding, recurrent infection, the need for additional procedures, failure to diagnose a condition, and creating a complication requiring transfusion or operation were discussed with the patient. The patient concurred with the proposed plan, giving informed consent.  The site of surgery was properly noted/marked if necessary per policy. The patient has been actively warmed in preoperative area. Preoperative antibiotics have been ordered and given within 1 hours of incision. Venous thrombosis prophylaxis are not indicated.    Procedure Details:   Patient was brought to the operating room placed operating table in supine position.  Following induction of IV sedation local anesthesia obtained about the patient's left hallux utilizing local mentioned below.  Pneumatic ankle tourniquet placed by the patient's left ankle.  Left lower extremity was then scrubbed prepped and draped in usual aseptic manner.    Attention first directed to the patient's left hallux.  Large palpable osseous prominence noted dorsal medial to the IPJ.  Utilizing a 15 scalpel blade incision was made directly dorsal to this prominence.  Blunt appropriate dissection performed down to the level of the osseous prominence.  Utilizing combination of sharp dissection and rongeur osseous prominence was excised passed off the field to the back table.   Palpation confirmed adequate excision of the deformity.  Utilizing mechanical rasp all rough edges were smoothed.  Surgical site was flushed with copious amounts of normal saline.    Capsular structures were reapproximated utilizing 5-0 PDS and skin incision reapproximated utilizing 5-0 Monocryl in a subcuticular technique.  Eliana-Strips then applied.  Site was dressed with Adaptic and fluffy gauze dressing and Ace bandage.  On completion of procedure ankle tourniquet deflated and prompt hyperemic response noted to all digits.  Patient was transferred to PACU vascular status intact to her left lower extremity.    Patient will be discharged with written and oral instructions to leave dressings clean dry and intact, rest ice and elevate left lower extremity, all ambulation to be limited with use of surgical shoe and patient will follow-up my office in 3 to 5 days.  Prescription for Percocet 10/325 dispensed.  Dispense 20 tabs.  Should any complications arise patient instructed to contact my office.    Complications:  None; patient tolerated the procedure well.    Disposition: PACU - hemodynamically stable.  Condition: stable           Attending Attestation: I was present and scrubbed for the entire procedure.    Abraham Mccord  Phone Number: [phone]

## 2024-09-13 NOTE — BRIEF OP NOTE
Date: 2024  OR Location: [facility]    Name: Judith Bush, : 1955, Age: 69 y.o., MRN: [id number], Sex: female    Diagnosis  Pre-op Diagnosis      * Acquired hallux limitus of left foot [M20.5X2]     * Hypertrophy of bone of left foot [M89.372]     * Toe pain, left [M79.675] Post-op Diagnosis     * Acquired hallux limitus of left foot [M20.5X2]     * Hypertrophy of bone of left foot [M89.372]     * Toe pain, left [M79.675]     Procedures  LEFT PROXIMAL  HALLUX PARTIAL EXCISION  10723 - OK PARTICAL EXCISION BONE PHALANX TOE      Surgeons      * Abraham Mccord - Primary    Resident/Fellow/Other Assistant:  Bo Hurley DPM PGY-1 Resident     Procedure Summary  Anesthesia: Monitor Anesthesia Care  ASA: III  Anesthesia Staff: Anesthesiologist: Jon Bryant MD  CRNA: LOIDA Lan-PRIMO BYRD  Estimated Blood Loss: 2mL  Intra-op Medications: * Intraprocedure medication information is unavailable because the case start and end events have not been set *           Anesthesia Record               Intraprocedure I/O Totals          Intake    LR infusion 800.00 mL    Total Intake 800 mL          Specimen: No specimens collected     Staff:   Scrub Person: Yoselin  Circulator: Valencia          Findings: exostosis     Complications:  None; patient tolerated the procedure well.     Disposition: PACU - hemodynamically stable.  Condition: stable  Specimens Collected: No specimens collected  Attending Attestation: I was present and scrubbed for the entire procedure.    Abraham Mccord  Phone Number: [phone]

## 2024-09-16 ENCOUNTER — APPOINTMENT (OUTPATIENT)
Dept: DERMATOLOGY | Facility: CLINIC | Age: 69
End: 2024-09-16
Payer: COMMERCIAL

## 2024-09-19 ENCOUNTER — APPOINTMENT (OUTPATIENT)
Dept: ENDOCRINOLOGY | Facility: CLINIC | Age: 69
End: 2024-09-19
Payer: COMMERCIAL

## 2024-09-22 DIAGNOSIS — M46.1 SACROILIITIS (CMS-HCC): ICD-10-CM

## 2024-09-23 RX ORDER — CYCLOBENZAPRINE HCL 10 MG
10 TABLET ORAL NIGHTLY PRN
Qty: 90 TABLET | Refills: 0 | Status: SHIPPED | OUTPATIENT
Start: 2024-09-23

## 2024-10-14 ENCOUNTER — APPOINTMENT (OUTPATIENT)
Dept: NEUROLOGY | Facility: CLINIC | Age: 69
End: 2024-10-14
Payer: COMMERCIAL

## 2024-10-15 ENCOUNTER — TELEPHONE (OUTPATIENT)
Dept: NEUROLOGY | Facility: CLINIC | Age: 69
End: 2024-10-15
Payer: COMMERCIAL

## 2024-10-15 NOTE — TELEPHONE ENCOUNTER
Patient calling to inquire about botox status. Has it been approved? I didn't see anything in the chart.

## 2024-10-21 ENCOUNTER — HOSPITAL ENCOUNTER (OUTPATIENT)
Dept: RADIOLOGY | Facility: HOSPITAL | Age: 69
Discharge: HOME | End: 2024-10-21
Payer: COMMERCIAL

## 2024-10-21 ENCOUNTER — OFFICE VISIT (OUTPATIENT)
Dept: PULMONOLOGY | Facility: CLINIC | Age: 69
End: 2024-10-21
Payer: COMMERCIAL

## 2024-10-21 VITALS
RESPIRATION RATE: 16 BRPM | TEMPERATURE: 97.6 F | DIASTOLIC BLOOD PRESSURE: 82 MMHG | SYSTOLIC BLOOD PRESSURE: 137 MMHG | BODY MASS INDEX: 22.82 KG/M2 | WEIGHT: 120.8 LBS | OXYGEN SATURATION: 98 % | HEART RATE: 60 BPM

## 2024-10-21 DIAGNOSIS — J44.9 CHRONIC OBSTRUCTIVE PULMONARY DISEASE, UNSPECIFIED COPD TYPE (MULTI): Primary | ICD-10-CM

## 2024-10-21 DIAGNOSIS — M75.101 TEAR OF RIGHT ROTATOR CUFF, UNSPECIFIED TEAR EXTENT, UNSPECIFIED WHETHER TRAUMATIC: ICD-10-CM

## 2024-10-21 PROCEDURE — 99214 OFFICE O/P EST MOD 30 MIN: CPT | Performed by: INTERNAL MEDICINE

## 2024-10-21 PROCEDURE — 1159F MED LIST DOCD IN RCRD: CPT | Performed by: INTERNAL MEDICINE

## 2024-10-21 PROCEDURE — 73221 MRI JOINT UPR EXTREM W/O DYE: CPT | Mod: RIGHT SIDE | Performed by: RADIOLOGY

## 2024-10-21 PROCEDURE — 1160F RVW MEDS BY RX/DR IN RCRD: CPT | Performed by: INTERNAL MEDICINE

## 2024-10-21 PROCEDURE — 3079F DIAST BP 80-89 MM HG: CPT | Performed by: INTERNAL MEDICINE

## 2024-10-21 PROCEDURE — 3075F SYST BP GE 130 - 139MM HG: CPT | Performed by: INTERNAL MEDICINE

## 2024-10-21 PROCEDURE — 4004F PT TOBACCO SCREEN RCVD TLK: CPT | Performed by: INTERNAL MEDICINE

## 2024-10-21 PROCEDURE — 1123F ACP DISCUSS/DSCN MKR DOCD: CPT | Performed by: INTERNAL MEDICINE

## 2024-10-21 PROCEDURE — 73221 MRI JOINT UPR EXTREM W/O DYE: CPT | Mod: RT

## 2024-10-21 RX ORDER — ALBUTEROL SULFATE 0.83 MG/ML
2.5 SOLUTION RESPIRATORY (INHALATION) EVERY 6 HOURS PRN
Qty: 150 ML | Refills: 11 | Status: SHIPPED | OUTPATIENT
Start: 2024-10-21 | End: 2025-10-21

## 2024-10-21 ASSESSMENT — ENCOUNTER SYMPTOMS
UNEXPECTED WEIGHT CHANGE: 0
STRIDOR: 0
CONSTIPATION: 0
NUMBNESS: 0
HEMATURIA: 0
CHOKING: 0
HEADACHES: 0
FREQUENCY: 0
DIFFICULTY URINATING: 0
APNEA: 0
PALPITATIONS: 0
SINUS PAIN: 0
ABDOMINAL DISTENTION: 0
NERVOUS/ANXIOUS: 0
RHINORRHEA: 0
WEAKNESS: 0
FACIAL SWELLING: 0
COUGH: 1
EYE REDNESS: 0
ADENOPATHY: 0
FEVER: 0
TREMORS: 0
EYE DISCHARGE: 0
ARTHRALGIAS: 1
FATIGUE: 0
SPEECH DIFFICULTY: 0
SHORTNESS OF BREATH: 0
WHEEZING: 1
DIZZINESS: 0
SINUS PRESSURE: 0
ABDOMINAL PAIN: 0
DYSURIA: 0
LIGHT-HEADEDNESS: 0
SLEEP DISTURBANCE: 0
AGITATION: 0
BRUISES/BLEEDS EASILY: 0
NAUSEA: 0
JOINT SWELLING: 0

## 2024-10-21 NOTE — PATIENT INSTRUCTIONS
Will order electronic nebulizer for nebulization of albuterol every 4-6 hours as needed congestion.    Smoking cessation.

## 2024-10-21 NOTE — PROGRESS NOTES
@PULMONARY FOLLOW-UP@       PROBLEM:  COPD    ASSESSMENT:  The patient is a 69-year-old status post mitral valve repair who is trying to cut down her smoking intake.  She is down to half a pack per day.  She has been under a lot of stresses and is quite anxious.  She does see psychiatry.  She has had problems with her house and has lost furniture etc.  Her PFTs reveal mild airflow obstruction with a decrease in DLCO.  She does have some wheezing on forced expiration.  She definitely will benefit from nebulized albuterol every 4-6 hours as needed.  Smoking cessation is encouraged.    PLAN:  I have ordered a nebulizer with albuterol to use every 4-6 hours as needed.  Smoking cessation encouraged.      HISTORY OF PRESENT ILLNESS:  The patient is a 69-year-old with a long smoking history  seen on October 21, 2020. She also has an underlying bipolar disorder, various arthritic issues , bacterial colitis. Her PFTs in the past have revealed an FEV1 of around 1.46 L or 65% predicted with mild restriction and a moderately reduced DLCO. In addition, it was determined that she had worsening severe mitral regurgitation and on August 7, 2019 the valve was replaced with a 29 mm Epic porcine prosthesis with preservation of most of the subvalvular attachments., Her course was complicated by postoperative pericarditis treated with anti-inflammatory agents. She also reported that during her postoperative period she developed ICU psychosis, required blood transfusion . . She continued to have chest pain was been placed on colchicine. A follow-up echocardiogram on August 23, 2019 showed preserved LV systolic function and a small pericardial effusion. The RV systolic pressure was 48 mmHg. The patient has been treated with diltiazem for hypertension and she also has a esophageal motility issue. She continues to smoke and has a bipolar disorder along with migraine headaches. She does have a marijuana card and has had up pulmonary nodule.  She has been using albuterol as needed and has seasonal allergies.     The low-dose screening CT scan from 2020 revealed the followin. Stable right upper lobe pulmonary nodule measuring up to 5 mm. Recommendation is for follow up noncontrast enhanced CT scan chest in 12 months. 2. Mild centrilobular emphysema. 3. Moderate coronary artery calcifications. 4. Small hiatal hernia.     She has been followed by cardiology and she has been relatively stable. She continues to smoke and has hyperlipidemia treated with Repatha. She did have back surgery in 2023. In addition, she has seen rheumatology for fibromyalgia. Also she has moderate sleep apnea with AHI index generally in the low 20s. She has been treated with CPAP.     The low-dose screening CT scan from 2023 revealed a stable 3 mm right upper lobe nodule. Mild upper lobe emphysema was observed with coronary calcifications and a patulous esophagus.   PFTs from May 26, 2023 revealed only mild airflow obstruction with the FVC of 2.24 L 85% predicted with an FEV1 of 1.47 L 71% predicted and FEV1 percent of 66% there is no bronchodilator response. The total lung capacity was 94% that the and the RV/TLC ratio 139% predicted the DLCO was 58% predicted. Overall no change compared to 2018     On 2023 the patient reported that she has had increasing congestion at times as a purulent phlegm production. This is been going on for a month or 2. She has no chest pains or pressures. She is dealing with her fibromyalgia. She has not had COVID and continues on treatment for her hyperlipidemia. She also has been treated for CPAP.  Her chest x-ray was clear and she was treated with a Z-Jaspreet.  It is noted the patient was admitted on April 15, 2024 with difficulty using left upper extremity and dysarthria.  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  She was discharged on DAPT.  Prior to the stroke she was under a lot of personal  stress and was arrested.  She had a court appearance 3 days before the stroke.  She was sent to 2 years probation limited restricted driving.  She has to wear an ankle monitor for alcohol for 6 months.    The screening CT scan from August 12, 2024 revealed the following  1.  Few small bilateral noncalcified pulmonary nodules measuring up  to 5 mm, likely benign. Continued screening with low-dose noncontrast  chest CT in 12 months (from current date) is recommended.  2. Estimated coronary artery calcium score is 383* which correlates  with at least 91st percentile rank as compared to matched EVERETT-study  subjects([email or web address]://[email or web address]-[email or web address]/Calcium/input.aspx).  3. Postsurgical changes consistent with prior median sternotomy and  mitral valve replacement.      The patient has been under a lot of stress because of the legal situation with her arrest having an alcohol monitoring bracelet in place.  Her hip surgery was canceled.  She has been tried on various bipolar medications unsuccessfully.  She is smoking about a half a pack per day.  Her CT scan revealed stable findings as outlined above.  She has a lot of congestion in her chest.  She brings up some phlegm at times.  She tried Nicorette gum but it made her sick.  She is utilizing the Breztri and intermittently has some wheezing.  She also has some headache.    Allergies   Allergen Reactions    Cephalosporins Anaphylaxis    Penicillin Anaphylaxis    Penicillins Anaphylaxis and Unknown    Neomycin-Polymyxin-Gramicidin Rash            Current Outpatient Medications:     albuterol 90 mcg/actuation inhaler, Inhale 2 puffs every 4 hours if needed for wheezing or shortness of breath., Disp: , Rfl:     aspirin 81 mg EC tablet, Take 1 tablet (81 mg) by mouth once daily., Disp: 90 tablet, Rfl: 0    celecoxib (CeleBREX) 200 mg capsule, Take 1 capsule (200 mg) by mouth as needed at bedtime for moderate pain (4 - 6)., Disp: , Rfl:     cyclobenzaprine (Flexeril) 10 mg tablet,  TAKE 1 TABLET (10 MG) BY MOUTH AS NEEDED AT BEDTIME FOR MUSCLE SPASMS., Disp: 90 tablet, Rfl: 0    diclofenac sodium (Voltaren) 1 % gel, APPLY 1 APPLICATION TOPICALLY 4 TIMES A DAY AS NEEDED (PAIN)., Disp: 100 g, Rfl: 2    dilTIAZem CD (Cardizem CD) 180 mg 24 hr capsule, Take 1 capsule (180 mg) by mouth once daily., Disp: 90 capsule, Rfl: 1    evolocumab (Repatha SureClick) 140 mg/mL injection, INJECT THE CONTENTS OF 1 PEN UNDER THE SKIN EVERY 2 WEEKS, Disp: 6 mL, Rfl: 3    glycopyrrolate-formoteroL (Bevespi Aerosphere) 9-4.8 mcg HFA aerosol inhaler, TAKE 2 PUFFS BY MOUTH TWICE A DAY, Disp: 10.7 g, Rfl: 11    hydrocortisone acetate 1 % ointment, Apply 1 Application topically 2 times a day as needed (for hemorrhoids)., Disp: 30 g, Rfl: 3    multivitamin with folic acid (One Daily Multivitamin) 400 mcg tablet, Take 1 tablet by mouth once daily., Disp: , Rfl:     oxybutynin XL (Ditropan-XL) 10 mg 24 hr tablet, Take 1 tablet (10 mg) by mouth 2 times a day., Disp: 180 tablet, Rfl: 1    polyethylene glycol (Glycolax, Miralax) 17 gram/dose powder, Use up to 3 times daily as directed as needed, Disp: 1054 g, Rfl: 0    Tymlos 80 mcg (3,120 mcg/1.56 mL) pen injector, Inject 1 Dose as directed once daily., Disp: , Rfl:     ubrogepant (Ubrelvy) 100 mg tablet tablet, Take 1 tablet (100 mg) by mouth 1 time if needed (migraine headaches). Can repeat in 2 hours if no response. Not to exceed 200mg per 24 hours., Disp: 9 tablet, Rfl: 11    albuterol 2.5 mg /3 mL (0.083 %) nebulizer solution, Take 3 mL (2.5 mg) by nebulization every 6 hours if needed for wheezing., Disp: 150 mL, Rfl: 11    fluticasone (Flonase) 50 mcg/actuation nasal spray, Administer 1 spray into each nostril once daily. Shake gently. Before first use, prime pump. After use, clean tip and replace cap., Disp: 16 g, Rfl: 5    pantoprazole (ProtoNix) 40 mg EC tablet, Take 1 tablet (40 mg) by mouth once daily in the morning. Take before meals. Do not crush, chew, or  split., Disp: 30 tablet, Rfl: 0    SUMAtriptan (Imitrex) 50 mg tablet, Take 1 tablet (50 mg) by mouth 1 time if needed for migraine. May repeat after 2 hours. (Patient not taking: Reported on 10/21/2024), Disp: 27 tablet, Rfl: 3          Review of Systems   Constitutional:  Negative for fatigue, fever and unexpected weight change.   HENT:  Negative for congestion, facial swelling, nosebleeds, postnasal drip, rhinorrhea, sinus pressure and sinus pain.    Eyes:  Negative for discharge, redness and visual disturbance.   Respiratory:  Positive for cough and wheezing. Negative for apnea, choking, shortness of breath and stridor.    Cardiovascular:  Negative for chest pain, palpitations and leg swelling.   Gastrointestinal:  Negative for abdominal distention, abdominal pain, constipation and nausea.   Endocrine: Negative for cold intolerance and heat intolerance.   Genitourinary:  Negative for difficulty urinating, dysuria, frequency and hematuria.   Musculoskeletal:  Positive for arthralgias. Negative for gait problem and joint swelling.   Allergic/Immunologic: Negative for environmental allergies, food allergies and immunocompromised state.   Neurological:  Negative for dizziness, tremors, syncope, speech difficulty, weakness, light-headedness, numbness and headaches.   Hematological:  Negative for adenopathy. Does not bruise/bleed easily.   Psychiatric/Behavioral:  Negative for agitation, behavioral problems and sleep disturbance. The patient is not nervous/anxious.         Vitals:    10/21/24 1240   BP: 137/82   Pulse: 60   Resp: 16   Temp: 36.4 °C (97.6 °F)   SpO2: 98%        Physical Exam  Vitals reviewed.   Constitutional:       Appearance: Normal appearance.   HENT:      Head: Normocephalic and atraumatic.   Eyes:      Extraocular Movements: Extraocular movements intact.   Cardiovascular:      Rate and Rhythm: Normal rate and regular rhythm.      Heart sounds: No murmur heard.     No friction rub. No gallop.    Pulmonary:      Effort: Pulmonary effort is normal. No respiratory distress.      Breath sounds: No stridor. Wheezing and rhonchi present. No rales.   Chest:      Chest wall: No tenderness.   Abdominal:      General: Abdomen is flat. There is no distension.      Palpations: Abdomen is soft. There is no mass.      Tenderness: There is no abdominal tenderness.   Musculoskeletal:         General: Normal range of motion.      Cervical back: Normal range of motion.      Right lower leg: No edema.      Left lower leg: No edema.   Skin:     General: Skin is warm and dry.   Neurological:      Mental Status: She is alert and oriented to person, place, and time.   Psychiatric:         Mood and Affect: Mood normal.         Behavior: Behavior normal.

## 2024-10-23 ENCOUNTER — TELEPHONE (OUTPATIENT)
Dept: NEUROLOGY | Facility: CLINIC | Age: 69
End: 2024-10-23
Payer: COMMERCIAL

## 2024-10-23 DIAGNOSIS — G43.719 INTRACTABLE CHRONIC MIGRAINE WITHOUT AURA AND WITHOUT STATUS MIGRAINOSUS: Primary | ICD-10-CM

## 2024-10-23 NOTE — TELEPHONE ENCOUNTER
Neurology Botulinum Injection    Subjective   Judith Bush is an 69 y.o. female here for medical botulinum injection for Intractable chronic migraine without aura and without status migrainosus [G43.719]. ***    Objective   Neurological Exam  Physical Exam  Procedures  Assessment/Plan   ***  {Assess/PlanSmartLinks:39851}

## 2024-10-23 NOTE — TELEPHONE ENCOUNTER
To start the authorization process, we would put the order in for Botox and send it to our retail pharmacy here at [facility]. It should then trigger the insurance prior authorization. I changed her pharmacy to the [facility] retail pharmacy.    Thank you!

## 2024-10-25 ENCOUNTER — APPOINTMENT (OUTPATIENT)
Dept: RADIOLOGY | Facility: HOSPITAL | Age: 69
End: 2024-10-25
Payer: COMMERCIAL

## 2024-10-25 ENCOUNTER — HOSPITAL ENCOUNTER (EMERGENCY)
Facility: HOSPITAL | Age: 69
Discharge: HOME | End: 2024-10-25
Attending: EMERGENCY MEDICINE
Payer: COMMERCIAL

## 2024-10-25 VITALS
HEIGHT: 61 IN | DIASTOLIC BLOOD PRESSURE: 76 MMHG | BODY MASS INDEX: 22.47 KG/M2 | HEART RATE: 60 BPM | OXYGEN SATURATION: 100 % | WEIGHT: 119 LBS | SYSTOLIC BLOOD PRESSURE: 122 MMHG | RESPIRATION RATE: 15 BRPM | TEMPERATURE: 97.5 F

## 2024-10-25 DIAGNOSIS — G43.909 MIGRAINE WITHOUT STATUS MIGRAINOSUS, NOT INTRACTABLE, UNSPECIFIED MIGRAINE TYPE: Primary | ICD-10-CM

## 2024-10-25 LAB
ALBUMIN SERPL BCP-MCNC: 4.4 G/DL (ref 3.4–5)
ALP SERPL-CCNC: 73 U/L (ref 33–136)
ALT SERPL W P-5'-P-CCNC: 8 U/L (ref 7–45)
ANION GAP SERPL CALC-SCNC: 13 MMOL/L (ref 10–20)
AST SERPL W P-5'-P-CCNC: 20 U/L (ref 9–39)
BASOPHILS # BLD AUTO: 0.03 X10*3/UL (ref 0–0.1)
BASOPHILS NFR BLD AUTO: 0.3 %
BILIRUB DIRECT SERPL-MCNC: 0.1 MG/DL (ref 0–0.3)
BILIRUB SERPL-MCNC: 0.6 MG/DL (ref 0–1.2)
BUN SERPL-MCNC: 13 MG/DL (ref 6–23)
CALCIUM SERPL-MCNC: 9.8 MG/DL (ref 8.6–10.3)
CHLORIDE SERPL-SCNC: 100 MMOL/L (ref 98–107)
CO2 SERPL-SCNC: 29 MMOL/L (ref 21–32)
CREAT SERPL-MCNC: 0.92 MG/DL (ref 0.5–1.05)
EGFRCR SERPLBLD CKD-EPI 2021: 68 ML/MIN/1.73M*2
EOSINOPHIL # BLD AUTO: 0.08 X10*3/UL (ref 0–0.7)
EOSINOPHIL NFR BLD AUTO: 0.8 %
ERYTHROCYTE [DISTWIDTH] IN BLOOD BY AUTOMATED COUNT: 12.6 % (ref 11.5–14.5)
GLUCOSE SERPL-MCNC: 90 MG/DL (ref 74–99)
HCT VFR BLD AUTO: 44.6 % (ref 36–46)
HGB BLD-MCNC: 14.9 G/DL (ref 12–16)
IMM GRANULOCYTES # BLD AUTO: 0.04 X10*3/UL (ref 0–0.7)
IMM GRANULOCYTES NFR BLD AUTO: 0.4 % (ref 0–0.9)
LYMPHOCYTES # BLD AUTO: 2.48 X10*3/UL (ref 1.2–4.8)
LYMPHOCYTES NFR BLD AUTO: 26.1 %
MCH RBC QN AUTO: 31.6 PG (ref 26–34)
MCHC RBC AUTO-ENTMCNC: 33.4 G/DL (ref 32–36)
MCV RBC AUTO: 95 FL (ref 80–100)
MONOCYTES # BLD AUTO: 0.74 X10*3/UL (ref 0.1–1)
MONOCYTES NFR BLD AUTO: 7.8 %
NEUTROPHILS # BLD AUTO: 6.13 X10*3/UL (ref 1.2–7.7)
NEUTROPHILS NFR BLD AUTO: 64.6 %
NRBC BLD-RTO: 0 /100 WBCS (ref 0–0)
PLATELET # BLD AUTO: 203 X10*3/UL (ref 150–450)
POTASSIUM SERPL-SCNC: 4.5 MMOL/L (ref 3.5–5.3)
PROT SERPL-MCNC: 7.3 G/DL (ref 6.4–8.2)
RBC # BLD AUTO: 4.71 X10*6/UL (ref 4–5.2)
SODIUM SERPL-SCNC: 137 MMOL/L (ref 136–145)
WBC # BLD AUTO: 9.5 X10*3/UL (ref 4.4–11.3)

## 2024-10-25 PROCEDURE — 36415 COLL VENOUS BLD VENIPUNCTURE: CPT | Performed by: EMERGENCY MEDICINE

## 2024-10-25 PROCEDURE — 2500000004 HC RX 250 GENERAL PHARMACY W/ HCPCS (ALT 636 FOR OP/ED): Performed by: EMERGENCY MEDICINE

## 2024-10-25 PROCEDURE — 70450 CT HEAD/BRAIN W/O DYE: CPT | Performed by: RADIOLOGY

## 2024-10-25 PROCEDURE — 80048 BASIC METABOLIC PNL TOTAL CA: CPT | Performed by: EMERGENCY MEDICINE

## 2024-10-25 PROCEDURE — 99284 EMERGENCY DEPT VISIT MOD MDM: CPT

## 2024-10-25 PROCEDURE — 85025 COMPLETE CBC W/AUTO DIFF WBC: CPT | Performed by: EMERGENCY MEDICINE

## 2024-10-25 PROCEDURE — 96374 THER/PROPH/DIAG INJ IV PUSH: CPT

## 2024-10-25 PROCEDURE — 70450 CT HEAD/BRAIN W/O DYE: CPT

## 2024-10-25 PROCEDURE — 96375 TX/PRO/DX INJ NEW DRUG ADDON: CPT

## 2024-10-25 PROCEDURE — 82248 BILIRUBIN DIRECT: CPT | Performed by: EMERGENCY MEDICINE

## 2024-10-25 RX ORDER — DIPHENHYDRAMINE HYDROCHLORIDE 50 MG/ML
25 INJECTION INTRAMUSCULAR; INTRAVENOUS ONCE
Status: COMPLETED | OUTPATIENT
Start: 2024-10-25 | End: 2024-10-25

## 2024-10-25 RX ORDER — KETOROLAC TROMETHAMINE 30 MG/ML
15 INJECTION, SOLUTION INTRAMUSCULAR; INTRAVENOUS ONCE
Status: COMPLETED | OUTPATIENT
Start: 2024-10-25 | End: 2024-10-25

## 2024-10-25 RX ORDER — PROCHLORPERAZINE EDISYLATE 5 MG/ML
10 INJECTION INTRAMUSCULAR; INTRAVENOUS ONCE
Status: COMPLETED | OUTPATIENT
Start: 2024-10-25 | End: 2024-10-25

## 2024-10-25 ASSESSMENT — COLUMBIA-SUICIDE SEVERITY RATING SCALE - C-SSRS
1. IN THE PAST MONTH, HAVE YOU WISHED YOU WERE DEAD OR WISHED YOU COULD GO TO SLEEP AND NOT WAKE UP?: NO
6. HAVE YOU EVER DONE ANYTHING, STARTED TO DO ANYTHING, OR PREPARED TO DO ANYTHING TO END YOUR LIFE?: NO
2. HAVE YOU ACTUALLY HAD ANY THOUGHTS OF KILLING YOURSELF?: NO

## 2024-10-25 ASSESSMENT — PAIN DESCRIPTION - FREQUENCY: FREQUENCY: CONSTANT/CONTINUOUS

## 2024-10-25 ASSESSMENT — PAIN DESCRIPTION - ORIENTATION: ORIENTATION: LEFT;ANTERIOR

## 2024-10-25 ASSESSMENT — PAIN SCALES - GENERAL
PAINLEVEL_OUTOF10: 2
PAINLEVEL_OUTOF10: 9
PAINLEVEL_OUTOF10: 9
PAINLEVEL_OUTOF10: 0 - NO PAIN

## 2024-10-25 ASSESSMENT — PAIN - FUNCTIONAL ASSESSMENT: PAIN_FUNCTIONAL_ASSESSMENT: 0-10

## 2024-10-25 ASSESSMENT — PAIN DESCRIPTION - DESCRIPTORS: DESCRIPTORS: ACHING

## 2024-10-25 ASSESSMENT — PAIN DESCRIPTION - PROGRESSION: CLINICAL_PROGRESSION: GRADUALLY WORSENING

## 2024-10-25 ASSESSMENT — PAIN DESCRIPTION - LOCATION
LOCATION: HEAD
LOCATION: HEAD

## 2024-10-25 ASSESSMENT — PAIN DESCRIPTION - PAIN TYPE: TYPE: ACUTE PAIN

## 2024-10-25 ASSESSMENT — PAIN DESCRIPTION - ONSET: ONSET: GRADUAL

## 2024-10-25 NOTE — ED TRIAGE NOTES
Patient presents to ED from home for severe headaches. Patient states she has a history of migraines and is in migraine medication. However, the medication has not worked in the past 2 days and she has a history of a CVA in April. Patient has no neuro deficits at this time. VSS. No blood thinners.

## 2024-10-25 NOTE — DISCHARGE INSTRUCTIONS
Your CT scan did show old infarct most likely from previous CVA I will definitely recommend to follow-up with your primary care doctor and neurology.

## 2024-10-29 ENCOUNTER — TELEPHONE (OUTPATIENT)
Dept: NEUROLOGY | Facility: CLINIC | Age: 69
End: 2024-10-29
Payer: COMMERCIAL

## 2024-11-03 NOTE — ED PROVIDER NOTES
HPI   Chief Complaint   Patient presents with    Headache       HPI: []  69-year-old female with history of chronic migraines, remote CVA, hypertension, COPD, bipolar disorder comes in with a headache.  She states she has got a history of migraine headaches for the last few days the headache and back not getting any better.  Headache is typical for mild headache.  It is not the worst headache of her life.  It is not a thunderclap headache.  She has no associated double vision blurred vision loss of vision she has no paresthesias numbness tingling weakness of the upper extremities she has no fever chills no chest pain shortness of breath no syncope onus given the hematemesis and hematochezia hemoptysis no recent travel hospitalization or antibiotic use.  No altered mental status.    Past history: Hypertension, migraines, bipolar disorder, COPD, CVA  Social: Patient history tobacco use alcohol use denies drug abuse.  REVIEW OF SYSTEMS:    GENERAL.: No weight loss, fatigue, anorexia, insomnia, fever.  Positive for headache    EYES: No vision loss, double vision, drainage, eye pain.    ENT: No pharyngitis, dry mouth.    CARDIOPULMONARY: No chest pain, palpitations, syncope, near syncope. No shortness of breath, cough, hemoptysis.    GI: No abdominal pain, change in bowel habits, melena, hematemesis, hematochezia, nausea, vomiting, diarrhea.    : No discharge, dysuria, frequency, urgency, hematuria.    MS: No limb pain, joint pain, joint swelling.    SKIN: No rashes.    PSYCH: No depression, anxiety, suicidality, homicidality.    Review of systems is otherwise negative unless stated above or in history of present illness.  Social history, family history, allergies reviewed.  PHYSICAL EXAM:    GENERAL: Vitals noted, no distress. Alert and oriented  x 3. Non-toxic.    Eyes: Pupils equal round and reactive accommodation EOMs are intact negative nystagmus  EENT: TMs clear. Posterior oropharynx unremarkable. No meningismus.  No LAD.  Negative temporal tenderness.    NECK: Supple. Nontender. No midline tenderness.     CARDIAC: Regular, rate, rhythm. No murmurs rubs or gallops. No JVD    PULMONARY: Lungs clear bilaterally with good aeration. No wheezes rales or rhonchi. No respiratory distress.     ABDOMEN: Soft, nonsurgical. Nontender. No peritoneal signs. Normoactive bowel sounds. No pulsatile masses.     EXTREMITIES: No peripheral edema. Negative Homans bilaterally, no cords.  2+ bounding pulses well-perfused.    SKIN: No rash. Intact.     NEURO: No focal neurologic deficits, NIH score of 0. Cranial nerves normal as tested from II through XII.  Negative nystagmus.  Her finger-nose test negative, heel to knee to shin test negative, gait is normal no ataxia Romberg negative test of skew was negative.    MEDICAL DECISION MAKING:  EKG on my interpretation shows a normal sinus rhythm normal axis rate mid 60s with no acute ischemic changes.  CBC with differential chemistry LFTs are normal CT head shows old infarct nothing acute.    Treatment in the ED: IV established she was given IV Toradol, Compazine, Benadryl and methylprednisolone.    ED course: Repeat assessment feeling much better headache down to 0 remains neurology intact stroke scale remains 0 with negative meningeal signs.  Remains afebrile normotensive no tachycardia hypoxia wants to go home.    Impression: Migraine headache    Plan/MDM: 39-year female history of longstanding migraine headaches hypertension bipolar disorder COPD comes in with a migraine headache.  Low concern for stroke TIA meningitis meningoencephalitis venous sinus thrombosis or any other catastrophic pathology.  Patient will be discharged home, with the supportive care advised and outpatient follow with her primary doctor and neurology with strict return precautions.  Given a very benign examination and no red flags around that she requires MRI/MRA/MRV of the brain and/or spinal tap.              Patient  History   Past Medical History:   Diagnosis Date    Abdominal distension (gaseous) 08/13/2018    Anxiety     Carpal tunnel syndrome, right upper limb 08/13/2018    Collagenous colitis 08/13/2018    Compression fracture of cervical spine     COPD (chronic obstructive pulmonary disease) (Multi)     Depression, unspecified 08/13/2018    Dermatitis, unspecified 07/09/2020    eczematoid    Dermatochalasis of unspecified eye, unspecified eyelid 10/25/2022    Displaced comminuted fracture of left patella, initial encounter for closed fracture 08/02/2018    Displaced comminuted fracture of right patella, initial encounter for closed fracture 08/13/2018    Dizziness     GERD (gastroesophageal reflux disease)     Headache     Hypokalemia 08/13/2018    Low back pain, unspecified 08/13/2018    Noninfective gastroenteritis and colitis, unspecified 08/02/2018    Chronic colitis    Nutritional deficiency, unspecified 08/02/2018    Poor diet    Old myocardial infarction 03/31/2014    Other chest pain 08/28/2017    Atypical chest pain    Other fracture of right patella, sequela 08/13/2018    Patellar sleeve fracture, right, sequela    Other visual disturbances 04/04/2018    Blurred vision, bilateral    Pain in unspecified hip 03/31/2014    Pericard[facility] ([facility]) 2019    Pleural effusion     Polyosteoarthritis, unspecified 08/02/2018    Presence of intraocular lens 09/20/2018    Pseudophakia of right eye    Sleep apnea     Small intestinal bacterial overgrowth (SIBO)     Stroke (Multi)     Unspecified fall, sequela 08/13/2018     Past Surgical History:   Procedure Laterality Date    CATARACT EXTRACTION  07/16/2018    Cataract Surgery    CHOLECYSTECTOMY      COLONOSCOPY      CT ABDOMEN ANGIOGRAM W AND/OR WO IV CONTRAST  07/28/2022    CT ABDOMEN ANGIOGRAM W AND/OR WO IV CONTRAST 7/28/2022 [facility]    FEMUR FRACTURE SURGERY  07/16/2018    Femur Repair    HIP SURGERY  07/16/2018    Hip Surgery    HYSTERECTOMY  07/16/2018     Hysterectomy    MITRAL VALVE REPLACEMENT      OTHER SURGICAL HISTORY  07/16/2018    Oophorectomy - Bilateral (Removal Of Both Ovaries)    OTHER SURGICAL HISTORY  05/27/2020    Mitral valve replacement    OTHER SURGICAL HISTORY  05/27/2020    Radius fracture repair    SHOULDER SURGERY  07/16/2018    Shoulder Surgery    UPPER GASTROINTESTINAL ENDOSCOPY       Family History   Adopted: Yes   Problem Relation Name Age of Onset    Other (Adopted) Mother      Other (Adopted child) Father       Social History     Tobacco Use    Smoking status: Every Day     Current packs/day: 0.25     Average packs/day: 0.3 packs/day for 40.0 years (10.0 ttl pk-yrs)     Types: Cigarettes    Smokeless tobacco: Never   Substance Use Topics    Alcohol use: Not Currently     Alcohol/week: 3.0 standard drinks of alcohol     Types: 3 Standard drinks or equivalent per week    Drug use: Yes     Types: Marijuana       Physical Exam   ED Triage Vitals [10/25/24 1030]   Temperature Heart Rate Respirations BP   36.4 °C (97.5 °F) 67 18 149/76      Pulse Ox Temp Source Heart Rate Source Patient Position   100 % Temporal Monitor Sitting      BP Location FiO2 (%)     Left arm --       Physical Exam      ED Course & MDM   ED Course as of 11/03/24 1800   Fri Oct 25, 2024   1426 Laboratory studies reassuring CBC with differential chemistries LFTs are normal.  CT head shows a old infarct in the right corona radiata patient feels remarked better after treatment the ED she received IV methylprednisolone diphenhydramine Compazine and ketorolac will be discharged home with supportive care low concern for stroke or TIA.  Made aware of the abnormal CT scan finding advised follow with her primary doctor and neurologist. [MT]      ED Course User Index  [MT] Timothy Irene MD         Diagnoses as of 11/03/24 1800   Migraine without status migrainosus, not intractable, unspecified migraine type                 No data recorded     [place] Coma Scale Score: 15  (10/25/24 1132 : Esme Blue RN)       NIH Stroke Scale: 0 (10/25/24 1132 : Esme Blue RN)                   Medical Decision Making      Procedure  Procedures     Timothy Irene MD  11/03/24 4521

## 2024-11-07 ENCOUNTER — DOCUMENTATION (OUTPATIENT)
Dept: PULMONOLOGY | Facility: HOSPITAL | Age: 69
End: 2024-11-07
Payer: COMMERCIAL

## 2024-11-07 ENCOUNTER — TELEPHONE (OUTPATIENT)
Dept: PULMONOLOGY | Facility: CLINIC | Age: 69
End: 2024-11-07
Payer: COMMERCIAL

## 2024-11-07 ENCOUNTER — APPOINTMENT (OUTPATIENT)
Dept: NEUROLOGY | Facility: CLINIC | Age: 69
End: 2024-11-07
Payer: COMMERCIAL

## 2024-11-07 NOTE — TELEPHONE ENCOUNTER
Patient sts she was diagnosed with pneumonia by [facility]. She was given prednisone and amoxicillin.    Patient sts she isn't feeling any better and think she need a stronger antibiotic and some cough medicine.    Please call to advise.

## 2024-11-07 NOTE — PROGRESS NOTES
"Patient with increasing cough and congestion having been treated by a visiting nurse for \"pneumonia \".  She is not any better having received prednisone and antibiotics.  I told her she needed to go to urgent care for x-ray etc.  "

## 2024-11-15 ENCOUNTER — TELEPHONE (OUTPATIENT)
Dept: PRIMARY CARE | Facility: CLINIC | Age: 69
End: 2024-11-15
Payer: COMMERCIAL

## 2024-11-15 ENCOUNTER — APPOINTMENT (OUTPATIENT)
Dept: RADIOLOGY | Facility: HOSPITAL | Age: 69
End: 2024-11-15
Payer: COMMERCIAL

## 2024-11-15 ENCOUNTER — HOSPITAL ENCOUNTER (EMERGENCY)
Facility: HOSPITAL | Age: 69
Discharge: HOME | End: 2024-11-15
Attending: EMERGENCY MEDICINE
Payer: COMMERCIAL

## 2024-11-15 VITALS
HEIGHT: 61 IN | DIASTOLIC BLOOD PRESSURE: 73 MMHG | SYSTOLIC BLOOD PRESSURE: 132 MMHG | HEART RATE: 75 BPM | TEMPERATURE: 98.2 F | OXYGEN SATURATION: 98 % | RESPIRATION RATE: 17 BRPM | WEIGHT: 119 LBS | BODY MASS INDEX: 22.47 KG/M2

## 2024-11-15 DIAGNOSIS — M25.512 ACUTE PAIN OF LEFT SHOULDER: ICD-10-CM

## 2024-11-15 DIAGNOSIS — S42.202A CLOSED FRACTURE OF PROXIMAL END OF LEFT HUMERUS, UNSPECIFIED FRACTURE MORPHOLOGY, INITIAL ENCOUNTER: ICD-10-CM

## 2024-11-15 DIAGNOSIS — S09.90XA CLOSED HEAD INJURY, INITIAL ENCOUNTER: ICD-10-CM
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
## 2024-11-15 DIAGNOSIS — S80.211A ABRASION OF RIGHT KNEE, INITIAL ENCOUNTER: ICD-10-CM

## 2024-11-15 PROCEDURE — 2500000001 HC RX 250 WO HCPCS SELF ADMINISTERED DRUGS (ALT 637 FOR MEDICARE OP): Performed by: EMERGENCY MEDICINE

## 2024-11-15 PROCEDURE — 73030 X-RAY EXAM OF SHOULDER: CPT | Mod: LEFT SIDE | Performed by: STUDENT IN AN ORGANIZED HEALTH CARE EDUCATION/TRAINING PROGRAM

## 2024-11-15 PROCEDURE — 70450 CT HEAD/BRAIN W/O DYE: CPT

## 2024-11-15 PROCEDURE — 99285 EMERGENCY DEPT VISIT HI MDM: CPT | Mod: 25

## 2024-11-15 PROCEDURE — 73030 X-RAY EXAM OF SHOULDER: CPT | Mod: LT

## 2024-11-15 PROCEDURE — 72125 CT NECK SPINE W/O DYE: CPT

## 2024-11-15 PROCEDURE — 72125 CT NECK SPINE W/O DYE: CPT | Performed by: STUDENT IN AN ORGANIZED HEALTH CARE EDUCATION/TRAINING PROGRAM

## 2024-11-15 PROCEDURE — 2500000001 HC RX 250 WO HCPCS SELF ADMINISTERED DRUGS (ALT 637 FOR MEDICARE OP): Performed by: SURGERY

## 2024-11-15 PROCEDURE — 70450 CT HEAD/BRAIN W/O DYE: CPT | Performed by: STUDENT IN AN ORGANIZED HEALTH CARE EDUCATION/TRAINING PROGRAM

## 2024-11-15 RX ORDER — OXYCODONE AND ACETAMINOPHEN 5; 325 MG/1; MG/1
1 TABLET ORAL ONCE
Status: COMPLETED | OUTPATIENT
Start: 2024-11-15 | End: 2024-11-15

## 2024-11-15 RX ORDER — OXYCODONE AND ACETAMINOPHEN 10; 325 MG/1; MG/1
1 TABLET ORAL EVERY 6 HOURS PRN
Qty: 5 TABLET | Refills: 0 | Status: SHIPPED | OUTPATIENT
Start: 2024-11-15 | End: 2024-11-15 | Stop reason: WASHOUT

## 2024-11-15 RX ORDER — NALOXONE HYDROCHLORIDE 4 MG/.1ML
4 SPRAY NASAL AS NEEDED
Status: ON HOLD
Start: 2024-11-15

## 2024-11-15 RX ORDER — OXYCODONE HYDROCHLORIDE 5 MG/1
10 TABLET ORAL ONCE
Status: COMPLETED | OUTPATIENT
Start: 2024-11-15 | End: 2024-11-15

## 2024-11-15 RX ORDER — OXYCODONE AND ACETAMINOPHEN 10; 325 MG/1; MG/1
1 TABLET ORAL EVERY 6 HOURS PRN
Qty: 5 TABLET | Refills: 0 | Status: SHIPPED | OUTPATIENT
Start: 2024-11-15 | End: 2024-11-18 | Stop reason: SDUPTHER

## 2024-11-15 SDOH — SOCIAL STABILITY: SOCIAL NETWORK: VISITOR BEHAVIORS: APPROPRIATE FOR SITUATION

## 2024-11-15 SDOH — HEALTH STABILITY: MENTAL HEALTH: BEHAVIORS/MOOD: COOPERATIVE;SAD;ANXIOUS

## 2024-11-15 SDOH — SOCIAL STABILITY: SOCIAL INSECURITY: FAMILY BEHAVIORS: APPROPRIATE FOR SITUATION

## 2024-11-15 SDOH — HEALTH STABILITY: MENTAL HEALTH: BEHAVIORAL HEALTH(WDL): EXCEPTIONS TO WDL

## 2024-11-15 ASSESSMENT — PAIN SCALES - GENERAL
PAINLEVEL_OUTOF10: 8

## 2024-11-15 ASSESSMENT — PAIN DESCRIPTION - PROGRESSION: CLINICAL_PROGRESSION: NOT CHANGED

## 2024-11-15 ASSESSMENT — PAIN DESCRIPTION - LOCATION
LOCATION: SHOULDER
LOCATION: SHOULDER
LOCATION: ARM
LOCATION: SHOULDER

## 2024-11-15 ASSESSMENT — PAIN DESCRIPTION - ORIENTATION
ORIENTATION: LEFT

## 2024-11-15 ASSESSMENT — PAIN - FUNCTIONAL ASSESSMENT
PAIN_FUNCTIONAL_ASSESSMENT: 0-10
PAIN_FUNCTIONAL_ASSESSMENT: 0-10

## 2024-11-15 ASSESSMENT — PAIN DESCRIPTION - FREQUENCY: FREQUENCY: CONSTANT/CONTINUOUS

## 2024-11-15 ASSESSMENT — PAIN DESCRIPTION - PAIN TYPE: TYPE: ACUTE PAIN

## 2024-11-15 ASSESSMENT — PAIN DESCRIPTION - DESCRIPTORS: DESCRIPTORS: ACHING;RADIATING;SHOOTING

## 2024-11-15 NOTE — ED TRIAGE NOTES
Patient last night had a fall at home, patient injured her left shoulder and scraped her face in between her nose.

## 2024-11-15 NOTE — DISCHARGE INSTRUCTIONS
Follow-up with orthopedic surgery for your left shoulder fracture.  Use your sling to reduce movement in the shoulder.       Take 1 Percocet every 4 hours as needed for pain and transition to Tylenol when you are able to when your pain is better controlled.       Increase your fiber intake while you are taking Percocet to prevent constipation.  You can take over-the-counter Colace as well as a stool softener

## 2024-11-15 NOTE — ED PROVIDER NOTES
Emergency Medicine Attending Attestation:     ED Course as of 11/15/24 0748   Fri Nov 15, 2024   0602 Signed out by Jorge.  CT head/cspine pending.  L shoulder fx.   [JG]      ED Course User Index  [JG] Valencia Mcleod MD         Diagnoses as of 11/15/24 0748   Fall, initial encounter   Closed head injury, initial encounter   Abrasion of right knee, initial encounter   Acute pain of left shoulder   Closed fracture of proximal end of left humerus, unspecified fracture morphology, initial encounter       This patient was seen by the advanced practice provider.  I have personally performed a substantive portion of the encounter.  I have seen and examined the patient; agree with the workup, evaluation, MDM, management and diagnosis.  The care plan has been discussed.      I personally saw the patient and made/approved the management plan and take responsibility for the patient management.    History: fall onto left shoulder.    Exam: ttp left shoulder, in sling now.    MDM: ambulatory in ED.  CT head and cspine no traumatic injuries.    Ok to dc with pain meds, sling, fu with ortho.             I independently interpreted patient's EKG and agree with the above mentioned interpretation.      MD Valencia Holt MD  11/15/24 0748

## 2024-11-15 NOTE — ED PROVIDER NOTES
Chief Complaint   Patient presents with    Fall    Shoulder Injury     HPI:   Judith Bush is an 69 y.o. female presenting to the ED after she experienced a mechanical fall.  She explains she got tangled up with her dog when she tripped and fell.  She fell directly onto her left shoulder and has significant pain with any range of motion of the left arm.  Reports that her pain is anterior over the shoulder.  No numbness or tingling.  She did sustain injury to the left side of her face.  She does not take blood thinners.  She endorses drinking alcohol last night.  She also scraped her right knee.     Medications:  Soc HX:  Allergies   Allergen Reactions    Cephalosporins Anaphylaxis    Penicillin Anaphylaxis    Penicillins Anaphylaxis and Unknown    Neomycin-Polymyxin-Gramicidin Rash   :  Past Medical History:   Diagnosis Date    Abdominal distension (gaseous) 08/13/2018    Anxiety     Carpal tunnel syndrome, right upper limb 08/13/2018    Collagenous colitis 08/13/2018    Compression fracture of cervical spine     COPD (chronic obstructive pulmonary disease) (Multi)     Depression, unspecified 08/13/2018    Dermatitis, unspecified 07/09/2020    eczematoid    Dermatochalasis of unspecified eye, unspecified eyelid 10/25/2022    Displaced comminuted fracture of left patella, initial encounter for closed fracture 08/02/2018    Displaced comminuted fracture of right patella, initial encounter for closed fracture 08/13/2018    Dizziness     GERD (gastroesophageal reflux disease)     Headache     Hypokalemia 08/13/2018    Low back pain, unspecified 08/13/2018    Noninfective gastroenteritis and colitis, unspecified 08/02/2018    Chronic colitis    Nutritional deficiency, unspecified 08/02/2018    Poor diet    Old myocardial infarction 03/31/2014    Other chest pain 08/28/2017    Atypical chest pain    Other fracture of right patella, sequela 08/13/2018    Patellar sleeve fracture, right, sequela    Other visual  disturbances 04/04/2018    Blurred vision, bilateral    Pain in unspecified hip 03/31/2014    [facility]ard[facility]) 2019    Pleural effusion     Polyosteoarthritis, unspecified 08/02/2018    Presence of intraocular lens 09/20/2018    Pseudophakia of right eye    Sleep apnea     Small intestinal bacterial overgrowth (SIBO)     Stroke (Multi)     Unspecified fall, sequela 08/13/2018     Past Surgical History:   Procedure Laterality Date    CATARACT EXTRACTION  07/16/2018    Cataract Surgery    CHOLECYSTECTOMY      COLONOSCOPY      CT ABDOMEN ANGIOGRAM W AND/OR WO IV CONTRAST  07/28/2022    CT ABDOMEN ANGIOGRAM W AND/OR WO IV CONTRAST 7/28/2022 [facility]    FEMUR FRACTURE SURGERY  07/16/2018    Femur Repair    HIP SURGERY  07/16/2018    Hip Surgery    HYSTERECTOMY  07/16/2018    Hysterectomy    MITRAL VALVE REPLACEMENT      OTHER SURGICAL HISTORY  07/16/2018    Oophorectomy - Bilateral (Removal Of Both Ovaries)    OTHER SURGICAL HISTORY  05/27/2020    Mitral valve replacement    OTHER SURGICAL HISTORY  05/27/2020    Radius fracture repair    SHOULDER SURGERY  07/16/2018    Shoulder Surgery    UPPER GASTROINTESTINAL ENDOSCOPY       Family History   Adopted: Yes   Problem Relation Name Age of Onset    Other (Adopted) Mother      Other (Adopted child) Father          Physical Exam  Vitals and nursing note reviewed.   Constitutional:       General: She is not in acute distress.     Appearance: She is well-developed.   HENT:      Head: Normocephalic and atraumatic.      Right Ear: Tympanic membrane normal.      Left Ear: Tympanic membrane normal.      Nose: Nose normal.   Eyes:      Extraocular Movements: Extraocular movements intact.      Conjunctiva/sclera: Conjunctivae normal.      Pupils: Pupils are equal, round, and reactive to light.   Cardiovascular:      Rate and Rhythm: Normal rate and regular rhythm.      Heart sounds: No murmur heard.  Pulmonary:      Effort: Pulmonary effort is normal. No respiratory  distress.      Breath sounds: Normal breath sounds.   Abdominal:      Palpations: Abdomen is soft.      Tenderness: There is no abdominal tenderness.   Musculoskeletal:         General: No swelling.      Cervical back: Neck supple.      Comments: The left shoulder has significant pain with range of motion.  Mild swelling anteriorly over the shoulder joint.   Skin:     General: Skin is warm and dry.      Capillary Refill: Capillary refill takes less than 2 seconds.      Comments: Bruising just superior to her left eye above her eyebrow.  Small abrasion over the bridge of her nose without swelling.   Neurological:      Mental Status: She is alert.   Psychiatric:         Mood and Affect: Mood normal.        VS: As documented in the triage note and EMR flowsheet from this visit were reviewed.    Medical Decision Making: This is a 69-year-old female that experienced a mechanical fall while being intoxicated earlier this morning.  She explains that she [place]gu[place] with her dog's leash and fell down onto her left shoulder.  She reports severe pain with movement in the left shoulder on exam the shoulder is swollen anteriorly.  She has pain with any motion of the shoulder.  Highly suspicious for fracture versus dislocation.  Her vitals are stable and the arm is otherwise neurovascular intact with good cap refill.  She did sustain a contusion just above her left eye that shows some bruising in this area.  She has no Strickland sign or raccoon eyes no hemotympanums.  Low suspicion for intracranial pathology.  She does not take any blood thinners however she does appear intoxicated so CT head and neck were ordered.  Patient was signed out to incoming Dr. Mcleod pending her x-ray and CT results.  ED Course as of 11/15/24 2000   Fri Nov 15, 2024   0602 Signed out by Jorge.  CT head/cspine pending.  L shoulder fx.   [JG]      ED Course User Index  [JG] Valencia Mcleod MD         Diagnoses as of 11/15/24 2000   Fall, initial  encounter   Closed head injury, initial encounter   Abrasion of right knee, initial encounter   Acute pain of left shoulder   Closed fracture of proximal end of left humerus, unspecified fracture morphology, initial encounter     Counseling: Spoke with the patient and discussed today´s findings, in addition to providing specific details for the plan of care and expected course.  Patient was given the opportunity to ask questions.    The plan of care was mutually agreed upon with the patient. The patient and/or family were given the opportunity to ask questions. All questions asked today in the ED were answered to the best of my ability with today's information.    This patient was cared for in the setting of nationwide stress on resources and staffing.    This report was transcribed using voice recognition software.  Every effort was made to ensure accuracy, however, inadvertently computerized transcription errors may be present.       Jorge Balderas PA-C  11/15/24 2003

## 2024-11-16 ENCOUNTER — HOSPITAL ENCOUNTER (EMERGENCY)
Facility: HOSPITAL | Age: 69
Discharge: HOME | End: 2024-11-16
Attending: EMERGENCY MEDICINE
Payer: COMMERCIAL

## 2024-11-16 VITALS
SYSTOLIC BLOOD PRESSURE: 118 MMHG | WEIGHT: 119 LBS | DIASTOLIC BLOOD PRESSURE: 76 MMHG | BODY MASS INDEX: 22.47 KG/M2 | HEART RATE: 78 BPM | HEIGHT: 61 IN | OXYGEN SATURATION: 96 % | RESPIRATION RATE: 16 BRPM | TEMPERATURE: 97.9 F

## 2024-11-16 DIAGNOSIS — Z87.81 HISTORY OF LEFT SHOULDER FRACTURE: ICD-10-CM

## 2024-11-16 DIAGNOSIS — R11.0 NAUSEA: Primary | ICD-10-CM

## 2024-11-16 PROCEDURE — 96372 THER/PROPH/DIAG INJ SC/IM: CPT | Performed by: EMERGENCY MEDICINE

## 2024-11-16 PROCEDURE — 99283 EMERGENCY DEPT VISIT LOW MDM: CPT

## 2024-11-16 PROCEDURE — 2500000005 HC RX 250 GENERAL PHARMACY W/O HCPCS: Performed by: PHYSICIAN ASSISTANT

## 2024-11-16 PROCEDURE — 2500000004 HC RX 250 GENERAL PHARMACY W/ HCPCS (ALT 636 FOR OP/ED): Performed by: PHYSICIAN ASSISTANT

## 2024-11-16 PROCEDURE — 96372 THER/PROPH/DIAG INJ SC/IM: CPT | Performed by: PHYSICIAN ASSISTANT

## 2024-11-16 PROCEDURE — 2500000001 HC RX 250 WO HCPCS SELF ADMINISTERED DRUGS (ALT 637 FOR MEDICARE OP): Performed by: PHYSICIAN ASSISTANT

## 2024-11-16 PROCEDURE — 2500000004 HC RX 250 GENERAL PHARMACY W/ HCPCS (ALT 636 FOR OP/ED): Performed by: EMERGENCY MEDICINE

## 2024-11-16 RX ORDER — ONDANSETRON 4 MG/1
4 TABLET, ORALLY DISINTEGRATING ORAL ONCE
Status: COMPLETED | OUTPATIENT
Start: 2024-11-16 | End: 2024-11-16

## 2024-11-16 RX ORDER — IBUPROFEN 600 MG/1
600 TABLET ORAL ONCE
Status: COMPLETED | OUTPATIENT
Start: 2024-11-16 | End: 2024-11-16

## 2024-11-16 RX ORDER — CYCLOBENZAPRINE HCL 5 MG
5 TABLET ORAL ONCE
Status: COMPLETED | OUTPATIENT
Start: 2024-11-16 | End: 2024-11-16

## 2024-11-16 RX ORDER — HYDROMORPHONE HYDROCHLORIDE 1 MG/ML
1 INJECTION, SOLUTION INTRAMUSCULAR; INTRAVENOUS; SUBCUTANEOUS ONCE
Status: COMPLETED | OUTPATIENT
Start: 2024-11-16 | End: 2024-11-16

## 2024-11-16 RX ORDER — NAPROXEN 500 MG/1
500 TABLET ORAL
Qty: 30 TABLET | Refills: 0 | Status: ON HOLD | OUTPATIENT
Start: 2024-11-16 | End: 2024-12-01

## 2024-11-16 RX ORDER — CYCLOBENZAPRINE HCL 5 MG
5 TABLET ORAL 3 TIMES DAILY
Qty: 30 TABLET | Refills: 0 | Status: ON HOLD | OUTPATIENT
Start: 2024-11-16 | End: 2024-11-26

## 2024-11-16 RX ORDER — OXYCODONE HYDROCHLORIDE 5 MG/1
5 TABLET ORAL ONCE
Status: COMPLETED | OUTPATIENT
Start: 2024-11-16 | End: 2024-11-16

## 2024-11-16 RX ORDER — OXYCODONE AND ACETAMINOPHEN 10; 325 MG/1; MG/1
1 TABLET ORAL EVERY 6 HOURS PRN
Qty: 5 TABLET | Refills: 0 | Status: SHIPPED | OUTPATIENT
Start: 2024-11-16 | End: 2024-11-16

## 2024-11-16 RX ORDER — LIDOCAINE 560 MG/1
1 PATCH PERCUTANEOUS; TOPICAL; TRANSDERMAL DAILY
Qty: 6 PATCH | Refills: 0 | Status: ON HOLD | OUTPATIENT
Start: 2024-11-16

## 2024-11-16 RX ORDER — FENTANYL CITRATE 50 UG/ML
50 INJECTION, SOLUTION INTRAMUSCULAR; INTRAVENOUS ONCE
Status: COMPLETED | OUTPATIENT
Start: 2024-11-16 | End: 2024-11-16

## 2024-11-16 RX ORDER — ONDANSETRON 4 MG/1
4 TABLET, ORALLY DISINTEGRATING ORAL EVERY 8 HOURS PRN
Qty: 20 TABLET | Refills: 0 | Status: ON HOLD | OUTPATIENT
Start: 2024-11-16

## 2024-11-16 RX ORDER — OXYCODONE AND ACETAMINOPHEN 10; 325 MG/1; MG/1
1 TABLET ORAL EVERY 6 HOURS PRN
Qty: 5 TABLET | Refills: 0 | Status: ON HOLD | OUTPATIENT
Start: 2024-11-16 | End: 2024-11-17

## 2024-11-16 RX ADMIN — HYDROMORPHONE HYDROCHLORIDE 1 MG: 1 INJECTION, SOLUTION INTRAMUSCULAR; INTRAVENOUS; SUBCUTANEOUS at 09:45

## 2024-11-16 RX ADMIN — IBUPROFEN 600 MG: 600 TABLET, FILM COATED ORAL at 08:57

## 2024-11-16 RX ADMIN — CYCLOBENZAPRINE HYDROCHLORIDE 5 MG: 5 TABLET, FILM COATED ORAL at 08:57

## 2024-11-16 RX ADMIN — FENTANYL CITRATE 50 MCG: 0.05 INJECTION, SOLUTION INTRAMUSCULAR; INTRAVENOUS at 12:13

## 2024-11-16 RX ADMIN — ONDANSETRON 4 MG: 4 TABLET, ORALLY DISINTEGRATING ORAL at 08:25

## 2024-11-16 RX ADMIN — OXYCODONE HYDROCHLORIDE 5 MG: 5 TABLET ORAL at 08:57

## 2024-11-16 RX ADMIN — HYDROMORPHONE HYDROCHLORIDE 1 MG: 1 INJECTION, SOLUTION INTRAMUSCULAR; INTRAVENOUS; SUBCUTANEOUS at 08:49

## 2024-11-16 ASSESSMENT — PAIN DESCRIPTION - DESCRIPTORS
DESCRIPTORS: ACHING
DESCRIPTORS: ACHING;STABBING

## 2024-11-16 ASSESSMENT — PAIN DESCRIPTION - ORIENTATION
ORIENTATION: LEFT

## 2024-11-16 ASSESSMENT — PAIN SCALES - GENERAL
PAINLEVEL_OUTOF10: 6
PAINLEVEL_OUTOF10: 10 - WORST POSSIBLE PAIN
PAINLEVEL_OUTOF10: 10 - WORST POSSIBLE PAIN
PAINLEVEL_OUTOF10: 8
PAINLEVEL_OUTOF10: 10 - WORST POSSIBLE PAIN

## 2024-11-16 ASSESSMENT — PAIN - FUNCTIONAL ASSESSMENT
PAIN_FUNCTIONAL_ASSESSMENT: 0-10

## 2024-11-16 ASSESSMENT — PAIN DESCRIPTION - LOCATION
LOCATION: SHOULDER

## 2024-11-16 ASSESSMENT — PAIN SCALES - PAIN ASSESSMENT IN ADVANCED DEMENTIA (PAINAD): TOTALSCORE: MEDICATION (SEE MAR)

## 2024-11-16 ASSESSMENT — PAIN DESCRIPTION - PAIN TYPE
TYPE: ACUTE PAIN
TYPE: ACUTE PAIN

## 2024-11-16 ASSESSMENT — PAIN DESCRIPTION - FREQUENCY
FREQUENCY: CONSTANT/CONTINUOUS
FREQUENCY: CONSTANT/CONTINUOUS

## 2024-11-16 NOTE — ED PROVIDER NOTES
HPI   Chief Complaint   Patient presents with    Shoulder Pain    Vomiting       Is a 69-year-old female who complains of severe pain in her left shoulder she was recently seen for a fall that happened mechanically she hit her head also had an CT imaging of her head and her neck which were unremarkable.  She was placed in a sling and has follow-up with orthopedics however she will not be seen by them until Monday.  She was taking Percocet 10 mg tablets at home without complete relief and due to her severe pain she came back to the department.  She recently got over pneumonia and sees pulmonology for COPD she states when she coughs she has a lot of pain as well.  She used all of her Percocet and is requesting more medication.  She also has nausea but attributes that to her pain she has not vomited.  She is frustrated because she just started a new job and this fracture is impacting her from working.              Patient History   Past Medical History:   Diagnosis Date    Abdominal distension (gaseous) 08/13/2018    Anxiety     Carpal tunnel syndrome, right upper limb 08/13/2018    Collagenous colitis 08/13/2018    Compression fracture of cervical spine     COPD (chronic obstructive pulmonary disease) (Multi)     Depression, unspecified 08/13/2018    Dermatitis, unspecified 07/09/2020    eczematoid    Dermatochalasis of unspecified eye, unspecified eyelid 10/25/2022    Displaced comminuted fracture of left patella, initial encounter for closed fracture 08/02/2018    Displaced comminuted fracture of right patella, initial encounter for closed fracture 08/13/2018    Dizziness     GERD (gastroesophageal reflux disease)     Headache     Hypokalemia 08/13/2018    Low back pain, unspecified 08/13/2018    Noninfective gastroenteritis and colitis, unspecified 08/02/2018    Chronic colitis    Nutritional deficiency, unspecified 08/02/2018    Poor diet    Old myocardial infarction 03/31/2014    Other chest pain 08/28/2017     Atypical chest pain    Other fracture of right patella, sequela 08/13/2018    Patellar sleeve fracture, right, sequela    Other visual disturbances 04/04/2018    Blurred vision, bilateral    Pain in unspecified hip 03/31/2014    Pericarditis ([facility]) 2019    Pleural effusion     Polyosteoarthritis, unspecified 08/02/2018    Presence of intraocular lens 09/20/2018    Pseudophakia of right eye    Sleep apnea     Small intestinal bacterial overgrowth (SIBO)     Stroke (Multi)     Unspecified fall, sequela 08/13/2018     Past Surgical History:   Procedure Laterality Date    CATARACT EXTRACTION  07/16/2018    Cataract Surgery    CHOLECYSTECTOMY      COLONOSCOPY      CT ABDOMEN ANGIOGRAM W AND/OR WO IV CONTRAST  07/28/2022    CT ABDOMEN ANGIOGRAM W AND/OR WO IV CONTRAST 7/28/2022 [facility]    FEMUR FRACTURE SURGERY  07/16/2018    Femur Repair    HIP SURGERY  07/16/2018    Hip Surgery    HYSTERECTOMY  07/16/2018    Hysterectomy    MITRAL VALVE REPLACEMENT      OTHER SURGICAL HISTORY  07/16/2018    Oophorectomy - Bilateral (Removal Of Both Ovaries)    OTHER SURGICAL HISTORY  05/27/2020    Mitral valve replacement    OTHER SURGICAL HISTORY  05/27/2020    Radius fracture repair    SHOULDER SURGERY  07/16/2018    Shoulder Surgery    UPPER GASTROINTESTINAL ENDOSCOPY       Family History   Adopted: Yes   Problem Relation Name Age of Onset    Other (Adopted) Mother      Other (Adopted child) Father       Social History     Tobacco Use    Smoking status: Every Day     Current packs/day: 0.25     Average packs/day: 0.3 packs/day for 40.0 years (10.0 ttl pk-yrs)     Types: Cigarettes    Smokeless tobacco: Never   Substance Use Topics    Alcohol use: Not Currently     Alcohol/week: 3.0 standard drinks of alcohol     Types: 3 Standard drinks or equivalent per week    Drug use: Yes     Types: Marijuana       Physical Exam   ED Triage Vitals   Temperature Heart Rate Respirations BP   11/16/24 0732 11/16/24 0732 11/16/24 0732  11/16/24 0732   35.9 °C (96.6 °F) 73 20 (!) 125/47      Pulse Ox Temp Source Heart Rate Source Patient Position   11/16/24 0732 11/16/24 0732 11/16/24 0854 11/16/24 0854   97 % Temporal Monitor Sitting      BP Location FiO2 (%)     11/16/24 0854 --     Left leg        Physical Exam  Vitals and nursing note reviewed.   Constitutional:       General: She is not in acute distress.     Appearance: Normal appearance. She is normal weight. She is not ill-appearing, toxic-appearing or diaphoretic.   HENT:      Head: Normocephalic and atraumatic.        Right Ear: External ear normal.      Left Ear: External ear normal.      Nose: Nose normal.      Mouth/Throat:      Mouth: Mucous membranes are moist.   Eyes:      Extraocular Movements: Extraocular movements intact.      Conjunctiva/sclera: Conjunctivae normal.      Pupils: Pupils are equal, round, and reactive to light.   Cardiovascular:      Rate and Rhythm: Normal rate and regular rhythm.      Heart sounds: No murmur heard.  Pulmonary:      Effort: Pulmonary effort is normal. No respiratory distress.      Breath sounds: No stridor. No wheezing, rhonchi or rales.   Chest:      Chest wall: No tenderness.   Abdominal:      General: There is no distension.      Tenderness: There is no guarding or rebound.   Musculoskeletal:         General: Tenderness present. No swelling, deformity or signs of injury.      Left shoulder: Tenderness and bony tenderness present. No swelling. Decreased range of motion.      Cervical back: Normal range of motion.   Skin:     General: Skin is warm.      Capillary Refill: Capillary refill takes less than 2 seconds.      Coloration: Skin is not jaundiced.      Findings: No bruising or rash.   Neurological:      General: No focal deficit present.      Mental Status: She is alert and oriented to person, place, and time. Mental status is at baseline.      Cranial Nerves: No cranial nerve deficit.      Sensory: No sensory deficit.      Motor: No  weakness.      Coordination: Coordination normal.   Psychiatric:         Mood and Affect: Mood normal.         Behavior: Behavior normal.         Thought Content: Thought content normal.         Judgment: Judgment normal.           ED Course & MDM   Diagnoses as of 11/16/24 1540   Nausea   History of left shoulder fracture                 No data recorded     Lena Coma Scale Score: 15 (11/16/24 0820 : Viv Rendon RN)                           Medical Decision Making  Differential diagnosis of nausea, vomiting, intractable pain, new fracture however no new injury.  Pneumonia however was just treated and is resolving    Seen with ED attending patient was given analgesia and never achieved complete resolution of pain but got to the point where she is comfortable being discharged.  Will be prescribed more pain medication.    Amount and/or Complexity of Data Reviewed  External Data Reviewed: radiology and notes.     Details: Reviewed recent x-ray that shows a proximal humeral fracture.    [facility] report was reviewed        Procedure  Procedures     Abraham Mercedes PA-C  11/16/24 1114

## 2024-11-16 NOTE — ED TRIAGE NOTES
Pt states she broke her shoulder on Thursday and is currently in a immobilizer. Pt having worsening pain and pain medications are not relieving it. Pt states vomiting that started last night.

## 2024-11-18 ENCOUNTER — HOSPITAL ENCOUNTER (OUTPATIENT)
Dept: RADIOLOGY | Facility: CLINIC | Age: 69
Discharge: HOME | End: 2024-11-18
Payer: COMMERCIAL

## 2024-11-18 ENCOUNTER — DOCUMENTATION (OUTPATIENT)
Dept: NEUROLOGY | Facility: CLINIC | Age: 69
End: 2024-11-18

## 2024-11-18 ENCOUNTER — OFFICE VISIT (OUTPATIENT)
Dept: ORTHOPEDIC SURGERY | Facility: CLINIC | Age: 69
End: 2024-11-18
Payer: COMMERCIAL

## 2024-11-18 ENCOUNTER — APPOINTMENT (OUTPATIENT)
Dept: ORTHOPEDIC SURGERY | Facility: HOSPITAL | Age: 69
End: 2024-11-18
Payer: COMMERCIAL

## 2024-11-18 ENCOUNTER — HOSPITAL ENCOUNTER (OUTPATIENT)
Facility: HOSPITAL | Age: 69
Setting detail: OUTPATIENT SURGERY
End: 2024-11-18
Attending: ORTHOPAEDIC SURGERY | Admitting: ORTHOPAEDIC SURGERY
Payer: COMMERCIAL

## 2024-11-18 DIAGNOSIS — S42.292A CLOSED 3-PART FRACTURE OF PROXIMAL HUMERUS, LEFT, INITIAL ENCOUNTER: ICD-10-CM

## 2024-11-18 DIAGNOSIS — S42.202A CLOSED FRACTURE OF PROXIMAL END OF LEFT HUMERUS, UNSPECIFIED FRACTURE MORPHOLOGY, INITIAL ENCOUNTER: ICD-10-CM

## 2024-11-18 DIAGNOSIS — Z01.818 PRE-OP TESTING: ICD-10-CM

## 2024-11-18 DIAGNOSIS — S42.292A CLOSED 3-PART FRACTURE OF PROXIMAL HUMERUS, LEFT, INITIAL ENCOUNTER: Primary | ICD-10-CM

## 2024-11-18 PROCEDURE — 99214 OFFICE O/P EST MOD 30 MIN: CPT | Performed by: ORTHOPAEDIC SURGERY

## 2024-11-18 PROCEDURE — 1123F ACP DISCUSS/DSCN MKR DOCD: CPT | Performed by: ORTHOPAEDIC SURGERY

## 2024-11-18 PROCEDURE — 1159F MED LIST DOCD IN RCRD: CPT | Performed by: ORTHOPAEDIC SURGERY

## 2024-11-18 PROCEDURE — 4004F PT TOBACCO SCREEN RCVD TLK: CPT | Performed by: ORTHOPAEDIC SURGERY

## 2024-11-18 RX ORDER — OXYCODONE AND ACETAMINOPHEN 10; 325 MG/1; MG/1
1 TABLET ORAL EVERY 4 HOURS PRN
Qty: 30 TABLET | Refills: 0 | Status: ON HOLD | OUTPATIENT
Start: 2024-11-18 | End: 2024-11-23

## 2024-11-18 ASSESSMENT — PAIN DESCRIPTION - DESCRIPTORS: DESCRIPTORS: SHARP;SHOOTING;ACHING

## 2024-11-18 ASSESSMENT — PAIN SCALES - GENERAL: PAINLEVEL_OUTOF10: 10 - WORST POSSIBLE PAIN

## 2024-11-18 ASSESSMENT — PAIN - FUNCTIONAL ASSESSMENT: PAIN_FUNCTIONAL_ASSESSMENT: 0-10

## 2024-11-18 NOTE — PROGRESS NOTES
History of Present Illness   Patient presents today for evaluation of side: left upper extremity pain after fall, on to the left shoulder after tripping over a hose when walking her dog . The patient sustained an acute injury on  11/14 .  The patient denies any loss of consciousness or additional significant injuries. The pain is constant, sharp, and acute in nature. States it is a 10/10 pain and is not well controlled with pain medication. Sx better with rest, worse with motion.  The patient denies any current numbness, tinging, f/c, CP, SOB, or any other complaints/concerns.      Past Medical History:   Diagnosis Date    Abdominal distension (gaseous) 08/13/2018    Anxiety     Carpal tunnel syndrome, right upper limb 08/13/2018    Collagenous colitis 08/13/2018    Compression fracture of cervical spine     COPD (chronic obstructive pulmonary disease) (Multi)     Depression, unspecified 08/13/2018    Dermatitis, unspecified 07/09/2020    eczematoid    Dermatochalasis of unspecified eye, unspecified eyelid 10/25/2022    Displaced comminuted fracture of left patella, initial encounter for closed fracture 08/02/2018    Displaced comminuted fracture of right patella, initial encounter for closed fracture 08/13/2018    Dizziness     GERD (gastroesophageal reflux disease)     Headache     Hypokalemia 08/13/2018    Low back pain, unspecified 08/13/2018    Noninfective gastroenteritis and colitis, unspecified 08/02/2018    Chronic colitis    Nutritional deficiency, unspecified 08/02/2018    Poor diet    Old myocardial infarction 03/31/2014    Other chest pain 08/28/2017    Atypical chest pain    Other fracture of right patella, sequela 08/13/2018    Patellar sleeve fracture, right, sequela    Other visual disturbances 04/04/2018    Blurred vision, bilateral    Pain in unspecified hip 03/31/2014    Pericarditis ([facility]) 2019    Pleural effusion     Polyosteoarthritis, unspecified 08/02/2018    Presence of intraocular lens  2018    Pseudophakia of right eye    Sleep apnea     Small intestinal bacterial overgrowth (SIBO)     Stroke (Multi)     Unspecified fall, sequela 2018       Medication Documentation Review Audit       Reviewed by Mary Kate Marion on 24 at 1414      Medication Order Taking? Sig Documenting Provider Last Dose Status   albuterol 2.5 mg /3 mL (0.083 %) nebulizer solution [id number]  Take 3 mL (2.5 mg) by nebulization every 6 hours if needed for wheezing. Azam Burns MD MPH  Active   albuterol 90 mcg/actuation inhaler [id number]  USE 2 INHALATIONS BY MOUTH EVERY 4 HOURS AS NEEDED Azam Burns MD MPH  Active   aspirin 81 mg EC tablet [id number]  Take 1 tablet (81 mg) by mouth once daily. Cee Coates MD  Active   celecoxib (CeleBREX) 200 mg capsule [id number] No Take 1 capsule (200 mg) by mouth as needed at bedtime for moderate pain (4 - 6). Homero Hinds MD 2024 Active   cyclobenzaprine (Flexeril) 10 mg tablet [id number]  TAKE 1 TABLET (10 MG) BY MOUTH AS NEEDED AT BEDTIME FOR MUSCLE SPASMS. Cee Coates MD  Active   cyclobenzaprine (Flexeril) 5 mg tablet [id number]  Take 1 tablet (5 mg) by mouth 3 times a day for 10 days. Abraham Mercedes PA-C  Active   diclofenac sodium (Voltaren) 1 % gel [id number] No APPLY 1 APPLICATION TOPICALLY 4 TIMES A DAY AS NEEDED (PAIN). Kyler Marks MD 2024 Active   dilTIAZem CD (Cardizem CD) 180 mg 24 hr capsule [id number] No Take 1 capsule (180 mg) by mouth once daily. Cee Coates MD 2024 Active   evolocumab (Repatha SureClick) 140 mg/mL injection [id number] No INJECT THE CONTENTS OF 1 PEN UNDER THE SKIN EVERY 2 WEEKS Gurpreet Wood MD Taking Active   fluticasone (Flonase) 50 mcg/actuation nasal spray [id number] No Administer 1 spray into each nostril once daily. Shake gently. Before first use, prime pump. After use, clean tip and replace cap. Cee Coates MD Taking  24 6204   glycopyrrolate-formoteroL (Bevespi  Aerosphere) 9-4.8 mcg HFA aerosol inhaler [id number] No TAKE 2 PUFFS BY MOUTH TWICE A DAY Azam Burns MD MPH 9/12/2024 Active   hydrocortisone acetate 1 % ointment [id number] No Apply 1 Application topically 2 times a day as needed (for hemorrhoids). Cee Coates MD 9/12/2024 Active   lidocaine 4 % patch [id number]  Place 1 patch over 12 hours on the skin once daily. Remove & discard patch within 12 hours or as directed by MD. Abraham Mercedes PA-C  Active   multivitamin with folic acid (One Daily Multivitamin) 400 mcg tablet [id number] No Take 1 tablet by mouth once daily. Homero Hinds MD 9/12/2024 Active   naloxone (Narcan) 4 mg/0.1 mL nasal spray [id number]  Administer 1 spray (4 mg) into affected nostril(s) if needed for opioid reversal for up to 2 doses. Give 1 spray as a single dose in one nostril. May repeat every 2-3 minutes in alternating nostrils until medical assistance becomes available. Valencia Mcleod MD  Active   naproxen (Naprosyn) 500 mg tablet [id number]  Take 1 tablet (500 mg) by mouth 2 times daily (morning and late afternoon) for 15 days. Abraham Mercedes PA-C  Active   onabotulinumtoxinA (Botox) 200 unit injection [id number]  Inject 155 Units into the muscle every 3 months. Deacon Vera MD  Active   ondansetron ODT (Zofran-ODT) 4 mg disintegrating tablet [id number]  Take 1 tablet (4 mg) by mouth every 8 hours if needed for nausea or vomiting. Abraham Mercedes PA-C  Active   oxybutynin XL (Ditropan-XL) 10 mg 24 hr tablet [id number] No Take 1 tablet (10 mg) by mouth 2 times a day. Cee Coates MD 9/12/2024 Active     Discontinued 11/15/24 0746   oxyCODONE-acetaminophen (Percocet)  mg tablet [id number]  Take 1 tablet by mouth every 6 hours if needed for severe pain (7 - 10) for up to 3 days. Valencia Mcleod MD  Active     Discontinued 11/16/24 1544   oxyCODONE-acetaminophen (Percocet)  mg tablet [id number]  Take 1 tablet by mouth every 6 hours if needed for severe pain (7 -  10) for up to 1 day. Abraham Mercedes PA-C   24 235   pantoprazole (ProtoNix) 40 mg EC tablet [id number] No Take 1 tablet (40 mg) by mouth once daily in the morning. Take before meals. Do not crush, chew, or split. Carol Waller, APRN-CNP Taking  24 235   polyethylene glycol (Glycolax, Miralax) 17 gram/dose powder [id number] No Use up to 3 times daily as directed as needed Pedro Garcia MD 2024 Active   SUMAtriptan (Imitrex) 50 mg tablet [id number] No Take 1 tablet (50 mg) by mouth 1 time if needed for migraine. May repeat after 2 hours.   Patient not taking: Reported on 10/21/2024    Cee Coates MD 2024 Active   Tymlos 80 mcg (3,120 mcg/1.56 mL) pen injector [id number] No Inject 1 Dose as directed once daily. Historical MD Lizet 2024 Active   ubrogepant (Ubrelvy) 100 mg tablet tablet [id number] No Take 1 tablet (100 mg) by mouth 1 time if needed (migraine headaches). Can repeat in 2 hours if no response. Not to exceed 200mg per 24 hours. Deacon Vera MD 2024 Active                    Allergies   Allergen Reactions    Cephalosporins Anaphylaxis    Penicillin Anaphylaxis    Penicillins Anaphylaxis and Unknown    Neomycin-Polymyxin-Gramicidin Rash       Social History     Socioeconomic History    Marital status: Single     Spouse name: Not on file    Number of children: 0    Years of education: Not on file    Highest education level: Not on file   Occupational History    Not on file   Tobacco Use    Smoking status: Every Day     Current packs/day: 0.25     Average packs/day: 0.3 packs/day for 40.0 years (10.0 ttl pk-yrs)     Types: Cigarettes    Smokeless tobacco: Never   Substance and Sexual Activity    Alcohol use: Not Currently     Alcohol/week: 3.0 standard drinks of alcohol     Types: 3 Standard drinks or equivalent per week    Drug use: Yes     Types: Marijuana    Sexual activity: Not Currently   Other Topics Concern    Not on file   Social History Narrative     Not on file     Social Drivers of Health     Financial Resource Strain: Low Risk  (4/15/2024)    Overall Financial Resource Strain (CARDIA)     Difficulty of Paying Living Expenses: Not hard at all   Food Insecurity: Not on file   Transportation Needs: No Transportation Needs (4/15/2024)    PRAPARE - Transportation     Lack of Transportation (Medical): No     Lack of Transportation (Non-Medical): No   Physical Activity: Not on file   Stress: Not on file   Social Connections: Not on file   Intimate Partner Violence: Not on file   Housing Stability: Low Risk  (4/15/2024)    Housing Stability Vital Sign     Unable to Pay for Housing in the Last Year: No     Number of Places Lived in the Last Year: 1     Unstable Housing in the Last Year: No       Past Surgical History:   Procedure Laterality Date    CATARACT EXTRACTION  07/16/2018    Cataract Surgery    CHOLECYSTECTOMY      COLONOSCOPY      CT ABDOMEN ANGIOGRAM W AND/OR WO IV CONTRAST  07/28/2022    CT ABDOMEN ANGIOGRAM W AND/OR WO IV CONTRAST 7/28/2022 [facility]    FEMUR FRACTURE SURGERY  07/16/2018    Femur Repair    HIP SURGERY  07/16/2018    Hip Surgery    HYSTERECTOMY  07/16/2018    Hysterectomy    MITRAL VALVE REPLACEMENT      OTHER SURGICAL HISTORY  07/16/2018    Oophorectomy - Bilateral (Removal Of Both Ovaries)    OTHER SURGICAL HISTORY  05/27/2020    Mitral valve replacement    OTHER SURGICAL HISTORY  05/27/2020    Radius fracture repair    SHOULDER SURGERY  07/16/2018    Shoulder Surgery    UPPER GASTROINTESTINAL ENDOSCOPY           Review of Systems   30 point ROS reviewed and negative other than as listed in the HPI.      Physical Exam:  Gen: The pt is A&Ox3, NAD, and appear state age and weight  Psychiatric: mood and affect are appropriate   Eyes: sclera are white, EOM grossly intact  ENT: MMM  Neck: supple, thyroid is midline  Respiratory: respirations are nonlabored, chest rise symmetric  CV: rate is regular by palpation of distal  pulses  Abdomen: nondistended   Integument: no obvious cutaneous lesions noted. No signs of lymphangitis. No signs of systemic edema. ,  MSK:    side: left upper extremity:  Skin healthy to gross inspection, no breakdown  Swelling / ecchymosis noted over fx site  Intact flexion and extension of 1st IP joint and finger abduction  Sensation intact to light touch medial / ulnar and radial nerve distribution   Good cap refill     Imaging  I personally reviewed multiple views of the left shoulder were obtained in the office today demonstrate displaced surgical neck fracture with significant angulation and translation of the proximal humerus head.     Assessment   Patient with an acute side: left proximal humerus  fracture      Plan:  Patient presents today for left shoulder pain after a fall onto the left shoulder when tripping on a hose when walking her dog. This occurred on 11/14. She is having a 10/10 pain that is not controlled with pain medication. On imaging, it was demonstrate displaced surgical neck fracture with significant angulation and translation of the proximal humerus head. Discussed with the patient that her proximal humerus is fracture and it is significantly displaced and angulated. The shifting of the bones is most likely what is causing her significant pain. Patient is educated that we could allow this fracture to heal on it's own and hope that it would heal in the correct position or we could go to surgery to makes sure the bone is aligned in the correct manner and reduce the pain. This would be by an ORIF of the left proximal humerus.      Nonoperative and operative treatment options were presented to the patient. After discussion, operative treatment was elected. Risks and benefits of surgery were discussed with the patient which include, but are not limited to, death, infection, bleeding, neurologic damage, nonunion, malunion, posttraumatic arthritis, incomplete resolution of symptoms, failure of  the operation, and others. The patient understood and elected to proceed. DOS planned for  11/22/2024  Patient was referred to our preoperative anesthesia clinic for risk assessment and stratification.  Patient is sent 10 mg percocet every four hours to help with pain until surgery. Patient is in agreement.     Natural history reviewed. All of the pt questions/concerns addressed and they are in agreement with the plan.

## 2024-11-18 NOTE — PATIENT INSTRUCTIONS
Judith Bush     You are currently scheduled for surgery on 11/22    Pre-op Instructions for Trauma patients:     - Bring a list of current medications to your surgery  - Please bathe/shower/shampoo the evening before or morning of surgery.  - Nothing to eat or drink after midnight the night before your surgery, morning meds with sip of water (IE blood pressure medication) Please check with anaesthesia/pre-admission testing what medications are permitted or call the office **DIABETICS PLEASE ASK ABOUT YOUR MEDS PRIOR TO THE MORNING OF SURGERY** (surgery will be cancelled if you are not fasting)   - Perform oral hygiene (brush teeth and rinse mouth) the morning of surgery.     Belongings  Patients who are being admitted to the hospital overnight or longer may bring the following items with them:  - Personal items: glasses, hearing aids, teeth, cosmetics. The hospital will provide toiletries, but you can also bring your own from home (during surgery these will have to be left with the adult that's with you or placed in a locker)   - Music: please use headphones or earpods as to not to disturb other patients  - Personal cell phone  - A change of clean clothing to go home in (remember LOOSE clothing will be most comfortable)  - Key prosthetic devices or equipment: CPAP, prosthetic limbs. Be sure to label any such equipment with your name    Please DO NOT bring the following Items:   Valuables: wallet, cash, and credit cards  Jewelry: you will not be able to keep jewelry on during surgery  Medical equipment: wheelchairs, walkers, oxygen tanks (Hospital will provide)     **Disclaimer** [facility] cannot be responsible for lost or stolen personal belongings. You must put a lock on all your personal items. Locks can be purchased in the [employer]  Reading and writing materials: books, magazines, note cards, laptop computer    Planning Ahead  If you are scheduled to go home on the same day as surgery, you  must have a responsible adult to drive you home following your procedure. We strongly suggest that a responsible adult be with you for the rest of the day and also the night following your procedure. This is for your protection and safety since you may not be as alert as usual. Children under 12 years old are not allowed to visit in the surgery center.    Parking  Park in the  Drive visitors parking garage off of [facility] in [place]; there is a walkway on Level 2 of this garage which leads into the hospital and the pre-op check-in. On the day of surgery, each patient is given one pass for free parking. If you would like a map, please call or ask for one prior to your surgery    Check-In  You need to arrive at [facility] two hours before the scheduled procedure time and check in at the Patient Access Admitting Desk located on the just off of the Visitor Garage Bridge. After checking in, you will be directed to the surgery center waiting area    **If you have not received a call by 3 p.m. the day before the procedure, please call [facility] at [phone] to get your DEFINITIVE arrival time    Recovery Room  The Recovery Room is where you will receive care immediately after surgery. You can expect to stay in the recovery room at least 2 to 3 hours. In order to give you the necessary care and privacy, visitation is limited in the recovery room. Approximately two hours after you arrive in the recovery room, the nurse will give your family an update and discuss the plan for you to have visitors. Please know this allows the nurse time to address your needs after surgery.  If you are going to be admitted after surgery, your stay in recovery may be extended while awaiting a room. Sometimes these waits can be several hours, but during this time, our staff will provide excellent care and keep your family updated about your condition.    At Home  Make sure you understand how to care for your surgical  incision/wound before leaving the hospital. Always clean your hands before touching or caring for your wound. Before leaving the hospital, make sure you know who to contact if you have questions or problems. If you have any symptoms of infection, such as redness and pain at the surgery site, drainage, or fever, call your doctor immediately. Typically, post-operative dressings come off after one week. Please make sure that this is clarified prior to leaving the hospital.    Please call the office for refills at [phone]. Your first post-operative follow-up appointment will be between 2-3 weeks after your surgery date.

## 2024-11-18 NOTE — PROGRESS NOTES
There has been delay in patient's prior authorization process for botox injection for chronic migraine.    To re-iterate, patient has hx of migraine diagnosed many years ago. Recently after stroke her migraine headache has become intractable. She has been having 30/30 HA days, daily persistent headaches since 8/2024 for about 3 months now. Headache is located bifrontal area, with neck tension, symmetric and pressure-like but also sharp and stabbing. Associated with photophobia and worsens with exertion. Typically lasts all day, pt has to find a dark room and rest because exertion will worsen the headache. Pt either take tylenol or try massage but nothing helps. She has had multiple ED visits for her migraine headache requiring migraine cocktail.     Work attendance or other daily activities are affected by the headaches.     Current Acute Headache Treatment Pulse steroid tapers    Current Preventative Headache Treatment None   Previous Acute Headache Treatment Sumatriptan, Nurtec    Previous Preventative Headache Treatment Amitriptyline   Propranolol   Pt is also on diltiazem, calcium channel blocker already  Topiramate        Based on this, pt has chronic migraines without aura. She has failed multiple traditional preventative medications. Will attempt again to get botox approved.

## 2024-11-19 ENCOUNTER — TELEPHONE (OUTPATIENT)
Dept: PREADMISSION TESTING | Facility: HOSPITAL | Age: 69
End: 2024-11-19
Payer: COMMERCIAL

## 2024-11-20 ENCOUNTER — APPOINTMENT (OUTPATIENT)
Dept: RADIOLOGY | Facility: HOSPITAL | Age: 69
DRG: 494 | End: 2024-11-20
Payer: COMMERCIAL

## 2024-11-20 ENCOUNTER — HOSPITAL ENCOUNTER (EMERGENCY)
Facility: HOSPITAL | Age: 69
Discharge: OTHER INSTITUTION | End: 2024-11-20
Attending: STUDENT IN AN ORGANIZED HEALTH CARE EDUCATION/TRAINING PROGRAM
Payer: COMMERCIAL

## 2024-11-20 ENCOUNTER — CLINICAL SUPPORT (OUTPATIENT)
Dept: EMERGENCY MEDICINE | Facility: HOSPITAL | Age: 69
DRG: 494 | End: 2024-11-20
Payer: COMMERCIAL

## 2024-11-20 ENCOUNTER — HOSPITAL ENCOUNTER (INPATIENT)
Facility: HOSPITAL | Age: 69
LOS: 6 days | Discharge: NURSING, REHAB OR LONG-TERM CARE | DRG: 494 | End: 2024-11-26
Attending: EMERGENCY MEDICINE | Admitting: ORTHOPAEDIC SURGERY
Payer: COMMERCIAL

## 2024-11-20 ENCOUNTER — CLINICAL SUPPORT (OUTPATIENT)
Dept: PREADMISSION TESTING | Facility: HOSPITAL | Age: 69
End: 2024-11-20
Payer: COMMERCIAL

## 2024-11-20 VITALS
BODY MASS INDEX: 22.66 KG/M2 | DIASTOLIC BLOOD PRESSURE: 68 MMHG | SYSTOLIC BLOOD PRESSURE: 151 MMHG | OXYGEN SATURATION: 93 % | HEIGHT: 61 IN | HEART RATE: 83 BPM | TEMPERATURE: 98.1 F | WEIGHT: 120 LBS | RESPIRATION RATE: 18 BRPM

## 2024-11-20 DIAGNOSIS — S42.422D CLOSED DISPLACED COMMINUTED SUPRACONDYLAR FRACTURE OF LEFT HUMERUS WITHOUT INTERCONDYLAR FRACTURE WITH ROUTINE HEALING, SUBSEQUENT ENCOUNTER: Primary | ICD-10-CM

## 2024-11-20 DIAGNOSIS — S42.202A CLOSED FRACTURE OF PROXIMAL END OF LEFT HUMERUS, UNSPECIFIED FRACTURE MORPHOLOGY, INITIAL ENCOUNTER: ICD-10-CM

## 2024-11-20 DIAGNOSIS — R52 PAIN: ICD-10-CM

## 2024-11-20 DIAGNOSIS — S42.292A CLOSED 3-PART FRACTURE OF PROXIMAL HUMERUS, LEFT, INITIAL ENCOUNTER: Primary | ICD-10-CM

## 2024-11-20 LAB
ABO GROUP (TYPE) IN BLOOD: NORMAL
ALBUMIN SERPL BCP-MCNC: 3.9 G/DL (ref 3.4–5)
ALP SERPL-CCNC: 187 U/L (ref 33–136)
ALT SERPL W P-5'-P-CCNC: 20 U/L (ref 7–45)
ANION GAP SERPL CALC-SCNC: 15 MMOL/L (ref 10–20)
ANTIBODY SCREEN: NORMAL
APTT PPP: 33 SECONDS (ref 27–38)
AST SERPL W P-5'-P-CCNC: 25 U/L (ref 9–39)
ATRIAL RATE: 82 BPM
BASOPHILS # BLD AUTO: 0.05 X10*3/UL (ref 0–0.1)
BASOPHILS NFR BLD AUTO: 0.5 %
BILIRUB SERPL-MCNC: 1 MG/DL (ref 0–1.2)
BUN SERPL-MCNC: 21 MG/DL (ref 6–23)
CALCIUM SERPL-MCNC: 9.5 MG/DL (ref 8.6–10.6)
CHLORIDE SERPL-SCNC: 100 MMOL/L (ref 98–107)
CO2 SERPL-SCNC: 25 MMOL/L (ref 21–32)
CREAT SERPL-MCNC: 1.07 MG/DL (ref 0.5–1.05)
EGFRCR SERPLBLD CKD-EPI 2021: 57 ML/MIN/1.73M*2
EOSINOPHIL # BLD AUTO: 0.1 X10*3/UL (ref 0–0.7)
EOSINOPHIL NFR BLD AUTO: 1 %
ERYTHROCYTE [DISTWIDTH] IN BLOOD BY AUTOMATED COUNT: 13.1 % (ref 11.5–14.5)
GLUCOSE SERPL-MCNC: 83 MG/DL (ref 74–99)
HCT VFR BLD AUTO: 28.7 % (ref 36–46)
HGB BLD-MCNC: 10 G/DL (ref 12–16)
IMM GRANULOCYTES # BLD AUTO: 0.05 X10*3/UL (ref 0–0.7)
IMM GRANULOCYTES NFR BLD AUTO: 0.5 % (ref 0–0.9)
INR PPP: 1 (ref 0.9–1.1)
LYMPHOCYTES # BLD AUTO: 2.75 X10*3/UL (ref 1.2–4.8)
LYMPHOCYTES NFR BLD AUTO: 27.5 %
MCH RBC QN AUTO: 31.9 PG (ref 26–34)
MCHC RBC AUTO-ENTMCNC: 34.8 G/DL (ref 32–36)
MCV RBC AUTO: 92 FL (ref 80–100)
MONOCYTES # BLD AUTO: 1.36 X10*3/UL (ref 0.1–1)
MONOCYTES NFR BLD AUTO: 13.6 %
NEUTROPHILS # BLD AUTO: 5.7 X10*3/UL (ref 1.2–7.7)
NEUTROPHILS NFR BLD AUTO: 56.9 %
NRBC BLD-RTO: 0 /100 WBCS (ref 0–0)
P AXIS: 78 DEGREES
P OFFSET: 208 MS
P ONSET: 168 MS
PLATELET # BLD AUTO: 258 X10*3/UL (ref 150–450)
POTASSIUM SERPL-SCNC: 4.5 MMOL/L (ref 3.5–5.3)
PR INTERVAL: 122 MS
PROT SERPL-MCNC: 6.8 G/DL (ref 6.4–8.2)
PROTHROMBIN TIME: 11.5 SECONDS (ref 9.8–12.8)
Q ONSET: 229 MS
QRS COUNT: 13 BEATS
QRS DURATION: 68 MS
QT INTERVAL: 404 MS
QTC CALCULATION(BAZETT): 472 MS
QTC FREDERICIA: 448 MS
R AXIS: 4 DEGREES
RBC # BLD AUTO: 3.13 X10*6/UL (ref 4–5.2)
RH FACTOR (ANTIGEN D): NORMAL
SODIUM SERPL-SCNC: 135 MMOL/L (ref 136–145)
T AXIS: -23 DEGREES
T OFFSET: 431 MS
VENTRICULAR RATE: 82 BPM
WBC # BLD AUTO: 10 X10*3/UL (ref 4.4–11.3)

## 2024-11-20 PROCEDURE — 86901 BLOOD TYPING SEROLOGIC RH(D): CPT

## 2024-11-20 PROCEDURE — 70486 CT MAXILLOFACIAL W/O DYE: CPT | Performed by: RADIOLOGY

## 2024-11-20 PROCEDURE — 73030 X-RAY EXAM OF SHOULDER: CPT | Mod: LEFT SIDE | Performed by: RADIOLOGY

## 2024-11-20 PROCEDURE — 70486 CT MAXILLOFACIAL W/O DYE: CPT

## 2024-11-20 PROCEDURE — 99284 EMERGENCY DEPT VISIT MOD MDM: CPT | Mod: 25

## 2024-11-20 PROCEDURE — 96376 TX/PRO/DX INJ SAME DRUG ADON: CPT

## 2024-11-20 PROCEDURE — 76377 3D RENDER W/INTRP POSTPROCES: CPT | Performed by: RADIOLOGY

## 2024-11-20 PROCEDURE — 2500000001 HC RX 250 WO HCPCS SELF ADMINISTERED DRUGS (ALT 637 FOR MEDICARE OP)

## 2024-11-20 PROCEDURE — 80053 COMPREHEN METABOLIC PANEL: CPT

## 2024-11-20 PROCEDURE — 2500000005 HC RX 250 GENERAL PHARMACY W/O HCPCS

## 2024-11-20 PROCEDURE — 93005 ELECTROCARDIOGRAM TRACING: CPT

## 2024-11-20 PROCEDURE — 2500000004 HC RX 250 GENERAL PHARMACY W/ HCPCS (ALT 636 FOR OP/ED)

## 2024-11-20 PROCEDURE — 99285 EMERGENCY DEPT VISIT HI MDM: CPT

## 2024-11-20 PROCEDURE — 85610 PROTHROMBIN TIME: CPT

## 2024-11-20 PROCEDURE — 36415 COLL VENOUS BLD VENIPUNCTURE: CPT

## 2024-11-20 PROCEDURE — 99285 EMERGENCY DEPT VISIT HI MDM: CPT | Performed by: EMERGENCY MEDICINE

## 2024-11-20 PROCEDURE — 96375 TX/PRO/DX INJ NEW DRUG ADDON: CPT

## 2024-11-20 PROCEDURE — 76377 3D RENDER W/INTRP POSTPROCES: CPT

## 2024-11-20 PROCEDURE — 96374 THER/PROPH/DIAG INJ IV PUSH: CPT

## 2024-11-20 PROCEDURE — 93010 ELECTROCARDIOGRAM REPORT: CPT | Performed by: EMERGENCY MEDICINE

## 2024-11-20 PROCEDURE — 73060 X-RAY EXAM OF HUMERUS: CPT | Mod: LT

## 2024-11-20 PROCEDURE — 71045 X-RAY EXAM CHEST 1 VIEW: CPT

## 2024-11-20 PROCEDURE — 73060 X-RAY EXAM OF HUMERUS: CPT | Mod: LEFT SIDE | Performed by: RADIOLOGY

## 2024-11-20 PROCEDURE — 2500000004 HC RX 250 GENERAL PHARMACY W/ HCPCS (ALT 636 FOR OP/ED): Performed by: STUDENT IN AN ORGANIZED HEALTH CARE EDUCATION/TRAINING PROGRAM

## 2024-11-20 PROCEDURE — 1100000001 HC PRIVATE ROOM DAILY

## 2024-11-20 PROCEDURE — 73200 CT UPPER EXTREMITY W/O DYE: CPT | Mod: LT

## 2024-11-20 PROCEDURE — 73200 CT UPPER EXTREMITY W/O DYE: CPT | Mod: LEFT SIDE | Performed by: RADIOLOGY

## 2024-11-20 PROCEDURE — 85025 COMPLETE CBC W/AUTO DIFF WBC: CPT

## 2024-11-20 PROCEDURE — 73030 X-RAY EXAM OF SHOULDER: CPT | Mod: LT

## 2024-11-20 RX ORDER — CYCLOBENZAPRINE HCL 10 MG
10 TABLET ORAL 3 TIMES DAILY PRN
Status: DISCONTINUED | OUTPATIENT
Start: 2024-11-20 | End: 2024-11-26 | Stop reason: HOSPADM

## 2024-11-20 RX ORDER — OXYCODONE HYDROCHLORIDE 5 MG/1
10 TABLET ORAL EVERY 6 HOURS PRN
Status: DISCONTINUED | OUTPATIENT
Start: 2024-11-20 | End: 2024-11-22

## 2024-11-20 RX ORDER — AMOXICILLIN 250 MG
2 CAPSULE ORAL 2 TIMES DAILY
Status: DISCONTINUED | OUTPATIENT
Start: 2024-11-20 | End: 2024-11-26 | Stop reason: HOSPADM

## 2024-11-20 RX ORDER — PROCHLORPERAZINE MALEATE 10 MG
10 TABLET ORAL EVERY 6 HOURS PRN
Status: DISCONTINUED | OUTPATIENT
Start: 2024-11-20 | End: 2024-11-26 | Stop reason: HOSPADM

## 2024-11-20 RX ORDER — DILTIAZEM HYDROCHLORIDE 180 MG/1
180 CAPSULE, COATED, EXTENDED RELEASE ORAL DAILY
Status: DISCONTINUED | OUTPATIENT
Start: 2024-11-21 | End: 2024-11-26 | Stop reason: HOSPADM

## 2024-11-20 RX ORDER — BISACODYL 5 MG
10 TABLET, DELAYED RELEASE (ENTERIC COATED) ORAL DAILY PRN
Status: DISCONTINUED | OUTPATIENT
Start: 2024-11-20 | End: 2024-11-26 | Stop reason: HOSPADM

## 2024-11-20 RX ORDER — BISACODYL 10 MG/1
10 SUPPOSITORY RECTAL DAILY PRN
Status: DISCONTINUED | OUTPATIENT
Start: 2024-11-20 | End: 2024-11-26 | Stop reason: HOSPADM

## 2024-11-20 RX ORDER — PROCHLORPERAZINE 25 MG/1
25 SUPPOSITORY RECTAL EVERY 12 HOURS PRN
Status: DISCONTINUED | OUTPATIENT
Start: 2024-11-20 | End: 2024-11-26 | Stop reason: HOSPADM

## 2024-11-20 RX ORDER — ACETAMINOPHEN 325 MG/1
650 TABLET ORAL EVERY 6 HOURS SCHEDULED
Status: DISCONTINUED | OUTPATIENT
Start: 2024-11-21 | End: 2024-11-26 | Stop reason: HOSPADM

## 2024-11-20 RX ORDER — HYDROMORPHONE HYDROCHLORIDE 1 MG/ML
1 INJECTION, SOLUTION INTRAMUSCULAR; INTRAVENOUS; SUBCUTANEOUS ONCE
Status: COMPLETED | OUTPATIENT
Start: 2024-11-20 | End: 2024-11-20

## 2024-11-20 RX ORDER — ACETAMINOPHEN 325 MG/1
975 TABLET ORAL ONCE
Status: COMPLETED | OUTPATIENT
Start: 2024-11-20 | End: 2024-11-20

## 2024-11-20 RX ORDER — FLUTICASONE FUROATE AND VILANTEROL 200; 25 UG/1; UG/1
1 POWDER RESPIRATORY (INHALATION) EVERY 24 HOURS
Status: DISCONTINUED | OUTPATIENT
Start: 2024-11-20 | End: 2024-11-26 | Stop reason: HOSPADM

## 2024-11-20 RX ORDER — HYDROMORPHONE HYDROCHLORIDE 1 MG/ML
0.5 INJECTION, SOLUTION INTRAMUSCULAR; INTRAVENOUS; SUBCUTANEOUS ONCE
Status: COMPLETED | OUTPATIENT
Start: 2024-11-20 | End: 2024-11-20

## 2024-11-20 RX ORDER — ONDANSETRON HYDROCHLORIDE 2 MG/ML
4 INJECTION, SOLUTION INTRAVENOUS ONCE
Status: COMPLETED | OUTPATIENT
Start: 2024-11-20 | End: 2024-11-20

## 2024-11-20 RX ORDER — WATER
240 LIQUID (ML) MISCELLANEOUS EVERY 6 HOURS SCHEDULED
Status: DISCONTINUED | OUTPATIENT
Start: 2024-11-21 | End: 2024-11-26 | Stop reason: HOSPADM

## 2024-11-20 RX ORDER — HYDROMORPHONE HYDROCHLORIDE 1 MG/ML
0.2 INJECTION, SOLUTION INTRAMUSCULAR; INTRAVENOUS; SUBCUTANEOUS
Status: DISCONTINUED | OUTPATIENT
Start: 2024-11-20 | End: 2024-11-26 | Stop reason: RX

## 2024-11-20 RX ORDER — LIDOCAINE 560 MG/1
2 PATCH PERCUTANEOUS; TOPICAL; TRANSDERMAL DAILY
Status: DISCONTINUED | OUTPATIENT
Start: 2024-11-20 | End: 2024-11-20 | Stop reason: HOSPADM

## 2024-11-20 RX ORDER — ONDANSETRON HYDROCHLORIDE 2 MG/ML
4 INJECTION, SOLUTION INTRAVENOUS EVERY 8 HOURS PRN
Status: DISCONTINUED | OUTPATIENT
Start: 2024-11-20 | End: 2024-11-26 | Stop reason: HOSPADM

## 2024-11-20 RX ORDER — ALBUTEROL SULFATE 0.83 MG/ML
2.5 SOLUTION RESPIRATORY (INHALATION) EVERY 6 HOURS PRN
Status: DISCONTINUED | OUTPATIENT
Start: 2024-11-20 | End: 2024-11-26 | Stop reason: HOSPADM

## 2024-11-20 RX ORDER — ONDANSETRON 4 MG/1
4 TABLET, FILM COATED ORAL EVERY 8 HOURS PRN
Status: DISCONTINUED | OUTPATIENT
Start: 2024-11-20 | End: 2024-11-26 | Stop reason: HOSPADM

## 2024-11-20 RX ORDER — POLYETHYLENE GLYCOL 3350 17 G/17G
17 POWDER, FOR SOLUTION ORAL DAILY
Status: DISCONTINUED | OUTPATIENT
Start: 2024-11-21 | End: 2024-11-26 | Stop reason: HOSPADM

## 2024-11-20 RX ORDER — PROCHLORPERAZINE EDISYLATE 5 MG/ML
10 INJECTION INTRAMUSCULAR; INTRAVENOUS EVERY 6 HOURS PRN
Status: DISCONTINUED | OUTPATIENT
Start: 2024-11-20 | End: 2024-11-26 | Stop reason: HOSPADM

## 2024-11-20 RX ORDER — OXYCODONE HYDROCHLORIDE 5 MG/1
5 TABLET ORAL EVERY 4 HOURS PRN
Status: DISCONTINUED | OUTPATIENT
Start: 2024-11-20 | End: 2024-11-26 | Stop reason: HOSPADM

## 2024-11-20 RX ADMIN — ONDANSETRON 4 MG: 2 INJECTION INTRAMUSCULAR; INTRAVENOUS at 19:56

## 2024-11-20 RX ADMIN — FLUTICASONE FUROATE AND VILANTEROL TRIFENATATE 1 PUFF: 200; 25 POWDER RESPIRATORY (INHALATION) at 21:37

## 2024-11-20 RX ADMIN — TIOTROPIUM BROMIDE INHALATION SPRAY 2 PUFF: 3.12 SPRAY, METERED RESPIRATORY (INHALATION) at 21:37

## 2024-11-20 RX ADMIN — HYDROMORPHONE HYDROCHLORIDE 0.5 MG: 1 INJECTION, SOLUTION INTRAMUSCULAR; INTRAVENOUS; SUBCUTANEOUS at 19:56

## 2024-11-20 RX ADMIN — HYDROMORPHONE HYDROCHLORIDE 0.5 MG: 1 INJECTION, SOLUTION INTRAMUSCULAR; INTRAVENOUS; SUBCUTANEOUS at 16:37

## 2024-11-20 SDOH — SOCIAL STABILITY: SOCIAL INSECURITY: HAS ANYONE EVER THREATENED TO HURT YOUR FAMILY OR YOUR PETS?: NO

## 2024-11-20 SDOH — SOCIAL STABILITY: SOCIAL INSECURITY: DO YOU FEEL UNSAFE GOING BACK TO THE PLACE WHERE YOU ARE LIVING?: NO

## 2024-11-20 SDOH — SOCIAL STABILITY: SOCIAL INSECURITY: ARE YOU OR HAVE YOU BEEN THREATENED OR ABUSED PHYSICALLY, EMOTIONALLY, OR SEXUALLY BY ANYONE?: NO

## 2024-11-20 SDOH — SOCIAL STABILITY: SOCIAL INSECURITY: HAVE YOU HAD ANY THOUGHTS OF HARMING ANYONE ELSE?: NO

## 2024-11-20 SDOH — SOCIAL STABILITY: SOCIAL INSECURITY: DO YOU FEEL ANYONE HAS EXPLOITED OR TAKEN ADVANTAGE OF YOU FINANCIALLY OR OF YOUR PERSONAL PROPERTY?: NO

## 2024-11-20 SDOH — SOCIAL STABILITY: SOCIAL INSECURITY: ABUSE: ADULT

## 2024-11-20 SDOH — SOCIAL STABILITY: SOCIAL INSECURITY: DOES ANYONE TRY TO KEEP YOU FROM HAVING/CONTACTING OTHER FRIENDS OR DOING THINGS OUTSIDE YOUR HOME?: NO

## 2024-11-20 SDOH — SOCIAL STABILITY: SOCIAL INSECURITY: HAVE YOU HAD THOUGHTS OF HARMING ANYONE ELSE?: NO

## 2024-11-20 SDOH — SOCIAL STABILITY: SOCIAL INSECURITY: WERE YOU ABLE TO COMPLETE ALL THE BEHAVIORAL HEALTH SCREENINGS?: YES

## 2024-11-20 SDOH — SOCIAL STABILITY: SOCIAL INSECURITY: ARE THERE ANY APPARENT SIGNS OF INJURIES/BEHAVIORS THAT COULD BE RELATED TO ABUSE/NEGLECT?: NO

## 2024-11-20 ASSESSMENT — COLUMBIA-SUICIDE SEVERITY RATING SCALE - C-SSRS
2. HAVE YOU ACTUALLY HAD ANY THOUGHTS OF KILLING YOURSELF?: NO
1. IN THE PAST MONTH, HAVE YOU WISHED YOU WERE DEAD OR WISHED YOU COULD GO TO SLEEP AND NOT WAKE UP?: NO
2. HAVE YOU ACTUALLY HAD ANY THOUGHTS OF KILLING YOURSELF?: NO
6. HAVE YOU EVER DONE ANYTHING, STARTED TO DO ANYTHING, OR PREPARED TO DO ANYTHING TO END YOUR LIFE?: NO
1. IN THE PAST MONTH, HAVE YOU WISHED YOU WERE DEAD OR WISHED YOU COULD GO TO SLEEP AND NOT WAKE UP?: NO
6. HAVE YOU EVER DONE ANYTHING, STARTED TO DO ANYTHING, OR PREPARED TO DO ANYTHING TO END YOUR LIFE?: NO

## 2024-11-20 ASSESSMENT — ACTIVITIES OF DAILY LIVING (ADL)
WALKS IN HOME: INDEPENDENT
HEARING - LEFT EAR: FUNCTIONAL
DRESSING YOURSELF: INDEPENDENT
ASSISTIVE_DEVICE: EYEGLASSES;CANE
GROOMING: INDEPENDENT
BATHING: INDEPENDENT
ADEQUATE_TO_COMPLETE_ADL: NO
JUDGMENT_ADEQUATE_SAFELY_COMPLETE_DAILY_ACTIVITIES: YES
ASSISTIVE_DEVICE: EYEGLASSES;CANE
HEARING - RIGHT EAR: FUNCTIONAL
JUDGMENT_ADEQUATE_SAFELY_COMPLETE_DAILY_ACTIVITIES: NO
TOILETING: INDEPENDENT
FEEDING YOURSELF: INDEPENDENT
FEEDING YOURSELF: INDEPENDENT
WALKS IN HOME: INDEPENDENT
ADEQUATE_TO_COMPLETE_ADL: YES
PATIENT'S MEMORY ADEQUATE TO SAFELY COMPLETE DAILY ACTIVITIES?: YES
HEARING - LEFT EAR: FUNCTIONAL
GROOMING: INDEPENDENT
HEARING - RIGHT EAR: FUNCTIONAL
PATIENT'S MEMORY ADEQUATE TO SAFELY COMPLETE DAILY ACTIVITIES?: NO
TOILETING: INDEPENDENT
DRESSING YOURSELF: INDEPENDENT

## 2024-11-20 ASSESSMENT — LIFESTYLE VARIABLES
HOW OFTEN DO YOU HAVE 6 OR MORE DRINKS ON ONE OCCASION: NEVER
SKIP TO QUESTIONS 9-10: 1
HOW OFTEN DO YOU HAVE A DRINK CONTAINING ALCOHOL: NEVER
AUDIT-C TOTAL SCORE: 0
HOW MANY STANDARD DRINKS CONTAINING ALCOHOL DO YOU HAVE ON A TYPICAL DAY: PATIENT DOES NOT DRINK
AUDIT-C TOTAL SCORE: 0

## 2024-11-20 ASSESSMENT — PAIN DESCRIPTION - LOCATION
LOCATION: SHOULDER
LOCATION: SHOULDER

## 2024-11-20 ASSESSMENT — PAIN SCALES - GENERAL
PAINLEVEL_OUTOF10: 10 - WORST POSSIBLE PAIN
PAINLEVEL_OUTOF10: 10 - WORST POSSIBLE PAIN
PAINLEVEL_OUTOF10: 8
PAINLEVEL_OUTOF10: 10 - WORST POSSIBLE PAIN

## 2024-11-20 ASSESSMENT — COGNITIVE AND FUNCTIONAL STATUS - GENERAL
MOVING TO AND FROM BED TO CHAIR: A LITTLE
DAILY ACTIVITIY SCORE: 18
EATING MEALS: A LITTLE
PERSONAL GROOMING: A LITTLE
DRESSING REGULAR LOWER BODY CLOTHING: A LITTLE
PATIENT BASELINE BEDBOUND: NO
HELP NEEDED FOR BATHING: A LITTLE
WALKING IN HOSPITAL ROOM: A LITTLE
CLIMB 3 TO 5 STEPS WITH RAILING: A LITTLE
TOILETING: A LITTLE
TURNING FROM BACK TO SIDE WHILE IN FLAT BAD: A LITTLE
MOBILITY SCORE: 18
STANDING UP FROM CHAIR USING ARMS: A LITTLE
MOVING FROM LYING ON BACK TO SITTING ON SIDE OF FLAT BED WITH BEDRAILS: A LITTLE
DRESSING REGULAR UPPER BODY CLOTHING: A LITTLE

## 2024-11-20 ASSESSMENT — PAIN DESCRIPTION - PAIN TYPE: TYPE: ACUTE PAIN

## 2024-11-20 ASSESSMENT — PAIN - FUNCTIONAL ASSESSMENT
PAIN_FUNCTIONAL_ASSESSMENT: 0-10

## 2024-11-20 ASSESSMENT — PAIN DESCRIPTION - PROGRESSION: CLINICAL_PROGRESSION: GRADUALLY IMPROVING

## 2024-11-20 ASSESSMENT — PAIN DESCRIPTION - ORIENTATION: ORIENTATION: LEFT

## 2024-11-20 NOTE — ED TRIAGE NOTES
Patient [facility] for [facility]o consult. On the 14th patient had fallen hitting left side causing left humerus fracture. Today patient came in for increase in pain without relief, N/V. Patient complaining of 10/10 pain to left arm, ribs, face, leg. Noticeable bruising to left occipital area in different stages of healing. Patient very tearful in room complaining of anxiety. Patient reports BPD not on medicationn. Patient very worried as she lives alone and has nobody to help her. Sensation intact, able to move fingers. A&Ox4 GCS 15. Pt received 1mg dilaudid approx 1340

## 2024-11-20 NOTE — ED PROVIDER NOTES
HPI   Chief Complaint   Patient presents with    left shoulder pain       Pt is a 69 year old female presenting to the ED with concern for left shoulder pain. She tripped and fell on 11/14/24 and was evaluated at that time. She was seen in [facility] ED on 11/16/24 for shoulder pain and was given pain medications without ever achieving pain control. Pt states she has been taking Percocet at home since then with no relief of pain. She was seen by orthopedics on Monday 11/18/24 who are planning on taking her to surgery on 11/22/24. Pt states her pain is worsening and is a 10/10. She denies any weakness, numbness, tingling in the extremity, chest pain, shortness of breath, or any other concern. Pt claims the surgical team at [facility] is waiting for her today.             Patient History   Past Medical History:   Diagnosis Date    Abdominal distension (gaseous) 08/13/2018    Anxiety     Bipolar disorder     Carpal tunnel syndrome, right upper limb 08/13/2018    Collagenous colitis 08/13/2018    Compression fracture of cervical spine     COPD (chronic obstructive pulmonary disease) (Multi)     Depression, unspecified 08/13/2018    Dermatitis, unspecified 07/09/2020    eczematoid    Dermatochalasis of unspecified eye, unspecified eyelid 10/25/2022    Displaced comminuted fracture of left patella, initial encounter for closed fracture 08/02/2018    Displaced comminuted fracture of right patella, initial encounter for closed fracture 08/13/2018    Dizziness     GERD (gastroesophageal reflux disease)     Headache     Hyperlipidemia     Hypertension     Hypokalemia 08/13/2018    Low back pain, unspecified 08/13/2018    Migraine     Noninfective gastroenteritis and colitis, unspecified 08/02/2018    Chronic colitis    Nutritional deficiency, unspecified 08/02/2018    Poor diet    Old myocardial infarction 03/31/2014    NSTEMI    Other chest pain 08/28/2017    Atypical chest pain    Other fracture of right patella, sequela 08/13/2018     Patellar sleeve fracture, right, sequela    Other visual disturbances 04/04/2018    Blurred vision, bilateral    Pain in unspecified hip 03/31/2014    Pericarditis ([facility]) 2019    Pleural effusion     Polyosteoarthritis, unspecified 08/02/2018    Presence of intraocular lens 09/20/2018    Pseudophakia of right eye    Sleep apnea     Small intestinal bacterial overgrowth (SIBO)     Stroke (Multi) 04/2024    Tobacco abuse     Unspecified fall, sequela 08/13/2018     Past Surgical History:   Procedure Laterality Date    CARDIAC CATHETERIZATION      CATARACT EXTRACTION      CHOLECYSTECTOMY      COLONOSCOPY      CT ABDOMEN ANGIOGRAM W AND/OR WO IV CONTRAST  07/28/2022    CT ABDOMEN ANGIOGRAM W AND/OR WO IV CONTRAST 7/28/2022 [facility]CI[facility]GAC[facility]    FEMUR FRACTURE SURGERY  07/16/2018    Femur Repair    FOOT SURGERY  09/13/2024    LEFT PROXIMAL  HALLUX PARTIAL EXCISION    HIP SURGERY      HYSTERECTOMY      MITRAL VALVE REPLACEMENT  08/07/2019    OTHER SURGICAL HISTORY  07/16/2018    Oophorectomy - Bilateral (Removal Of Both Ovaries)    OTHER SURGICAL HISTORY  05/27/2020    Radius fracture repair    SHOULDER SURGERY  07/16/2018    Shoulder Surgery    UPPER GASTROINTESTINAL ENDOSCOPY       Family History   Adopted: Yes   Problem Relation Name Age of Onset    Other (Adopted) Mother      Other (Adopted child) Father       Social History     Tobacco Use    Smoking status: Every Day     Current packs/day: 0.25     Average packs/day: 0.3 packs/day for 40.0 years (10.0 ttl pk-yrs)     Types: Cigarettes    Smokeless tobacco: Never   Substance Use Topics    Alcohol use: Not Currently     Alcohol/week: 3.0 standard drinks of alcohol     Types: 3 Standard drinks or equivalent per week    Drug use: Yes     Types: Marijuana       Physical Exam   ED Triage Vitals [11/20/24 0917]   Temperature Heart Rate Respirations BP   37.2 °C (98.9 °F) 80 16 70/54      Pulse Ox Temp Source Heart Rate Source Patient Position   94 % Temporal Monitor  Lying      BP Location FiO2 (%)     Right arm --       Physical Exam  Constitutional:       General: She is not in acute distress.     Appearance: She is not ill-appearing, toxic-appearing or diaphoretic.   Cardiovascular:      Rate and Rhythm: Normal rate and regular rhythm.      Pulses: Normal pulses.      Heart sounds: Normal heart sounds. No murmur heard.     No gallop.   Pulmonary:      Effort: Pulmonary effort is normal. No respiratory distress.      Breath sounds: Normal breath sounds. No stridor. No wheezing, rhonchi or rales.   Chest:      Chest wall: No tenderness.   Musculoskeletal:      Comments: Left shoulder in a sling. Bruising on left shoulder.    Neurological:      Mental Status: She is alert.           ED Course & MDM   ED Course as of 11/20/24 1246   Wed Nov 20, 2024   1029 Pt is a 69 year old female who presents to the Ed as described in HPI for left shoulder pain. Initial differentials include intractable pain and new fracture. Pt staffed with attending.  [VS]   1047 Messaged orthopedic attending Dr Roberts. Recommended transfer to [facility].  [VS]   1101 Will order dilaudid and tylenol for pain control. Transfer order placed. Spoke with pt and she is agreeable with this plan.  [VS]   1101 Patient seen and evaluated dependently.    Patient is a 69-year-old female status post fall and left humerus fracture who is discharged with instructions for pain management.  She called her orthopedic surgeon because she was in so much pain and she was unable to function.  Because of this she was instructed to go to the emergency department at [facility], she felt that this was closer and she would not be able to drive all the way to [facility].  She states the only discomfort she is having is in her left shoulder.  She denies any chest pain, shortness of breath, abdominal pain.  Did have some residual tinnitus after she was initially evaluated with negative CT imaging of her head which has resolved.  Denies any numbness  tingling or weakness.    On exam, patient does have bruising over the left humerus, does have good handgrip, sensation intact in the left arm, 2+ radial pulse.  Heart lungs clear to auscultation, palpated the left chest wall and ribs, nontender to palpation, no step-offs or deformities.  With her reports of tinnitus post head CT, no hemotympanum noted bilaterally, moving all 4 extremities without difficulty, alert and oriented x 4.    Initially was hypotensive, readjusted cuff had a normal blood pressure, suspect patient will need transfer for orthopedic surgical intervention per her report of her conversation with her orthopedic surgeon.  We did reach out to Dr. Roberts  [KK]   1110 Discussed the case with Dr. Pinzon accepted the patient for transfer. [KK]   1236 Ordered lidocaine patch for pain control.  [VS]      ED Course User Index  [KK] Samir Bradofrd DO  [VS] Xu Zurita PA-C         Diagnoses as of 11/20/24 1246   Closed displaced comminuted supracondylar fracture of left humerus without intercondylar fracture with routine healing, subsequent encounter   Pain                 No data recorded     Crawford Coma Scale Score: 15 (11/20/24 0930 : Brielle Davis, ORB)                     Chronic Medical Conditions Significantly Affecting Care:      Escalation of Care: Appropriate for transfer       Counseling: Spoke with the patient and discussed today´s findings, in addition to providing specific details for the plan of care and expected course.  Patient was given the opportunity to ask questions.    Discussed return precautions and importance of follow-up.  Advised to follow-up with orthopedics.  Advised to return to the ED for changing or worsening symptoms, new symptoms, complaint specific precautions, and precautions listed on the discharge paperwork.  Educated on the common potential side effects of medications prescribed.    I advised the patient that the emergency evaluation and treatment  provided today doesn't end their need for medical care. It is very important that they follow-up with their primary care provider or other specialist as instructed.    The plan of care was mutually agreed upon with the patient. The patient and/or family were given the opportunity to ask questions. All questions asked today in the ED were answered to the best of my ability with today's information.    I specifically advised the patient to return to the ED for changing or worsening symptoms, worrisome new symptoms, or for any complaint specific precautions listed on the discharge paperwork.    This patient was cared for in the setting of nationwide stress on resources and staffing.    This report was transcribed using voice recognition software.  Every effort was made to ensure accuracy, however, inadvertently computerized transcription errors may be present.      Medical Decision Making      Procedure  Procedures     Xu Zurita PA-C  11/20/24 5092

## 2024-11-20 NOTE — CPM/PAT H&P
CPM/PAT Evaluation       Name: Judith Bush (Judith Bush)  /Age: 1955/69 y.o.     {[facility] Visit Type:50236}      Chief Complaint: ***    HPI  Juidth Bush is scheduled for humerus fracture ORIF proximal- right with Dr. Roberts on 24.    Past Medical History:   Diagnosis Date    Abdominal distension (gaseous) 2018    Anxiety     Bipolar disorder     Carpal tunnel syndrome, right upper limb 2018    Collagenous colitis 2018    Compression fracture of cervical spine     COPD (chronic obstructive pulmonary disease) (Multi)     Depression, unspecified 2018    Dermatitis, unspecified 2020    eczematoid    Dermatochalasis of unspecified eye, unspecified eyelid 10/25/2022    Displaced comminuted fracture of left patella, initial encounter for closed fracture 2018    Displaced comminuted fracture of right patella, initial encounter for closed fracture 2018    Dizziness     GERD (gastroesophageal reflux disease)     Headache     Hyperlipidemia     Hypertension     Hypokalemia 2018    Low back pain, unspecified 2018    Migraine     Noninfective gastroenteritis and colitis, unspecified 2018    Chronic colitis    Nutritional deficiency, unspecified 2018    Poor diet    Old myocardial infarction 2014    NSTEMI    Other chest pain 2017    Atypical chest pain    Other fracture of right patella, sequela 2018    Patellar sleeve fracture, right, sequela    Other visual disturbances 2018    Blurred vision, bilateral    Pain in unspecified hip 2014    Pericarditis ([facility]) 2019    Pleural effusion     Polyosteoarthritis, unspecified 2018    Presence of intraocular lens 2018    Pseudophakia of right eye    Sleep apnea     Small intestinal bacterial overgrowth (SIBO)     Stroke (Multi) 2024    Tobacco abuse     Unspecified fall, sequela 2018       Past Surgical History:   Procedure Laterality Date     CARDIAC CATHETERIZATION      CATARACT EXTRACTION      CHOLECYSTECTOMY      COLONOSCOPY      CT ABDOMEN ANGIOGRAM W AND/OR WO IV CONTRAST  07/28/2022    CT ABDOMEN ANGIOGRAM W AND/OR WO IV CONTRAST 7/28/2022 [facility]    FEMUR FRACTURE SURGERY  07/16/2018    Femur Repair    FOOT SURGERY  09/13/2024    LEFT PROXIMAL  HALLUX PARTIAL EXCISION    HIP SURGERY      HYSTERECTOMY      MITRAL VALVE REPLACEMENT  08/07/2019    OTHER SURGICAL HISTORY  07/16/2018    Oophorectomy - Bilateral (Removal Of Both Ovaries)    OTHER SURGICAL HISTORY  05/27/2020    Radius fracture repair    SHOULDER SURGERY  07/16/2018    Shoulder Surgery    UPPER GASTROINTESTINAL ENDOSCOPY         Patient  reports that she is not currently sexually active.    Family History   Adopted: Yes   Problem Relation Name Age of Onset    Other (Adopted) Mother      Other (Adopted child) Father         Allergies   Allergen Reactions    Cephalosporins Anaphylaxis    Penicillin Anaphylaxis    Penicillins Anaphylaxis and Unknown    Neomycin-Polymyxin-Gramicidin Rash       Prior to Admission medications    Medication Sig Start Date End Date Taking? Authorizing Provider   albuterol 2.5 mg /3 mL (0.083 %) nebulizer solution Take 3 mL (2.5 mg) by nebulization every 6 hours if needed for wheezing. 10/21/24 10/21/25  Azam Burns MD MPH   albuterol 90 mcg/actuation inhaler USE 2 INHALATIONS BY MOUTH EVERY 4 HOURS AS NEEDED 10/22/24   Azam Burns MD MPH   aspirin 81 mg EC tablet Take 1 tablet (81 mg) by mouth once daily. 4/23/24   Cee Coates MD   celecoxib (CeleBREX) 200 mg capsule Take 1 capsule (200 mg) by mouth as needed at bedtime for moderate pain (4 - 6).    Historical Provider, MD   cyclobenzaprine (Flexeril) 10 mg tablet TAKE 1 TABLET (10 MG) BY MOUTH AS NEEDED AT BEDTIME FOR MUSCLE SPASMS. 9/23/24   Cee Coates MD   cyclobenzaprine (Flexeril) 5 mg tablet Take 1 tablet (5 mg) by mouth 3 times a day for 10 days. 11/16/24 11/26/24  Abraham  JYOTI Mercedes PA-C   diclofenac sodium (Voltaren) 1 % gel APPLY 1 APPLICATION TOPICALLY 4 TIMES A DAY AS NEEDED (PAIN). 5/13/24   Kyler Marks MD   dilTIAZem CD (Cardizem CD) 180 mg 24 hr capsule Take 1 capsule (180 mg) by mouth once daily. 7/4/24   Cee Coates MD   evolocumab (Repatha SureClick) 140 mg/mL injection INJECT THE CONTENTS OF 1 PEN UNDER THE SKIN EVERY 2 WEEKS 11/20/23 11/18/24  Gurpreet Wood MD   fluticasone (Flonase) 50 mcg/actuation nasal spray Administer 1 spray into each nostril once daily. Shake gently. Before first use, prime pump. After use, clean tip and replace cap. 8/15/23 8/14/24  Cee Coates MD   glycopyrrolate-formoteroL (Bevespi Aerosphere) 9-4.8 mcg HFA aerosol inhaler TAKE 2 PUFFS BY MOUTH TWICE A DAY 7/8/24   Azam Burns MD MPH   hydrocortisone acetate 1 % ointment Apply 1 Application topically 2 times a day as needed (for hemorrhoids). 2/19/24   Cee Coates MD   lidocaine 4 % patch Place 1 patch over 12 hours on the skin once daily. Remove & discard patch within 12 hours or as directed by MD. 11/16/24   Abraham Mercedes PA-C   multivitamin with folic acid (One Daily Multivitamin) 400 mcg tablet Take 1 tablet by mouth once daily. 5/4/21   Homero Hinds MD   naloxone (Narcan) 4 mg/0.1 mL nasal spray Administer 1 spray (4 mg) into affected nostril(s) if needed for opioid reversal for up to 2 doses. Give 1 spray as a single dose in one nostril. May repeat every 2-3 minutes in alternating nostrils until medical assistance becomes available. 11/15/24   Valencia Mcleod MD   naproxen (Naprosyn) 500 mg tablet Take 1 tablet (500 mg) by mouth 2 times daily (morning and late afternoon) for 15 days. 11/16/24 12/1/24  Abraham Mercedes PA-C   onabotulinumtoxinA (Botox) 200 unit injection Inject 155 Units into the muscle every 3 months. 10/23/24 10/23/25  Deacon Vera MD   ondansetron ODT (Zofran-ODT) 4 mg disintegrating tablet Take 1 tablet (4 mg) by mouth every 8  hours if needed for nausea or vomiting. 11/16/24   Abraham Mercedes PA-C   oxybutynin XL (Ditropan-XL) 10 mg 24 hr tablet Take 1 tablet (10 mg) by mouth 2 times a day. 2/19/24   Cee Coates MD   oxyCODONE-acetaminophen (Percocet)  mg tablet Take 1 tablet by mouth every 6 hours if needed for severe pain (7 - 10) for up to 1 day. 11/16/24 11/17/24  Abraham Mercedes PA-C   oxyCODONE-acetaminophen (Percocet)  mg tablet Take 1 tablet by mouth every 4 hours if needed for severe pain (7 - 10) for up to 5 days. 11/18/24 11/23/24  Cheri Givens PA-C   pantoprazole (ProtoNix) 40 mg EC tablet Take 1 tablet (40 mg) by mouth once daily in the morning. Take before meals. Do not crush, chew, or split. 4/16/24 5/16/24  Carol Waller, APRN-CNP   polyethylene glycol (Glycolax, Miralax) 17 gram/dose powder Use up to 3 times daily as directed as needed 5/15/24   Pedro Garcia MD   SUMAtriptan (Imitrex) 50 mg tablet Take 1 tablet (50 mg) by mouth 1 time if needed for migraine. May repeat after 2 hours.  Patient not taking: Reported on 10/21/2024 8/26/24 8/26/25  Cee Coates MD   Tymlos 80 mcg (3,120 mcg/1.56 mL) pen injector Inject 1 Dose as directed once daily. 3/9/23   Historical MD Lizet   ubrogepant (Ubrelvy) 100 mg tablet tablet Take 1 tablet (100 mg) by mouth 1 time if needed (migraine headaches). Can repeat in 2 hours if no response. Not to exceed 200mg per 24 hours. 9/11/24 9/11/25  Deacon Vera MD   oxyCODONE-acetaminophen (Percocet)  mg tablet Take 1 tablet by mouth every 6 hours if needed for severe pain (7 - 10) for up to 3 days. 11/15/24 11/15/24  Valencia Mcleod MD   oxyCODONE-acetaminophen (Percocet)  mg tablet Take 1 tablet by mouth every 6 hours if needed for severe pain (7 - 10) for up to 3 days. 11/15/24 11/18/24  Valencia Mcleod MD   oxyCODONE-acetaminophen (Percocet)  mg tablet Take 1 tablet by mouth every 6 hours if needed for severe pain (7 - 10) for up to 3  days. 11/16/24 11/16/24  Abraham Mercedes PA-C        PAT ROS     PAT Physical Exam     Airway    Testing/Diagnostic:     -XR SHOULDER LEFT 2+ VIEWS; 11/15/2024   IMPRESSION:  Mildly displaced and impacted fracture of the surgical neck of left  humerus with slight extension to the greater tuberosity. No  dislocation.    -ECG; 8/27/24  Normal Sinus Rhythm  Normal ECG    -Holter Monitor- 5/22/24  See Holter and cardiac event monitor scan on June 24, 2024 for details. No atrial fibrillation. 7% ventricular premature beat, 10% atrial premature beat.     -Transthoracic Echo; 4/15/24  CONCLUSIONS:   1. Left ventricular systolic function is normal with a 60% estimated ejection fraction.   2. Abnormal septal motion consistent with post-operative status.   3. Spectral Doppler shows a pseudonormal pattern of left ventricular diastolic filling.   4. The left atrium is moderately dilated.   5. Chordae tendinae / papillary muscle remnant noted attached to the left ventricular wall (not significantly changed compared with TTE 12/2022).   6. Slightly elevated RVSP.    -TRANSESOPHAGEAL ECHO    CONCLUSIONS:   1. The left ventricular systolic function is normal.   2. Non coaption of the mitral leaflets leading to severe MR, restricted posterior leaftlet.   3. Severe mitral valve regurgitation.    -Cardiac Cath; 7/25/2019  CONCLUSIONS:   1. Coronary artery anatomy as described above.  Patient Specialist/PCP:   PCP: Cee Coates MD LOV 4/23/24   Orthopaedic Surgery: Cheri Givens PA-C LOV 11/18/24 seen for evaluation of side: left upper extremity pain after fall, on to the left shoulder after tripping over a hose when walking her dog.    Pulmonology: Azam Burns MD LOV 10/21/24 follow up visit for COPD  Neurology: Deacon Vera MD  LOV 11/18/24 seen for chronic migraines without aura.   Cardiology: Gurpreet Wood MD LOV 9/9/24  There were no vitals taken for this visit.    DASI Risk Score      Flowsheet Row Pre-Admission  Testing from 9/9/2024 in [facility]   Can you take care of yourself (eat, dress, bathe, or use toilet)?  2.75 filed at 09/09/2024 0932   Can you walk indoors, such as around your house? 1.75 filed at 09/09/2024 0932   Can you walk a block or two on level ground?  2.75 filed at 09/09/2024 0932   Can you climb a flight of stairs or walk up a hill? 5.5 filed at 09/09/2024 0932   Can you run a short distance? 8 filed at 09/09/2024 0932   Can you do light work around the house like dusting or washing dishes? 2.7 filed at 09/09/2024 0932   Can you do moderate work around the house like vacuuming, sweeping floors or carrying groceries? 3.5 filed at 09/09/2024 0932   Can you do heavy work around the house like scrubbing floors or lifting and moving heavy furniture?  8 filed at 09/09/2024 0932   Can you do yard work like raking leaves, weeding or pushing a mower? 4.5 filed at 09/09/2024 0932   Can you have sexual relations? 0 filed at 09/09/2024 0932   Can you participate in moderate recreational activities like golf, bowling, dancing, doubles tennis or throwing a baseball or football? 0 filed at 09/09/2024 0932   Can you participate in strenous sports like swimming, singles tennis, football, basketball, or skiing? 0 filed at 09/09/2024 0932   DASI SCORE 39.45 filed at 09/09/2024 0932   METS Score (Will be calculated only when all the questions are answered) 7.6 filed at 09/09/2024 0932          [place] DVT Assessment      Flowsheet Row ED to Hosp-Admission (Discharged) from 4/15/2024 in [facility] Bldg A 1 with Miguel Garcia MD and Leon Rojas MD   DVT Score 11 filed at 04/15/2024 1445   BMI 30 or less filed at 04/15/2024 1445   RETIRED: Current Status COPD filed at 04/15/2024 1445   RETIRED: History COPD, Acute myocardial infarction, Stroke filed at 04/15/2024 1445   RETIRED: Age 60-75 years filed at 04/15/2024 1445          Modified Frailty Index    No data to display       CHADS2  Stroke Risk  Current as of 3 minutes ago        N/A 3 to 100%: High Risk   2 to < 3%: Medium Risk   0 to < 2%: Low Risk     Last Change: N/A          This score determines the patient's risk of having a stroke if the patient has atrial fibrillation.        This score is not applicable to this patient. Components are not calculated.          Revised Cardiac Risk Index    No data to display       Apfel Simplified Score    No data to display       Risk Analysis Index Results This Encounter    No data found in the last 10 encounters.       Prodigy: High Risk  Total Score: 11              Prodigy Age Score      Prodigy Previous Opioid Use Score           ARISCAT Score for Postoperative Pulmonary Complications    No data to display       Koenig Perioperative Risk for Myocardial Infarction or Cardiac Arrest (DALY)    No data to display         Assessment and Plan:   Unable to reach the patient review health history. LVM.  ONLY    Brit Ramos RN  Pre-Admission Testing   {[facility] EMBEDDED ASSESSMENT AND PLAN:06623}

## 2024-11-20 NOTE — ED TRIAGE NOTES
Pt came in c/o left shoulder pain after having a fall last week and fracturing humerus. Denies any numbness or tingling in extremities.

## 2024-11-21 ENCOUNTER — ANESTHESIA (OUTPATIENT)
Dept: OPERATING ROOM | Facility: HOSPITAL | Age: 69
End: 2024-11-21
Payer: COMMERCIAL

## 2024-11-21 ENCOUNTER — APPOINTMENT (OUTPATIENT)
Dept: CARDIOLOGY | Facility: HOSPITAL | Age: 69
DRG: 494 | End: 2024-11-21
Payer: COMMERCIAL

## 2024-11-21 ENCOUNTER — ANESTHESIA EVENT (OUTPATIENT)
Dept: OPERATING ROOM | Facility: HOSPITAL | Age: 69
End: 2024-11-21
Payer: COMMERCIAL

## 2024-11-21 ENCOUNTER — APPOINTMENT (OUTPATIENT)
Dept: RADIOLOGY | Facility: HOSPITAL | Age: 69
DRG: 494 | End: 2024-11-21
Payer: COMMERCIAL

## 2024-11-21 PROCEDURE — 0PSD04Z REPOSITION LEFT HUMERAL HEAD WITH INTERNAL FIXATION DEVICE, OPEN APPROACH: ICD-10-PCS | Performed by: ORTHOPAEDIC SURGERY

## 2024-11-21 PROCEDURE — 23615 OPTX PROX HUMRL FX W/INT FIX: CPT | Performed by: STUDENT IN AN ORGANIZED HEALTH CARE EDUCATION/TRAINING PROGRAM

## 2024-11-21 PROCEDURE — 2500000005 HC RX 250 GENERAL PHARMACY W/O HCPCS: Performed by: ORTHOPAEDIC SURGERY

## 2024-11-21 PROCEDURE — 7100000002 HC RECOVERY ROOM TIME - EACH INCREMENTAL 1 MINUTE: Performed by: ORTHOPAEDIC SURGERY

## 2024-11-21 PROCEDURE — 23615 OPTX PROX HUMRL FX W/INT FIX: CPT | Performed by: ORTHOPAEDIC SURGERY

## 2024-11-21 PROCEDURE — 2500000002 HC RX 250 W HCPCS SELF ADMINISTERED DRUGS (ALT 637 FOR MEDICARE OP, ALT 636 FOR OP/ED): Performed by: STUDENT IN AN ORGANIZED HEALTH CARE EDUCATION/TRAINING PROGRAM

## 2024-11-21 PROCEDURE — 1100000001 HC PRIVATE ROOM DAILY

## 2024-11-21 PROCEDURE — 2500000001 HC RX 250 WO HCPCS SELF ADMINISTERED DRUGS (ALT 637 FOR MEDICARE OP)

## 2024-11-21 PROCEDURE — 3600000004 HC OR TIME - INITIAL BASE CHARGE - PROCEDURE LEVEL FOUR: Performed by: ORTHOPAEDIC SURGERY

## 2024-11-21 PROCEDURE — 3700000001 HC GENERAL ANESTHESIA TIME - INITIAL BASE CHARGE: Performed by: ORTHOPAEDIC SURGERY

## 2024-11-21 PROCEDURE — 3700000002 HC GENERAL ANESTHESIA TIME - EACH INCREMENTAL 1 MINUTE: Performed by: ORTHOPAEDIC SURGERY

## 2024-11-21 PROCEDURE — 93005 ELECTROCARDIOGRAM TRACING: CPT

## 2024-11-21 PROCEDURE — 2780000003 HC OR 278 NO HCPCS: Performed by: ORTHOPAEDIC SURGERY

## 2024-11-21 PROCEDURE — 3600000009 HC OR TIME - EACH INCREMENTAL 1 MINUTE - PROCEDURE LEVEL FOUR: Performed by: ORTHOPAEDIC SURGERY

## 2024-11-21 PROCEDURE — 2500000002 HC RX 250 W HCPCS SELF ADMINISTERED DRUGS (ALT 637 FOR MEDICARE OP, ALT 636 FOR OP/ED)

## 2024-11-21 PROCEDURE — 2500000004 HC RX 250 GENERAL PHARMACY W/ HCPCS (ALT 636 FOR OP/ED)

## 2024-11-21 PROCEDURE — 2500000001 HC RX 250 WO HCPCS SELF ADMINISTERED DRUGS (ALT 637 FOR MEDICARE OP): Performed by: STUDENT IN AN ORGANIZED HEALTH CARE EDUCATION/TRAINING PROGRAM

## 2024-11-21 PROCEDURE — 2500000005 HC RX 250 GENERAL PHARMACY W/O HCPCS: Performed by: STUDENT IN AN ORGANIZED HEALTH CARE EDUCATION/TRAINING PROGRAM

## 2024-11-21 PROCEDURE — C1713 ANCHOR/SCREW BN/BN,TIS/BN: HCPCS | Performed by: ORTHOPAEDIC SURGERY

## 2024-11-21 PROCEDURE — 76000 FLUOROSCOPY <1 HR PHYS/QHP: CPT

## 2024-11-21 PROCEDURE — 7100000001 HC RECOVERY ROOM TIME - INITIAL BASE CHARGE: Performed by: ORTHOPAEDIC SURGERY

## 2024-11-21 PROCEDURE — 2500000004 HC RX 250 GENERAL PHARMACY W/ HCPCS (ALT 636 FOR OP/ED): Performed by: ANESTHESIOLOGY

## 2024-11-21 PROCEDURE — A6213 FOAM DRG >16<=48 SQ IN W/BDR: HCPCS | Performed by: ORTHOPAEDIC SURGERY

## 2024-11-21 PROCEDURE — 2720000007 HC OR 272 NO HCPCS: Performed by: ORTHOPAEDIC SURGERY

## 2024-11-21 PROCEDURE — 2500000004 HC RX 250 GENERAL PHARMACY W/ HCPCS (ALT 636 FOR OP/ED): Performed by: ORTHOPAEDIC SURGERY

## 2024-11-21 DEVICE — IMPLANTABLE DEVICE: Type: IMPLANTABLE DEVICE | Site: HUMERUS | Status: FUNCTIONAL

## 2024-11-21 DEVICE — IMPLANTABLE DEVICE
Type: IMPLANTABLE DEVICE | Site: HUMERUS | Status: FUNCTIONAL
Brand: CERAMENT™BONE VOID FILLER

## 2024-11-21 RX ORDER — SODIUM CHLORIDE 0.9 G/100ML
IRRIGANT IRRIGATION AS NEEDED
Status: DISCONTINUED | OUTPATIENT
Start: 2024-11-21 | End: 2024-11-21 | Stop reason: HOSPADM

## 2024-11-21 RX ORDER — HYDROMORPHONE HYDROCHLORIDE 0.2 MG/ML
0.2 INJECTION INTRAMUSCULAR; INTRAVENOUS; SUBCUTANEOUS EVERY 5 MIN PRN
Status: DISCONTINUED | OUTPATIENT
Start: 2024-11-21 | End: 2024-11-21 | Stop reason: HOSPADM

## 2024-11-21 RX ORDER — ONDANSETRON HYDROCHLORIDE 2 MG/ML
INJECTION, SOLUTION INTRAVENOUS AS NEEDED
Status: DISCONTINUED | OUTPATIENT
Start: 2024-11-21 | End: 2024-11-21

## 2024-11-21 RX ORDER — LIDOCAINE HYDROCHLORIDE 10 MG/ML
0.1 INJECTION, SOLUTION INFILTRATION; PERINEURAL ONCE
Status: DISCONTINUED | OUTPATIENT
Start: 2024-11-21 | End: 2024-11-21 | Stop reason: HOSPADM

## 2024-11-21 RX ORDER — SODIUM CHLORIDE, SODIUM LACTATE, POTASSIUM CHLORIDE, CALCIUM CHLORIDE 600; 310; 30; 20 MG/100ML; MG/100ML; MG/100ML; MG/100ML
100 INJECTION, SOLUTION INTRAVENOUS CONTINUOUS
Status: DISCONTINUED | OUTPATIENT
Start: 2024-11-21 | End: 2024-11-21 | Stop reason: HOSPADM

## 2024-11-21 RX ORDER — PANTOPRAZOLE SODIUM 40 MG/1
40 TABLET, DELAYED RELEASE ORAL
Status: DISCONTINUED | OUTPATIENT
Start: 2024-11-22 | End: 2024-11-26 | Stop reason: HOSPADM

## 2024-11-21 RX ORDER — ROCURONIUM BROMIDE 10 MG/ML
INJECTION, SOLUTION INTRAVENOUS AS NEEDED
Status: DISCONTINUED | OUTPATIENT
Start: 2024-11-21 | End: 2024-11-21

## 2024-11-21 RX ORDER — VANCOMYCIN HYDROCHLORIDE 1 G/200ML
1000 INJECTION, SOLUTION INTRAVENOUS EVERY 24 HOURS
Status: DISCONTINUED | OUTPATIENT
Start: 2024-11-21 | End: 2024-11-22

## 2024-11-21 RX ORDER — ASPIRIN 81 MG/1
81 TABLET ORAL DAILY
Status: DISCONTINUED | OUTPATIENT
Start: 2024-11-22 | End: 2024-11-22

## 2024-11-21 RX ORDER — ASPIRIN 81 MG/1
81 TABLET ORAL 2 TIMES DAILY
Status: DISCONTINUED | OUTPATIENT
Start: 2024-11-22 | End: 2024-11-26 | Stop reason: HOSPADM

## 2024-11-21 RX ORDER — OXYCODONE HYDROCHLORIDE 5 MG/1
5 TABLET ORAL ONCE
Status: DISCONTINUED | OUTPATIENT
Start: 2024-11-21 | End: 2024-11-21 | Stop reason: HOSPADM

## 2024-11-21 RX ORDER — VANCOMYCIN HYDROCHLORIDE 1 G/20ML
INJECTION, POWDER, LYOPHILIZED, FOR SOLUTION INTRAVENOUS AS NEEDED
Status: DISCONTINUED | OUTPATIENT
Start: 2024-11-21 | End: 2024-11-21 | Stop reason: HOSPADM

## 2024-11-21 RX ORDER — FENTANYL CITRATE 50 UG/ML
INJECTION, SOLUTION INTRAMUSCULAR; INTRAVENOUS AS NEEDED
Status: DISCONTINUED | OUTPATIENT
Start: 2024-11-21 | End: 2024-11-21

## 2024-11-21 RX ORDER — HYDROMORPHONE HYDROCHLORIDE 1 MG/ML
INJECTION, SOLUTION INTRAMUSCULAR; INTRAVENOUS; SUBCUTANEOUS AS NEEDED
Status: DISCONTINUED | OUTPATIENT
Start: 2024-11-21 | End: 2024-11-21

## 2024-11-21 RX ORDER — DIAZEPAM 5 MG/1
5 TABLET ORAL EVERY 4 HOURS PRN
Status: DISCONTINUED | OUTPATIENT
Start: 2024-11-21 | End: 2024-11-26 | Stop reason: HOSPADM

## 2024-11-21 RX ORDER — SUMATRIPTAN 50 MG/1
50 TABLET, FILM COATED ORAL ONCE AS NEEDED
Status: DISCONTINUED | OUTPATIENT
Start: 2024-11-21 | End: 2024-11-26 | Stop reason: HOSPADM

## 2024-11-21 RX ORDER — NALOXONE HYDROCHLORIDE 4 MG/.1ML
4 SPRAY NASAL AS NEEDED
Status: DISCONTINUED | OUTPATIENT
Start: 2024-11-21 | End: 2024-11-22

## 2024-11-21 RX ORDER — FLUTICASONE PROPIONATE 50 MCG
1 SPRAY, SUSPENSION (ML) NASAL DAILY
Status: DISCONTINUED | OUTPATIENT
Start: 2024-11-21 | End: 2024-11-26 | Stop reason: HOSPADM

## 2024-11-21 RX ORDER — PROPOFOL 10 MG/ML
INJECTION, EMULSION INTRAVENOUS AS NEEDED
Status: DISCONTINUED | OUTPATIENT
Start: 2024-11-21 | End: 2024-11-21

## 2024-11-21 RX ORDER — ONDANSETRON 4 MG/1
4 TABLET, ORALLY DISINTEGRATING ORAL EVERY 8 HOURS PRN
Status: DISCONTINUED | OUTPATIENT
Start: 2024-11-21 | End: 2024-11-26 | Stop reason: HOSPADM

## 2024-11-21 RX ORDER — LIDOCAINE 560 MG/1
1 PATCH PERCUTANEOUS; TOPICAL; TRANSDERMAL DAILY
Status: DISCONTINUED | OUTPATIENT
Start: 2024-11-21 | End: 2024-11-26 | Stop reason: HOSPADM

## 2024-11-21 RX ORDER — PHENYLEPHRINE HCL IN 0.9% NACL 0.4MG/10ML
SYRINGE (ML) INTRAVENOUS AS NEEDED
Status: DISCONTINUED | OUTPATIENT
Start: 2024-11-21 | End: 2024-11-21

## 2024-11-21 RX ORDER — MIDAZOLAM HYDROCHLORIDE 1 MG/ML
INJECTION INTRAMUSCULAR; INTRAVENOUS AS NEEDED
Status: DISCONTINUED | OUTPATIENT
Start: 2024-11-21 | End: 2024-11-21

## 2024-11-21 RX ORDER — LIDOCAINE HCL/PF 100 MG/5ML
SYRINGE (ML) INTRAVENOUS AS NEEDED
Status: DISCONTINUED | OUTPATIENT
Start: 2024-11-21 | End: 2024-11-21

## 2024-11-21 RX ORDER — VANCOMYCIN HYDROCHLORIDE 1 G/20ML
INJECTION, POWDER, LYOPHILIZED, FOR SOLUTION INTRAVENOUS DAILY PRN
Status: DISCONTINUED | OUTPATIENT
Start: 2024-11-21 | End: 2024-11-22

## 2024-11-21 RX ORDER — ACETAMINOPHEN 325 MG/1
650 TABLET ORAL ONCE
Status: DISCONTINUED | OUTPATIENT
Start: 2024-11-21 | End: 2024-11-21 | Stop reason: HOSPADM

## 2024-11-21 RX ORDER — OXYBUTYNIN CHLORIDE 5 MG/1
5 TABLET ORAL 2 TIMES DAILY
Status: DISCONTINUED | OUTPATIENT
Start: 2024-11-21 | End: 2024-11-26 | Stop reason: HOSPADM

## 2024-11-21 RX ORDER — CEFAZOLIN 1 G/1
INJECTION, POWDER, FOR SOLUTION INTRAVENOUS AS NEEDED
Status: DISCONTINUED | OUTPATIENT
Start: 2024-11-21 | End: 2024-11-21

## 2024-11-21 RX ADMIN — HYDROMORPHONE HYDROCHLORIDE 0.5 MG: 1 INJECTION, SOLUTION INTRAMUSCULAR; INTRAVENOUS; SUBCUTANEOUS at 18:02

## 2024-11-21 RX ADMIN — HYDROMORPHONE HYDROCHLORIDE 0.2 MG: 1 INJECTION, SOLUTION INTRAMUSCULAR; INTRAVENOUS; SUBCUTANEOUS at 11:39

## 2024-11-21 RX ADMIN — OXYCODONE HYDROCHLORIDE 10 MG: 5 TABLET ORAL at 22:26

## 2024-11-21 RX ADMIN — Medication 240 ML: at 05:30

## 2024-11-21 RX ADMIN — VANCOMYCIN HYDROCHLORIDE 1000 MG: 1 INJECTION, SOLUTION INTRAVENOUS at 22:37

## 2024-11-21 RX ADMIN — HYDROMORPHONE HYDROCHLORIDE 0.5 MG: 1 INJECTION, SOLUTION INTRAMUSCULAR; INTRAVENOUS; SUBCUTANEOUS at 19:10

## 2024-11-21 RX ADMIN — Medication 240 ML: at 00:07

## 2024-11-21 RX ADMIN — FLUTICASONE PROPIONATE 1 SPRAY: 50 SPRAY, METERED NASAL at 22:38

## 2024-11-21 RX ADMIN — OXYBUTYNIN CHLORIDE 5 MG: 5 TABLET ORAL at 22:36

## 2024-11-21 RX ADMIN — CYCLOBENZAPRINE 10 MG: 10 TABLET, FILM COATED ORAL at 00:06

## 2024-11-21 RX ADMIN — HYDROMORPHONE HYDROCHLORIDE 0.2 MG: 1 INJECTION, SOLUTION INTRAMUSCULAR; INTRAVENOUS; SUBCUTANEOUS at 08:39

## 2024-11-21 RX ADMIN — ACETAMINOPHEN 650 MG: 325 TABLET ORAL at 11:38

## 2024-11-21 RX ADMIN — DIAZEPAM 5 MG: 5 TABLET ORAL at 05:29

## 2024-11-21 RX ADMIN — ACETAMINOPHEN 650 MG: 325 TABLET ORAL at 05:29

## 2024-11-21 RX ADMIN — OXYCODONE HYDROCHLORIDE 10 MG: 5 TABLET ORAL at 00:06

## 2024-11-21 RX ADMIN — DIAZEPAM 5 MG: 5 TABLET ORAL at 00:47

## 2024-11-21 RX ADMIN — DILTIAZEM HYDROCHLORIDE 180 MG: 180 CAPSULE, COATED, EXTENDED RELEASE ORAL at 10:40

## 2024-11-21 RX ADMIN — LIDOCAINE 1 PATCH: 4 PATCH TOPICAL at 22:37

## 2024-11-21 RX ADMIN — OXYCODONE HYDROCHLORIDE 10 MG: 5 TABLET ORAL at 10:41

## 2024-11-21 RX ADMIN — OXYCODONE HYDROCHLORIDE 10 MG: 5 TABLET ORAL at 04:01

## 2024-11-21 RX ADMIN — ACETAMINOPHEN 650 MG: 325 TABLET ORAL at 00:06

## 2024-11-21 RX ADMIN — HYDROMORPHONE HYDROCHLORIDE 0.5 MG: 1 INJECTION, SOLUTION INTRAMUSCULAR; INTRAVENOUS; SUBCUTANEOUS at 17:53

## 2024-11-21 SDOH — ECONOMIC STABILITY: FOOD INSECURITY: WITHIN THE PAST 12 MONTHS, THE FOOD YOU BOUGHT JUST DIDN'T LAST AND YOU DIDN'T HAVE MONEY TO GET MORE.: PATIENT DECLINED

## 2024-11-21 SDOH — SOCIAL STABILITY: SOCIAL INSECURITY
WITHIN THE LAST YEAR, HAVE YOU BEEN RAPED OR FORCED TO HAVE ANY KIND OF SEXUAL ACTIVITY BY YOUR PARTNER OR EX-PARTNER?: PATIENT DECLINED

## 2024-11-21 SDOH — SOCIAL STABILITY: SOCIAL INSECURITY
WITHIN THE LAST YEAR, HAVE YOU BEEN KICKED, HIT, SLAPPED, OR OTHERWISE PHYSICALLY HURT BY YOUR PARTNER OR EX-PARTNER?: PATIENT DECLINED

## 2024-11-21 SDOH — ECONOMIC STABILITY: INCOME INSECURITY
IN THE PAST 12 MONTHS HAS THE ELECTRIC, GAS, OIL, OR WATER COMPANY THREATENED TO SHUT OFF SERVICES IN YOUR HOME?: PATIENT DECLINED

## 2024-11-21 SDOH — ECONOMIC STABILITY: FOOD INSECURITY
WITHIN THE PAST 12 MONTHS, YOU WORRIED THAT YOUR FOOD WOULD RUN OUT BEFORE YOU GOT THE MONEY TO BUY MORE.: PATIENT DECLINED

## 2024-11-21 SDOH — ECONOMIC STABILITY: HOUSING INSECURITY: IN THE PAST 12 MONTHS, HOW MANY TIMES HAVE YOU MOVED WHERE YOU WERE LIVING?: 0

## 2024-11-21 SDOH — ECONOMIC STABILITY: HOUSING INSECURITY: IN THE LAST 12 MONTHS, WAS THERE A TIME WHEN YOU WERE NOT ABLE TO PAY THE MORTGAGE OR RENT ON TIME?: YES

## 2024-11-21 SDOH — SOCIAL STABILITY: SOCIAL INSECURITY: WITHIN THE LAST YEAR, HAVE YOU BEEN AFRAID OF YOUR PARTNER OR EX-PARTNER?: PATIENT DECLINED

## 2024-11-21 SDOH — ECONOMIC STABILITY: HOUSING INSECURITY: AT ANY TIME IN THE PAST 12 MONTHS, WERE YOU HOMELESS OR LIVING IN A SHELTER (INCLUDING NOW)?: NO

## 2024-11-21 SDOH — HEALTH STABILITY: MENTAL HEALTH: CURRENT SMOKER: 1

## 2024-11-21 SDOH — ECONOMIC STABILITY: TRANSPORTATION INSECURITY: IN THE PAST 12 MONTHS, HAS LACK OF TRANSPORTATION KEPT YOU FROM MEDICAL APPOINTMENTS OR FROM GETTING MEDICATIONS?: YES

## 2024-11-21 SDOH — ECONOMIC STABILITY: FOOD INSECURITY: HOW HARD IS IT FOR YOU TO PAY FOR THE VERY BASICS LIKE FOOD, HOUSING, MEDICAL CARE, AND HEATING?: VERY HARD

## 2024-11-21 SDOH — SOCIAL STABILITY: SOCIAL INSECURITY
WITHIN THE LAST YEAR, HAVE YOU BEEN HUMILIATED OR EMOTIONALLY ABUSED IN OTHER WAYS BY YOUR PARTNER OR EX-PARTNER?: PATIENT DECLINED

## 2024-11-21 ASSESSMENT — COGNITIVE AND FUNCTIONAL STATUS - GENERAL
WALKING IN HOSPITAL ROOM: A LITTLE
STANDING UP FROM CHAIR USING ARMS: A LITTLE
MOBILITY SCORE: 20
DRESSING REGULAR LOWER BODY CLOTHING: A LITTLE
CLIMB 3 TO 5 STEPS WITH RAILING: A LITTLE
HELP NEEDED FOR BATHING: A LITTLE
TOILETING: A LITTLE
PERSONAL GROOMING: A LITTLE
DRESSING REGULAR UPPER BODY CLOTHING: A LOT
DAILY ACTIVITIY SCORE: 17
EATING MEALS: A LITTLE
MOVING TO AND FROM BED TO CHAIR: A LITTLE

## 2024-11-21 ASSESSMENT — PAIN SCALES - GENERAL
PAINLEVEL_OUTOF10: 8
PAINLEVEL_OUTOF10: 7
PAINLEVEL_OUTOF10: 9
PAINLEVEL_OUTOF10: 8
PAINLEVEL_OUTOF10: 9
PAINLEVEL_OUTOF10: 8
PAIN_LEVEL: 0
PAINLEVEL_OUTOF10: 8
PAINLEVEL_OUTOF10: 9
PAINLEVEL_OUTOF10: 10 - WORST POSSIBLE PAIN
PAINLEVEL_OUTOF10: 9
PAINLEVEL_OUTOF10: 10 - WORST POSSIBLE PAIN
PAINLEVEL_OUTOF10: 10 - WORST POSSIBLE PAIN
PAINLEVEL_OUTOF10: 8
PAINLEVEL_OUTOF10: 10 - WORST POSSIBLE PAIN
PAINLEVEL_OUTOF10: 7
PAINLEVEL_OUTOF10: 8
PAINLEVEL_OUTOF10: 10 - WORST POSSIBLE PAIN

## 2024-11-21 ASSESSMENT — PAIN - FUNCTIONAL ASSESSMENT
PAIN_FUNCTIONAL_ASSESSMENT: 0-10
PAIN_FUNCTIONAL_ASSESSMENT: UNABLE TO SELF-REPORT
PAIN_FUNCTIONAL_ASSESSMENT: UNABLE TO SELF-REPORT
PAIN_FUNCTIONAL_ASSESSMENT: 0-10
PAIN_FUNCTIONAL_ASSESSMENT: UNABLE TO SELF-REPORT
PAIN_FUNCTIONAL_ASSESSMENT: 0-10
PAIN_FUNCTIONAL_ASSESSMENT: UNABLE TO SELF-REPORT
PAIN_FUNCTIONAL_ASSESSMENT: 0-10

## 2024-11-21 ASSESSMENT — ACTIVITIES OF DAILY LIVING (ADL): LACK_OF_TRANSPORTATION: YES

## 2024-11-21 ASSESSMENT — PAIN DESCRIPTION - LOCATION: LOCATION: SHOULDER

## 2024-11-21 NOTE — PROGRESS NOTES
"Orthopaedic Surgery Progress Note    S:  No acute events overnight. Still endorsing significant L shoulder pain that is now better controlled. Denies chest pain, shortness of breath, or fevers.    O:  /73 (BP Location: Right arm, Patient Position: Lying)   Pulse 68   Temp 36.6 °C (97.9 °F) (Temporal)   Resp 17   Ht 1.549 m (5' 1\")   Wt 54.4 kg (120 lb)   SpO2 93%   BMI 22.67 kg/m²     Gen: arousable, NAD, appropriately conversational  Cardiac: RRR to peripheral palpation  Resp: nonlabored on RA  GI: soft, nondistended    MSK:  Left upper extremity:  - Closed injury  - Significant ecchymosis of LUE   - Motor and sensation intact M/U/R distributions  - Palpable radial pulse  - Cap refill < 2 seconds in all digits    A/P:   Orthopaedic Problems/Injuries: L proximal humerus fx (Presented early for OR d/t uncontrolled pain)       69F (Hx of mitral valve replacement, COPD) had a fall on 11/14 sustaining above. Presented to Dr. Roberts’s clinic, surgery planned for 11/22. Presenting to ED d/t uncontrolled pain. On exam NVI, compartments soft and compressible, significant ecchymosis of LUE. Sling     Plan:  - Consented and posted to OR schedule for ORIF L proximal humerus w/ Dr. Roberts on 11/20   - Weight bearing: NWB LUE in sling   - DVT ppx: SCDs, hold chemoppx for OR  - Diet: NPO  - Pain: Tylenol, oxycodone 5/10, dilaudid breakthrough  - Antibiotics: perioperative ancef  - Lines: maintain PIVx2 while inpatient  - Bowel Regimen: Colace, senna, dulcolax  - PT/OT: consulted  - Pulm: Encourage IS, maintain O2 sat >92%  - Cardiac: no issues  - Glycemic: no issues  - Continue home medications       Zoran Le MD  Orthopaedic Surgery PGY-2   Epic Chat preferred     Please page [phone] (on-call pager) on weekends and between 6p-7a on weekdays.  _________________________________________________________     While inpatient, this patient will be followed by the Orthopaedic trauma team. Please see contact " information below:     1st call: Dorcas Tapia, PGY-1  2nd call: Zorna Le, PGY-2  3rd call: Yoel Nguyen, PGY-3     Please call the ortho pager [phone]) on weekends and between 6p-7a on weekdays.

## 2024-11-21 NOTE — H&P
[facility] Department of Orthopaedic Surgery   History & Physical    HPI:     Orthopaedic Problems/Injuries: L proximal humerus fx (Presented early for OR d/t uncontrolled pain)   Other Injuries: NA     69F (Hx of mitral valve replacement, COPD, smoker) had a fall on 11/14 sustaining above. Presented to Dr. Roberts’s clinic, surgery planned for 11/22. Presenting to ED d/t uncontrolled pain. On exam NVI, compartments soft and compressible, significant ecchymosis of LUE. Sling    PMH: per above/EMR  PSH: per above/EMR  SocHx:      - 1/2 PPD tobacco use      -  Denies EtOH use      -  Denies other drug use  FamHx:  Non-contributory to this patient's acute orthopaedic problem.   Allergies: Reviewed in EMR  Meds: Reviewed in EMR    ROS  12-point review of systems is negative other than what is mentioned above.    Physical Exam:  Gen: AOx3, NAD  HEENT: normocephalic, atraumatic  Psych: appropriate mood and affect  Resp: nonlabored breathing  Cardiac: Extremities WWP, regular rate on peripheral palpation  Neuro: moves all extremities spontaneously   Skin: no rashes  MSK:   Left upper extremity:  - Closed injury  - Significant ecchymosis of LUE   - Motor and sensation intact M/U/R distributions  - Palpable radial pulse  - Cap refill < 2 seconds in all digits      A full secondary exam was performed and all relevant findings discussed and noted above.    Imaging:    Prior imaging with fracture of L humerus surgical neck, and greater tuberosity    Assessment:  Orthopaedic Problems/Injuries: L proximal humerus fx (Presented early for OR d/t uncontrolled pain)      69F (Hx of mitral valve replacement, COPD) had a fall on 11/14 sustaining above. Presented to Dr. Roberts’s clinic, surgery planned for 11/22. Presenting to ED d/t uncontrolled pain. On exam NVI, compartments soft and compressible, significant ecchymosis of LUE. Sling    Plan:  - Admit to ortho trauma  - Consented and posted to OR schedule for ORIF L  proximal humerus w/ Dr. Roberts on 11/20   - Weight bearing: NWB LUE in sling   - DVT ppx: SCDs, hold chemoppx for OR  - Diet: Clear liquids at MN, NPO at 5 am  - Pain: Tylenol, oxycodone 5/10, dilaudid breakthrough  - Antibiotics: perioperative ancef  - FEN: mIVF LR @ 100cc/hr; HLIV with good PO intake  - Lines: maintain PIVx2 while inpatient  - Bowel Regimen: Colace, senna, dulcolax  - PT/OT: consulted  - Pulm: Encourage IS, maintain O2 sat >92%  - Cardiac: no issues  - Glycemic: no issues  - Continue home medications    This patient was seen and evaluated within 30 minutes of initial consult.     Staffed and discussed with attending. Please don't hesitate to reach out with any questions.    Zoran Le MD  Orthopaedic Surgery PGY-2   Epic Chat preferred    Please page [phone] (on-call pager) on weekends and between 6p-7a on weekdays.  _________________________________________________________    While inpatient, this patient will be followed by the Orthopaedic trauma team. Please see contact information below:    1st call: Dorcas Tapia PGY-1  2nd call: Zoran Le PGY-2  3rd call: Yoel Nguyen PGY-3     Please call the ortho pager [phone]) on weekends and between 6p-7a on weekdays.

## 2024-11-21 NOTE — ANESTHESIA PREPROCEDURE EVALUATION
Patient: Judith Bush    Procedure Information       Anesthesia Start Date/Time: 11/21/24 1551    Procedure: Humerus Fracture ORIF Proximal (Left: Hand)    Location: [facility] OR 09 / Virtual [facility] [place] OR    Surgeons: Guillermo Roberts MD            Relevant Problems   Cardiac   (+) Hyperlipidemia   (+) Hypertension      Pulmonary   (+) COPD (chronic obstructive pulmonary disease) (Multi)   (+) SHON (obstructive sleep apnea)      Neuro   (+) Anxiety disorder   (+) Bipolar 1 disorder, mixed, mild (Multi)   (+) Bipolar affective disorder, rapid cycling (Multi)   (+) Cerebrovascular accident (CVA) (Multi)   (+) Lumbar radiculopathy       Clinical information reviewed:    Allergies      OB Status           NPO Detail:  NPO/Void Status  Carbohydrate Drink Given Prior to Surgery? : N  Date of Last Liquid: 11/20/24  Time of Last Liquid: 2359  Date of Last Solid: 11/20/24  Time of Last Solid: 2359  Last Intake Type: Clear fluids  Time of Last Void: 1300         Physical Exam    Airway  Mallampati: II  TM distance: >3 FB  Neck ROM: full     Cardiovascular - normal exam  Rhythm: regular  Rate: normal     Dental    Pulmonary - normal exam  Breath sounds clear to auscultation     Abdominal            Anesthesia Plan    History of general anesthesia?: yes  History of complications of general anesthesia?: no    ASA 3     general     The patient is a current smoker.  Patient did not smoke on day of procedure.    intravenous induction   Postoperative administration of opioids is intended.  Anesthetic plan and risks discussed with patient.    Plan discussed with attending.

## 2024-11-21 NOTE — ANESTHESIA POSTPROCEDURE EVALUATION
Patient: Judith Bush    Procedure Summary       Date: 11/21/24 Room / Location: [facility] OR 09 / Virtual [facility] OR    Anesthesia Start: 1551 Anesthesia Stop: 1753    Procedure: Humerus Fracture ORIF Proximal (Left: Hand) Diagnosis:       Closed fracture of proximal end of left humerus, unspecified fracture morphology, initial encounter      (Closed fracture of proximal end of left humerus, unspecified fracture morphology, initial encounter [S42.202A])    Surgeons: Guillermo Roberts MD Responsible Provider: Azam Carlson MD    Anesthesia Type: general ASA Status: 3            Anesthesia Type: general    Vitals Value Taken Time   /67 11/21/24 1750   Temp 36.1 11/21/24 1755   Pulse 77 11/21/24 1752   Resp 16 11/21/24 1752   SpO2 95 % 11/21/24 1752   Vitals shown include unfiled device data.    Anesthesia Post Evaluation    Patient location during evaluation: PACU  Patient participation: complete - patient participated  Level of consciousness: awake and alert  Pain score: 0  Pain management: adequate  Airway patency: patent  Cardiovascular status: acceptable  Respiratory status: acceptable and room air  Hydration status: acceptable  Postoperative Nausea and Vomiting: none        There were no known notable events for this encounter.

## 2024-11-21 NOTE — OP NOTE
Humerus Fracture ORIF Proximal (L) Operative Note     Date: 2024 - 2024  OR Location: [facility] OR    Name: Judith Bush YOB: 1955, Age: 69 y.o., MRN: [id number], Sex: female    Diagnosis  Pre-op Diagnosis      * Closed fracture of proximal end of left humerus, unspecified fracture morphology, initial encounter [S4] Post-op Diagnosis     * Closed fracture of proximal end of left humerus, unspecified fracture morphology, initial encounter [S4]     Procedures  Left proximal Humerus Fracture ORIF       Surgeons      * Guillermo Roberts - Primary    Resident/Fellow/Other Assistant:  Surgeons and Role:     * Tito Tamayo MD - fellow    Staff:   Circulator: Marla Hollis Person: Vinh    Anesthesia Staff: Anesthesiologist: Azam Lenz DO; Azam Carlson MD  C-AA: LEIDA Raphael    Procedure Summary  Anesthesia: General  ASA: III  Estimated Blood Loss: 100mL  Intra-op Medications:   Administrations occurring from 1535 to 1810 on 24:   Medication Name Total Dose   vancomycin (Vancocin) vial for injection 1 g   sodium chloride 0.9 % irrigation solution 2,000 mL   ceFAZolin (Ancef) vial 1 g 2 g   dexAMETHasone (Decadron) injection 4 mg/mL 8 mg   fentaNYL (Sublimaze) injection 50 mcg/mL 100 mcg   HYDROmorphone (Dilaudid) injection 1 mg/mL 1 mg   LR bolus Cannot be calculated   lidocaine (cardiac) injection 2% prefilled syringe 100 mg   midazolam PF (Versed) injection 1 mg/mL 2 mg   ondansetron (Zofran) 2 mg/mL injection 4 mg   phenylephrine 40 mcg/mL syringe 10 mL 120 mcg   propofol (Diprivan) injection 10 mg/mL 200 mg   rocuronium (ZeMuron) 50 mg/5 mL injection 100 mg   sugammadex (Bridion) 200 mg/2 mL injection 200 mg              Anesthesia Record               Intraprocedure I/O Totals       None           Specimen: No specimens collected              Drains and/or Catheters: * None in log *    Tourniquet Times:         Implants:   Nothing was implanted  during the procedure    Findings: Closed, displaced right proximal humerus fracture    Indications: Judith Bush is an 69 y.o. female who is having surgery for Closed fracture of proximal end of left humerus, unspecified fracture morphology, initial encounter [S42.202A].  The patient sustained a ground-level fall approximately 3 days ago sustaining the injury noted above.  There were no other injuries as result the incident.  Initially seen at an outside facility and scheduled for surgical intervention, however, she again reported to an outside facility yesterday and was then transferred to [facility] for surgical treatment.    The patient was seen in the preoperative area. The risks, benefits, complications, treatment options, non-operative alternatives, expected recovery and outcomes were discussed with the patient. The possibilities of reaction to medication, pulmonary aspiration, injury to surrounding structures, bleeding, recurrent infection, the need for additional procedures, failure to diagnose a condition, and creating a complication requiring transfusion or operation were discussed with the patient. The patient concurred with the proposed plan, giving informed consent.  The site of surgery was properly noted/marked if necessary per policy. The patient has been actively warmed in preoperative area. Preoperative antibiotics have been ordered and given within 1 hours of incision. Venous thrombosis prophylaxis have been ordered including bilateral sequential compression devices    Procedure Details: The patient was subsequently taken back to the operating room where initial timeout was performed including patient's name, date of birth, MRN, procedure to be performed, correct laterality which were agreed upon by the entire surgical staff.  Patient was then intubated in the supine position as per anesthesia protocol.  Next she was transition to the operating room table where she was maintained in supine position  with all bony prominences well-padded.  A small bump was placed under the ipsilateral scapula.  A preliminary alcohol scrub was performed.  ChloraPrep was then used.  She was then draped in usual sterile fashion.    A preincision surgical pause performed ensuring administration of IV antibiotics and TXA.  We identified the anterolateral aspect of the acromion and made an approximately 8 cm incision just posterior to the anterolateral corner of the acromion in line with the deltoid fibers using a 10 blade.  Electrocautery was then used to dissect the deeper tissues.  The axillary nerve was identified and protected throughout the entirety of the case.  Once the raphae between the anterior and middle heads of the deltoid was identified we then used this interval to access our fracture site.  The fracture edges were identified and subsequently debrided for an appropriate cortical read.  A FiberWire was passed through the anterior aspect of the rotator cuff and a second FiberWire was passed through the posterior aspect of the rotator cuff.  Using manual reduction maneuvers we were able to obtain an appropriate reduction under fluoroscopy.  This was held using K wires provisionally.  Next we applied a proximal humeral plate that was also secured via K wires.  The preliminary placement of the plate was checked on fluoroscopy and we were overall happy with our reduction as well as plate placement.  We for started by placing a nonlocking screw in the shaft of the humerus to secure a plate.  Next locking screws were placed in the head of the humerus.  2 calcar screws were then placed which were both locking as well.  We completed our overall fixation using locking nonlocking screws.  This was again evaluated under fluoroscopy and we are overall happy with our screw placement overall reduction.  All screws were noted to be extra-articular.  Next, bone void filler was placed into the fracture site and this was also confirmed  under fluoroscopy.  The rotator cuff was then sutured to the plate as an offloading stitch.  The incision was then irrigated using several liters of sterile saline.  Vancomycin and tobramycin powder were applied to the incision.  The deltoid fascia was closed using 0 PDS in a running fashion.  2-0 Monocryl was utilized to close the deep dermal layer.  A running 4-0 Monocryl was used to close the skin.  Dermabond was then applied.  A sterile Mepilex was then placed once the Dermabond was allowed adequate time to try.  The drapes taken down the counts were all correct x 2.  The patient was awakened in the supine position as per anesthesia protocol.  She was transitioned to the PACU in stable condition.    The patient be nonweightbearing to the operative extremity.  She may begin Codman's and pendulum exercises immediately.  She will begin physical therapy following our first postoperative visit.  She is to receive IV antibiotics 24 hours while in house.  Recommend 81 mg oral aspirin twice daily for DVT prophylaxis.  She will return to our clinic in 2 to 3 weeks for repeat evaluation, suture evaluation, and repeat radiographs of the right proximal humerus including AP, Scap Y, Grashey, and axillary lateral.    Complications:  None; patient tolerated the procedure well.    Disposition: PACU - hemodynamically stable.  Condition: stable             Task Performed by [facility] Assist or Physician Assistant:   Brielle CASTRO/STEWART, was necessary to assist on this case due to the nature of the case and difficulty.       Attending Attestation: I was present and scrubbed for the entire procedure.    Guillermo Allensophie  Phone Number: [phone]

## 2024-11-21 NOTE — ED PROVIDER NOTES
History of Present Illness     History provided by: Patient  Limitations to History: None  External Records Reviewed with Brief Summary: Outpatient progress note from 2024 which showed outpatient visit for humerus fracture    HPI:  Judith Bush is a 69 y.o. female with a past medical history of smoking, mitral regurg with a valve replacement who is presenting today for evaluation of the left humerus.  Patient was presenting after having worsening pain at home.  She reports that she was scheduled for surgery on .  Because of the worsening pain she came to [facility] to be admitted.  Sensation intact.  Good range of motion.  She does report having continued headache.  She did have a CT head that was done prior.    Physical Exam   Triage vitals:  T 36.7 °C (98.1 °F)  HR 76  /64  RR 18  O2 95 % Supplemental oxygen    General: Awake, alert, in no acute distress  Eyes: Gaze conjugate.  No scleral icterus or injection  HENT: Normo-cephalic, atraumatic. No stridor.  Bruising to the forehead.  CV: Regular rate, regular rhythm. Radial pulses 2+ bilaterally  Resp: Breathing non-labored, speaking in full sentences.  Clear to auscultation bilaterally  GI: Soft, non-distended, non-tender. No rebound or guarding.  MSK/Extremities: No gross bony deformities. Moving all extremities.  2+ radial pulses bilaterally.  Sensation and cap refill intact.  Resting comfortably in a sling.  Skin: Warm. Appropriate color  Neuro: Alert. Oriented. Face symmetric. Speech is fluent.  Gross strength and sensation intact in b/l UE and LEs  Psych: Appropriate mood and affect      Medical Decision Making & ED Course   Medical Decision Makin y.o. female  with a past medical history of smoking, mitral regurg with a valve replacement who is presenting today for evaluation of the left humerus.  Patient's vitals were stable on arrival.  Given concern for facial tenderness and headache a CT maxillofacial bones was added given that  patient just received a CT head at the last visit for the recurrent fall.  Patient was given pain meds for symptom control of her shoulder.  Ortho surgery was consulted.  Preoperative labs were obtained.  Lab work was independently reviewed and showed a anemia with no signs of bleeding.  Chest x-ray that was negative.  Patient was discussed with orthopedic surgery who accepted the patient to their service for admission  ----      Differential diagnoses considered include but are not limited to: Humerus fracture, facial fracture     Social Determinants of Health which Significantly Impact Care: None identified     EKG Independent Interpretation: EKG interpreted by myself. Please see ED Course and MDM for full interpretation.    Independent Result Review and Interpretation: Results were independently reviewed and interpreted by myself. Please see ED course and [facility] for full interpretation.    Chronic conditions affecting the patient's care: As documented in the MDM    The patient was discussed with the following consultants/services:  Ortho surgery regarding the humerus fracture    Care Considerations: As per [facility]    ED Course:  ED Course as of 11/20/24 2341   Wed Nov 20, 2024   1750 XR humerus left [MIRZA]   2031 ECG 12 Lead  ECG, per my read, with normal sinus rhythm, heart rate 82 bpm, normal axis, slightly prolonged QTc, otherwise normal intervals, T wave inversions in lead III and aVF, no ST segment abnormalities.  Compared to prior in August 2024, T wave inversions and QTc prolongation are new.  These findings are nonspecific in the setting of no acute chest pain. [MB]      ED Course User Index  [MIRZA] Marla Sanchez MD  [MB] Harmony Pemberton MD         Diagnoses as of 11/20/24 2341   Closed fracture of proximal end of left humerus, unspecified fracture morphology, initial encounter   Closed 3-part fracture of proximal humerus, left, initial encounter     Disposition   As a result of their workup, the patient will  require admission to the hospital.  The patient was informed of her diagnosis.  The patient was given the opportunity to ask questions and I answered them. The patient agreed to be admitted to the hospital.    Procedures   Procedures    Patient seen and discussed with ED attending physician.    Marla Sanchez MD  Emergency Medicine     Marla Sanchez MD  Resident  11/20/24 2722

## 2024-11-21 NOTE — ANESTHESIA PROCEDURE NOTES
Airway  Date/Time: 11/21/2024 4:00 PM  Urgency: elective      Staffing  Performed: ELIDA   Authorized by: Azam Lenz DO    Performed by: ELIDA Raphael  Patient location during procedure: OR    Indications and Patient Condition  Indications for airway management: anesthesia  Spontaneous ventilation: present  Sedation level: deep  Preoxygenated: yes  Mask difficulty assessment: 1 - vent by mask    Final Airway Details  Final airway type: endotracheal airway      Successful airway: ETT  Cuffed: yes   Successful intubation technique: direct laryngoscopy  Blade size: #3  ETT size (mm): 6.5  Cormack-Lehane Classification: grade I - full view of glottis  Measured from: lips  ETT to lips (cm): 21  Number of attempts at approach: 1  Ventilation between attempts: BVM

## 2024-11-21 NOTE — CARE PLAN
The clinical goals for the shift include well controlled pain      Problem: Pain - Adult  Goal: Verbalizes/displays adequate comfort level or baseline comfort level  Outcome: Progressing     Problem: Safety - Adult  Goal: Free from fall injury  Outcome: Progressing     Problem: Discharge Planning  Goal: Discharge to home or other facility with appropriate resources  Outcome: Progressing     Problem: Chronic Conditions and Co-morbidities  Goal: Patient's chronic conditions and co-morbidity symptoms are monitored and maintained or improved  Outcome: Progressing     Problem: Fall/Injury  Goal: Not fall by end of shift  Outcome: Progressing  Goal: Be free from injury by end of the shift  Outcome: Progressing  Goal: Verbalize understanding of personal risk factors for fall in the hospital  Outcome: Progressing  Goal: Verbalize understanding of risk factor reduction measures to prevent injury from fall in the home  Outcome: Progressing  Goal: Use assistive devices by end of the shift  Outcome: Progressing  Goal: Pace activities to prevent fatigue by end of the shift  Outcome: Progressing     Problem: Pain  Goal: Takes deep breaths with improved pain control throughout the shift  Outcome: Progressing  Goal: Turns in bed with improved pain control throughout the shift  Outcome: Progressing  Goal: Walks with improved pain control throughout the shift  Outcome: Progressing  Goal: Performs ADL's with improved pain control throughout shift  Outcome: Progressing  Goal: Participates in PT with improved pain control throughout the shift  Outcome: Progressing  Goal: Free from opioid side effects throughout the shift  Outcome: Progressing  Goal: Free from acute confusion related to pain meds throughout the shift  Outcome: Progressing

## 2024-11-21 NOTE — H&P
[facility] Department of Orthopaedic Surgery   Surgical History & Physical <30 Days    Reason for Surgery: L proximal humerus fx   Planned Procedure: ORIF L humerus    History & Physical Reviewed:  I have reviewed the History and Physical for obtained within the last 30 days. Relevant findings and updates are noted below:  No significant changes.    Home medications were reviewed with significant updates noted below:  No significant changes.    ERAS patient?: No    COVID-19 Risk Consent:   Surgeon has reviewed the key risks related to tesfaye COVID-19 and subsequent sequelae.     11/21/24 at 12:55 AM - Antonio Smith MD

## 2024-11-21 NOTE — CARE PLAN
The patient's goals for the shift include      The clinical goals for the shift include patient will have pain <7 during shift      Problem: Pain - Adult  Goal: Verbalizes/displays adequate comfort level or baseline comfort level  Outcome: Progressing

## 2024-11-22 ENCOUNTER — ANESTHESIA (OUTPATIENT)
Dept: OPERATING ROOM | Facility: HOSPITAL | Age: 69
End: 2024-11-22
Payer: COMMERCIAL

## 2024-11-22 LAB — VANCOMYCIN SERPL-MCNC: 7.8 UG/ML (ref 5–20)

## 2024-11-22 PROCEDURE — 36415 COLL VENOUS BLD VENIPUNCTURE: CPT

## 2024-11-22 PROCEDURE — 99223 1ST HOSP IP/OBS HIGH 75: CPT | Performed by: STUDENT IN AN ORGANIZED HEALTH CARE EDUCATION/TRAINING PROGRAM

## 2024-11-22 PROCEDURE — 2500000004 HC RX 250 GENERAL PHARMACY W/ HCPCS (ALT 636 FOR OP/ED): Performed by: STUDENT IN AN ORGANIZED HEALTH CARE EDUCATION/TRAINING PROGRAM

## 2024-11-22 PROCEDURE — 80202 ASSAY OF VANCOMYCIN: CPT

## 2024-11-22 PROCEDURE — 2500000001 HC RX 250 WO HCPCS SELF ADMINISTERED DRUGS (ALT 637 FOR MEDICARE OP): Performed by: STUDENT IN AN ORGANIZED HEALTH CARE EDUCATION/TRAINING PROGRAM

## 2024-11-22 PROCEDURE — 97165 OT EVAL LOW COMPLEX 30 MIN: CPT | Mod: GO

## 2024-11-22 PROCEDURE — 1100000001 HC PRIVATE ROOM DAILY

## 2024-11-22 PROCEDURE — 2500000002 HC RX 250 W HCPCS SELF ADMINISTERED DRUGS (ALT 637 FOR MEDICARE OP, ALT 636 FOR OP/ED): Performed by: STUDENT IN AN ORGANIZED HEALTH CARE EDUCATION/TRAINING PROGRAM

## 2024-11-22 PROCEDURE — 2500000001 HC RX 250 WO HCPCS SELF ADMINISTERED DRUGS (ALT 637 FOR MEDICARE OP)

## 2024-11-22 PROCEDURE — 64415 NJX AA&/STRD BRCH PLXS IMG: CPT | Performed by: STUDENT IN AN ORGANIZED HEALTH CARE EDUCATION/TRAINING PROGRAM

## 2024-11-22 RX ORDER — DOCUSATE SODIUM 100 MG/1
100 CAPSULE, LIQUID FILLED ORAL 2 TIMES DAILY PRN
Qty: 30 CAPSULE | Refills: 0 | Status: SHIPPED | OUTPATIENT
Start: 2024-11-22 | End: 2024-11-26

## 2024-11-22 RX ORDER — OXYCODONE HYDROCHLORIDE 5 MG/1
5 TABLET ORAL EVERY 4 HOURS PRN
Qty: 42 TABLET | Refills: 0 | Status: SHIPPED | OUTPATIENT
Start: 2024-11-22 | End: 2024-11-26

## 2024-11-22 RX ORDER — ACETAMINOPHEN 325 MG/1
650 TABLET ORAL EVERY 6 HOURS PRN
Qty: 120 TABLET | Refills: 0 | Status: SHIPPED | OUTPATIENT
Start: 2024-11-22 | End: 2024-11-26

## 2024-11-22 RX ORDER — ASPIRIN 81 MG/1
81 TABLET ORAL 2 TIMES DAILY
Qty: 84 TABLET | Refills: 0 | Status: SHIPPED | OUTPATIENT
Start: 2024-11-22 | End: 2024-11-26

## 2024-11-22 RX ORDER — NALOXONE HYDROCHLORIDE 0.4 MG/ML
0.4 INJECTION, SOLUTION INTRAMUSCULAR; INTRAVENOUS; SUBCUTANEOUS AS NEEDED
Status: DISCONTINUED | OUTPATIENT
Start: 2024-11-22 | End: 2024-11-26 | Stop reason: HOSPADM

## 2024-11-22 RX ORDER — OXYCODONE HYDROCHLORIDE 5 MG/1
10 TABLET ORAL EVERY 4 HOURS PRN
Status: DISCONTINUED | OUTPATIENT
Start: 2024-11-22 | End: 2024-11-26 | Stop reason: HOSPADM

## 2024-11-22 RX ADMIN — HYDROMORPHONE HYDROCHLORIDE 0.2 MG: 1 INJECTION, SOLUTION INTRAMUSCULAR; INTRAVENOUS; SUBCUTANEOUS at 03:04

## 2024-11-22 RX ADMIN — CYCLOBENZAPRINE 10 MG: 10 TABLET, FILM COATED ORAL at 20:08

## 2024-11-22 RX ADMIN — ACETAMINOPHEN 650 MG: 325 TABLET ORAL at 05:26

## 2024-11-22 RX ADMIN — OXYCODONE HYDROCHLORIDE 10 MG: 5 TABLET ORAL at 21:59

## 2024-11-22 RX ADMIN — ACETAMINOPHEN 650 MG: 325 TABLET ORAL at 00:28

## 2024-11-22 RX ADMIN — OXYCODONE HYDROCHLORIDE 10 MG: 5 TABLET ORAL at 18:06

## 2024-11-22 RX ADMIN — DIAZEPAM 5 MG: 5 TABLET ORAL at 03:04

## 2024-11-22 RX ADMIN — Medication 240 ML: at 11:42

## 2024-11-22 RX ADMIN — SENNOSIDES AND DOCUSATE SODIUM 2 TABLET: 50; 8.6 TABLET ORAL at 20:04

## 2024-11-22 RX ADMIN — ASPIRIN 81 MG: 81 TABLET, COATED ORAL at 20:04

## 2024-11-22 RX ADMIN — DILTIAZEM HYDROCHLORIDE 180 MG: 180 CAPSULE, COATED, EXTENDED RELEASE ORAL at 08:46

## 2024-11-22 RX ADMIN — DIAZEPAM 5 MG: 5 TABLET ORAL at 17:18

## 2024-11-22 RX ADMIN — OXYBUTYNIN CHLORIDE 5 MG: 5 TABLET ORAL at 20:04

## 2024-11-22 RX ADMIN — ACETAMINOPHEN 650 MG: 325 TABLET ORAL at 17:19

## 2024-11-22 RX ADMIN — ACETAMINOPHEN 650 MG: 325 TABLET ORAL at 11:38

## 2024-11-22 RX ADMIN — PANTOPRAZOLE SODIUM 40 MG: 40 TABLET, DELAYED RELEASE ORAL at 06:29

## 2024-11-22 RX ADMIN — OXYCODONE HYDROCHLORIDE 10 MG: 5 TABLET ORAL at 05:26

## 2024-11-22 RX ADMIN — Medication 240 ML: at 00:31

## 2024-11-22 RX ADMIN — ASPIRIN 81 MG: 81 TABLET, COATED ORAL at 08:46

## 2024-11-22 RX ADMIN — Medication 240 ML: at 05:27

## 2024-11-22 RX ADMIN — OXYCODONE HYDROCHLORIDE 10 MG: 5 TABLET ORAL at 11:38

## 2024-11-22 RX ADMIN — Medication 240 ML: at 17:19

## 2024-11-22 RX ADMIN — HYDROMORPHONE HYDROCHLORIDE 0.2 MG: 1 INJECTION, SOLUTION INTRAMUSCULAR; INTRAVENOUS; SUBCUTANEOUS at 09:03

## 2024-11-22 ASSESSMENT — COGNITIVE AND FUNCTIONAL STATUS - GENERAL
HELP NEEDED FOR BATHING: A LITTLE
STANDING UP FROM CHAIR USING ARMS: A LITTLE
MOBILITY SCORE: 19
DRESSING REGULAR UPPER BODY CLOTHING: A LOT
CLIMB 3 TO 5 STEPS WITH RAILING: A LOT
WALKING IN HOSPITAL ROOM: A LITTLE
TOILETING: A LITTLE
DAILY ACTIVITIY SCORE: 18
HELP NEEDED FOR BATHING: A LITTLE
DAILY ACTIVITIY SCORE: 19
DRESSING REGULAR LOWER BODY CLOTHING: A LOT
DRESSING REGULAR UPPER BODY CLOTHING: A LOT
MOVING TO AND FROM BED TO CHAIR: A LITTLE
TOILETING: A LITTLE
DRESSING REGULAR LOWER BODY CLOTHING: A LITTLE

## 2024-11-22 ASSESSMENT — PAIN SCALES - GENERAL
PAINLEVEL_OUTOF10: 7
PAINLEVEL_OUTOF10: 8
PAINLEVEL_OUTOF10: 8
PAINLEVEL_OUTOF10: 9
PAINLEVEL_OUTOF10: 8
PAINLEVEL_OUTOF10: 9
PAINLEVEL_OUTOF10: 10 - WORST POSSIBLE PAIN
PAINLEVEL_OUTOF10: 8
PAINLEVEL_OUTOF10: 10 - WORST POSSIBLE PAIN
PAINLEVEL_OUTOF10: 8
PAINLEVEL_OUTOF10: 8
PAINLEVEL_OUTOF10: 10 - WORST POSSIBLE PAIN

## 2024-11-22 ASSESSMENT — ACTIVITIES OF DAILY LIVING (ADL)
BATHING_ASSISTANCE: MINIMAL
ADL_ASSISTANCE: INDEPENDENT

## 2024-11-22 ASSESSMENT — PAIN - FUNCTIONAL ASSESSMENT
PAIN_FUNCTIONAL_ASSESSMENT: 0-10

## 2024-11-22 NOTE — PROGRESS NOTES
I attempted to speak with Judith regarding discharge planning and home going need. Patient was very abrupt and rude ended the assessment. I will see patient at a later time or date.

## 2024-11-22 NOTE — CARE PLAN
The patient's goals for the shift include pt will rate pain 6/10 or less -progressing    The clinical goals for the shift include pt will remain safe and utilize call light -met

## 2024-11-22 NOTE — PROGRESS NOTES
Occupational Therapy    Evaluation    Patient Name: Judith Bush  MRN: [id number]  Today's Date: 11/22/2024  [facility]  Time Calculation  Start Time: 1045  Stop Time: 1105  Time Calculation (min): 20 min    Assessment  IP OT Assessment  OT Assessment: Pt presents w/ decreased activity tolerance, mobility, strength, ROM, and balance resulting in the need for increased assist w self care tasks, however deficts are likely exaserbated d/t pain. Pt would benefit from Low intensity skilled OT services and HH assist to allow for a safe and functional d/c.  Prognosis: Good  Barriers to Discharge: Decreased caregiver support  Evaluation/Treatment Tolerance: Patient limited by pain, Treatment limited secondary to agitation  Medical Staff Made Aware: Yes  End of Session Communication: PCT/NA/CTA  End of Session Patient Position: Bed, 3 rail up  Plan:  Inpatient Plan  Treatment Interventions: ADL retraining, UE strengthening/ROM, Functional transfer training, Equipment evaluation/education, Compensatory technique education  OT Frequency: 2 times per week  OT Discharge Recommendations: Low intensity level of continued care  OT Recommended Transfer Status: Assist of 1  OT - OK to Discharge: Yes  OT Assessment  OT Assessment Results: Decreased ADL status, Decreased safe judgment during ADL, Decreased upper extremity strength, Decreased upper extremity range of motion, Decreased endurance, Decreased functional mobility, Decreased IADLs  Prognosis: Good  Barriers to Discharge: Decreased caregiver support  Evaluation/Treatment Tolerance: Patient limited by pain, Treatment limited secondary to agitation  Medical Staff Made Aware: Yes    Subjective   Current Problem:  1. Closed 3-part fracture of proximal humerus, left, initial encounter        2. Closed fracture of proximal end of left humerus, unspecified fracture morphology, initial encounter  Case Request Operating Room: Humerus Fracture ORIF Proximal    Case Request  Operating Room: Humerus Fracture ORIF Proximal    aspirin 81 mg EC tablet    docusate sodium (Colace) 100 mg capsule    acetaminophen (Tylenol) 325 mg tablet    oxyCODONE (Roxicodone) 5 mg immediate release tablet        General:  Reason for Referral: This 70 y/o female s/p fall 11/14 resulting in proximal humeral fx w/ plan for ORIF, however pain was not able to tolerate pain resulting in her presented to ED 11/20. Pt now s/p ORIF 11/21 by Dr. Roberts.  Past Medical History Relevant to Rehab: Anxiety, bipolar s/o, R carpal tunnel, compression fx C spine, COPD, depression, CVA, HLD, HTN  Prior to Session Communication: PCT/NA/CTA  Patient Position Received: Bed, 3 rail up, Alarm on  Family/Caregiver Present: No  General Comment: Pt sitting at EOB on approach. Pt very agitated stating need to go to the bathroom.   Precautions:  UE Weight Bearing Status: Left Non-Weight Bearing  Medical Precautions: Fall precautions  Vital Signs:  Vital Signs (Past 2hrs)        Date/Time Vitals Session Patient Position Pulse Resp SpO2 BP MAP (mmHg)    11/22/24 1131 --  --  68  18  95 %  136/71  --                   Pain:  Pain Assessment  Pain Assessment: 0-10  0-10 (Numeric) Pain Score: 9  Pain Type: Surgical pain  Pain Location: Arm  Pain Orientation: Left    Objective   Cognition:  Orientation Level: Oriented X4  Insight: Within function limits  Impulsive: Mildly  Home Living:  Type of Home: House  Lives With: Alone  Home Adaptive Equipment: None  Home Layout: Two level, Bed/bath upstairs  Home Access: Stairs to enter without rails  Entrance Stairs-Rails: None  Entrance Stairs-Number of Steps: 4  Bathroom Shower/Tub: Walk-in shower  Bathroom Toilet: Standard  Bathroom Equipment: Shower chair with back   Prior Function:  Level of Bee: Independent with ADLs and functional transfers, Independent with homemaking with ambulation  ADL Assistance: Independent  Homemaking Assistance: Independent  Ambulatory Assistance:  Independent  Vocational: Full time employment ([employer])  Hand Dominance: Right  IADL History:  Current License: Yes  Mode of Transportation: Car  ADL:  Eating Assistance: Independent  Grooming Assistance: Stand by  Bathing Assistance: Minimal  UE Dressing Assistance: Moderate  LE Dressing Assistance: Moderate  Toileting Assistance with Device: Minimal  Activity Tolerance:  Endurance: Tolerates less than 10 min exercise, no significant change in vital signs  Bed Mobility/Transfers:  Bed Mobility  Bed Mobility: Yes  Bed Mobility 1  Bed Mobility 1: Supine to sitting, Sitting to supine  Level of Assistance 1: Distant supervision  Functional Mobility  Functional Mobility Performed: Yes  Functional Mobility 1  Surface 1: Level tile  Device 1: No device  Assistance 1: Contact guard  Comments 1: Min household distance   and Transfers  Transfer: Yes  Transfer 1  Transfer From 1: Sit to  Transfer to 1: Stand  Technique 1: Sit to stand, Stand to sit  Transfer Device 1:  (no AD)  Transfer Level of Assistance 1: Minimum assistance  IADL's:   Current License: Yes  Mode of Transportation: Car  Vision: Vision - Basic Assessment  Current Vision: Wears glasses all the time (States they have been lost during this hospital stay)  Sensation:  Sensation Comment: No apparent deficits  Coordination:  Movements are Fluid and Coordinated: Yes   Hand Function:  Hand Function  Gross Grasp: Functional  Coordination: Functional  Extremities: RUE   RUE : Within Functional Limits, LUE   LUE:  (Not assessed d/t pain),  Outcome Measures: [facility] Daily Activity  Putting on and taking off regular lower body clothing: A lot  Bathing (including washing, rinsing, drying): A little  Putting on and taking off regular upper body clothing: A lot  Toileting, which includes using toilet, bedpan or urinal: A little  Taking care of personal grooming such as brushing teeth: None  Eating Meals: None  Daily Activity - Total Score: 18         ,     OT Adult Other  Outcome Measures  4AT: 0    Education Documentation  Body Mechanics, taught by Roxy Serra OT at 11/22/2024 11:36 AM.  Learner: Patient  Readiness: Acceptance  Method: Explanation  Response: Verbalizes Understanding    Precautions, taught by Roxy Serra OT at 11/22/2024 11:36 AM.  Learner: Patient  Readiness: Acceptance  Method: Explanation  Response: Verbalizes Understanding    ADL Training, taught by Roxy Serra OT at 11/22/2024 11:36 AM.  Learner: Patient  Readiness: Acceptance  Method: Explanation  Response: Verbalizes Understanding    Education Comments  No comments found.        Goals:     Encounter Problems       Encounter Problems (Active)       ADLs       Patient will perform UB and LB bathing with modified independent level of assistance and shower chair.       Start:  11/22/24    Expected End:  12/06/24            Patient with complete upper body dressing with modified independent level of assistance donning and doffing all UE clothes with PRN adaptive equipment while edge of bed        Start:  11/22/24    Expected End:  12/06/24            Patient with complete lower body dressing with modified independent level of assistance donning and doffing all LE clothes  with PRN adaptive equipment while edge of bed        Start:  11/22/24    Expected End:  12/06/24            Patient will complete toileting including hygiene clothing management/hygiene with modified independent level of assistance and raised toilet seat.       Start:  11/22/24    Expected End:  12/06/24               COGNITION/SAFETY       Patient will recall and adhere to weight bearing and /or ROM restrictions with all ADL and functional mobility in order to promote healing and safety with functional tasks       Start:  11/22/24    Expected End:  12/06/24               MOBILITY       Patient will perform Functional mobility max Household distances/Community Distances with modified independent level of assistance and least restrictive  device in order to improve safety and functional mobility.       Start:  11/22/24    Expected End:  12/06/24               TRANSFERS       Patient will complete functional transfer to toilet with least restrictive device with modified independent level of assistance.       Start:  11/22/24    Expected End:  12/06/24 11/22/24 at 11:37 AM   ELVIS MILLER OT   Rehab Office: [phone]

## 2024-11-22 NOTE — CONSULTS
Judith Bush is a 69 y.o. year old female patient with history of HLD, HTN, COPD, SHON, bipolar 1 disorder, CVA,  who presented after a fall and now s/p L humerus fracture ORIF on 11/21 with Dr. Man. Acute Pain consulted for block for uncontrolled postoperative pain.     Current pain regimen:   - Tylenol 650 Q6  - Lidocaine patch  - Flexeril 10 TID PRN  - Valium 5mg Q4 PRN  - Dilaudid 0.2 Q3 PRN  - Oxy 5/10 Q6 PRN    Medication use in past 24hrs:  - Tylenol x3  - Flexeril x1  - Valium x1  - Dilaudid x4  - Oxy 10 x3    Past Medical History:   Diagnosis Date    Abdominal distension (gaseous) 08/13/2018    Anxiety     Bipolar disorder     Carpal tunnel syndrome, right upper limb 08/13/2018    Collagenous colitis 08/13/2018    Compression fracture of cervical spine     COPD (chronic obstructive pulmonary disease) (Multi)     Depression, unspecified 08/13/2018    Dermatitis, unspecified 07/09/2020    eczematoid    Dermatochalasis of unspecified eye, unspecified eyelid 10/25/2022    Displaced comminuted fracture of left patella, initial encounter for closed fracture 08/02/2018    Displaced comminuted fracture of right patella, initial encounter for closed fracture 08/13/2018    Dizziness     GERD (gastroesophageal reflux disease)     Headache     Hyperlipidemia     Hypertension     Hypokalemia 08/13/2018    Low back pain, unspecified 08/13/2018    Migraine     No blood products     Noninfective gastroenteritis and colitis, unspecified 08/02/2018    Chronic colitis    Nutritional deficiency, unspecified 08/02/2018    Poor diet    Old myocardial infarction 03/31/2014    NSTEMI    Other chest pain 08/28/2017    Atypical chest pain    Other fracture of right patella, sequela 08/13/2018    Patellar sleeve fracture, right, sequela    Other visual disturbances 04/04/2018    Blurred vision, bilateral    Pain in unspecified hip 03/31/2014    Pericarditis ([facility]) 2019    Pleural effusion     Polyosteoarthritis, unspecified  08/02/2018    Presence of intraocular lens 09/20/2018    Pseudophakia of right eye    Sleep apnea     Small intestinal bacterial overgrowth (SIBO)     Stroke (Multi) 04/2024    Tobacco abuse     Unspecified fall, sequela 08/13/2018        Past Surgical History:   Procedure Laterality Date    CARDIAC CATHETERIZATION      CATARACT EXTRACTION      CHOLECYSTECTOMY      COLONOSCOPY      CT ABDOMEN ANGIOGRAM W AND/OR WO IV CONTRAST  07/28/2022    CT ABDOMEN ANGIOGRAM W AND/OR WO IV CONTRAST 7/28/2022 [facility]    FEMUR FRACTURE SURGERY  07/16/2018    Femur Repair    FOOT SURGERY  09/13/2024    LEFT PROXIMAL  HALLUX PARTIAL EXCISION    HIP SURGERY      HYSTERECTOMY      MITRAL VALVE REPLACEMENT  08/07/2019    OTHER SURGICAL HISTORY  07/16/2018    Oophorectomy - Bilateral (Removal Of Both Ovaries)    OTHER SURGICAL HISTORY  05/27/2020    Radius fracture repair    SHOULDER SURGERY  07/16/2018    Shoulder Surgery    UPPER GASTROINTESTINAL ENDOSCOPY          Family History   Adopted: Yes   Problem Relation Name Age of Onset    Other (Adopted) Mother      Other (Adopted child) Father          Social History     Socioeconomic History    Marital status: Single     Spouse name: Not on file    Number of children: 0    Years of education: Not on file    Highest education level: Not on file   Occupational History    Not on file   Tobacco Use    Smoking status: Every Day     Current packs/day: 0.25     Average packs/day: 0.3 packs/day for 40.0 years (10.0 ttl pk-yrs)     Types: Cigarettes    Smokeless tobacco: Never   Substance and Sexual Activity    Alcohol use: Not Currently     Alcohol/week: 3.0 standard drinks of alcohol     Types: 3 Standard drinks or equivalent per week    Drug use: Yes     Types: Marijuana    Sexual activity: Not Currently   Other Topics Concern    Not on file   Social History Narrative    Not on file     Social Drivers of Health     Financial Resource Strain: High Risk (11/21/2024)    Overall  Financial Resource Strain (CARDIA)     Difficulty of Paying Living Expenses: Very hard   Food Insecurity: Patient Declined (11/21/2024)    Hunger Vital Sign     Worried About Running Out of Food in the Last Year: Patient declined     Ran Out of Food in the Last Year: Patient declined   Transportation Needs: Unmet Transportation Needs (11/21/2024)    PRAPARE - Transportation     Lack of Transportation (Medical): Yes     Lack of Transportation (Non-Medical): Yes   Physical Activity: Not on file   Stress: Not on file   Social Connections: Not on file   Intimate Partner Violence: Patient Declined (11/21/2024)    Humiliation, Afraid, Rape, and Kick questionnaire     Fear of Current or Ex-Partner: Patient declined     Emotionally Abused: Patient declined     Physically Abused: Patient declined     Sexually Abused: Patient declined   Housing Stability: High Risk (11/21/2024)    Housing Stability Vital Sign     Unable to Pay for Housing in the Last Year: Yes     Number of Times Moved in the Last Year: 0     Homeless in the Last Year: No        Allergies   Allergen Reactions    Cephalosporins Anaphylaxis    Penicillin Anaphylaxis    Penicillins Anaphylaxis and Unknown    Neomycin-Polymyxin-Gramicidin Rash         Review of Systems  Gen: No fatigue, anorexia, insomnia, fever.   Eyes: No vision loss, double vision, drainage, eye pain.   ENT: No pharyngitis, dry mouth, no hearing changes or ear discharge  Cardiac: No chest pain, palpitations, syncope, near syncope.   Pulmonary: No shortness of breath, cough, hemoptysis.   Heme/lymph: No swollen glands, fever, bleeding.   GI: No abdominal pain, change in bowel habits, melena, hematemesis, hematochezia, nausea, vomiting, diarrhea.   : No discharge, dysuria, frequency, urgency, hematuria.  Endo: No polyuria or weight loss.   Musculoskeletal: Negative for any pain or loss of ROM/weakness  Skin: No rashes or lesions  Neuro: Normal speech, no numbness or weakness. No gait  difficulties  Review of systems is otherwise negative unless stated above or in history of present illness.    Physical Exam:  Constitutional:  no distress, alert and cooperative  Eyes: clear sclera  Head/Neck: No apparent injury, trachea midline  Respiratory/Thorax: Patent airways, thorax symmetric, breathing comfortably  Cardiovascular: no pitting edema  Gastrointestinal: Nondistended  Musculoskeletal: ROM intact  Extremities: no clubbing  Neurological: alert, rosado x4  Psychological: Appropriate affect    Results for orders placed or performed during the hospital encounter of 11/20/24 (from the past 24 hours)   Vancomycin   Result Value Ref Range    Vancomycin 7.8 5.0 - 20.0 ug/mL     *Note: Due to a large number of results and/or encounters for the requested time period, some results have not been displayed. A complete set of results can be found in Results Review.      Judith Bush is a 69 y.o. year old female patient with history of HLD, HTN, COPD, SHON, bipolar 1 disorder, CVA,  who presented after a fall and now s/p L humerus fracture ORIF on 11/21 with Dr. Man. Acute Pain consulted for block for uncontrolled postoperative pain.     Recommendations:  - LEFT interscalene single shot blocks performed postperatively on 11/22  - Pain medications per primary team  - Will see tomorrow if still inpatient    Acute Pain Team  pg [phone] ph [phone].

## 2024-11-22 NOTE — CARE PLAN
The patient's goals for the shift include      The clinical goals for the shift include The patient's pain will be under control by end of shift    Over the shift, the patient did not make progress toward the following goals. Barriers to progression include . Recommendations to address these barriers include   Problem: Pain - Adult  Goal: Verbalizes/displays adequate comfort level or baseline comfort level  Outcome: Progressing     Problem: Safety - Adult  Goal: Free from fall injury  Outcome: Progressing     Problem: Discharge Planning  Goal: Discharge to home or other facility with appropriate resources  Outcome: Progressing     Problem: Chronic Conditions and Co-morbidities  Goal: Patient's chronic conditions and co-morbidity symptoms are monitored and maintained or improved  Outcome: Progressing     Problem: Fall/Injury  Goal: Not fall by end of shift  Outcome: Progressing  Goal: Be free from injury by end of the shift  Outcome: Progressing  Goal: Verbalize understanding of personal risk factors for fall in the hospital  Outcome: Progressing  Goal: Verbalize understanding of risk factor reduction measures to prevent injury from fall in the home  Outcome: Progressing  Goal: Use assistive devices by end of the shift  Outcome: Progressing  Goal: Pace activities to prevent fatigue by end of the shift  Outcome: Progressing     Problem: Pain  Goal: Takes deep breaths with improved pain control throughout the shift  Outcome: Progressing  Goal: Turns in bed with improved pain control throughout the shift  Outcome: Progressing  Goal: Walks with improved pain control throughout the shift  Outcome: Progressing  Goal: Performs ADL's with improved pain control throughout shift  Outcome: Progressing  Goal: Participates in PT with improved pain control throughout the shift  Outcome: Progressing  Goal: Free from opioid side effects throughout the shift  Outcome: Progressing  Goal: Free from acute confusion related to pain meds  throughout the shift  Outcome: Progressing   .

## 2024-11-22 NOTE — PROGRESS NOTES
Physical Therapy                 Therapy Communication Note    Patient Name: Judith Bush  MRN: [id number]  Department: [place] [address]  Room: 60/6066-A  Today's Date: 11/22/2024     Discipline: Physical Therapy    Missed Visit Reason: Other (Comment) (Pt reporting pain 10/10, and requesting PT to return after she gets pain block.  RN agreed to message PT when pt gets pain block.)    Missed Time: 959  Attempt

## 2024-11-22 NOTE — DISCHARGE INSTRUCTIONS
Follow-Up Instructions  You will need to be seen in clinic by Dr. Roberts in 2 weeks for a post-operative evaluation.     You will need to call and schedule an appointment, unless there is a previous appointment that appears on your discharge instructions.  The direct orthopaedic clinic appointment line phone number is [phone].  Please do not delay in calling to make this appointment.    You should also follow up with your primary care provider in 1-2 weeks.    Activity Restrictions  1) No driving until further instructed by your orthopaedic physician, which will be addressed at your outpatient appointments.    2) No driving or operating heavy machinery while taking narcotic pain medication.    3) Weight bearing status --> non weight bearing. Okay to begin Codman's and pendulum exercises.     Discharge Medications  You have been sent home with the following home medications: Oxycodone, Colace, and Aspirin.  Please wean yourself off the oxycodone, as tolerated. A good time to take the medication is before physical therapy sessions and bedtime. Colace is a stool softener to reduce the narcotic pain medications cause. Take it twice a day while taking narcotic pain medication to ensure you maintain your regular bowel movement frequency. Baby aspirin is taken twice daily to help reduce your risk of blood clots.    You should also take tylenol 650mg every 6 hours as needed to reduce the amount of oxycodone you need for pain.    Wound care instructions:   1) Leave operative dressing in place for 1 week. Then remove and leave incision open to air. Let water run freely over incision when showering, do not scrub. Do not soak in pool or tub.    2) Call if any drainage after 7 days, increased redness/warmth/swelling at incision site, abnormal pain/tenderness of the extremity, abnormal swelling of the extremity that does not respond to elevation, SOB/chest pain.

## 2024-11-22 NOTE — PROGRESS NOTES
Physical Therapy                 Therapy Communication Note    Patient Name: Judith Bush  MRN: [id number]  [facility]  Room: 6066/6066-A  Today's Date: 11/22/2024     Discipline: Physical Therapy    Missed Visit Reason:  (Per RN, pt had received pain meds, and may be agreeable to PT as she had gotten to bathroom earlier with RN and again later with OT.  Pt reporting pain meds not working and she does not want to get up and she's not discharging today anyway.)    Missed Time: 1145 Attempt

## 2024-11-22 NOTE — PROGRESS NOTES
Vancomycin Dosing by Pharmacy- INITIAL    Judith Bush is a 69 y.o. year old female who Pharmacy has been consulted for vancomycin dosing for other surgical prophylaxis . Based on the patient's indication and renal status this patient will be dosed based on a goal AUC of 400-600.     Renal function is currently stable.    Visit Vitals  /62   Pulse 75   Temp 36 °C (96.8 °F) (Temporal)   Resp 15        Lab Results   Component Value Date    CREATININE 1.07 (H) 2024    CREATININE 0.92 10/25/2024    CREATININE 0.81 2024    CREATININE 0.92 2024        Patient weight is as follows:   Vitals:    24 1424   Weight: 54 kg (119 lb 0.8 oz)       Cultures:  No results found for the encounter in last 14 days.        I/O last 3 completed shifts:  In: 365 (6.8 mL/kg) [P.O.:265; I.V.:100 (1.9 mL/kg)]  Out: 0 (0 mL/kg)   Weight: 54 kg   I/O during current shift:  I/O this shift:  In: 100 [I.V.:100]  Out: 0     Temp (24hrs), Av.3 °C (97.4 °F), Min:36 °C (96.8 °F), Max:36.8 °C (98.2 °F)         Assessment/Plan     Patient will not be given a loading dose.  Will initiate vancomycin maintenance,  1000 mg every 24 hours.    This dosing regimen is predicted by [employer] to result in the following pharmacokinetic parameters:    Regimen: 1000 mg IV every 24 hours.  Exposure target: AUC24 (range)400-600 mg/L.hr   [id number]: 342 mg/L.hr  AUC24,ss: 450 mg/L.hr  Probability of AUC24 > 400: 65 %  Ctrough,ss: 13.3 mg/L  Probability of Ctrough,ss > 20: 11 %      Follow-up level will be ordered on  at 1st am lab unless clinically indicated sooner.  Will continue to monitor renal function daily while on vancomycin and order serum creatinine at least every 48 hours if not already ordered.  Follow for continued vancomycin needs, clinical response, and signs/symptoms of toxicity.       Karolina Gomez, RowenaD

## 2024-11-22 NOTE — CONSULTS
Vancomycin Dosing by Pharmacy- Cessation of Therapy    Consult to pharmacy for vancomycin dosing has been discontinued by the prescriber, pharmacy will sign off at this time.    Please call pharmacy if there are further questions or re-enter a consult if vancomycin is resumed.     Paged [facility] surgery [id number] @ 0452 on 11/22/24 to confirm duration of treatment of prophylactic vancomycin. Per team, only wanted 24h coverage.  Patient received 1g q24h x1 vancomycin  on 11/21 at 2237    Cathleen Benedict, RowenaD

## 2024-11-22 NOTE — PROGRESS NOTES
"Orthopaedic Surgery Progress Note    Subjective:  No acute events overnight. Pain not well controlled on current pain regimen, dilaudid with minimal effect. Patient lives alone. Denies chest pain, shortness of breath, or fevers.    Objective:  /75   Pulse 55   Temp 37.3 °C (99.2 °F) (Temporal)   Resp 18   Ht 1.55 m (5' 1.02\")   Wt 54 kg (119 lb 0.8 oz)   SpO2 96%   BMI 22.48 kg/m²     Gen: arousable, NAD, appropriately conversational  Cardiac: RRR to peripheral palpation  Resp: nonlabored on RA  GI: soft, nondistended    MSK:  GEN - tearful at bedside due to pain  HEENT - atraumatic, anicteric sclera  CV - RRR by peripheral palpation, limbs wwp  PULM - unlabored breathing on RA  NEURO - Moving all extremities spontaneously  PSYCH - Appropriate mood and affect    LUE:   -diffuse ecchymosis L shoulder   - Post-operative dressing in place without strikethrough bleeding. Sling.   -Motor intact in axillary/AIN/PIN/ulnar distributions  -SILT axillary/radial/median/ulnar distributions  -Hand wwp, 2+ radial pulse, cap refill brisk  -Compartments soft and compressible, no pain with passive stretch of digits      No results found. However, due to the size of the patient record, not all encounters were searched. Please check Results Review for a complete set of results.    CT shoulder left wo IV contrast   Final Result   1. Extensively comminuted acute fracture of the left proximal humerus   involving the surgical neck, greater tuberosity, lesser tuberosity   and articular surface. Moderate impaction of fragments and medial   displacement of the shaft relative to the head.   2. Moderate sized left shoulder hemarthrosis.             I personally reviewed the image(s)/study and resident interpretation   as stated by Dr. Nidhi Potter MD. I agree with the findings as   stated. This study was interpreted at [facility], [place], [place].        MACRO:   None        Signed by: " Flor Lee 11/21/2024 2:28 AM   Dictation workstation:   [id number]      CT 3D reconstruction   Final Result   CT MAXILLOFACIAL SKELETON:   1. No acute maxillofacial fracture.        I personally reviewed the image(s)/study and resident interpretation   as stated by Dr. Nidhi Potter MD. I agree with the findings as   stated. This study was interpreted at [place], [place].        MACRO:   None.        Signed by: Flor Lee 11/21/2024 2:31 AM   Dictation workstation:   [id number]      CT maxillofacial bones wo IV contrast   Final Result   CT MAXILLOFACIAL SKELETON:   1. No acute maxillofacial fracture.        I personally reviewed the image(s)/study and resident interpretation   as stated by Dr. Nidhi Potter MD. I agree with the findings as   stated. This study was interpreted at [place], [place].        MACRO:   None.        Signed by: Flor Lee 11/21/2024 2:31 AM   Dictation workstation:   [id number]      XR shoulder left 2+ views   Final Result   1. Redemonstrated acute comminuted and impacted left humeral surgical   neck fracture with suspected subluxation of the humeral head relative   to the glenoid.   2. No additional or new fractures are visualized.   3. Decreased but mild persistent edema of the left deltoid   musculature.        I personally reviewed the image(s)/study and resident interpretation   as stated by Dr. Nidhi Potter MD. I agree with the findings as   stated. This study was interpreted at [place], [place].        MACRO:   None        Signed by: Adilia Arguello 11/20/2024 6:36 PM   Dictation workstation:   [id number]      XR humerus left   Final Result   1. Redemonstrated acute comminuted and impacted left humeral surgical   neck fracture with suspected subluxation of the humeral head relative   to the glenoid.   2. No  additional or new fractures are visualized.   3. Decreased but mild persistent edema of the left deltoid   musculature.        I personally reviewed the image(s)/study and resident interpretation   as stated by Dr. Nidhi Potter MD. I agree with the findings as   stated. This study was interpreted at [facility], [place], [place].        MACRO:   None        Signed by: Adilia Arguello 11/20/2024 6:36 PM   Dictation workstation:   [id number]      XR chest 1 view   Final Result   1.  No evidence of acute cardiopulmonary process.                  MACRO:   None        Signed by: Lucas Evans 11/20/2024 3:33 PM   Dictation workstation:   [id number]      FL less than 1 hour    (Results Pending)       Assessment/Plan: 69 y.o. female s/p Humerus Fracture ORIF Proximal, Left - Hand   on 11/21 with Dr. Roberts.      Plan:  - Weight bearing: NWB LUE  - DVT ppx: SCDs, ASA 81mg BID  - Diet: Regular as tolerated  - Pain: Tylenol, oxycodone 5/10, dilaudid breakthrough  - Antibiotics: perioperative ancef 2g q8hr x3 doses  - FEN: mIVF LR @ 100cc/hr; HLIV with good PO intake; monitor BMP  - Lines: maintain PIVx2 while inpatient  - Bowel Regimen: Miralax, senna, dulcolax  - PT/OT: consulted  - Pulm: Encourage IS, maintain O2 sat >92%  - Cardiac: no issues  - Glycemic: no issues  - Continue home medications  - [place]roy: none   - Drains: none    Dispo: pending pain control    Plan discussed with attending. Recommendations not finalized until note signed by attending.    Dorcas Tapia MD  Orthopedic Surgery PGY-1  Available by Epic Chat

## 2024-11-22 NOTE — PROCEDURES
Peripheral Block    Patient location during procedure: OR  Start time: 11/22/2024 1:24 PM  End time: 11/22/2024 1:30 PM  Reason for block: at surgeon's request and post-op pain management  Staffing  Performed: attending   Authorized by: Ashley Gomez MD    Performed by: Blanka Kahn DO  Preanesthetic Checklist  Completed: patient identified, IV checked, site marked, risks and benefits discussed, surgical consent, monitors and equipment checked, pre-op evaluation and timeout performed   Timeout performed at: 11/22/2024 1:34 PM  Peripheral Block  Patient position: laying flat  Prep: ChloraPrep  Patient monitoring: heart rate, continuous pulse ox and cardiac monitor  Block type: interscalene  Laterality: left  Injection technique: single-shot  Guidance: ultrasound guided  Needle  Needle type: short-bevel   Needle gauge: 22 G  Needle length: 5 cm  Needle localization: ultrasound guidance     image stored in chart  Assessment  Injection assessment: negative aspiration for heme, incremental injection and local visualized surrounding nerve on ultrasound  Heart rate change: no  Slow fractionated injection: yes  Additional Notes  Interscalene brachial plexus block: Informed consent obtained.  Risks and benefits discussed.  ASA monitors placed, general anesthesia induced and timeout performed.  Patient position, prepped with chlorhexidine, and draped with sterile towels.      Ultrasound guidance used to visualize the brachial plexus and surrounding structures with visualization of the needle throughout duration of the procedure.  Aspiration was negative.  A total of ropi 0.5 % 20 ml and precedex 12 mcg was divided and injected on left side

## 2024-11-22 NOTE — PROGRESS NOTES
Physical Therapy                 Therapy Communication Note    Patient Name: Judith Bush  MRN: [id number]  Department: [place] [address]  Room: 6066/6066-A  Today's Date: 11/22/2024     Discipline: Physical Therapy    Missed Visit Reason: Patient refused (Per RN, pt returned from getting block.  Pt confirmed that the block was working and she did not have pain in her arm.  But that she only wanted to rest, and she's not leaving today anyway, so PT should come back tomorrow.)    Missed Time: 1525  Attempt

## 2024-11-23 ENCOUNTER — DOCUMENTATION (OUTPATIENT)
Dept: HOME HEALTH SERVICES | Facility: HOME HEALTH | Age: 69
End: 2024-11-23
Payer: COMMERCIAL

## 2024-11-23 ENCOUNTER — HOME HEALTH ADMISSION (OUTPATIENT)
Dept: HOME HEALTH SERVICES | Facility: HOME HEALTH | Age: 69
End: 2024-11-23
Payer: MEDICARE

## 2024-11-23 ENCOUNTER — PHARMACY VISIT (OUTPATIENT)
Dept: PHARMACY | Facility: CLINIC | Age: 69
End: 2024-11-23
Payer: COMMERCIAL

## 2024-11-23 PROCEDURE — 2500000005 HC RX 250 GENERAL PHARMACY W/O HCPCS: Performed by: STUDENT IN AN ORGANIZED HEALTH CARE EDUCATION/TRAINING PROGRAM

## 2024-11-23 PROCEDURE — 99231 SBSQ HOSP IP/OBS SF/LOW 25: CPT | Performed by: STUDENT IN AN ORGANIZED HEALTH CARE EDUCATION/TRAINING PROGRAM

## 2024-11-23 PROCEDURE — 2500000001 HC RX 250 WO HCPCS SELF ADMINISTERED DRUGS (ALT 637 FOR MEDICARE OP)

## 2024-11-23 PROCEDURE — 2500000001 HC RX 250 WO HCPCS SELF ADMINISTERED DRUGS (ALT 637 FOR MEDICARE OP): Performed by: STUDENT IN AN ORGANIZED HEALTH CARE EDUCATION/TRAINING PROGRAM

## 2024-11-23 PROCEDURE — 97530 THERAPEUTIC ACTIVITIES: CPT | Mod: GP

## 2024-11-23 PROCEDURE — 2500000004 HC RX 250 GENERAL PHARMACY W/ HCPCS (ALT 636 FOR OP/ED): Performed by: STUDENT IN AN ORGANIZED HEALTH CARE EDUCATION/TRAINING PROGRAM

## 2024-11-23 PROCEDURE — 97161 PT EVAL LOW COMPLEX 20 MIN: CPT | Mod: GP

## 2024-11-23 PROCEDURE — 1100000001 HC PRIVATE ROOM DAILY

## 2024-11-23 PROCEDURE — 2500000002 HC RX 250 W HCPCS SELF ADMINISTERED DRUGS (ALT 637 FOR MEDICARE OP, ALT 636 FOR OP/ED): Performed by: STUDENT IN AN ORGANIZED HEALTH CARE EDUCATION/TRAINING PROGRAM

## 2024-11-23 RX ADMIN — ACETAMINOPHEN 650 MG: 325 TABLET ORAL at 12:03

## 2024-11-23 RX ADMIN — Medication 240 ML: at 00:43

## 2024-11-23 RX ADMIN — LIDOCAINE 1 PATCH: 4 PATCH TOPICAL at 08:59

## 2024-11-23 RX ADMIN — ASPIRIN 81 MG: 81 TABLET, COATED ORAL at 21:21

## 2024-11-23 RX ADMIN — DIAZEPAM 5 MG: 5 TABLET ORAL at 08:58

## 2024-11-23 RX ADMIN — OXYCODONE HYDROCHLORIDE 10 MG: 5 TABLET ORAL at 21:27

## 2024-11-23 RX ADMIN — PANTOPRAZOLE SODIUM 40 MG: 40 TABLET, DELAYED RELEASE ORAL at 06:14

## 2024-11-23 RX ADMIN — ACETAMINOPHEN 650 MG: 325 TABLET ORAL at 06:14

## 2024-11-23 RX ADMIN — DILTIAZEM HYDROCHLORIDE 180 MG: 180 CAPSULE, COATED, EXTENDED RELEASE ORAL at 08:58

## 2024-11-23 RX ADMIN — ACETAMINOPHEN 650 MG: 325 TABLET ORAL at 00:41

## 2024-11-23 RX ADMIN — DIAZEPAM 5 MG: 5 TABLET ORAL at 21:22

## 2024-11-23 RX ADMIN — POLYETHYLENE GLYCOL 3350 17 G: 17 POWDER, FOR SOLUTION ORAL at 08:59

## 2024-11-23 RX ADMIN — Medication 240 ML: at 17:09

## 2024-11-23 RX ADMIN — Medication 240 ML: at 12:04

## 2024-11-23 RX ADMIN — HYDROMORPHONE HYDROCHLORIDE 0.2 MG: 1 INJECTION, SOLUTION INTRAMUSCULAR; INTRAVENOUS; SUBCUTANEOUS at 18:07

## 2024-11-23 RX ADMIN — SENNOSIDES AND DOCUSATE SODIUM 2 TABLET: 50; 8.6 TABLET ORAL at 08:59

## 2024-11-23 RX ADMIN — FLUTICASONE PROPIONATE 1 SPRAY: 50 SPRAY, METERED NASAL at 08:58

## 2024-11-23 RX ADMIN — OXYBUTYNIN CHLORIDE 5 MG: 5 TABLET ORAL at 21:21

## 2024-11-23 RX ADMIN — ASPIRIN 81 MG: 81 TABLET, COATED ORAL at 08:59

## 2024-11-23 RX ADMIN — Medication 240 ML: at 06:15

## 2024-11-23 RX ADMIN — SENNOSIDES AND DOCUSATE SODIUM 2 TABLET: 50; 8.6 TABLET ORAL at 21:21

## 2024-11-23 RX ADMIN — ACETAMINOPHEN 650 MG: 325 TABLET ORAL at 17:09

## 2024-11-23 RX ADMIN — OXYCODONE HYDROCHLORIDE 10 MG: 5 TABLET ORAL at 12:03

## 2024-11-23 RX ADMIN — OXYCODONE HYDROCHLORIDE 10 MG: 5 TABLET ORAL at 17:09

## 2024-11-23 RX ADMIN — OXYCODONE HYDROCHLORIDE 10 MG: 5 TABLET ORAL at 06:15

## 2024-11-23 RX ADMIN — DIAZEPAM 5 MG: 5 TABLET ORAL at 04:40

## 2024-11-23 RX ADMIN — CYCLOBENZAPRINE 10 MG: 10 TABLET, FILM COATED ORAL at 21:22

## 2024-11-23 RX ADMIN — OXYCODONE HYDROCHLORIDE 10 MG: 5 TABLET ORAL at 02:30

## 2024-11-23 RX ADMIN — OXYBUTYNIN CHLORIDE 5 MG: 5 TABLET ORAL at 08:58

## 2024-11-23 ASSESSMENT — COGNITIVE AND FUNCTIONAL STATUS - GENERAL
MOVING FROM LYING ON BACK TO SITTING ON SIDE OF FLAT BED WITH BEDRAILS: A LITTLE
WALKING IN HOSPITAL ROOM: A LITTLE
TOILETING: A LITTLE
WALKING IN HOSPITAL ROOM: A LITTLE
CLIMB 3 TO 5 STEPS WITH RAILING: A LOT
HELP NEEDED FOR BATHING: A LITTLE
STANDING UP FROM CHAIR USING ARMS: A LITTLE
STANDING UP FROM CHAIR USING ARMS: A LITTLE
MOVING TO AND FROM BED TO CHAIR: A LITTLE
HELP NEEDED FOR BATHING: A LITTLE
DAILY ACTIVITIY SCORE: 18
WALKING IN HOSPITAL ROOM: A LITTLE
CLIMB 3 TO 5 STEPS WITH RAILING: TOTAL
STANDING UP FROM CHAIR USING ARMS: A LITTLE
TURNING FROM BACK TO SIDE WHILE IN FLAT BAD: A LITTLE
PERSONAL GROOMING: A LITTLE
DRESSING REGULAR LOWER BODY CLOTHING: A LITTLE
MOBILITY SCORE: 19
DRESSING REGULAR UPPER BODY CLOTHING: A LOT
MOBILITY SCORE: 20
MOVING TO AND FROM BED TO CHAIR: A LITTLE
DRESSING REGULAR LOWER BODY CLOTHING: A LITTLE
DRESSING REGULAR UPPER BODY CLOTHING: A LOT
PERSONAL GROOMING: A LITTLE
DAILY ACTIVITIY SCORE: 18
CLIMB 3 TO 5 STEPS WITH RAILING: A LITTLE
TOILETING: A LITTLE
MOVING TO AND FROM BED TO CHAIR: A LITTLE
MOBILITY SCORE: 16

## 2024-11-23 ASSESSMENT — PAIN SCALES - GENERAL
PAINLEVEL_OUTOF10: 9
PAINLEVEL_OUTOF10: 9
PAINLEVEL_OUTOF10: 10 - WORST POSSIBLE PAIN
PAINLEVEL_OUTOF10: 7

## 2024-11-23 ASSESSMENT — PAIN - FUNCTIONAL ASSESSMENT: PAIN_FUNCTIONAL_ASSESSMENT: 0-10

## 2024-11-23 ASSESSMENT — PAIN DESCRIPTION - DESCRIPTORS: DESCRIPTORS: BURNING;PINS AND NEEDLES;TINGLING

## 2024-11-23 ASSESSMENT — ACTIVITIES OF DAILY LIVING (ADL): ADL_ASSISTANCE: INDEPENDENT

## 2024-11-23 NOTE — HH CARE COORDINATION
Home Care received a Referral for Physical Therapy and Occupational Therapy. We have processed the referral for a Start of Care on 11/24-11/25/24.     If you have any questions or concerns, please feel free to contact us at [phone]. Follow the prompts, enter your five digit zip code, and you will be directed to your care team on [facility] 1.

## 2024-11-23 NOTE — NURSING NOTE
Notified the doctor around 0100 that the patient was unable to move her left arm. She stated she received a block today to help with pain. MD stated that this was normal for the kind of block that she had received, and that it could take up to 24 hours to regain normal function.

## 2024-11-23 NOTE — CARE PLAN
Problem: Pain - Adult  Goal: Verbalizes/displays adequate comfort level or baseline comfort level  Outcome: Progressing     Problem: Safety - Adult  Goal: Free from fall injury  Outcome: Progressing     Problem: Discharge Planning  Goal: Discharge to home or other facility with appropriate resources  Outcome: Progressing   The patient's goals for the shift include      The clinical goals for the shift include The patient's pain will be under control by the end of shift    No acute events. Medicated per MAR. Patient reporting sensation returning to LUE. Sling in use. Patient agreeable to discharge to skilled nursing facility now per PT recommendations. Discharge planning ongoing. Fall precautions in place. Resting between care.

## 2024-11-23 NOTE — PROGRESS NOTES
Physical Therapy    Physical Therapy Evaluation & Treatment    Patient Name: Judith Bush  MRN: [id number]  Department: [place] [address]  Room: [address] [place] [address]  Today's Date: 11/23/2024   Time Calculation  Start Time: 1027  Stop Time: 1101  Time Calculation (min): 34 min    Assessment/Plan   PT Assessment  PT Assessment Results: Decreased strength, Decreased range of motion, Decreased endurance, Impaired balance, Decreased mobility, Decreased coordination, Pain, Orthopedic restrictions, Impaired sensation  Rehab Prognosis: Good  Barriers to Discharge: lack of support; multi level home; pain  End of Session Communication: Bedside nurse, Care Coordinator  Assessment Comment: 68yo female s/p L prox humerus ORIF after fall presents with inc pain, dec strength, balance, endurance limiting functional mobility.  Pt would benefit from continued skilled therapy to address these deficits and improve safety and indep  End of Session Patient Position: Bed, 3 rail up, Alarm on   IP OR SWING BED PT PLAN  Inpatient or Swing Bed: Inpatient  PT Plan  Treatment/Interventions: Bed mobility, Transfer training, Gait training, Stair training, Balance training, Strengthening, Endurance training, Therapeutic exercise, Therapeutic activity, Range of motion  PT Plan: Ongoing PT  PT Frequency: 6 times per week  PT Discharge Recommendations:  (Pt would benefit from 24/7 assist for functional mobility & ADLs; however, if this is unavailable, pt may need to consider mod intensity therapy at discharge.)  Equipment Recommended upon Discharge:  (TBD)  PT Recommended Transfer Status: Assist x1  PT - OK to Discharge: Yes (PT eval completed & recs made)      Subjective     General Visit Information:  General  Reason for Referral: This 68 y/o female s/p fall 11/14 resulting in proximal humeral fx w/ plan for ORIF, however pain was not able to tolerate pain resulting in her presented to ED 11/20. Pt now s/p ORIF 11/21 by Dr. Roberts.  Past Medical History  Relevant to Rehab: Anxiety, bipolar s/o, R carpal tunnel, compression fx C spine, COPD, depression, CVA, HLD, HTN  Missed Visit: No  Family/Caregiver Present: No  Prior to Session Communication: Bedside nurse  Patient Position Received: Bed, 3 rail up, Alarm on  Preferred Learning Style: auditory, verbal, visual  General Comment: Pt supine in bed, cooperative, willing to participate in therapy assessment.  Endorses feeling overwhelmed with managing everything currently.  Home Living:  Home Living  Type of Home: House  Lives With: Alone  Home Adaptive Equipment: None  Home Layout: Two level, Bed/bath upstairs  Home Access: Stairs to enter without rails  Entrance Stairs-Rails: None  Entrance Stairs-Number of Steps: 3  Bathroom Shower/Tub: Walk-in shower  Bathroom Toilet: Standard  Bathroom Equipment: Shower chair with back  Prior Level of Function:  Prior Function Per Pt/Caregiver Report  Level of Pender: Independent with ADLs and functional transfers, Independent with homemaking with ambulation  ADL Assistance: Independent  Homemaking Assistance: Independent  Ambulatory Assistance: Independent  Vocational: Full time employment ([employer])  Hand Dominance: Right  Prior Function Comments: Pt states she has little to no assistance once home.  Also has a 100# dog to manage.  Precautions:  Precautions  UE Weight Bearing Status: Left Non-Weight Bearing (Non weight bearing LUE, can start Codman's and pendulum exercises)  Medical Precautions: Fall precautions  Braces Applied: sling donned prior to mobility  Precautions Comment: pt endorses other falls prior to this one    Objective   Pain:  Pain Assessment  Pain Assessment: 0-10  0-10 (Numeric) Pain Score:  (Pt does not numerically rate; c/o parasthesias and burning in hand/arm)  Pain Type: Acute pain  Pain Location: Arm  Pain Orientation: Left  Pain Descriptors: Burning, Pins and needles, Tingling  Pain Interventions: Repositioned (sling)  Response to  Interventions: No change in pain  Cognition:  Cognition  Overall Cognitive Status: Within Functional Limits  Impulsive: Mildly    General Assessments:     Activity Tolerance  Endurance: Tolerates 10 - 20 min exercise with multiple rests    Sensation  Sensation Comment: tingling & numbness in LUE    Strength  Strength Comments: RUE/LEs grossly WFL; LUE not tested 2/2 inc pain  Strength  Strength Comments: RUE/LEs grossly WFL; LUE not tested 2/2 inc pain    Perception  Inattention/Neglect: Appears intact  Initiation: Appears intact  Motor Planning: Appears intact  Perseveration: Not present    Coordination  Coordination Comment: L hand impaired    Postural Control  Postural Control: Within Functional Limits    Static Sitting Balance  Static Sitting-Balance Support: Feet supported  Static Sitting-Level of Assistance: Close supervision    Static Standing Balance  Static Standing-Level of Assistance: Contact guard  Dynamic Standing Balance  Dynamic Standing-Level of Assistance: Contact guard, Minimum assistance  Dynamic Standing-Balance: Turning  Functional Assessments:  Bed Mobility  Bed Mobility: Yes  Bed Mobility 1  Bed Mobility 1: Supine to sitting  Level of Assistance 1: Minimum assistance  Bed Mobility Comments 1: HOB elevated, assist at trunk to achieve full upright sitting  Bed Mobility 2  Bed Mobility  2: Sitting to supine  Level of Assistance 2: Close supervision  Bed Mobility Comments 2: HOB elevated    Transfers  Transfer: Yes  Transfer 1  Technique 1: Sit to stand, Stand to sit  Transfer Level of Assistance 1: Minimum assistance  Transfers 2  Transfer From 2: Sit to, Stand to  Transfer to 2: Commode-standard  Technique 2: Sit to stand, Stand to sit  Transfer Level of Assistance 2: Contact guard, Minimum assistance  Trials/Comments 2: acute LOB upon standing requiring min-mod to recover    Ambulation/Gait Training  Ambulation/Gait Training Performed: Yes  Ambulation/Gait Training 1  Surface 1: Level  tile  Device 1: No device  Assistance 1: Arm in arm assistance, Minimum assistance  Quality of Gait 1: Wide base of support, Inconsistent stride length, Decreased step length (mild-mod path deviations)  Comments/Distance (ft) 1: 10'x2    Stairs  Stairs: No (deferred 2/2 inc pain & fatigue)  Extremity/Trunk Assessments:  RUE   RUE : Within Functional Limits  LUE   LUE:  (lmtd assessment 2/2 pain; moves fingers indep)  RLE   RLE : Within Functional Limits  LLE   LLE : Within Functional Limits  Treatments:  Therapeutic Activity  Therapeutic Activity Performed: Yes  Therapeutic Activity 1: Pt completed transfer to EOB with Kamron; sitting balance with SBA x ~10 min 2/2 inc pain, c/o lightheadedness initially.  Resolves with prolonged sitting.  Therapeutic Activity 2: Discussion with pt regarding levels of assist needed for safe dc to home, DME needs, falls assessment.  Outcome Measures:  [facility] Basic Mobility  Turning from your back to your side while in a flat bed without using bedrails: A little  Moving from lying on your back to sitting on the side of a flat bed without using bedrails: A little  Moving to and from bed to chair (including a wheelchair): A little  Standing up from a chair using your arms (e.g. wheelchair or bedside chair): A little  To walk in hospital room: A little  Climbing 3-5 steps with railing: Total  Basic Mobility - Total Score: 16    Encounter Problems       Encounter Problems (Active)       Balance       Pt will score >23 on Tinetti assessment to indicate low falls risk in pt with falls history  (Progressing)       Start:  11/23/24    Expected End:  12/07/24               Mobility       STG - Patient will ambulate >100' with LRD as needed, indep without LOB or path deviations  (Progressing)       Start:  11/23/24    Expected End:  12/07/24            STG - Patient will ascend and descend a flight of stairs with R HR, SBA (Progressing)       Start:  11/23/24    Expected End:  12/07/24                PT Transfers       STG - Patient to transfer to and from sit to supine indep from flat bed, no rails  (Progressing)       Start:  11/23/24    Expected End:  12/07/24            STG - Patient will transfer sit to and from stand indep  (Progressing)       Start:  11/23/24    Expected End:  12/07/24               Pain - Adult              Education Documentation  Precautions, taught by Olya Gaxiola, PT at 11/23/2024 11:57 AM.  Learner: Patient  Readiness: Acceptance  Method: Explanation  Response: Verbalizes Understanding  Comment: dc planning, safe mobility, falls risk, NWB LUE/sling use    Mobility Training, taught by Olya Gaxiola, PT at 11/23/2024 11:57 AM.  Learner: Patient  Readiness: Acceptance  Method: Explanation  Response: Verbalizes Understanding  Comment: dc planning, safe mobility, falls risk, NWB LUE/sling use    Education Comments  No comments found.      11/23/24 at 11:58 AM - Olya Gaxiola, PT

## 2024-11-23 NOTE — CARE PLAN
The patient's goals for the shift include      The clinical goals for the shift include The patient's pain will be under control by the end of shift    Over the shift, the patient did not make progress toward the following goals. Barriers to progression include . Recommendations to address these barriers include   Problem: Pain - Adult  Goal: Verbalizes/displays adequate comfort level or baseline comfort level  Outcome: Progressing     Problem: Safety - Adult  Goal: Free from fall injury  Outcome: Progressing     Problem: Discharge Planning  Goal: Discharge to home or other facility with appropriate resources  Outcome: Progressing     Problem: Chronic Conditions and Co-morbidities  Goal: Patient's chronic conditions and co-morbidity symptoms are monitored and maintained or improved  Outcome: Progressing     Problem: Fall/Injury  Goal: Not fall by end of shift  Outcome: Progressing  Goal: Be free from injury by end of the shift  Outcome: Progressing  Goal: Verbalize understanding of personal risk factors for fall in the hospital  Outcome: Progressing  Goal: Verbalize understanding of risk factor reduction measures to prevent injury from fall in the home  Outcome: Progressing  Goal: Use assistive devices by end of the shift  Outcome: Progressing  Goal: Pace activities to prevent fatigue by end of the shift  Outcome: Progressing     Problem: Pain  Goal: Takes deep breaths with improved pain control throughout the shift  Outcome: Progressing  Goal: Turns in bed with improved pain control throughout the shift  Outcome: Progressing  Goal: Walks with improved pain control throughout the shift  Outcome: Progressing  Goal: Performs ADL's with improved pain control throughout shift  Outcome: Progressing  Goal: Participates in PT with improved pain control throughout the shift  Outcome: Progressing  Goal: Free from opioid side effects throughout the shift  Outcome: Progressing  Goal: Free from acute confusion related to pain  meds throughout the shift  Outcome: Progressing   .

## 2024-11-23 NOTE — PROGRESS NOTES
Postop Pain HPI -   Palliative: relieved with IV analgesics and regional local anesthetics  Provocative: movement  Quality:  burning and aching  Radiation:  none  Severity:  0/10  Timing: constant  Motor function is not back yet    24-HOUR OPIOID CONSUMPTION:  Oxycodone 10mgx3    Scheduled medications  acetaminophen, 650 mg, oral, q6h ISAÍAS  aspirin, 81 mg, oral, BID  dilTIAZem CD, 180 mg, oral, Daily  fluticasone, 1 spray, Each Nostril, Daily  tiotropium, 2 puff, inhalation, q24h   And  fluticasone furoate-vilanteroL, 1 puff, inhalation, q24h  lidocaine, 1 patch, transdermal, Daily  oral hydration, 240 mL, oral, q6h ISAÍAS  oxybutynin, 5 mg, oral, BID  pantoprazole, 40 mg, oral, Daily before breakfast  polyethylene glycol, 17 g, oral, Daily  sennosides-docusate sodium, 2 tablet, oral, BID      Continuous medications  oxygen, 2 L/min      PRN medications  PRN medications: albuterol, bisacodyl, bisacodyl, cyclobenzaprine, cyclobenzaprine, diazePAM, HYDROmorphone, naloxone, ondansetron **OR** ondansetron, ondansetron ODT, oxyCODONE, oxyCODONE, prochlorperazine **OR** prochlorperazine **OR** prochlorperazine, SUMAtriptan     Physical Exam:  Constitutional:  no distress, alert and cooperative  Eyes: clear sclera  Head/Neck: No apparent injury, trachea midline  Respiratory/Thorax: Patent airways, thorax symmetric, breathing comfortably  Cardiovascular: no pitting edema  Gastrointestinal: Nondistended  Musculoskeletal: ROM intact  Extremities: no clubbing  Neurological: alert, rosado x4  Psychological: Appropriate affect    No results found. However, due to the size of the patient record, not all encounters were searched. Please check Results Review for a complete set of results.      Judith Bush is a 69 y.o. year old female patient with history of HLD, HTN, COPD, SHON, bipolar 1 disorder, CVA,  who presented after a fall and now s/p L humerus fracture ORIF on 11/21 with Dr. Man. Acute Pain consulted for block for  uncontrolled postoperative pain.      Recommendations:  - LEFT interscalene single shot blocks performed postperatively on 11/22  - Pain medications per primary team  -Motor function is not fully back yet, will follow up with the pt if she was discharged.     Acute Pain Team  pg [phone] ph [phone].

## 2024-11-23 NOTE — PROGRESS NOTES
"Orthopaedic Surgery Progress Note    Subjective:  No acute events overnight. Obtained block yesterday for pain control with improvement in pain. No pain, still has no motor function in LUE 2/2 pain block. Patient lives alone. Denies chest pain, shortness of breath, or fevers.    Objective:  /69 (BP Location: Right arm, Patient Position: Lying)   Pulse 62   Temp 36 °C (96.8 °F) (Temporal)   Resp 16   Ht 1.55 m (5' 1.02\")   Wt 54 kg (119 lb 0.8 oz)   SpO2 95%   BMI 22.48 kg/m²     Gen: arousable, NAD, appropriately conversational  Cardiac: RRR to peripheral palpation  Resp: nonlabored on RA  GI: soft, nondistended    MSK:  GEN - NAD  HEENT - bruising over L side of face, anicteric sclera  CV - RRR by peripheral palpation, limbs wwp  PULM - unlabored breathing on RA  NEURO - Moving all extremities spontaneously  PSYCH - Appropriate mood and affect    LUE:   - diffuse ecchymosis L shoulder   - Post-operative dressing in place without strikethrough bleeding. Sling.   - Wiggles fingers slightly, otherwise no motor function 2/2 block  - reduced sensation to light touch in all distributions  -Hand wwp, 2+ radial pulse, cap refill brisk  -Compartments soft and compressible, no pain with passive stretch of digits      No results found. However, due to the size of the patient record, not all encounters were searched. Please check Results Review for a complete set of results.    CT shoulder left wo IV contrast   Final Result   1. Extensively comminuted acute fracture of the left proximal humerus   involving the surgical neck, greater tuberosity, lesser tuberosity   and articular surface. Moderate impaction of fragments and medial   displacement of the shaft relative to the head.   2. Moderate sized left shoulder hemarthrosis.             I personally reviewed the image(s)/study and resident interpretation   as stated by Dr. Nidhi Potter MD. I agree with the findings as   stated. This study was interpreted at " [place], [place].        MACRO:   None        Signed by: Flor Lee 11/21/2024 2:28 AM   Dictation workstation:   [id number]      CT 3D reconstruction   Final Result   CT MAXILLOFACIAL SKELETON:   1. No acute maxillofacial fracture.        I personally reviewed the image(s)/study and resident interpretation   as stated by Dr. Nidhi Potter MD. I agree with the findings as   stated. This study was interpreted at [place], [place].        MACRO:   None.        Signed by: Flor Lee 11/21/2024 2:31 AM   Dictation workstation:   [id number]      CT maxillofacial bones wo IV contrast   Final Result   CT MAXILLOFACIAL SKELETON:   1. No acute maxillofacial fracture.        I personally reviewed the image(s)/study and resident interpretation   as stated by Dr. Nidhi Potter MD. I agree with the findings as   stated. This study was interpreted at [place], [place].        MACRO:   None.        Signed by: Flor Lee 11/21/2024 2:31 AM   Dictation workstation:   [id number]      XR shoulder left 2+ views   Final Result   1. Redemonstrated acute comminuted and impacted left humeral surgical   neck fracture with suspected subluxation of the humeral head relative   to the glenoid.   2. No additional or new fractures are visualized.   3. Decreased but mild persistent edema of the left deltoid   musculature.        I personally reviewed the image(s)/study and resident interpretation   as stated by Dr. Nidhi Potter MD. I agree with the findings as   stated. This study was interpreted at [place], [place].        MACRO:   None        Signed by: Adilia Arguello 11/20/2024 6:36 PM   Dictation workstation:   [id number]      XR humerus left   Final Result   1. Redemonstrated acute comminuted and impacted left humeral surgical    neck fracture with suspected subluxation of the humeral head relative   to the glenoid.   2. No additional or new fractures are visualized.   3. Decreased but mild persistent edema of the left deltoid   musculature.        I personally reviewed the image(s)/study and resident interpretation   as stated by Dr. Nidhi Potter MD. I agree with the findings as   stated. This study was interpreted at [facility], [place], [place].        MACRO:   None        Signed by: Adilia Arguello 11/20/2024 6:36 PM   Dictation workstation:   [id number]      XR chest 1 view   Final Result   1.  No evidence of acute cardiopulmonary process.                  MACRO:   None        Signed by: Lucas Evans 11/20/2024 3:33 PM   Dictation workstation:   [id number]      FL less than 1 hour    (Results Pending)       Assessment/Plan: 69 y.o. female s/p Humerus Fracture ORIF Proximal, Left - Hand   on 11/21 with Dr. Roberts.      Plan:  - Weight bearing: NWB LUE  - DVT ppx: SCDs, ASA 81mg BID  - Diet: Regular as tolerated  - Pain: Tylenol, oxycodone 5/10, dilaudid breakthrough  - Antibiotics: perioperative ancef 2g q8hr x3 doses  - FEN: mIVF LR @ 100cc/hr; HLIV with good PO intake; monitor BMP  - Lines: maintain PIVx2 while inpatient  - Bowel Regimen: Miralax, senna, dulcolax  - PT/OT: consulted  - Pulm: Encourage IS, maintain O2 sat >92%  - Cardiac: no issues  - Glycemic: no issues  - Continue home medications  - Monroy: none   - Drains: none    Dispo: medically clear for dc, rec'd SNF    While admitted, this patient will be followed by the Ortho Trauma Team. Please contact the residents listed below with any questions (available via Epic Chat weekdays). Please page [phone] (ortho on-call) after 6pm and on weekends.    Ortho Trauma  First Call: Dorcas Tapia, PGY-1  Second Call: Zoran Le, PGY-2  Third Call: Yoel Nguyen, PGY-3

## 2024-11-24 PROCEDURE — 99231 SBSQ HOSP IP/OBS SF/LOW 25: CPT | Performed by: STUDENT IN AN ORGANIZED HEALTH CARE EDUCATION/TRAINING PROGRAM

## 2024-11-24 PROCEDURE — 2500000002 HC RX 250 W HCPCS SELF ADMINISTERED DRUGS (ALT 637 FOR MEDICARE OP, ALT 636 FOR OP/ED): Performed by: STUDENT IN AN ORGANIZED HEALTH CARE EDUCATION/TRAINING PROGRAM

## 2024-11-24 PROCEDURE — 1100000001 HC PRIVATE ROOM DAILY

## 2024-11-24 PROCEDURE — 2500000001 HC RX 250 WO HCPCS SELF ADMINISTERED DRUGS (ALT 637 FOR MEDICARE OP)

## 2024-11-24 PROCEDURE — 2500000004 HC RX 250 GENERAL PHARMACY W/ HCPCS (ALT 636 FOR OP/ED): Performed by: STUDENT IN AN ORGANIZED HEALTH CARE EDUCATION/TRAINING PROGRAM

## 2024-11-24 PROCEDURE — 2500000005 HC RX 250 GENERAL PHARMACY W/O HCPCS: Performed by: STUDENT IN AN ORGANIZED HEALTH CARE EDUCATION/TRAINING PROGRAM

## 2024-11-24 PROCEDURE — 97530 THERAPEUTIC ACTIVITIES: CPT | Mod: GP

## 2024-11-24 PROCEDURE — 97116 GAIT TRAINING THERAPY: CPT | Mod: GP

## 2024-11-24 PROCEDURE — 2500000001 HC RX 250 WO HCPCS SELF ADMINISTERED DRUGS (ALT 637 FOR MEDICARE OP): Performed by: STUDENT IN AN ORGANIZED HEALTH CARE EDUCATION/TRAINING PROGRAM

## 2024-11-24 PROCEDURE — 94640 AIRWAY INHALATION TREATMENT: CPT

## 2024-11-24 RX ADMIN — Medication 240 ML: at 12:08

## 2024-11-24 RX ADMIN — POLYETHYLENE GLYCOL 3350 17 G: 17 POWDER, FOR SOLUTION ORAL at 09:33

## 2024-11-24 RX ADMIN — ACETAMINOPHEN 650 MG: 325 TABLET ORAL at 17:42

## 2024-11-24 RX ADMIN — SENNOSIDES AND DOCUSATE SODIUM 2 TABLET: 50; 8.6 TABLET ORAL at 09:32

## 2024-11-24 RX ADMIN — Medication 240 ML: at 05:13

## 2024-11-24 RX ADMIN — PANTOPRAZOLE SODIUM 40 MG: 40 TABLET, DELAYED RELEASE ORAL at 05:12

## 2024-11-24 RX ADMIN — HYDROMORPHONE HYDROCHLORIDE 0.2 MG: 1 INJECTION, SOLUTION INTRAMUSCULAR; INTRAVENOUS; SUBCUTANEOUS at 00:58

## 2024-11-24 RX ADMIN — TIOTROPIUM BROMIDE INHALATION SPRAY 2 PUFF: 3.12 SPRAY, METERED RESPIRATORY (INHALATION) at 08:43

## 2024-11-24 RX ADMIN — Medication 240 ML: at 23:10

## 2024-11-24 RX ADMIN — FLUTICASONE PROPIONATE 1 SPRAY: 50 SPRAY, METERED NASAL at 09:15

## 2024-11-24 RX ADMIN — OXYBUTYNIN CHLORIDE 5 MG: 5 TABLET ORAL at 21:59

## 2024-11-24 RX ADMIN — ACETAMINOPHEN 650 MG: 325 TABLET ORAL at 12:12

## 2024-11-24 RX ADMIN — SENNOSIDES AND DOCUSATE SODIUM 2 TABLET: 50; 8.6 TABLET ORAL at 21:59

## 2024-11-24 RX ADMIN — ACETAMINOPHEN 650 MG: 325 TABLET ORAL at 23:09

## 2024-11-24 RX ADMIN — DILTIAZEM HYDROCHLORIDE 180 MG: 180 CAPSULE, COATED, EXTENDED RELEASE ORAL at 09:32

## 2024-11-24 RX ADMIN — Medication 240 ML: at 00:58

## 2024-11-24 RX ADMIN — DIAZEPAM 5 MG: 5 TABLET ORAL at 23:09

## 2024-11-24 RX ADMIN — ASPIRIN 81 MG: 81 TABLET, COATED ORAL at 21:59

## 2024-11-24 RX ADMIN — CYCLOBENZAPRINE 10 MG: 10 TABLET, FILM COATED ORAL at 21:59

## 2024-11-24 RX ADMIN — ASPIRIN 81 MG: 81 TABLET, COATED ORAL at 09:32

## 2024-11-24 RX ADMIN — OXYCODONE HYDROCHLORIDE 10 MG: 5 TABLET ORAL at 09:32

## 2024-11-24 RX ADMIN — LIDOCAINE 1 PATCH: 4 PATCH TOPICAL at 09:33

## 2024-11-24 RX ADMIN — OXYCODONE HYDROCHLORIDE 10 MG: 5 TABLET ORAL at 21:59

## 2024-11-24 RX ADMIN — OXYCODONE HYDROCHLORIDE 10 MG: 5 TABLET ORAL at 13:33

## 2024-11-24 RX ADMIN — OXYBUTYNIN CHLORIDE 5 MG: 5 TABLET ORAL at 09:32

## 2024-11-24 RX ADMIN — FLUTICASONE FUROATE AND VILANTEROL TRIFENATATE 1 PUFF: 200; 25 POWDER RESPIRATORY (INHALATION) at 08:43

## 2024-11-24 RX ADMIN — DIAZEPAM 5 MG: 5 TABLET ORAL at 05:12

## 2024-11-24 RX ADMIN — ACETAMINOPHEN 650 MG: 325 TABLET ORAL at 00:58

## 2024-11-24 RX ADMIN — OXYCODONE HYDROCHLORIDE 10 MG: 5 TABLET ORAL at 05:12

## 2024-11-24 RX ADMIN — OXYCODONE HYDROCHLORIDE 10 MG: 5 TABLET ORAL at 17:42

## 2024-11-24 RX ADMIN — ACETAMINOPHEN 650 MG: 325 TABLET ORAL at 05:12

## 2024-11-24 RX ADMIN — HYDROMORPHONE HYDROCHLORIDE 0.2 MG: 1 INJECTION, SOLUTION INTRAMUSCULAR; INTRAVENOUS; SUBCUTANEOUS at 16:48

## 2024-11-24 RX ADMIN — Medication 240 ML: at 18:28

## 2024-11-24 ASSESSMENT — COGNITIVE AND FUNCTIONAL STATUS - GENERAL
HELP NEEDED FOR BATHING: A LITTLE
MOBILITY SCORE: 20
DAILY ACTIVITIY SCORE: 18
WALKING IN HOSPITAL ROOM: A LITTLE
DRESSING REGULAR LOWER BODY CLOTHING: A LITTLE
STANDING UP FROM CHAIR USING ARMS: A LITTLE
DRESSING REGULAR UPPER BODY CLOTHING: A LOT
HELP NEEDED FOR BATHING: A LITTLE
CLIMB 3 TO 5 STEPS WITH RAILING: A LITTLE
MOBILITY SCORE: 20
MOVING TO AND FROM BED TO CHAIR: A LITTLE
PERSONAL GROOMING: A LITTLE
TOILETING: A LITTLE
TOILETING: A LITTLE
CLIMB 3 TO 5 STEPS WITH RAILING: A LITTLE
STANDING UP FROM CHAIR USING ARMS: A LITTLE
DRESSING REGULAR LOWER BODY CLOTHING: A LITTLE
DRESSING REGULAR UPPER BODY CLOTHING: A LOT
TOILETING: A LITTLE
TOILETING: A LITTLE
MOVING TO AND FROM BED TO CHAIR: A LITTLE
DAILY ACTIVITIY SCORE: 18
PERSONAL GROOMING: A LITTLE
CLIMB 3 TO 5 STEPS WITH RAILING: A LITTLE
CLIMB 3 TO 5 STEPS WITH RAILING: A LITTLE
HELP NEEDED FOR BATHING: A LITTLE
MOVING TO AND FROM BED TO CHAIR: A LITTLE
CLIMB 3 TO 5 STEPS WITH RAILING: A LITTLE
MOVING TO AND FROM BED TO CHAIR: A LITTLE
PERSONAL GROOMING: A LITTLE
WALKING IN HOSPITAL ROOM: A LITTLE
MOVING TO AND FROM BED TO CHAIR: A LITTLE
DAILY ACTIVITIY SCORE: 18
STANDING UP FROM CHAIR USING ARMS: A LITTLE
WALKING IN HOSPITAL ROOM: A LITTLE
MOVING TO AND FROM BED TO CHAIR: A LITTLE
PERSONAL GROOMING: A LITTLE
DRESSING REGULAR UPPER BODY CLOTHING: A LOT
STANDING UP FROM CHAIR USING ARMS: A LITTLE
HELP NEEDED FOR BATHING: A LITTLE
PERSONAL GROOMING: A LITTLE
MOBILITY SCORE: 16
MOVING FROM LYING ON BACK TO SITTING ON SIDE OF FLAT BED WITH BEDRAILS: A LITTLE
STANDING UP FROM CHAIR USING ARMS: A LITTLE
STANDING UP FROM CHAIR USING ARMS: A LITTLE
WALKING IN HOSPITAL ROOM: A LITTLE
HELP NEEDED FOR BATHING: A LITTLE
MOVING TO AND FROM BED TO CHAIR: A LITTLE
TURNING FROM BACK TO SIDE WHILE IN FLAT BAD: A LITTLE
DAILY ACTIVITIY SCORE: 18
MOBILITY SCORE: 20
DRESSING REGULAR LOWER BODY CLOTHING: A LITTLE
DRESSING REGULAR LOWER BODY CLOTHING: A LITTLE
MOBILITY SCORE: 20
HELP NEEDED FOR BATHING: A LITTLE
WALKING IN HOSPITAL ROOM: A LITTLE
CLIMB 3 TO 5 STEPS WITH RAILING: A LITTLE
DRESSING REGULAR UPPER BODY CLOTHING: A LOT
TOILETING: A LITTLE
DRESSING REGULAR LOWER BODY CLOTHING: A LITTLE
WALKING IN HOSPITAL ROOM: A LITTLE
DAILY ACTIVITIY SCORE: 18
STANDING UP FROM CHAIR USING ARMS: A LITTLE
CLIMB 3 TO 5 STEPS WITH RAILING: TOTAL
DRESSING REGULAR UPPER BODY CLOTHING: A LOT
DRESSING REGULAR LOWER BODY CLOTHING: A LITTLE
PERSONAL GROOMING: A LITTLE
WALKING IN HOSPITAL ROOM: A LITTLE
TOILETING: A LITTLE
DAILY ACTIVITIY SCORE: 18
DRESSING REGULAR UPPER BODY CLOTHING: A LOT

## 2024-11-24 ASSESSMENT — PAIN DESCRIPTION - DESCRIPTORS
DESCRIPTORS: BURNING;PINS AND NEEDLES
DESCRIPTORS: ACHING;SHARP;STABBING
DESCRIPTORS: ACHING;SHARP
DESCRIPTORS: BURNING;TINGLING;PINS AND NEEDLES

## 2024-11-24 ASSESSMENT — PAIN SCALES - GENERAL
PAINLEVEL_OUTOF10: 9
PAINLEVEL_OUTOF10: 5 - MODERATE PAIN
PAINLEVEL_OUTOF10: 4
PAINLEVEL_OUTOF10: 8
PAINLEVEL_OUTOF10: 9
PAINLEVEL_OUTOF10: 10 - WORST POSSIBLE PAIN
PAINLEVEL_OUTOF10: 9
PAINLEVEL_OUTOF10: 10 - WORST POSSIBLE PAIN

## 2024-11-24 ASSESSMENT — PAIN DESCRIPTION - LOCATION
LOCATION: ARM
LOCATION: SHOULDER
LOCATION: ARM
LOCATION: ARM

## 2024-11-24 ASSESSMENT — PAIN DESCRIPTION - ORIENTATION
ORIENTATION: LEFT

## 2024-11-24 ASSESSMENT — PAIN - FUNCTIONAL ASSESSMENT
PAIN_FUNCTIONAL_ASSESSMENT: 0-10
PAIN_FUNCTIONAL_ASSESSMENT: 0-10

## 2024-11-24 ASSESSMENT — PAIN SCALES - PAIN ASSESSMENT IN ADVANCED DEMENTIA (PAINAD): TOTALSCORE: MEDICATION (SEE MAR);HEAT APPLIED

## 2024-11-24 NOTE — CARE PLAN
The patient's goals for the shift include  pain control    The clinical goals for the shift include the pt will remain safe during shift    Over the shift, the patient did make progress toward the following goals. Barriers to progression include none. Recommendations to address these barriers include receive prn pain medications and/modalities.    Problem: Pain - Adult  Goal: Verbalizes/displays adequate comfort level or baseline comfort level  Outcome: Progressing     Problem: Safety - Adult  Goal: Free from fall injury  Outcome: Progressing     Problem: Discharge Planning  Goal: Discharge to home or other facility with appropriate resources  Outcome: Progressing     Problem: Chronic Conditions and Co-morbidities  Goal: Patient's chronic conditions and co-morbidity symptoms are monitored and maintained or improved  Outcome: Progressing     Problem: Fall/Injury  Goal: Not fall by end of shift  Outcome: Progressing  Goal: Be free from injury by end of the shift  Outcome: Progressing  Goal: Verbalize understanding of personal risk factors for fall in the hospital  Outcome: Progressing  Goal: Verbalize understanding of risk factor reduction measures to prevent injury from fall in the home  Outcome: Progressing  Goal: Use assistive devices by end of the shift  Outcome: Progressing  Goal: Pace activities to prevent fatigue by end of the shift  Outcome: Progressing     Problem: Pain  Goal: Takes deep breaths with improved pain control throughout the shift  Outcome: Progressing  Goal: Turns in bed with improved pain control throughout the shift  Outcome: Progressing  Goal: Walks with improved pain control throughout the shift  Outcome: Progressing  Goal: Performs ADL's with improved pain control throughout shift  Outcome: Progressing  Goal: Participates in PT with improved pain control throughout the shift  Outcome: Progressing  Goal: Free from opioid side effects throughout the shift  Outcome: Progressing  Goal: Free from  acute confusion related to pain meds throughout the shift  Outcome: Progressing

## 2024-11-24 NOTE — PROGRESS NOTES
Postop Pain HPI -   Palliative: relieved with IV analgesics and regional local anesthetics  Provocative: movement  Quality:  burning and aching  Radiation:  none  Severity:  9/10  Timing: constant    24-HOUR OPIOID CONSUMPTION:  Dilaudid 0.2mg x2    Scheduled medications  acetaminophen, 650 mg, oral, q6h ISAÍAS  aspirin, 81 mg, oral, BID  dilTIAZem CD, 180 mg, oral, Daily  fluticasone, 1 spray, Each Nostril, Daily  tiotropium, 2 puff, inhalation, q24h   And  fluticasone furoate-vilanteroL, 1 puff, inhalation, q24h  lidocaine, 1 patch, transdermal, Daily  oral hydration, 240 mL, oral, q6h ISAÍAS  oxybutynin, 5 mg, oral, BID  pantoprazole, 40 mg, oral, Daily before breakfast  polyethylene glycol, 17 g, oral, Daily  sennosides-docusate sodium, 2 tablet, oral, BID      Continuous medications  oxygen, 2 L/min      PRN medications  PRN medications: albuterol, bisacodyl, bisacodyl, cyclobenzaprine, cyclobenzaprine, diazePAM, HYDROmorphone, naloxone, ondansetron **OR** ondansetron, ondansetron ODT, oxyCODONE, oxyCODONE, prochlorperazine **OR** prochlorperazine **OR** prochlorperazine, SUMAtriptan     Physical Exam:  Constitutional:  no distress, alert and cooperative  Eyes: clear sclera  Head/Neck: No apparent injury, trachea midline  Respiratory/Thorax: Patent airways, thorax symmetric, breathing comfortably  Cardiovascular: no pitting edema  Gastrointestinal: Nondistended  Musculoskeletal: ROM intact  Extremities: no clubbing  Neurological: alert, rosado x4  Psychological: Appropriate affect    No results found. However, due to the size of the patient record, not all encounters were searched. Please check Results Review for a complete set of results.      Judith Bush is a 69 y.o. year old female patient with history of HLD, HTN, COPD, SHON, bipolar 1 disorder, CVA,  who presented after a fall and now s/p L humerus fracture ORIF on 11/21 with Dr. Man. Acute Pain consulted for block for uncontrolled postoperative pain.       Recommendations:  - LEFT interscalene single shot blocks performed postperatively on 11/22  - Pain medications per primary team  -Motor function is back, and sensory function is intact in LUE.  -Will sign off     Acute Pain Team  pg [phone] ph [phone].

## 2024-11-24 NOTE — PROGRESS NOTES
Physical Therapy    Physical Therapy Treatment    Patient Name: Judith Bush  MRN: [id number]  Department: [place] [id number]  Room: [facility]  Today's Date: 11/24/2024  Time Calculation  Start Time: 1026  Stop Time: 1059  Time Calculation (min): 33 min     Assessment/Plan   PT Assessment  End of Session Communication: Bedside nurse  Assessment Comment: Pt demo improved functional mobility this date, though requiring some assist for transfers and balance.  Feels not ready to trial stairs.  Cont PT per POC  End of Session Patient Position: Bed, 3 rail up, Alarm on     PT Plan  Treatment/Interventions: Bed mobility, Transfer training, Gait training, Stair training, Balance training, Strengthening, Endurance training, Therapeutic exercise, Therapeutic activity, Range of motion  PT Plan: Ongoing PT  PT Frequency: 6 times per week  PT Discharge Recommendations:  (Pt would benefit from 24/7 assist for functional mobility & ADLs; however, if this is unavailable, pt may need to consider mod intensity therapy at discharge.)  Equipment Recommended upon Discharge:  (TBD)  PT Recommended Transfer Status: Contact guard  PT - OK to Discharge: Yes (PT eval completed & recs made)    General Visit Information:   PT  Visit  PT Received On: 11/24/24  Response to Previous Treatment: Patient with no complaints from previous session.  General  Missed Visit: No  Family/Caregiver Present: No  Prior to Session Communication: Bedside nurse  Patient Position Received: Bed, 3 rail up, Alarm on  Preferred Learning Style: auditory, verbal, visual  General Comment: Pt pleasant & cooperative, willing to participate    Subjective   Precautions:  Precautions  UE Weight Bearing Status: Left Non-Weight Bearing  Medical Precautions: Fall precautions  Braces Applied: sling donned prior to mobility      Objective   Pain:  Pain Assessment  Pain Assessment: 0-10  0-10 (Numeric) Pain Score: 9  Pain Type: Surgical pain, Acute pain  Pain Location: Shoulder  Pain  Orientation: Left  Pain Descriptors: Burning, Tingling, Pins and needles  Pain Interventions: Repositioned, Ambulation/increased activity  Response to Interventions: No change in pain  Cognition:  Cognition  Overall Cognitive Status: Within Functional Limits  Insight: Mild     Postural Control:  Postural Control  Postural Control: Within Functional Limits  Static Sitting Balance  Static Sitting-Balance Support: Feet supported  Static Sitting-Level of Assistance: Close supervision  Static Standing Balance  Static Standing-Level of Assistance: Contact guard  Dynamic Standing Balance  Dynamic Standing-Balance Support: Right upper extremity supported  Dynamic Standing-Level of Assistance: Contact guard  Dynamic Standing-Balance: Turning    Activity Tolerance:  Activity Tolerance  Endurance: Tolerates 30 min exercise with multiple rests  Treatments:     Bed Mobility  Bed Mobility: Yes  Bed Mobility 1  Bed Mobility 1: Supine to sitting  Level of Assistance 1: Contact guard  Bed Mobility Comments 1: HOB flat  Bed Mobility 2  Bed Mobility  2: Sitting to supine  Level of Assistance 2: Contact guard  Bed Mobility Comments 2: use of bedrails    Ambulation/Gait Training  Ambulation/Gait Training Performed: Yes  Ambulation/Gait Training 1  Surface 1: Level tile  Device 1: No device  Assistance 1: Contact guard  Quality of Gait 1: Wide base of support, Inconsistent stride length, Decreased step length (mild path deviation)  Comments/Distance (ft) 1: 10'x2  Ambulation/Gait Training 2  Surface 2: Level tile  Device 2: Small base quad cane  Assistance 2: Contact guard  Quality of Gait 2: Decreased step length (less path deviations noted, no acute LOB)  Comments/Distance (ft) 2: 30'x2  Transfers  Transfer: Yes  Transfer 1  Technique 1: Sit to stand, Stand to sit  Transfer Level of Assistance 1: Contact guard  Trials/Comments 1: x3  Transfers 2  Transfer From 2: Sit to, Stand to  Transfer to 2: Commode-standard  Technique 2: Sit to  stand, Stand to sit  Transfer Level of Assistance 2: Minimum assistance    Stairs  Stairs: No (pt declining trial)    Outcome Measures:  [facility] Basic Mobility  Turning from your back to your side while in a flat bed without using bedrails: A little  Moving from lying on your back to sitting on the side of a flat bed without using bedrails: A little  Moving to and from bed to chair (including a wheelchair): A little  Standing up from a chair using your arms (e.g. wheelchair or bedside chair): A little  To walk in hospital room: A little  Climbing 3-5 steps with railing: Total  Basic Mobility - Total Score: 16    Education Documentation  Precautions, taught by Olya Gaxiola, PT at 11/24/2024  3:30 PM.  Learner: Patient  Readiness: Acceptance  Method: Explanation  Response: Verbalizes Understanding  Comment: safe mobility    Mobility Training, taught by Olya Gaxiola, PT at 11/24/2024  3:30 PM.  Learner: Patient  Readiness: Acceptance  Method: Explanation  Response: Verbalizes Understanding  Comment: safe mobility    Education Comments  No comments found.        OP EDUCATION:       Encounter Problems       Encounter Problems (Active)       Balance       Pt will score >23 on Tinetti assessment to indicate low falls risk in pt with falls history  (Progressing)       Start:  11/23/24    Expected End:  12/07/24               Mobility       STG - Patient will ambulate >100' with LRD as needed, indep without LOB or path deviations  (Progressing)       Start:  11/23/24    Expected End:  12/07/24            STG - Patient will ascend and descend a flight of stairs with R HR, SBA (Not Progressing)       Start:  11/23/24    Expected End:  12/07/24               PT Transfers       STG - Patient to transfer to and from sit to supine indep from flat bed, no rails  (Progressing)       Start:  11/23/24    Expected End:  12/07/24            STG - Patient will transfer sit to and from stand indep  (Progressing)       Start:   11/23/24    Expected End:  12/07/24               Pain - Adult              11/24/24 at 3:32 PM - Olya Gaxiola PT

## 2024-11-24 NOTE — PROGRESS NOTES
"Orthopaedic Surgery Progress Note    Subjective:  No acute events overnight. Block now worn off with improvement in motor function LUE. Patient lives alone. Denies chest pain, shortness of breath, or fevers. Rec'd SNF.    Objective:  /70 (BP Location: Right arm, Patient Position: Lying)   Pulse 54   Temp 36.3 °C (97.3 °F) (Temporal)   Resp 15   Ht 1.55 m (5' 1.02\")   Wt 54 kg (119 lb 0.8 oz)   SpO2 93%   BMI 22.48 kg/m²     Gen: arousable, NAD, appropriately conversational  Cardiac: RRR to peripheral palpation  Resp: nonlabored on RA  GI: soft, nondistended    MSK:  GEN - NAD  HEENT - anicteric sclera  CV - RRR by peripheral palpation, limbs wwp  PULM - unlabored breathing on RA  NEURO - Moving all extremities spontaneously  PSYCH - Appropriate mood and affect    LUE:   - diffuse ecchymosis L shoulder   - Post-operative dressing in place without strikethrough bleeding. Sling.   - Motor intact radial/ulnar/median  - SILT med/ulnar/rad  - Hand wwp, 2+ radial pulse, cap refill brisk  - Compartments soft and compressible, no pain with passive stretch of digits      No results found. However, due to the size of the patient record, not all encounters were searched. Please check Results Review for a complete set of results.    CT shoulder left wo IV contrast   Final Result   1. Extensively comminuted acute fracture of the left proximal humerus   involving the surgical neck, greater tuberosity, lesser tuberosity   and articular surface. Moderate impaction of fragments and medial   displacement of the shaft relative to the head.   2. Moderate sized left shoulder hemarthrosis.             I personally reviewed the image(s)/study and resident interpretation   as stated by Dr. Nidhi Potter MD. I agree with the findings as   stated. This study was interpreted at [facility], [place], [place].        MACRO:   None        Signed by: Flor Lee 11/21/2024 2:28 AM   Dictation " workstation:   [id number]      CT 3D reconstruction   Final Result   CT MAXILLOFACIAL SKELETON:   1. No acute maxillofacial fracture.        I personally reviewed the image(s)/study and resident interpretation   as stated by Dr. Nidhi Potter MD. I agree with the findings as   stated. This study was interpreted at [place], [place].        MACRO:   None.        Signed by: Flor Lee 11/21/2024 2:31 AM   Dictation workstation:   [id number]      CT maxillofacial bones wo IV contrast   Final Result   CT MAXILLOFACIAL SKELETON:   1. No acute maxillofacial fracture.        I personally reviewed the image(s)/study and resident interpretation   as stated by Dr. Nidhi Potter MD. I agree with the findings as   stated. This study was interpreted at [place], [place].        MACRO:   None.        Signed by: Flor Lee 11/21/2024 2:31 AM   Dictation workstation:   [id number]      XR shoulder left 2+ views   Final Result   1. Redemonstrated acute comminuted and impacted left humeral surgical   neck fracture with suspected subluxation of the humeral head relative   to the glenoid.   2. No additional or new fractures are visualized.   3. Decreased but mild persistent edema of the left deltoid   musculature.        I personally reviewed the image(s)/study and resident interpretation   as stated by Dr. Nidhi Potter MD. I agree with the findings as   stated. This study was interpreted at [place], [place].        MACRO:   None        Signed by: Adilia Arguello 11/20/2024 6:36 PM   Dictation workstation:   [id number]      XR humerus left   Final Result   1. Redemonstrated acute comminuted and impacted left humeral surgical   neck fracture with suspected subluxation of the humeral head relative   to the glenoid.   2. No additional or new fractures are visualized.   3.  Decreased but mild persistent edema of the left deltoid   musculature.        I personally reviewed the image(s)/study and resident interpretation   as stated by Dr. Nidhi Potter MD. I agree with the findings as   stated. This study was interpreted at [facility], [place], [place].        MACRO:   None        Signed by: Adilia Arguello 11/20/2024 6:36 PM   Dictation workstation:   [id number]      XR chest 1 view   Final Result   1.  No evidence of acute cardiopulmonary process.                  MACRO:   None        Signed by: Lucas Evans 11/20/2024 3:33 PM   Dictation workstation:   [id number]      FL less than 1 hour    (Results Pending)       Assessment/Plan: 69 y.o. female s/p Humerus Fracture ORIF Proximal, Left - Hand   on 11/21 with Dr. Roberts.      Plan:  - Weight bearing: NWB LUE  - DVT ppx: SCDs, ASA 81mg BID  - Diet: Regular as tolerated  - Pain: Tylenol, oxycodone 5/10, dilaudid breakthrough  - Antibiotics: perioperative ancef 2g q8hr x3 doses  - FEN: mIVF LR @ 100cc/hr; HLIV with good PO intake; monitor BMP  - Lines: maintain PIVx2 while inpatient  - Bowel Regimen: Miralax, senna, dulcolax  - PT/OT: consulted  - Pulm: Encourage IS, maintain O2 sat >92%  - Cardiac: no issues  - Glycemic: no issues  - Continue home medications  - Monroy: none   - Drains: none    Dispo: medically clear for dc, rec'd SNF    While admitted, this patient will be followed by the Ortho Trauma Team. Please contact the residents listed below with any questions (available via Epic Chat weekdays). Please page [phone] (ortho on-call) after 6pm and on weekends.    Ortho Trauma  First Call: Dorcas Tapia, PGY-1  Second Call: Zoran Le, PGY-2  Third Call: Yoel Nguyen, PGY-3

## 2024-11-24 NOTE — CARE PLAN
The patient's goals for the shift include        Problem: Pain - Adult  Goal: Verbalizes/displays adequate comfort level or baseline comfort level  Outcome: Progressing     Problem: Safety - Adult  Goal: Free from fall injury  Outcome: Progressing     Problem: Discharge Planning  Goal: Discharge to home or other facility with appropriate resources  Outcome: Progressing     Problem: Fall/Injury  Goal: Be free from injury by end of the shift  Outcome: Progressing  Goal: Pace activities to prevent fatigue by end of the shift  Outcome: Progressing     Problem: Pain  Goal: Takes deep breaths with improved pain control throughout the shift  Outcome: Progressing  Goal: Walks with improved pain control throughout the shift  Outcome: Progressing

## 2024-11-25 PROCEDURE — 2500000001 HC RX 250 WO HCPCS SELF ADMINISTERED DRUGS (ALT 637 FOR MEDICARE OP)

## 2024-11-25 PROCEDURE — 1100000001 HC PRIVATE ROOM DAILY

## 2024-11-25 PROCEDURE — 97116 GAIT TRAINING THERAPY: CPT | Mod: GP

## 2024-11-25 PROCEDURE — 94640 AIRWAY INHALATION TREATMENT: CPT

## 2024-11-25 PROCEDURE — 2500000005 HC RX 250 GENERAL PHARMACY W/O HCPCS: Performed by: STUDENT IN AN ORGANIZED HEALTH CARE EDUCATION/TRAINING PROGRAM

## 2024-11-25 PROCEDURE — 2500000002 HC RX 250 W HCPCS SELF ADMINISTERED DRUGS (ALT 637 FOR MEDICARE OP, ALT 636 FOR OP/ED): Performed by: STUDENT IN AN ORGANIZED HEALTH CARE EDUCATION/TRAINING PROGRAM

## 2024-11-25 PROCEDURE — 2500000004 HC RX 250 GENERAL PHARMACY W/ HCPCS (ALT 636 FOR OP/ED): Performed by: STUDENT IN AN ORGANIZED HEALTH CARE EDUCATION/TRAINING PROGRAM

## 2024-11-25 PROCEDURE — 2500000001 HC RX 250 WO HCPCS SELF ADMINISTERED DRUGS (ALT 637 FOR MEDICARE OP): Performed by: STUDENT IN AN ORGANIZED HEALTH CARE EDUCATION/TRAINING PROGRAM

## 2024-11-25 RX ADMIN — SENNOSIDES AND DOCUSATE SODIUM 2 TABLET: 50; 8.6 TABLET ORAL at 08:18

## 2024-11-25 RX ADMIN — OXYCODONE HYDROCHLORIDE 10 MG: 5 TABLET ORAL at 10:42

## 2024-11-25 RX ADMIN — HYDROMORPHONE HYDROCHLORIDE 0.2 MG: 1 INJECTION, SOLUTION INTRAMUSCULAR; INTRAVENOUS; SUBCUTANEOUS at 12:24

## 2024-11-25 RX ADMIN — PANTOPRAZOLE SODIUM 40 MG: 40 TABLET, DELAYED RELEASE ORAL at 06:04

## 2024-11-25 RX ADMIN — Medication 240 ML: at 17:19

## 2024-11-25 RX ADMIN — OXYCODONE HYDROCHLORIDE 10 MG: 5 TABLET ORAL at 06:04

## 2024-11-25 RX ADMIN — CYCLOBENZAPRINE 10 MG: 10 TABLET, FILM COATED ORAL at 06:04

## 2024-11-25 RX ADMIN — CYCLOBENZAPRINE 10 MG: 10 TABLET, FILM COATED ORAL at 12:08

## 2024-11-25 RX ADMIN — OXYCODONE HYDROCHLORIDE 10 MG: 5 TABLET ORAL at 21:43

## 2024-11-25 RX ADMIN — Medication 240 ML: at 06:05

## 2024-11-25 RX ADMIN — OXYBUTYNIN CHLORIDE 5 MG: 5 TABLET ORAL at 20:03

## 2024-11-25 RX ADMIN — ACETAMINOPHEN 650 MG: 325 TABLET ORAL at 17:01

## 2024-11-25 RX ADMIN — ACETAMINOPHEN 650 MG: 325 TABLET ORAL at 06:03

## 2024-11-25 RX ADMIN — TIOTROPIUM BROMIDE INHALATION SPRAY 2 PUFF: 3.12 SPRAY, METERED RESPIRATORY (INHALATION) at 10:26

## 2024-11-25 RX ADMIN — SENNOSIDES AND DOCUSATE SODIUM 2 TABLET: 50; 8.6 TABLET ORAL at 20:03

## 2024-11-25 RX ADMIN — ASPIRIN 81 MG: 81 TABLET, COATED ORAL at 08:18

## 2024-11-25 RX ADMIN — HYDROMORPHONE HYDROCHLORIDE 0.2 MG: 1 INJECTION, SOLUTION INTRAMUSCULAR; INTRAVENOUS; SUBCUTANEOUS at 01:02

## 2024-11-25 RX ADMIN — LIDOCAINE 1 PATCH: 4 PATCH TOPICAL at 08:18

## 2024-11-25 RX ADMIN — ACETAMINOPHEN 650 MG: 325 TABLET ORAL at 12:08

## 2024-11-25 RX ADMIN — FLUTICASONE FUROATE AND VILANTEROL TRIFENATATE 1 PUFF: 200; 25 POWDER RESPIRATORY (INHALATION) at 10:29

## 2024-11-25 RX ADMIN — DILTIAZEM HYDROCHLORIDE 180 MG: 180 CAPSULE, COATED, EXTENDED RELEASE ORAL at 08:26

## 2024-11-25 RX ADMIN — OXYBUTYNIN CHLORIDE 5 MG: 5 TABLET ORAL at 08:18

## 2024-11-25 RX ADMIN — HYDROMORPHONE HYDROCHLORIDE 0.2 MG: 1 INJECTION, SOLUTION INTRAMUSCULAR; INTRAVENOUS; SUBCUTANEOUS at 18:27

## 2024-11-25 RX ADMIN — OXYCODONE HYDROCHLORIDE 10 MG: 5 TABLET ORAL at 17:01

## 2024-11-25 RX ADMIN — POLYETHYLENE GLYCOL 3350 17 G: 17 POWDER, FOR SOLUTION ORAL at 08:18

## 2024-11-25 RX ADMIN — Medication 240 ML: at 12:25

## 2024-11-25 RX ADMIN — FLUTICASONE PROPIONATE 1 SPRAY: 50 SPRAY, METERED NASAL at 08:19

## 2024-11-25 RX ADMIN — ASPIRIN 81 MG: 81 TABLET, COATED ORAL at 20:03

## 2024-11-25 ASSESSMENT — PAIN SCALES - GENERAL
PAINLEVEL_OUTOF10: 8
PAINLEVEL_OUTOF10: 7
PAINLEVEL_OUTOF10: 10 - WORST POSSIBLE PAIN
PAINLEVEL_OUTOF10: 9
PAINLEVEL_OUTOF10: 10 - WORST POSSIBLE PAIN
PAINLEVEL_OUTOF10: 10 - WORST POSSIBLE PAIN
PAINLEVEL_OUTOF10: 9
PAINLEVEL_OUTOF10: 8
PAINLEVEL_OUTOF10: 8

## 2024-11-25 ASSESSMENT — PAIN - FUNCTIONAL ASSESSMENT
PAIN_FUNCTIONAL_ASSESSMENT: 0-10

## 2024-11-25 ASSESSMENT — COGNITIVE AND FUNCTIONAL STATUS - GENERAL
MOVING FROM LYING ON BACK TO SITTING ON SIDE OF FLAT BED WITH BEDRAILS: A LITTLE
MOVING TO AND FROM BED TO CHAIR: A LITTLE
CLIMB 3 TO 5 STEPS WITH RAILING: TOTAL
STANDING UP FROM CHAIR USING ARMS: A LITTLE
WALKING IN HOSPITAL ROOM: A LITTLE
TURNING FROM BACK TO SIDE WHILE IN FLAT BAD: A LITTLE
MOBILITY SCORE: 16

## 2024-11-25 ASSESSMENT — ACTIVITIES OF DAILY LIVING (ADL): LACK_OF_TRANSPORTATION: YES

## 2024-11-25 NOTE — PROGRESS NOTES
Judith Bush is a 69 y.o. female on day 5 of admission presenting with Closed fracture of left proximal humerus.    Transitional Care Coordination Progress Note:  Patient discussed during interdisciplinary rounds.   Team members present: MD and TCC  Plan per Medical/Surgical team: Patient pending SNF placement.   Payor: [facility]  Discharge disposition: FOC: [facility]- Our team started pre-cert 11/25.  Potential Barriers: None  ADOD: 11/27/24    ERICA CHAU RN

## 2024-11-25 NOTE — CARE PLAN
The patient's goals for the shift include        Problem: Pain - Adult  Goal: Verbalizes/displays adequate comfort level or baseline comfort level  Outcome: Progressing     Problem: Safety - Adult  Goal: Free from fall injury  Outcome: Progressing     Problem: Fall/Injury  Goal: Not fall by end of shift  Outcome: Progressing  Goal: Verbalize understanding of personal risk factors for fall in the hospital  Outcome: Progressing  Goal: Verbalize understanding of risk factor reduction measures to prevent injury from fall in the home  Outcome: Progressing

## 2024-11-25 NOTE — PROGRESS NOTES
Physical Therapy    Physical Therapy Treatment    Patient Name: Judith Bush  MRN: [id number]  Department: [place] [id number]  Room: [facility]  Today's Date: 11/25/2024  Time Calculation  Start Time: 1418  Stop Time: 1432  Time Calculation (min): 14 min       Assessment/Plan   PT Assessment  End of Session Communication: Bedside nurse  Assessment Comment: Remains slightly unsteady with functional mobility with using cane.  Declining stair trial this date.  Continue to progress as tolerates  End of Session Patient Position: Bed, 3 rail up, Alarm on     PT Plan  Treatment/Interventions: Bed mobility, Transfer training, Gait training, Stair training, Balance training, Strengthening, Endurance training, Therapeutic exercise, Therapeutic activity, Range of motion  PT Plan: Ongoing PT  PT Frequency: 5 times per week  PT Discharge Recommendations:  (Pt would benefit from 24/7 assist for functional mobility & ADLs; however, if this is unavailable, pt may need to consider mod intensity therapy at discharge.)  Equipment Recommended upon Discharge:  (TBD)  PT Recommended Transfer Status: Assist x1  PT - OK to Discharge: Yes (PT eval completed & recs made)      General Visit Information:   PT  Visit  PT Received On: 11/25/24  Response to Previous Treatment: Patient reporting fatigue but able to participate.  General  Missed Visit: No  Family/Caregiver Present: No  Prior to Session Communication: Bedside nurse  Patient Position Received: Bed, 3 rail up, Alarm on  Preferred Learning Style: auditory, verbal, visual  General Comment: Pt pleasant & cooperative, willing to participate; stating she feels very fatigued today    Subjective   Precautions:  Precautions  UE Weight Bearing Status: Left Non-Weight Bearing  Medical Precautions: Fall precautions  Braces Applied: sling donned prior to mobility      Objective   Pain:  Pain Assessment  Pain Assessment: 0-10  0-10 (Numeric) Pain Score: 8  Pain Type: Acute pain, Surgical pain  Pain  Location: Arm  Pain Orientation: Left  Pain Interventions: Repositioned  Response to Interventions: No change in pain  Cognition:     Coordination:     Postural Control:  Postural Control  Postural Control: Within Functional Limits  Static Sitting Balance  Static Sitting-Balance Support: Feet supported  Static Sitting-Level of Assistance: Close supervision  Dynamic Sitting Balance  Dynamic Sitting-Balance Support: Feet supported  Dynamic Sitting-Level of Assistance: Close supervision  Dynamic Sitting-Balance: Forward lean  Static Standing Balance  Static Standing-Balance Support: Right upper extremity supported  Static Standing-Level of Assistance: Contact guard  Dynamic Standing Balance  Dynamic Standing-Balance Support: Right upper extremity supported  Dynamic Standing-Level of Assistance: Contact guard  Dynamic Standing-Balance: Turning     Activity Tolerance:  Activity Tolerance  Endurance: Tolerates 10 - 20 min exercise with multiple rests  Treatments:     Bed Mobility  Bed Mobility: Yes  Bed Mobility 1  Bed Mobility 1: Supine to sitting  Level of Assistance 1: Close supervision  Bed Mobility Comments 1: HOB elevated  Bed Mobility 2  Bed Mobility  2: Sitting to supine  Level of Assistance 2: Close supervision  Bed Mobility Comments 2: HOB elevated, use of rails    Ambulation/Gait Training  Ambulation/Gait Training Performed: Yes  Ambulation/Gait Training 1  Surface 1: Level tile  Device 1: Single point cane  Assistance 1: Contact guard  Quality of Gait 1: Narrow base of support, Diminished heel strike, Decreased step length (dec petros)  Comments/Distance (ft) 1: 60'x2  Ambulation/Gait Training 2  Surface 2: Level tile  Device 2: No device  Assistance 2: Contact guard  Quality of Gait 2: Decreased step length (dec petros; reaching for external support)  Comments/Distance (ft) 2: 10'  Transfers  Transfer: Yes  Transfer 1  Technique 1: Sit to stand, Stand to sit  Transfer Device 1: Cane  Transfer Level of  Assistance 1: Contact guard, Minimal verbal cues  Transfers 2  Transfer From 2: Sit to, Stand to  Transfer to 2: Commode-standard  Technique 2: Sit to stand, Stand to sit  Transfer Level of Assistance 2: Contact guard    Stairs  Stairs: No (pt declining 2/2 fatigue)    Outcome Measures:  [facility] Basic Mobility  Turning from your back to your side while in a flat bed without using bedrails: A little  Moving from lying on your back to sitting on the side of a flat bed without using bedrails: A little  Moving to and from bed to chair (including a wheelchair): A little  Standing up from a chair using your arms (e.g. wheelchair or bedside chair): A little  To walk in hospital room: A little  Climbing 3-5 steps with railing: Total  Basic Mobility - Total Score: 16    Education Documentation  Precautions, taught by Olya Gaxiola, PT at 11/25/2024  4:11 PM.  Learner: Patient  Readiness: Acceptance  Method: Explanation  Response: Verbalizes Understanding  Comment: safety with cane    Mobility Training, taught by Olya Gaxiola, PT at 11/25/2024  4:11 PM.  Learner: Patient  Readiness: Acceptance  Method: Explanation  Response: Verbalizes Understanding  Comment: safety with cane    Education Comments  No comments found.        OP EDUCATION:       Encounter Problems       Encounter Problems (Active)       Balance       Pt will score >23 on Tinetti assessment to indicate low falls risk in pt with falls history  (Progressing)       Start:  11/23/24    Expected End:  12/07/24               Mobility       STG - Patient will ambulate >100' with LRD as needed, indep without LOB or path deviations  (Progressing)       Start:  11/23/24    Expected End:  12/07/24            STG - Patient will ascend and descend a flight of stairs with R HR, SBA (Not Progressing)       Start:  11/23/24    Expected End:  12/07/24               PT Transfers       STG - Patient to transfer to and from sit to supine indep from flat bed, no rails   (Progressing)       Start:  11/23/24    Expected End:  12/07/24            STG - Patient will transfer sit to and from stand indep  (Progressing)       Start:  11/23/24    Expected End:  12/07/24               Pain - Adult            11/25/24 at 4:13 PM - Olya Gaxiola, PT

## 2024-11-25 NOTE — PROGRESS NOTES
"Orthopaedic Surgery Progress Note    Subjective:  No acute events overnight. Patient lives alone. Denies chest pain, shortness of breath, or fevers. Rec'd SNF due to lack of help at home.     Objective:  /74 (BP Location: Right arm, Patient Position: Lying)   Pulse 61   Temp 36.2 °C (97.2 °F) (Temporal)   Resp 17   Ht 1.55 m (5' 1.02\")   Wt 54 kg (119 lb 0.8 oz)   SpO2 91%   BMI 22.48 kg/m²     Gen: arousable, NAD, appropriately conversational  Cardiac: RRR to peripheral palpation  Resp: nonlabored on RA  GI: soft, nondistended    MSK:  GEN - NAD  HEENT - anicteric sclera  CV - RRR by peripheral palpation, limbs wwp  PULM - unlabored breathing on RA  NEURO - Moving all extremities spontaneously  PSYCH - Appropriate mood and affect    LUE:   - Post-operative dressing in place without strikethrough bleeding. Sling.   - Motor intact radial/ulnar/median  - SILT med/ulnar/rad  - Hand wwp, 2+ radial pulse, cap refill brisk  - Compartments soft and compressible, no pain with passive stretch of digits      No results found. However, due to the size of the patient record, not all encounters were searched. Please check Results Review for a complete set of results.    CT shoulder left wo IV contrast   Final Result   1. Extensively comminuted acute fracture of the left proximal humerus   involving the surgical neck, greater tuberosity, lesser tuberosity   and articular surface. Moderate impaction of fragments and medial   displacement of the shaft relative to the head.   2. Moderate sized left shoulder hemarthrosis.             I personally reviewed the image(s)/study and resident interpretation   as stated by Dr. Nidhi Potter MD. I agree with the findings as   stated. This study was interpreted at [facility], [place], [place].        MACRO:   None        Signed by: Flor Lee 11/21/2024 2:28 AM   Dictation workstation:   [id number]      CT 3D reconstruction   Final " Result   CT MAXILLOFACIAL SKELETON:   1. No acute maxillofacial fracture.        I personally reviewed the image(s)/study and resident interpretation   as stated by Dr. Nidhi Potter MD. I agree with the findings as   stated. This study was interpreted at [place], [place].        MACRO:   None.        Signed by: Flor Lee 11/21/2024 2:31 AM   Dictation workstation:   [id number]      CT maxillofacial bones wo IV contrast   Final Result   CT MAXILLOFACIAL SKELETON:   1. No acute maxillofacial fracture.        I personally reviewed the image(s)/study and resident interpretation   as stated by Dr. Nidhi Potter MD. I agree with the findings as   stated. This study was interpreted at [place], [place].        MACRO:   None.        Signed by: Flor Lee 11/21/2024 2:31 AM   Dictation workstation:   [id number]      XR shoulder left 2+ views   Final Result   1. Redemonstrated acute comminuted and impacted left humeral surgical   neck fracture with suspected subluxation of the humeral head relative   to the glenoid.   2. No additional or new fractures are visualized.   3. Decreased but mild persistent edema of the left deltoid   musculature.        I personally reviewed the image(s)/study and resident interpretation   as stated by Dr. Nidhi Potter MD. I agree with the findings as   stated. This study was interpreted at [place], [place].        MACRO:   None        Signed by: Adilia Arguello 11/20/2024 6:36 PM   Dictation workstation:   [id number]      XR humerus left   Final Result   1. Redemonstrated acute comminuted and impacted left humeral surgical   neck fracture with suspected subluxation of the humeral head relative   to the glenoid.   2. No additional or new fractures are visualized.   3. Decreased but mild persistent edema of the left deltoid    musculature.        I personally reviewed the image(s)/study and resident interpretation   as stated by Dr. Nidhi Potter MD. I agree with the findings as   stated. This study was interpreted at [facility], [place], [place].        MACRO:   None        Signed by: Adilia Arguello 11/20/2024 6:36 PM   Dictation workstation:   [id number]      XR chest 1 view   Final Result   1.  No evidence of acute cardiopulmonary process.                  MACRO:   None        Signed by: Lucas Evans 11/20/2024 3:33 PM   Dictation workstation:   [id number]      FL less than 1 hour    (Results Pending)       Assessment/Plan: 69 y.o. female s/p Humerus Fracture ORIF Proximal, Left - Hand   on 11/21 with Dr. Roberts.      Plan:  - Weight bearing: NWB LUE  - DVT ppx: SCDs, ASA 81mg BID  - Diet: Regular as tolerated  - Pain: Tylenol, oxycodone 5/10, dilaudid breakthrough  - Antibiotics: perioperative ancef 2g q8hr x3 doses  - FEN: mIVF LR @ 100cc/hr; HLIV with good PO intake; monitor BMP  - Lines: maintain PIVx2 while inpatient  - Bowel Regimen: Miralax, senna, dulcolax  - PT/OT: consulted  - Pulm: Encourage IS, maintain O2 sat >92%  - Cardiac: no issues  - Glycemic: no issues  - Continue home medications  - [place]: none   - Drains: none    Dispo: medically clear for dc, rec'd SNF    While admitted, this patient will be followed by the Ortho Trauma Team. Please contact the residents listed below with any questions (available via Epic Chat weekdays). Please page [phone] (ortho on-call) after 6pm and on weekends.    Ortho Trauma  First Call: Dorcas Tapia, PGY-1  Second Call: Zoran Le, PGY-2  Third Call: Yoel Nguyen, PGY-3

## 2024-11-25 NOTE — CARE PLAN
Problem: Pain - Adult  Goal: Verbalizes/displays adequate comfort level or baseline comfort level  Outcome: Progressing     Problem: Fall/Injury  Goal: Not fall by end of shift  Outcome: Progressing   The patient's goals for the shift include      The clinical goals for the shift include Pt will be free from falls with decrease pain on my shift

## 2024-11-26 VITALS
DIASTOLIC BLOOD PRESSURE: 66 MMHG | HEART RATE: 62 BPM | BODY MASS INDEX: 22.48 KG/M2 | TEMPERATURE: 97.7 F | SYSTOLIC BLOOD PRESSURE: 105 MMHG | OXYGEN SATURATION: 96 % | RESPIRATION RATE: 14 BRPM | WEIGHT: 119.05 LBS | HEIGHT: 61 IN

## 2024-11-26 PROCEDURE — 94640 AIRWAY INHALATION TREATMENT: CPT

## 2024-11-26 PROCEDURE — 2500000001 HC RX 250 WO HCPCS SELF ADMINISTERED DRUGS (ALT 637 FOR MEDICARE OP)

## 2024-11-26 PROCEDURE — 97116 GAIT TRAINING THERAPY: CPT | Mod: GP,CQ

## 2024-11-26 PROCEDURE — 2500000004 HC RX 250 GENERAL PHARMACY W/ HCPCS (ALT 636 FOR OP/ED): Performed by: STUDENT IN AN ORGANIZED HEALTH CARE EDUCATION/TRAINING PROGRAM

## 2024-11-26 PROCEDURE — 97530 THERAPEUTIC ACTIVITIES: CPT | Mod: GP,CQ

## 2024-11-26 PROCEDURE — 2500000002 HC RX 250 W HCPCS SELF ADMINISTERED DRUGS (ALT 637 FOR MEDICARE OP, ALT 636 FOR OP/ED): Performed by: STUDENT IN AN ORGANIZED HEALTH CARE EDUCATION/TRAINING PROGRAM

## 2024-11-26 PROCEDURE — 2500000001 HC RX 250 WO HCPCS SELF ADMINISTERED DRUGS (ALT 637 FOR MEDICARE OP): Performed by: STUDENT IN AN ORGANIZED HEALTH CARE EDUCATION/TRAINING PROGRAM

## 2024-11-26 PROCEDURE — 2500000005 HC RX 250 GENERAL PHARMACY W/O HCPCS: Performed by: STUDENT IN AN ORGANIZED HEALTH CARE EDUCATION/TRAINING PROGRAM

## 2024-11-26 RX ORDER — ASPIRIN 81 MG/1
81 TABLET ORAL 2 TIMES DAILY
Qty: 84 TABLET | Refills: 0 | Status: SHIPPED | OUTPATIENT
Start: 2024-11-26 | End: 2025-01-07

## 2024-11-26 RX ORDER — HYDROMORPHONE HYDROCHLORIDE 0.2 MG/ML
0.2 INJECTION INTRAMUSCULAR; INTRAVENOUS; SUBCUTANEOUS
Status: DISCONTINUED | OUTPATIENT
Start: 2024-11-26 | End: 2024-11-26 | Stop reason: HOSPADM

## 2024-11-26 RX ORDER — OXYCODONE HYDROCHLORIDE 5 MG/1
5 TABLET ORAL EVERY 4 HOURS PRN
Qty: 42 TABLET | Refills: 0 | Status: SHIPPED | OUTPATIENT
Start: 2024-11-26

## 2024-11-26 RX ORDER — DOCUSATE SODIUM 100 MG/1
100 CAPSULE, LIQUID FILLED ORAL 2 TIMES DAILY PRN
Qty: 30 CAPSULE | Refills: 0 | Status: SHIPPED | OUTPATIENT
Start: 2024-11-26

## 2024-11-26 RX ORDER — ACETAMINOPHEN 325 MG/1
650 TABLET ORAL EVERY 6 HOURS PRN
Qty: 120 TABLET | Refills: 0 | Status: SHIPPED | OUTPATIENT
Start: 2024-11-26

## 2024-11-26 RX ADMIN — HYDROMORPHONE HYDROCHLORIDE 0.2 MG: 0.2 INJECTION, SOLUTION INTRAMUSCULAR; INTRAVENOUS; SUBCUTANEOUS at 04:42

## 2024-11-26 RX ADMIN — ACETAMINOPHEN 650 MG: 325 TABLET ORAL at 00:21

## 2024-11-26 RX ADMIN — OXYBUTYNIN CHLORIDE 5 MG: 5 TABLET ORAL at 08:40

## 2024-11-26 RX ADMIN — LIDOCAINE 1 PATCH: 4 PATCH TOPICAL at 08:41

## 2024-11-26 RX ADMIN — FLUTICASONE FUROATE AND VILANTEROL TRIFENATATE 1 PUFF: 200; 25 POWDER RESPIRATORY (INHALATION) at 09:41

## 2024-11-26 RX ADMIN — Medication 240 ML: at 06:03

## 2024-11-26 RX ADMIN — ACETAMINOPHEN 650 MG: 325 TABLET ORAL at 11:56

## 2024-11-26 RX ADMIN — OXYCODONE HYDROCHLORIDE 10 MG: 5 TABLET ORAL at 11:56

## 2024-11-26 RX ADMIN — HYDROMORPHONE HYDROCHLORIDE 0.2 MG: 0.2 INJECTION, SOLUTION INTRAMUSCULAR; INTRAVENOUS; SUBCUTANEOUS at 00:21

## 2024-11-26 RX ADMIN — TIOTROPIUM BROMIDE INHALATION SPRAY 2 PUFF: 3.12 SPRAY, METERED RESPIRATORY (INHALATION) at 09:41

## 2024-11-26 RX ADMIN — Medication 240 ML: at 00:22

## 2024-11-26 RX ADMIN — OXYCODONE HYDROCHLORIDE 10 MG: 5 TABLET ORAL at 01:37

## 2024-11-26 RX ADMIN — HYDROMORPHONE HYDROCHLORIDE 0.2 MG: 0.2 INJECTION, SOLUTION INTRAMUSCULAR; INTRAVENOUS; SUBCUTANEOUS at 13:51

## 2024-11-26 RX ADMIN — ASPIRIN 81 MG: 81 TABLET, COATED ORAL at 08:40

## 2024-11-26 RX ADMIN — ACETAMINOPHEN 650 MG: 325 TABLET ORAL at 06:02

## 2024-11-26 RX ADMIN — DILTIAZEM HYDROCHLORIDE 180 MG: 180 CAPSULE, COATED, EXTENDED RELEASE ORAL at 08:41

## 2024-11-26 RX ADMIN — SENNOSIDES AND DOCUSATE SODIUM 2 TABLET: 50; 8.6 TABLET ORAL at 08:40

## 2024-11-26 RX ADMIN — OXYCODONE HYDROCHLORIDE 10 MG: 5 TABLET ORAL at 06:02

## 2024-11-26 RX ADMIN — CYCLOBENZAPRINE 10 MG: 10 TABLET, FILM COATED ORAL at 11:53

## 2024-11-26 RX ADMIN — PANTOPRAZOLE SODIUM 40 MG: 40 TABLET, DELAYED RELEASE ORAL at 06:02

## 2024-11-26 RX ADMIN — Medication 240 ML: at 13:01

## 2024-11-26 RX ADMIN — POLYETHYLENE GLYCOL 3350 17 G: 17 POWDER, FOR SOLUTION ORAL at 08:40

## 2024-11-26 ASSESSMENT — COGNITIVE AND FUNCTIONAL STATUS - GENERAL
MOVING TO AND FROM BED TO CHAIR: A LITTLE
MOBILITY SCORE: 17
STANDING UP FROM CHAIR USING ARMS: A LITTLE
TURNING FROM BACK TO SIDE WHILE IN FLAT BAD: A LITTLE
WALKING IN HOSPITAL ROOM: A LITTLE
CLIMB 3 TO 5 STEPS WITH RAILING: A LOT
MOVING FROM LYING ON BACK TO SITTING ON SIDE OF FLAT BED WITH BEDRAILS: A LITTLE

## 2024-11-26 ASSESSMENT — PAIN - FUNCTIONAL ASSESSMENT
PAIN_FUNCTIONAL_ASSESSMENT: 0-10

## 2024-11-26 ASSESSMENT — PAIN SCALES - GENERAL
PAINLEVEL_OUTOF10: 8
PAINLEVEL_OUTOF10: 9
PAINLEVEL_OUTOF10: 8
PAINLEVEL_OUTOF10: 8
PAINLEVEL_OUTOF10: 9

## 2024-11-26 NOTE — DISCHARGE SUMMARY
Discharge Diagnosis  Closed fracture of left proximal humerus    Issues Requiring Follow-Up  ORIF left proximal humerus     Test Results Pending At Discharge  Pending Labs       No current pending labs.            Hospital Course   69 year-old female who presented with left proxima humerus fracture. Patient is now s/p ORIF L humerus on 11/21 with Dr. Roberts. On the day of surgery, patient was identified in the pre-operative holding area and agreeable to proceed with surgery. Written consent was obtained.  Please see operative note for further details of this procedure. Patient received 24 hours of cliff-operative antibiotics. Patient recovered in the PACU before transfer to a regular nursing floor. Patient was started on oxycodone and tylenol for pain control and  ASA 81 mg bid for DVT prophylaxis. Physical therapy recommended continued recovery at [facility] with continued physical therapy and wound care. On the day of discharge, patient was afebrile with stable vital signs. Patient was neurovascularly intact at time of discharge. Patient was discharged with prescription of ASA 81 BID for DVT prophylaxis for 6 weeks. Patient will follow-up with Dr. Roberts in 2 weeks for post-operative visit.         Home Medications     Medication List      START taking these medications     acetaminophen 325 mg tablet; Commonly known as: Tylenol; Take 2 tablets   (650 mg) by mouth every 6 hours if needed for mild pain (1 - 3).   docusate sodium 100 mg capsule; Commonly known as: Colace; Take 1   capsule (100 mg) by mouth 2 times a day as needed for constipation.   oxyCODONE 5 mg immediate release tablet; Commonly known as: Roxicodone;   Take 1 tablet (5 mg) by mouth every 4 hours if needed for severe pain (7 -   10) or moderate pain (4 - 6).     CHANGE how you take these medications     aspirin 81 mg EC tablet; Take 1 tablet (81 mg) by mouth 2 times a day.;   What changed: when to take this     CONTINUE taking these medications      * albuterol 2.5 mg /3 mL (0.083 %) nebulizer solution; Take 3 mL (2.5   mg) by nebulization every 6 hours if needed for wheezing.   * albuterol 90 mcg/actuation inhaler; USE 2 INHALATIONS BY MOUTH EVERY 4   HOURS AS NEEDED   Bevespi Aerosphere 9-4.8 mcg HFA aerosol inhaler; Generic drug:   glycopyrrolate-formoteroL; TAKE 2 PUFFS BY MOUTH TWICE A DAY   * cyclobenzaprine 10 mg tablet; Commonly known as: Flexeril; TAKE 1   TABLET (10 MG) BY MOUTH AS NEEDED AT BEDTIME FOR MUSCLE SPASMS.   * cyclobenzaprine 5 mg tablet; Commonly known as: Flexeril; Take 1   tablet (5 mg) by mouth 3 times a day for 10 days.   diclofenac sodium 1 % gel; Commonly known as: Voltaren; APPLY 1   APPLICATION TOPICALLY 4 TIMES A DAY AS NEEDED (PAIN).   dilTIAZem  mg 24 hr capsule; Commonly known as: Cardizem CD; Take   1 capsule (180 mg) by mouth once daily.   fluticasone 50 mcg/actuation nasal spray; Commonly known as: Flonase;   Administer 1 spray into each nostril once daily. Shake gently. Before   first use, prime pump. After use, clean tip and replace cap.   hydrocortisone acetate 1 % ointment; Apply 1 Application topically 2   times a day as needed (for hemorrhoids).   lidocaine 4 % patch; Place 1 patch over 12 hours on the skin once daily.   Remove & discard patch within 12 hours or as directed by MD.   naloxone 4 mg/0.1 mL nasal spray; Commonly known as: Narcan; Administer   1 spray (4 mg) into affected nostril(s) if needed for opioid reversal for   up to 2 doses. Give 1 spray as a single dose in one nostril. May repeat   every 2-3 minutes in alternating nostrils until medical assistance becomes   available.   onabotulinumtoxinA 200 unit injection; Commonly known as: Botox; Inject   155 Units into the muscle every 3 months.   ondansetron ODT 4 mg disintegrating tablet; Commonly known as:   Zofran-ODT; Take 1 tablet (4 mg) by mouth every 8 hours if needed for   nausea or vomiting.   One Daily Multivitamin tablet; Generic drug:  multivitamin   oxybutynin XL 10 mg 24 hr tablet; Commonly known as: Ditropan-XL; Take 1   tablet (10 mg) by mouth 2 times a day.   pantoprazole 40 mg EC tablet; Commonly known as: ProtoNix; Take 1 tablet   (40 mg) by mouth once daily in the morning. Take before meals. Do not   crush, chew, or split.   polyethylene glycol 17 gram/dose powder; Commonly known as: Glycolax,   Miralax; Use up to 3 times daily as directed as needed   Repatha SureClick 140 mg/mL injection; Generic drug: evolocumab; INJECT   THE CONTENTS OF 1 PEN UNDER THE SKIN EVERY 2 WEEKS   Tymlos 80 mcg (3,120 mcg/1.56 mL) pen injector; Generic drug:   abaloparatide   ubrogepant 100 mg tablet tablet; Commonly known as: Ubrelvy; Take 1   tablet (100 mg) by mouth 1 time if needed (migraine headaches). Can repeat   in 2 hours if no response. Not to exceed 200mg per 24 hours.  * This list has 4 medication(s) that are the same as other medications   prescribed for you. Read the directions carefully, and ask your doctor or   other care provider to review them with you.     STOP taking these medications     celecoxib 200 mg capsule; Commonly known as: CeleBREX   naproxen 500 mg tablet; Commonly known as: Naprosyn   oxyCODONE-acetaminophen  mg tablet; Commonly known as: Percocet     ASK your doctor about these medications     SUMAtriptan 50 mg tablet; Commonly known as: Imitrex; Take 1 tablet (50   mg) by mouth 1 time if needed for migraine. May repeat after 2 hours.       Outpatient Follow-Up  Future Appointments   Date Time Provider Department [place]   12/9/2024  2:20 PM Cheri Givens PA-C [id number] [facility]   12/17/2024 10:00 AM Deacon Vera MD [id number] None   1/14/2025 11:15 AM Amanda Humphrey MD [id number] [facility]   2/11/2025  2:45 PM Jaz Woo DO [id number] [facility]   10/9/2025 11:30 AM Deacon Vera MD [id number] None       Zoran Le MD

## 2024-11-26 NOTE — PROGRESS NOTES
Physical Therapy    Physical Therapy Treatment    Patient Name: Judith Bush  MRN: [id number]  Department: [place] [id number]  Room: [facility]-  Today's Date: 11/26/2024  Time Calculation  Start Time: 1014  Stop Time: 1037  Time Calculation (min): 23 min         Assessment/Plan   PT Assessment  PT Assessment Results: Decreased strength, Decreased range of motion, Decreased endurance, Impaired balance, Decreased mobility, Orthopedic restrictions, Pain  Rehab Prognosis: Good  Barriers to Discharge: lack of support; multi level home; pain  Evaluation/Treatment Tolerance: Patient limited by fatigue  Medical Staff Made Aware: Yes  End of Session Communication: Bedside nurse  Assessment Comment: Pt remains slightly unsteady. When practicing stairs, reports that her stairs at home are cluttered, reports she has no one available to help clear them. Pt remains appropriate for low intensity therapy with daytime vs 24/7 support at home, if support unavailable, may require mod intensity in order to increase safety prior to D/C  End of Session Patient Position: Bed, 3 rail up, Alarm off, not on at start of session     PT Plan  Treatment/Interventions: Bed mobility, Transfer training, Gait training, Stair training, Balance training, Strengthening, Endurance training, Therapeutic exercise, Therapeutic activity, Range of motion  PT Plan: Ongoing PT  PT Frequency: 5 times per week  PT Discharge Recommendations:  (Pt would benefit from 24/7 assist for functional mobility & ADLs; however, if this is unavailable, pt may need to consider mod intensity therapy at discharge.)  Equipment Recommended upon Discharge:  (TBD)  PT Recommended Transfer Status: Assist x1  PT - OK to Discharge: Yes (PT eval completed & recs made)      General Visit Information:   PT  Visit  PT Received On: 11/26/24  Response to Previous Treatment: Patient with no complaints from previous session.  General  Prior to Session Communication: Bedside nurse  Patient Position  Received: Bed, 3 rail up, Alarm off, not on at start of session  General Comment: Pt supine in bed. Reports she is tired, but agreeable to therapy with min encouragement.  Per handoff with RN, pt is appropriate for therapy, vitals are stable and pain is controlled. Other concerns prior to tx are: none    Subjective   Precautions:  Precautions  UE Weight Bearing Status: Left Non-Weight Bearing  Medical Precautions: Fall precautions  Braces Applied: Sling in place when approached for therapy    Objective   Pain:  Pain Assessment  Pain Assessment: 0-10  0-10 (Numeric) Pain Score: 8  Pain Type: Acute pain  Pain Location: Arm  Pain Orientation: Left  Pain Interventions:  (Pt medicated prior to therapy)  Cognition:  Cognition  Overall Cognitive Status: Within Functional Limits  Orientation Level: Oriented X4    Treatments:     Therapeutic Activity  Therapeutic Activity Performed: Yes  Therapeutic Activity 1: Close SBA provided while pt standing unsupported in order to address oral hygiene, min cues for NWB L UE.    Bed Mobility  Bed Mobility: Yes  Bed Mobility 1  Bed Mobility 1: Supine to sitting, Sitting to supine  Level of Assistance 1: Close supervision    Ambulation/Gait Training  Ambulation/Gait Training Performed: Yes  Ambulation/Gait Training 1  Surface 1: Level tile  Device 1: Single point cane  Assistance 1: Close supervision  Quality of Gait 1: Diminished heel strike, Decreased step length  Comments/Distance (ft) 1: 150'x1  Ambulation/Gait Training 2  Surface 2: Level tile  Device 2: No device  Assistance 2: Contact guard  Quality of Gait 2:  (increased petros, increased lateral sway)  Comments/Distance (ft) 2: 30'x1  Transfers  Transfer: Yes  Transfer 1  Transfer From 1: Sit to, Stand to  Transfer to 1: Sit  Technique 1: Sit to stand, Stand to sit  Transfer Device 1: Cane (vs no AD)  Transfer Level of Assistance 1: Contact guard, Moderate verbal cues  Trials/Comments 1: cues for R UE placement and body  alignment    Stairs  Stairs: Yes  Stairs  Rails 1: Right, Left  Device 1: Railing  Assistance 1: Moderate assistance  Comment/Number of Steps 1: down/up 3 steps 2x with single rail, 1x with rail on R, 1x with rail on L, both bouts with R UE support only, primarily CGA but with +LOB x1 with modA to recover    Outcome Measures:     [facility] Basic Mobility  Turning from your back to your side while in a flat bed without using bedrails: A little  Moving from lying on your back to sitting on the side of a flat bed without using bedrails: A little  Moving to and from bed to chair (including a wheelchair): A little  Standing up from a chair using your arms (e.g. wheelchair or bedside chair): A little  To walk in hospital room: A little  Climbing 3-5 steps with railing: A lot  Basic Mobility - Total Score: 17    Education Documentation  Precautions, taught by Santa Arellano PTA at 11/26/2024 11:17 AM.  Learner: Patient  Readiness: Acceptance  Method: Explanation, Demonstration  Response: Verbalizes Understanding, Demonstrated Understanding  Comment: precautions, safe mobility    Mobility Training, taught by Santa Arellano PTA at 11/26/2024 11:17 AM.  Learner: Patient  Readiness: Acceptance  Method: Explanation, Demonstration  Response: Verbalizes Understanding, Demonstrated Understanding  Comment: precautions, safe mobility    Education Comments  No comments found.        OP EDUCATION:       Encounter Problems       Encounter Problems (Active)       Balance       Pt will score >23 on Tinetti assessment to indicate low falls risk in pt with falls history  (Progressing)       Start:  11/23/24    Expected End:  12/07/24               Mobility       STG - Patient will ambulate >100' with LRD as needed, indep without LOB or path deviations  (Progressing)       Start:  11/23/24    Expected End:  12/07/24            STG - Patient will ascend and descend a flight of stairs with R HR, SBA (Progressing)       Start:  11/23/24     Expected End:  12/07/24               PT Transfers       STG - Patient to transfer to and from sit to supine indep from flat bed, no rails  (Progressing)       Start:  11/23/24    Expected End:  12/07/24            STG - Patient will transfer sit to and from stand indep  (Progressing)       Start:  11/23/24    Expected End:  12/07/24               Pain - Adult            FRANK Strong

## 2024-11-26 NOTE — CARE PLAN
Problem: Safety - Adult  Goal: Free from fall injury  Outcome: Progressing       Problem: Pain  Goal: Walks with improved pain control throughout the shift  Outcome: Progressing       The clinical goals for the shift include The patient's pain will be under control by end of shift

## 2024-11-26 NOTE — PROGRESS NOTES
"Orthopaedic Surgery Progress Note    Subjective:  No acute events overnight. Patient lives alone. Denies chest pain, shortness of breath, or fevers. Rec'd SNF due to lack of help at home. Medically stable for discharge.    Objective:  /79 (BP Location: Right arm, Patient Position: Lying)   Pulse 73   Temp 36.6 °C (97.9 °F) (Temporal)   Resp 16   Ht 1.55 m (5' 1.02\")   Wt 54 kg (119 lb 0.8 oz)   SpO2 92%   BMI 22.48 kg/m²     Gen: arousable, NAD, appropriately conversational  Cardiac: RRR to peripheral palpation  Resp: nonlabored on RA  GI: soft, nondistended    MSK:  GEN - NAD  HEENT - anicteric sclera  CV - RRR by peripheral palpation, limbs wwp  PULM - unlabored breathing on RA  NEURO - Moving all extremities spontaneously  PSYCH - Appropriate mood and affect    LUE:   - Post-operative dressing in place without strikethrough bleeding. Sling.   - Motor intact radial/ulnar/median  - SILT med/ulnar/rad  - Hand wwp, 2+ radial pulse, cap refill brisk  - Compartments soft and compressible, no pain with passive stretch of digits      No results found. However, due to the size of the patient record, not all encounters were searched. Please check Results Review for a complete set of results.    CT shoulder left wo IV contrast   Final Result   1. Extensively comminuted acute fracture of the left proximal humerus   involving the surgical neck, greater tuberosity, lesser tuberosity   and articular surface. Moderate impaction of fragments and medial   displacement of the shaft relative to the head.   2. Moderate sized left shoulder hemarthrosis.             I personally reviewed the image(s)/study and resident interpretation   as stated by Dr. Nidhi Potter MD. I agree with the findings as   stated. This study was interpreted at [facility], [place], [place], [place].        MACRO:   None        Signed by: Flor Lee 11/21/2024 2:28 AM   Dictation workstation:   [id number]    "   CT 3D reconstruction   Final Result   CT MAXILLOFACIAL SKELETON:   1. No acute maxillofacial fracture.        I personally reviewed the image(s)/study and resident interpretation   as stated by Dr. Nidhi Potter MD. I agree with the findings as   stated. This study was interpreted at [place], [place].        MACRO:   None.        Signed by: Flor Lee 11/21/2024 2:31 AM   Dictation workstation:   [id number]      CT maxillofacial bones wo IV contrast   Final Result   CT MAXILLOFACIAL SKELETON:   1. No acute maxillofacial fracture.        I personally reviewed the image(s)/study and resident interpretation   as stated by Dr. Nidhi Potter MD. I agree with the findings as   stated. This study was interpreted at [place], [place].        MACRO:   None.        Signed by: Flor Lee 11/21/2024 2:31 AM   Dictation workstation:   [id number]      XR shoulder left 2+ views   Final Result   1. Redemonstrated acute comminuted and impacted left humeral surgical   neck fracture with suspected subluxation of the humeral head relative   to the glenoid.   2. No additional or new fractures are visualized.   3. Decreased but mild persistent edema of the left deltoid   musculature.        I personally reviewed the image(s)/study and resident interpretation   as stated by Dr. Nidhi Potter MD. I agree with the findings as   stated. This study was interpreted at [place], [place].        MACRO:   None        Signed by: Adilia Arguello 11/20/2024 6:36 PM   Dictation workstation:   [id number]      XR humerus left   Final Result   1. Redemonstrated acute comminuted and impacted left humeral surgical   neck fracture with suspected subluxation of the humeral head relative   to the glenoid.   2. No additional or new fractures are visualized.   3. Decreased but mild persistent  edema of the left deltoid   musculature.        I personally reviewed the image(s)/study and resident interpretation   as stated by Dr. Nidhi Potter MD. I agree with the findings as   stated. This study was interpreted at [facility], [place], [place].        MACRO:   None        Signed by: Adilia Arguello 11/20/2024 6:36 PM   Dictation workstation:   [id number]      XR chest 1 view   Final Result   1.  No evidence of acute cardiopulmonary process.                  MACRO:   None        Signed by: Lucas Evans 11/20/2024 3:33 PM   Dictation workstation:   [id number]      FL less than 1 hour    (Results Pending)       Assessment/Plan: 69 y.o. female s/p Humerus Fracture ORIF Proximal, Left - Hand   on 11/21 with Dr. Roberts.      Plan:  - Weight bearing: NWB LUE  - DVT ppx: SCDs, ASA 81mg BID  - Diet: Regular as tolerated  - Pain: Tylenol, oxycodone 5/10, dilaudid breakthrough  - Antibiotics: periop ancef 2g q8hr x3 doses. Complete.  - FEN: mIVF LR @ 100cc/hr; HLIV with good PO intake; monitor BMP  - Lines: maintain PIVx2 while inpatient  - Bowel Regimen: Miralax, senna, dulcolax  - PT/OT: consulted  - Pulm: Encourage IS, maintain O2 sat >92%  - Cardiac: no issues  - Glycemic: no issues  - Continue home medications  - [place]roy: none   - Drains: none    Dispo: medically clear for dc, pending SNF.    Dorcas Tapia MD  PGY-1, Orthopaedic Surgery    While admitted, this patient will be followed by the Ortho Trauma Team. Please contact the residents listed below with any questions (available via Epic Chat weekdays). Please page [phone] (ortho on-call) after 6pm and on weekends.    Ortho Trauma  First Call: Dorcas Tapia, PGY-1  Second Call: Zoran Le PGY-2  Third Call: Yoel Nguyen, PGY-3

## 2024-12-02 ENCOUNTER — NURSING HOME VISIT (OUTPATIENT)
Dept: POST ACUTE CARE | Facility: EXTERNAL LOCATION | Age: 69
End: 2024-12-02
Payer: COMMERCIAL

## 2024-12-02 DIAGNOSIS — Z79.899 ON DEEP VEIN THROMBOSIS (DVT) PROPHYLAXIS: ICD-10-CM

## 2024-12-02 DIAGNOSIS — S42.202S CLOSED FRACTURE OF PROXIMAL END OF LEFT HUMERUS, UNSPECIFIED FRACTURE MORPHOLOGY, SEQUELA: Primary | ICD-10-CM

## 2024-12-02 DIAGNOSIS — M62.81 GENERALIZED MUSCLE WEAKNESS: ICD-10-CM

## 2024-12-02 DIAGNOSIS — K21.9 GASTROESOPHAGEAL REFLUX DISEASE, UNSPECIFIED WHETHER ESOPHAGITIS PRESENT: ICD-10-CM

## 2024-12-02 DIAGNOSIS — R11.2 NAUSEA AND VOMITING, UNSPECIFIED VOMITING TYPE: ICD-10-CM

## 2024-12-02 DIAGNOSIS — F41.1 GENERALIZED ANXIETY DISORDER: ICD-10-CM

## 2024-12-02 DIAGNOSIS — F17.210 CIGARETTE SMOKER: ICD-10-CM

## 2024-12-02 DIAGNOSIS — M62.838 MUSCLE SPASM: ICD-10-CM

## 2024-12-02 DIAGNOSIS — J44.9 CHRONIC OBSTRUCTIVE PULMONARY DISEASE, UNSPECIFIED COPD TYPE (MULTI): ICD-10-CM

## 2024-12-02 DIAGNOSIS — K59.00 CONSTIPATION, UNSPECIFIED CONSTIPATION TYPE: ICD-10-CM

## 2024-12-02 DIAGNOSIS — Z87.81 S/P ORIF (OPEN REDUCTION INTERNAL FIXATION) FRACTURE: ICD-10-CM
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
## 2024-12-02 DIAGNOSIS — F31.61 BIPOLAR 1 DISORDER, MIXED, MILD (MULTI): ICD-10-CM

## 2024-12-02 DIAGNOSIS — M25.512 ACUTE PAIN OF LEFT SHOULDER: ICD-10-CM

## 2024-12-02 DIAGNOSIS — Z98.890 S/P ORIF (OPEN REDUCTION INTERNAL FIXATION) FRACTURE: ICD-10-CM

## 2024-12-02 DIAGNOSIS — I10 BENIGN ESSENTIAL HTN: ICD-10-CM

## 2024-12-02 DIAGNOSIS — F32.A DEPRESSION, UNSPECIFIED DEPRESSION TYPE: ICD-10-CM

## 2024-12-02 DIAGNOSIS — Z75.8 DISCHARGE PLANNING ISSUES: ICD-10-CM

## 2024-12-02 DIAGNOSIS — R26.89 IMPAIRED GAIT AND MOBILITY: ICD-10-CM

## 2024-12-02 NOTE — LETTER
Patient: Judith Bush  : 1955    Encounter Date: 2024    Name: Judith Bush    YOB: 1955    Code Status:   FULL CODE    Chief Complaint:  Initial visit; new patient;   Left proximal humerus fracture; s/p  ORIF left humerus; fall sequela; etc...       HPI   69 year old female with medical history of COPD, HTN, anxiety, depression, bipolar, Hx CVA, smoker,  and mitral valve replacement.    HOSPITAL COURSE:  Patient presented to the [facility] ER with uncontrolled left arm pain after a fall on 24 for shoulder pain.  Patient tripped and fell on 24.  She was seen and was given pain medications without ever achieving pain control.  She took Percocet at home, but did not have any relief of pain.  She was evaluated by orthopedics on 24 on Monday.  Patient stated her pain was 10/10.  On exam, patient had bruising over the left humerus,   does have good handgrip, sensation intact in the left arm, 2+ radial pulse.    Heart lungs clear to auscultation, palpated the left chest wall and ribs,   nontender to palpation, no step-offs or deformities.   With her reports of tinnitus post head CT, no hemotympanum noted bilaterally, moving all 4 extremities without difficulty, alert and oriented x 4.  Initially was hypotensive, then cuff was re-adjusted and had a normal blood pressure.  Found to have a left proximal humerus fracture.   Left upper extremity--Closed injury  She had significant ecchymosis of LUE   Her motor and sensation intact M/U/R distributions.  Has palpable radial pulse  Cap refill < 2 seconds in all digits  Patient was transferred to [facility] for surgery.  Patient had surgery on  .  Now s/p ORIF L humerus on  with Dr. Roberts.   On the day of surgery, patient was identified in the pre-operative holding area and agreeable to proceed with surgery.   Written consent was obtained.    Patient received 24 hours of cliff-operative antibiotics.   Patient recovered in  the PACU before transfer to a regular nursing floor.   Patient was started on oxycodone and tylenol for pain control   and ASA 81 mg bid for DVT prophylaxis.   Physical therapy recommended continued recovery at SNF with continued physical therapy and wound care.   On the day of discharge, patient was afebrile with stable vital signs.   Patient was neurovascularly intact at time of discharge.   Patient was discharged with prescription of ASA 81 BID for DVT prophylaxis for 6 weeks.   Patient will need follow-up with Dr. Roberts in 2 weeks for post-operative visit.        Hospital and chronic diagnoses as follows:    Left proximal humerus fracture; s/p ORIF left humerus; fall sequela; left shoulder/arm pain; muscle spasms:  Left upper extremity--Closed injury  She had significant ecchymosis of LUE   Her motor and sensation intact M/U/R distributions.  Has palpable radial pulse  Cap refill < 2 seconds in all digits  Patient was transferred to [facility] for surgery.  Patient had surgery on 11/20 .  Now s/p ORIF L humerus on 11/21 with Dr. Roberts.   Patient recovered in the PACU before transfer to a regular nursing floor.   Patient was started on oxycodone and tylenol for pain control in the hospital.  and ASA 81 mg bid for DVT prophylaxis.  On cyclobenzaprine routine and PRN for muscle spasms.   On routine acetaminophen and PRN oxycodone.   Ordered RX for oxycodone.    Has left arm in sling.  Patient seen today.  She is complaining of pain in left shoulder/bicep area.  At the [facility] for skilled services.  Cooperative, but anxious.  No chest pain.  No headaches or dizziness.    COPD; Smoker:  On albuterol HFA inhaler and Anono Ellipta.  1/2 PPD tobacco use.  No complaints of cough or SOB.  No oxygen desaturations reported.    HTN:  On Diltiazem.  Recent /69  No chest pain.  No headaches or dizziness.    Anxiety; Depression; Bipolar:  Not currently on any medications.  She states she was taking some  medications prior to going to the hospital and has them at home.    GERD:  On pantoprazole.  No complaints of reflux symptoms today.    Nausea and vomiting:  On ondansetron PRN.  No complaints of N/V today.    Constipation:  On colace, miralax and bisacodyl PRN for constipation.  Patient states she feels constipated.  She is asking that she receive bisacodyl.     DVT prophylaxis:  On aspirin.    Generalized muscle weakness; impaired gait and mobility:  At the [facility] for skilled services.  Participating in PT/OT.  Patient able to ambulate but is at risk for falls.    Discharge planning:  Recommend Home Health Care Services/PT/OT/RN/aide after discharge.  Patient will need to follow up with PCP and orthopedics doctor as recommended.      Hx CVA  Hx mitral valve replacement                          Reviewed  hospital records from recent hospitalization.  Reviewed  EMR  Reviewed medical, social, surgical and family history.  Reviewed all current medications and performed medication reconciliation.  Performed prescription drug management.  Reviewed vital signs AND lab results  Reviewed [facility] Documentation  Discussed patient with nursing.   Spent greater than 50 minutes on patient visit                    ROS: 10 point ROS performed; Negative unless noted in HPI.      MEDICAL HISTORY:  COPD  Smoker  HTN  Anxiety  Depression  Bipolar  Hx CVA  Smoker  Hx mitral valve replacement    SURGICAL HISTORY:  Mitral valve replacement.    SOCIAL HISTORY:  Smoker--1/2 PPD tobacco use  Denies alcohol use.  Denies drug use.     FAMILY HISTORY:  Not available.           Lab Results   Component Value Date    WBC 10.0 11/20/2024    HGB 10.0 (L) 11/20/2024    HCT 28.7 (L) 11/20/2024     11/20/2024    CHOL 157 04/23/2024    TRIG 114 04/23/2024    HDL 77.0 04/23/2024    ALT 20 11/20/2024    AST 25 11/20/2024     (L) 11/20/2024    K 4.5 11/20/2024     11/20/2024    CREATININE 1.07 (H) 11/20/2024  "   BUN 21 11/20/2024    CO2 25 11/20/2024    TSH 1.98 04/23/2024    INR 1.0 11/20/2024    HGBA1C 5.2 04/23/2024             /69   Pulse 70   Temp 36.5 °C (97.7 °F)   Resp 18   Ht 1.549 m (5' 1\")   Wt 53.5 kg (118 lb)   SpO2 95%   BMI 22.30 kg/m²      Physical Exam  Vitals and nursing note reviewed.   Constitutional:       Appearance: Normal appearance.   HENT:      Head: Normocephalic.      Right Ear: External ear normal.      Left Ear: External ear normal.      Nose: Nose normal.      Mouth/Throat:      Mouth: Mucous membranes are moist.      Pharynx: Oropharynx is clear.   Eyes:      Extraocular Movements: Extraocular movements intact.      Conjunctiva/sclera: Conjunctivae normal.      Pupils: Pupils are equal, round, and reactive to light.   Cardiovascular:      Rate and Rhythm: Normal rate and regular rhythm.      Pulses: Normal pulses.      Heart sounds: Normal heart sounds.   Pulmonary:      Effort: Pulmonary effort is normal.      Breath sounds: Normal breath sounds.   Abdominal:      General: Bowel sounds are normal.      Palpations: Abdomen is soft.   Musculoskeletal:         General: Normal range of motion.      Cervical back: Normal range of motion and neck supple.      Comments: Left arm:  Arm in sling  Motor and sensation intact  Palpable radial pulse  Cap refill < 2 seconds in all digits     Skin:     General: Skin is warm and dry.   Neurological:      General: No focal deficit present.      Mental Status: She is alert and oriented to person, place, and time. Mental status is at baseline.      Sensory: Sensation is intact.      Motor: Weakness present.   Psychiatric:         Mood and Affect: Mood is anxious.         Behavior: Behavior normal. Behavior is cooperative.          Assessment/Plan   Ordered CMP and CBC with diff. For tomorrow.    Left proximal humerus fracture; s/p ORIF left humerus; fall sequela; left shoulder/arm pain; muscle spasms:  Left upper extremity--Closed " injury  Continue to wear sling.  Monitor for movement and sensation.  Monitor radial pulse.  Monitor Cap refill   should be< 2 seconds in all digits  Follow up with orthopedic surgeon Dr. Roberts.   Continue ASA 81 mg bid for DVT prophylaxis.  Continue cyclobenzaprine routine and PRN for muscle spasms.   Continue routine acetaminophen and PRN oxycodone.     COPD; Smoker:  Continue albuterol HFA inhaler and Anono Ellipta.  1/2 PPD tobacco use.  Monitor oxygen saturations.    HTN:  Continue Diltiazem.  Monitor Bps.    Anxiety; Depression; Bipolar:  Not currently on any medications.  She states she was taking some medications prior to going to the hospital and has them at home.  Follow up with psych doctor.    GERD:  Continue pantoprazole.  Monitor for reflux.    Nausea and vomiting:  Continue ondansetron PRN.    Constipation:  Continue colace, miralax and bisacodyl PRN for constipation.  Monitor for constipation.    DVT prophylaxis:  Continue aspirin.    Generalized muscle weakness; impaired gait and mobility:  Continue at the [facility] for skilled services/PT/OT.  Patient able to ambulate but is at risk for falls.  Fall prevention strategies.    Discharge planning:  Recommend Home Health Care Services/PT/OT/RN/aide after discharge.  Patient will need to follow up with PCP and orthopedics doctor as recommended.          Problem List Items Addressed This Visit       Anxiety disorder    COPD (chronic obstructive pulmonary disease) (Multi)    Bipolar 1 disorder, mixed, mild (Multi)    Closed fracture of left proximal humerus - Primary     Other Visit Diagnoses       S/P ORIF (open reduction internal fixation) fracture        Fall, sequela        Acute pain of left shoulder        Muscle spasm        Cigarette smoker        Benign essential HTN        Depression, unspecified depression type        Gastroesophageal reflux disease, unspecified whether esophagitis present        Nausea and vomiting, unspecified  vomiting type        Constipation, unspecified constipation type        On deep vein thrombosis (DVT) prophylaxis        Generalized muscle weakness        Impaired gait and mobility        Discharge planning issues                KOKO Ford       Electronically Signed By: KOKO Ford   12/6/24  5:30 PM

## 2024-12-06 VITALS
HEIGHT: 61 IN | RESPIRATION RATE: 18 BRPM | WEIGHT: 118 LBS | OXYGEN SATURATION: 95 % | HEART RATE: 70 BPM | BODY MASS INDEX: 22.28 KG/M2 | DIASTOLIC BLOOD PRESSURE: 69 MMHG | SYSTOLIC BLOOD PRESSURE: 120 MMHG | TEMPERATURE: 97.7 F

## 2024-12-06 NOTE — PROGRESS NOTES
Name: Judith Bush    YOB: 1955    Code Status:   FULL CODE    Chief Complaint:  Initial visit; new patient;   Left proximal humerus fracture; s/p  ORIF left humerus; fall sequela; etc...       HPI   69 year old female with medical history of COPD, HTN, anxiety, depression, bipolar, Hx CVA, smoker,  and mitral valve replacement.    HOSPITAL COURSE:  Patient presented to the [facility] ER with uncontrolled left arm pain after a fall on 11/16/24 for shoulder pain.  Patient tripped and fell on 11/14/24.  She was seen and was given pain medications without ever achieving pain control.  She took Percocet at home, but did not have any relief of pain.  She was evaluated by orthopedics on 11/18/24 on Monday.  Patient stated her pain was 10/10.  On exam, patient had bruising over the left humerus,   does have good handgrip, sensation intact in the left arm, 2+ radial pulse.    Heart lungs clear to auscultation, palpated the left chest wall and ribs,   nontender to palpation, no step-offs or deformities.   With her reports of tinnitus post head CT, no hemotympanum noted bilaterally, moving all 4 extremities without difficulty, alert and oriented x 4.  Initially was hypotensive, then cuff was re-adjusted and had a normal blood pressure.  Found to have a left proximal humerus fracture.   Left upper extremity--Closed injury  She had significant ecchymosis of LUE   Her motor and sensation intact M/U/R distributions.  Has palpable radial pulse  Cap refill < 2 seconds in all digits  Patient was transferred to [facility] for surgery.  Patient had surgery on 11/20 .  Now s/p ORIF L humerus on 11/21 with Dr. Roberts.   On the day of surgery, patient was identified in the pre-operative holding area and agreeable to proceed with surgery.   Written consent was obtained.    Patient received 24 hours of cliff-operative antibiotics.   Patient recovered in the PACU before transfer to a regular nursing floor.   Patient was  started on oxycodone and tylenol for pain control   and ASA 81 mg bid for DVT prophylaxis.   Physical therapy recommended continued recovery at SNF with continued physical therapy and wound care.   On the day of discharge, patient was afebrile with stable vital signs.   Patient was neurovascularly intact at time of discharge.   Patient was discharged with prescription of ASA 81 BID for DVT prophylaxis for 6 weeks.   Patient will need follow-up with Dr. Roberts in 2 weeks for post-operative visit.        Hospital and chronic diagnoses as follows:    Left proximal humerus fracture; s/p ORIF left humerus; fall sequela; left shoulder/arm pain; muscle spasms:  Left upper extremity--Closed injury  She had significant ecchymosis of LUE   Her motor and sensation intact M/U/R distributions.  Has palpable radial pulse  Cap refill < 2 seconds in all digits  Patient was transferred to [facility] for surgery.  Patient had surgery on 11/20 .  Now s/p ORIF L humerus on 11/21 with Dr. Roberts.   Patient recovered in the PACU before transfer to a regular nursing floor.   Patient was started on oxycodone and tylenol for pain control in the hospital.  and ASA 81 mg bid for DVT prophylaxis.  On cyclobenzaprine routine and PRN for muscle spasms.   On routine acetaminophen and PRN oxycodone.   Ordered RX for oxycodone.    Has left arm in sling.  Patient seen today.  She is complaining of pain in left shoulder/bicep area.  At the [facility] for skilled services.  Cooperative, but anxious.  No chest pain.  No headaches or dizziness.    COPD; Smoker:  On albuterol HFA inhaler and Anono Ellipta.  1/2 PPD tobacco use.  No complaints of cough or SOB.  No oxygen desaturations reported.    HTN:  On Diltiazem.  Recent /69  No chest pain.  No headaches or dizziness.    Anxiety; Depression; Bipolar:  Not currently on any medications.  She states she was taking some medications prior to going to the hospital and has them at  home.    GERD:  On pantoprazole.  No complaints of reflux symptoms today.    Nausea and vomiting:  On ondansetron PRN.  No complaints of N/V today.    Constipation:  On colace, miralax and bisacodyl PRN for constipation.  Patient states she feels constipated.  She is asking that she receive bisacodyl.     DVT prophylaxis:  On aspirin.    Generalized muscle weakness; impaired gait and mobility:  At the [facility] for skilled services.  Participating in PT/OT.  Patient able to ambulate but is at risk for falls.    Discharge planning:  Recommend Home Health Care Services/PT/OT/RN/aide after discharge.  Patient will need to follow up with PCP and orthopedics doctor as recommended.      Hx CVA  Hx mitral valve replacement                          Reviewed  hospital records from recent hospitalization.  Reviewed  EMR  Reviewed medical, social, surgical and family history.  Reviewed all current medications and performed medication reconciliation.  Performed prescription drug management.  Reviewed vital signs AND lab results  Reviewed [facility] Documentation  Discussed patient with nursing.   Spent greater than 50 minutes on patient visit                    ROS: 10 point ROS performed; Negative unless noted in HPI.      MEDICAL HISTORY:  COPD  Smoker  HTN  Anxiety  Depression  Bipolar  Hx CVA  Smoker  Hx mitral valve replacement    SURGICAL HISTORY:  Mitral valve replacement.    SOCIAL HISTORY:  Smoker--1/2 PPD tobacco use  Denies alcohol use.  Denies drug use.     FAMILY HISTORY:  Not available.           Lab Results   Component Value Date    WBC 10.0 11/20/2024    HGB 10.0 (L) 11/20/2024    HCT 28.7 (L) 11/20/2024     11/20/2024    CHOL 157 04/23/2024    TRIG 114 04/23/2024    HDL 77.0 04/23/2024    ALT 20 11/20/2024    AST 25 11/20/2024     (L) 11/20/2024    K 4.5 11/20/2024     11/20/2024    CREATININE 1.07 (H) 11/20/2024    BUN 21 11/20/2024    CO2 25 11/20/2024    TSH 1.98  "04/23/2024    INR 1.0 11/20/2024    HGBA1C 5.2 04/23/2024             /69   Pulse 70   Temp 36.5 °C (97.7 °F)   Resp 18   Ht 1.549 m (5' 1\")   Wt 53.5 kg (118 lb)   SpO2 95%   BMI 22.30 kg/m²      Physical Exam  Vitals and nursing note reviewed.   Constitutional:       Appearance: Normal appearance.   HENT:      Head: Normocephalic.      Right Ear: External ear normal.      Left Ear: External ear normal.      Nose: Nose normal.      Mouth/Throat:      Mouth: Mucous membranes are moist.      Pharynx: Oropharynx is clear.   Eyes:      Extraocular Movements: Extraocular movements intact.      Conjunctiva/sclera: Conjunctivae normal.      Pupils: Pupils are equal, round, and reactive to light.   Cardiovascular:      Rate and Rhythm: Normal rate and regular rhythm.      Pulses: Normal pulses.      Heart sounds: Normal heart sounds.   Pulmonary:      Effort: Pulmonary effort is normal.      Breath sounds: Normal breath sounds.   Abdominal:      General: Bowel sounds are normal.      Palpations: Abdomen is soft.   Musculoskeletal:         General: Normal range of motion.      Cervical back: Normal range of motion and neck supple.      Comments: Left arm:  Arm in sling  Motor and sensation intact  Palpable radial pulse  Cap refill < 2 seconds in all digits     Skin:     General: Skin is warm and dry.   Neurological:      General: No focal deficit present.      Mental Status: She is alert and oriented to person, place, and time. Mental status is at baseline.      Sensory: Sensation is intact.      Motor: Weakness present.   Psychiatric:         Mood and Affect: Mood is anxious.         Behavior: Behavior normal. Behavior is cooperative.          Assessment/Plan    Ordered CMP and CBC with diff. For tomorrow.    Left proximal humerus fracture; s/p ORIF left humerus; fall sequela; left shoulder/arm pain; muscle spasms:  Left upper extremity--Closed injury  Continue to wear sling.  Monitor for movement and " sensation.  Monitor radial pulse.  Monitor Cap refill   should be< 2 seconds in all digits  Follow up with orthopedic surgeon Dr. Roberts.   Continue ASA 81 mg bid for DVT prophylaxis.  Continue cyclobenzaprine routine and PRN for muscle spasms.   Continue routine acetaminophen and PRN oxycodone.     COPD; Smoker:  Continue albuterol HFA inhaler and Anono Ellipta.  1/2 PPD tobacco use.  Monitor oxygen saturations.    HTN:  Continue Diltiazem.  Monitor Bps.    Anxiety; Depression; Bipolar:  Not currently on any medications.  She states she was taking some medications prior to going to the hospital and has them at home.  Follow up with psych doctor.    GERD:  Continue pantoprazole.  Monitor for reflux.    Nausea and vomiting:  Continue ondansetron PRN.    Constipation:  Continue colace, miralax and bisacodyl PRN for constipation.  Monitor for constipation.    DVT prophylaxis:  Continue aspirin.    Generalized muscle weakness; impaired gait and mobility:  Continue at the [facility] for skilled services/PT/OT.  Patient able to ambulate but is at risk for falls.  Fall prevention strategies.    Discharge planning:  Recommend Home Health Care Services/PT/OT/RN/aide after discharge.  Patient will need to follow up with PCP and orthopedics doctor as recommended.          Problem List Items Addressed This Visit       Anxiety disorder    COPD (chronic obstructive pulmonary disease) (Multi)    Bipolar 1 disorder, mixed, mild (Multi)    Closed fracture of left proximal humerus - Primary     Other Visit Diagnoses       S/P ORIF (open reduction internal fixation) fracture        Fall, sequela        Acute pain of left shoulder        Muscle spasm        Cigarette smoker        Benign essential HTN        Depression, unspecified depression type        Gastroesophageal reflux disease, unspecified whether esophagitis present        Nausea and vomiting, unspecified vomiting type        Constipation, unspecified constipation  type        On deep vein thrombosis (DVT) prophylaxis        Generalized muscle weakness        Impaired gait and mobility        Discharge planning issues                Nuvia De La Torre, APRN-CNP

## 2024-12-09 ENCOUNTER — HOSPITAL ENCOUNTER (OUTPATIENT)
Dept: RADIOLOGY | Facility: CLINIC | Age: 69
Discharge: HOME | End: 2024-12-09
Payer: COMMERCIAL

## 2024-12-09 ENCOUNTER — OFFICE VISIT (OUTPATIENT)
Dept: ORTHOPEDIC SURGERY | Facility: CLINIC | Age: 69
End: 2024-12-09
Payer: COMMERCIAL

## 2024-12-09 DIAGNOSIS — S42.202A CLOSED FRACTURE OF PROXIMAL END OF LEFT HUMERUS, UNSPECIFIED FRACTURE MORPHOLOGY, INITIAL ENCOUNTER: ICD-10-CM

## 2024-12-09 DIAGNOSIS — S42.202A CLOSED FRACTURE OF PROXIMAL END OF LEFT HUMERUS, UNSPECIFIED FRACTURE MORPHOLOGY, INITIAL ENCOUNTER: Primary | ICD-10-CM

## 2024-12-09 PROCEDURE — 1111F DSCHRG MED/CURRENT MED MERGE: CPT

## 2024-12-09 PROCEDURE — 73030 X-RAY EXAM OF SHOULDER: CPT | Mod: LEFT SIDE | Performed by: RADIOLOGY

## 2024-12-09 PROCEDURE — 73030 X-RAY EXAM OF SHOULDER: CPT | Mod: LT

## 2024-12-09 PROCEDURE — 99211 OFF/OP EST MAY X REQ PHY/QHP: CPT

## 2024-12-09 PROCEDURE — 1123F ACP DISCUSS/DSCN MKR DOCD: CPT

## 2024-12-09 RX ORDER — OXYCODONE HYDROCHLORIDE 5 MG/1
5 TABLET ORAL EVERY 4 HOURS PRN
Qty: 42 TABLET | Refills: 0 | Status: SHIPPED | OUTPATIENT
Start: 2024-12-09 | End: 2024-12-16

## 2024-12-09 NOTE — PROGRESS NOTES
Subjective    Patient ID: Judith Bush is a 69 y.o. female.    Chief Complaint: POV- Left shoulder     Last Surgery: ORIF of left proximal humerus  Last Surgery Date: 11/21/2024    HPI  Patient is a 69 y.o. female who is s/p ORIF of left proximal humerus. Date of surgery was 11/21/2024. Patient continues to be non weight bearing on the left arm at this time and denies issues with incision. Patient continues on ASA for DVT ppx. Patient is being discharged from rehab facility today and needs prescription for pain medication per facility. Patient continues with home PT and then transition to outpatient PT. States that she has some numbness on the forearm that has been improving. Patient denies fever or chills, tingling or arm pain.     ROS: All other systems have been reviewed and are negative except as previously noted in history of present illness.      IMP:  Problem List Items Addressed This Visit       Closed fracture of proximal end of left humerus, unspecified fracture morphology, initial encounter - Primary    Relevant Medications    oxyCODONE (Roxicodone) 5 mg immediate release tablet    Other Relevant Orders    XR shoulder left 2+ views     Objective   General: Alert and oriented x 3, NAD, respirations easy and unlabored with no audible wheezes, skin warm and dry, speech and dress appropriate for noted age, affect euthymic.     Musculoskeletal: Left Upper Extremity  incision well-healed  compartments soft  mild swelling to upper extremity  sensation intact to light touch  motor intact including R/U/M/AIN/PIN nn.  palpable radial pulse 2+     X-ray: Images of left shoulder reviewed personally by me today and reveal maintenance of alignment of proximal humerus fracture with hardware in position and no interval change.      Assessment/Plan   Encounter Diagnoses:  Closed fracture of proximal end of left humerus, unspecified fracture morphology, initial encounter    PLAN: Patient is s/p ORIF of left proximal  humerus. Patient overall doing well. She is compliant with non weight bearing on the left arm. Patient is currently at rehab facility, but is being discharged today with home PT and then will transition to outpatient PT. She is having numbness at forearm, but symptoms are improving. Per facility, patient needs a new prescription for pain medication. Imaging reveals alignment of left proximal humerus fracture with stable hardware. Patient is educated that she will remain non weight bearing on the left arm until February 21st, but may perform coffee cup weight bearing and light ADLs on the left arm starting today. She is given a referral to physical therapy to work on shoulder and elbow ROM, arm strengthening, and eventual weight bearing. Patient is given a prescription for oxycodone 5 mg every 4 hours. She is educated that we give refills up to 6 weeks from surgery, then will refer her to pain management if further pain medications are needed. Patient will follow up in 3 months. Patient is in agreement with this plan. Xrays of the left shoulder will be needed.     Orders Placed This Encounter    XR shoulder left 2+ views    oxyCODONE (Roxicodone) 5 mg immediate release tablet     No follow-ups on file.

## 2024-12-13 ENCOUNTER — EVALUATION (OUTPATIENT)
Dept: PHYSICAL THERAPY | Facility: CLINIC | Age: 69
End: 2024-12-13
Payer: COMMERCIAL

## 2024-12-13 DIAGNOSIS — S42.202A CLOSED FRACTURE OF PROXIMAL END OF LEFT HUMERUS, UNSPECIFIED FRACTURE MORPHOLOGY, INITIAL ENCOUNTER: ICD-10-CM

## 2024-12-13 DIAGNOSIS — M25.612 SHOULDER STIFFNESS, LEFT: ICD-10-CM

## 2024-12-13 DIAGNOSIS — M25.512 LEFT SHOULDER PAIN: Primary | ICD-10-CM

## 2024-12-13 DIAGNOSIS — R29.898 WEAKNESS OF LEFT SHOULDER: ICD-10-CM

## 2024-12-13 PROCEDURE — 97110 THERAPEUTIC EXERCISES: CPT | Mod: GP | Performed by: PHYSICAL THERAPIST

## 2024-12-13 PROCEDURE — 97161 PT EVAL LOW COMPLEX 20 MIN: CPT | Mod: GP | Performed by: PHYSICAL THERAPIST

## 2024-12-13 PROCEDURE — 97016 VASOPNEUMATIC DEVICE THERAPY: CPT | Mod: GP | Performed by: PHYSICAL THERAPIST

## 2024-12-13 ASSESSMENT — PAIN - FUNCTIONAL ASSESSMENT: PAIN_FUNCTIONAL_ASSESSMENT: 0-10

## 2024-12-13 ASSESSMENT — PAIN SCALES - GENERAL: PAINLEVEL_OUTOF10: 6

## 2024-12-13 NOTE — PROGRESS NOTES
Physical Therapy  Physical Therapy Orthopedic Evaluation    Patient Name: Judith Bush  MRN: [id number]  Today's Date: 12/13/2024  Time Calculation  Start Time: 1259  Stop Time: 1351  Time Calculation (min): 52 min    Insurance:  Visit number: 1 of ?  Authorization info: NAN  Insurance Type: ?  Cert start date: 12/13/24; Cert end date: 3/13/24     General:  Reason for visit: Closed fracture of proximal end of left humerus, unspecified fracture morphology, initial encounter   Referred by: Cheri Givens PA-C    Assessment: Patient presents with signs and symptoms consistent with post op ORIF of L proximal humerus, including L shoulder ROM deficits, L shoulder strength deficits, and impaired activity tolerance, resulting in limited participation in pain-free ADLs and inability to perform at their prior level of function. Pt would benefit from physical therapy to address the impairments found & listed previously in the objective section in order to return to safe and pain-free ADLs and prior level of function.       Prognosis: Good  Clinical Presentation: Stable  Level of Complexity: Low    Plan:     Planned Interventions include: therapeutic exercise, self-care home management, manual therapy, therapeutic activities, gait training, neuromuscular coordination, vasopneumatic, dry needling, aquatic therapy  Frequency: 2 x Week  Duration: 12 Weeks    Patient and/or family understands and agrees with goals and plan documented.    Current Problem:  1. Left shoulder pain  Follow Up In Physical Therapy      2. Closed fracture of proximal end of left humerus, unspecified fracture morphology, initial encounter  Referral to Physical Therapy      3. Shoulder stiffness, left  Follow Up In Physical Therapy      4. Weakness of left shoulder  Follow Up In Physical Therapy          Precautions:   Precautions  STEADI Fall Risk Score (The score of 4 or more indicates an increased risk of falling): 4  Precautions Comment: Fall risk,  "HBP, h/o CVA 4/2024, osteoporosis, bipolar disorder, COPD      Medical History Form: Reviewed (scanned into chart)    Subjective:   Subjective   Chief Complaint: Pt presents to PT s/p ORIF of left proximal humerus after falling and fracturing proximal humerus. Date of surgery 11/21/2024. Transferred to SNF for a few weeks, discharged home 2 days ago. Not permitted to lift heavier than a coffee cup/bear weight until 2/21/25.  Onset: 11/21/24  GUILLE: Surgery    Current Condition:   Better    Pain:  Pain Assessment: 0-10  0-10 (Numeric) Pain Score: 6  Location:  Deep L shoulder (notes forearm pain that has been present since fall)  Description:  Stabbing, ache; locking/clicking; denies n/t   Aggravating Factors:  UB dressing, reaching, extended rest     Relieving Factors:  Oxy and Tylenol (2-3x/day); muscle relaxant  Intensity:  At Best - 6/10; At Worst - 8/10    Relevant Information (PMH & Previous Tests/Imaging): HBP, emphysema, remote CVA 4/2024, hypertension, COPD, bipolar disorder, HA/migraines (sees neurologist), osteoporosis, mitral valve replacement 2019, h/o R radius surgery (plates, pins, screws)   L shoulder XR 12/9/24:  Lateral plate and screw fixation of comminuted proximal humeral   fracture deformity. There is improved alignment with mild impaction   persisting. There is no dislocation. 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    Patients Living Environment: Lives alone with dog, friend is staying with her until she is able to live alone    Primary Language: English    Patient's Goal(s) for Therapy: \"Get back to work\"    Patient Specific Functional Scale (0 = unable to perform; 10 = able to perform activity at same level " as before injury or problem)  Activity Current Follow Up Discharge   Food prep 0     Hygiene 2           Total score = sum of the activity scores/number of activities  Minimum detectable change (90%CI) for average score = 2 points  Minimum detectable change (90%CI) for single activity score = 3 points    Red Flags: Do you have any of the following? No  Fever/chills, unexplained weight changes, dizziness/fainting, unexplained change in bowel or bladder functions, unexplained malaise or muscle weakness, night pain/sweats, numbness or tingling    Objective:  Objective     Posture: Rounded shoulders, FHP, protecting L shoulder    Observation: incision healing well, some scabbing present, no redness/warmth    Palpation: Diffusely tender through shoulder musculature    Shoulder AROM  Flexion R 150 deg; L 35 deg  Abduction R 140 deg; L 42 deg  Extension R 60 deg; L 22 deg    Shoulder PROM  Flexion R DNT; L 50 deg  Abduction R DNT; L 58 deg  ER R 92 deg @ 90 deg; L 9 deg @20 deg  IR R 45 deg @ 90 deg; L 71 deg @ 20 deg    Upper Extremity MMT  Shoulder flexion R 4/5; L DNT  Shoulder abduction R 4/5; L DNT  Shoulder extension R 4/5; L DNT  Shoulder ER R 4/5; L DNT  Shoulder IR R 4/5; L DNT  Elbow flexion R 4/5; L DNT  Elbow extension R 4-/5; L DNT    Joint Mobility: Hypo    Outcome Measures:  Other Measures  Disability of Arm Shoulder Hand (DASH): 50 = 88.64%     EDUCATION: Home exercise program, plan of care, activity modifications, pain management, and injury pathology       Goals: Set and discussed today  Active       PT Problem       STG       Start:  12/13/24    Expected End:  01/27/25       Patient demonstrate home exercise program adherence to supplement symptom reduction and functional gains made in clinic         LTG       Start:  12/13/24    Expected End:  03/13/25       Patient will verbalize pain (0-10) <2/10 at best and <6/10 worst to improve ADL tolerance   Pt will demonstrate shoulder AROM >130 deg degrees  flexion, >130 deg degrees abduction, and >50 degrees external rotation to improve joint mechanics during ADLs  Pt will demonstrate gross L shoulder strength 4-/5 MMT to optimize joint stability for ADLs  Pt will demonstrate MCID (10 points) improvement on QuickDASH score (<40 points or 65.91%) to demonstrate overall improved functional abilities    Pt will demonstrate MCID improvement on PSFS to demonstrate return to PLOF             Plan of care was developed with input and agreement by the patient    Treatment Performed:  Gentle PROM F, ABD, ER 5'    Pulleys, scap retraction, supine ER AROM with cane, supine chest stretch   Instructed to buy pulleys for future    HEP: [id number]     Charges:  Evaluation: low complexity  Treatment: 1 TE, 1 vaso    Olga Mcgowan, PT

## 2024-12-17 ENCOUNTER — APPOINTMENT (OUTPATIENT)
Dept: NEUROLOGY | Facility: CLINIC | Age: 69
End: 2024-12-17
Payer: COMMERCIAL

## 2024-12-17 NOTE — PROGRESS NOTES
Physical Therapy  Physical Therapy Treatment    Patient Name: Judith Bush  MRN: [id number]  Encounter Date: 12/18/2024       Insurance:  Visit number: 2 of 30  Authorization info: NAN  Insurance Type: [facility] Dual Complete and Medicaid  Cert start date: 12/13/24; Cert end date: 3/13/24     General:  Reason for visit: Closed fracture of proximal end of left humerus, unspecified fracture morphology, initial encounter   Referred by: Cheri Givens PA-C    Assessment: ***       Plan: ***       Current Problem  No diagnosis found.    Precautions:        Subjective:  Subjective   ***    Pain       Performing HEP?: {Yes/No:14095}    Objective:  Objective   ***    Treatments:  Gentle PROM F, ABD, ER 5'     Pulleys, scap retraction, supine ER AROM with cane, supine chest stretch   Instructed to buy pulleys for future     Vaso 10' L shoulder    HEP: [id number]        Charges:     Olga Mcgowan, PT

## 2024-12-18 ENCOUNTER — DOCUMENTATION (OUTPATIENT)
Dept: PHYSICAL THERAPY | Facility: CLINIC | Age: 69
End: 2024-12-18
Payer: COMMERCIAL

## 2024-12-18 ENCOUNTER — APPOINTMENT (OUTPATIENT)
Dept: PHYSICAL THERAPY | Facility: CLINIC | Age: 69
End: 2024-12-18
Payer: COMMERCIAL

## 2024-12-18 ENCOUNTER — PATIENT MESSAGE (OUTPATIENT)
Dept: ORTHOPEDIC SURGERY | Facility: CLINIC | Age: 69
End: 2024-12-18
Payer: COMMERCIAL

## 2024-12-18 DIAGNOSIS — S42.202A CLOSED FRACTURE OF PROXIMAL END OF LEFT HUMERUS, UNSPECIFIED FRACTURE MORPHOLOGY, INITIAL ENCOUNTER: ICD-10-CM

## 2024-12-18 NOTE — PROGRESS NOTES
Therapy Communication Note    Patient Name: Judith Bush  MRN: [id number]  Department:   Room: Room/bed info not found  Today's Date: 12/18/2024     Discipline: Physical Therapy        Missed Visit Reason:  Couldn't get ride    Missed Time: Cancel    Comment: 1st CX of POC

## 2024-12-18 NOTE — PROGRESS NOTES
"Physical Therapy  Physical Therapy Treatment    Patient Name: Judith Bush  MRN: [id number]  Encounter Date: 12/20/2024       Insurance:  Visit number: 2 of 30  Authorization info: NAN  Insurance Type: [facility] Dual Complete and Medicaid  Cert start date: 12/13/24; Cert end date: 3/13/24     General:  Reason for visit: Closed fracture of proximal end of left humerus, unspecified fracture morphology, initial encounter   Referred by: Cheri Givens PA-C    Assessment: Pt notes pain EOR with ROM, however, ROM improving from IE. Requiring moderate cuing for exercise. Notes locking of shoulder during pulleys. At end of session, reports decrease in pain to 4/10.        Plan: Continue to address L shoulder ROM and postural awareness.        Current Problem  1. Left shoulder pain  Follow Up In Physical Therapy      2. Shoulder stiffness, left  Follow Up In Physical Therapy      3. Weakness of left shoulder  Follow Up In Physical Therapy          Precautions:   Precautions  STEADI Fall Risk Score (The score of 4 or more indicates an increased risk of falling): 4  Precautions Comment: Fall risk, HBP, h/o CVA 4/2024, osteoporosis, bipolar disorder, COPD    Subjective:  Subjective   Pt presents to PT stating intermittent LUE neuropathy. Significant pain yesterday, however, improved symptoms today, rating as 6/10 upon arrival. Some HEP adherence.     Pain  Pain Assessment: 0-10  0-10 (Numeric) Pain Score: 6    Performing HEP?: Partially    Objective:  Objective   L shoulder abduction 78 deg  L shoulder flexion 95 deg  L shoulder ER 21 deg @ 30 deg  L shoulder IR 55 deg @ 30 deg      Treatments:  Gentle PROM F, ABD, ER, IR 25'; manually chest stretch x90\"     Instructed to minimize guarding position (L elbow flexion and B shoulder elevation)    Exercise:   - Verbally reviewed supine chest stretch  - Supine ER AROM with cane x20  - Scap retraction x20  - Reviewing pulleys, pt continues to guard       Vaso 10' L shoulder    HEP: " [id number]        Charges: 1 TE, 2 MAN, 1 VASO    Olga Mcgowan, PT

## 2024-12-20 ENCOUNTER — TREATMENT (OUTPATIENT)
Dept: PHYSICAL THERAPY | Facility: CLINIC | Age: 69
End: 2024-12-20
Payer: COMMERCIAL

## 2024-12-20 DIAGNOSIS — M25.512 LEFT SHOULDER PAIN: ICD-10-CM

## 2024-12-20 DIAGNOSIS — R29.898 WEAKNESS OF LEFT SHOULDER: ICD-10-CM

## 2024-12-20 DIAGNOSIS — M25.612 SHOULDER STIFFNESS, LEFT: ICD-10-CM

## 2024-12-20 PROCEDURE — 97016 VASOPNEUMATIC DEVICE THERAPY: CPT | Mod: GP | Performed by: PHYSICAL THERAPIST

## 2024-12-20 PROCEDURE — 97110 THERAPEUTIC EXERCISES: CPT | Mod: GP | Performed by: PHYSICAL THERAPIST

## 2024-12-20 PROCEDURE — 97140 MANUAL THERAPY 1/> REGIONS: CPT | Mod: GP | Performed by: PHYSICAL THERAPIST

## 2024-12-20 RX ORDER — PANTOPRAZOLE SODIUM 40 MG/1
40 TABLET, DELAYED RELEASE ORAL
Qty: 28 TABLET | Refills: 0 | Status: SHIPPED | OUTPATIENT
Start: 2024-12-20 | End: 2025-01-17

## 2024-12-20 RX ORDER — GABAPENTIN 100 MG/1
100 CAPSULE ORAL 3 TIMES DAILY
Qty: 90 CAPSULE | Refills: 0 | Status: SHIPPED | OUTPATIENT
Start: 2024-12-20 | End: 2025-01-19

## 2024-12-20 RX ORDER — OXYCODONE HYDROCHLORIDE 5 MG/1
5 TABLET ORAL EVERY 4 HOURS PRN
Qty: 42 TABLET | Refills: 0 | Status: SHIPPED | OUTPATIENT
Start: 2024-12-20 | End: 2024-12-27

## 2024-12-20 ASSESSMENT — PAIN - FUNCTIONAL ASSESSMENT: PAIN_FUNCTIONAL_ASSESSMENT: 0-10

## 2024-12-20 ASSESSMENT — PAIN SCALES - GENERAL: PAINLEVEL_OUTOF10: 6

## 2024-12-20 NOTE — PATIENT COMMUNICATION
Pt called for pain medicine. DOS 11/21/2024. Rates pain a 7/10. I have personally reviewed the OARRS report for the patient, no issues identified. This report is scanned into the EMR. I have considered the risks of abuse, dependence, addiction, and diversion. The 7 day Rx sent to pharmacy on file. ROSA Givens PAC     Patient called having uncontrolled pain that shoots all the way down her arm into her hand. States that it is sharp. Pain worsens after PT. Discussed that the pain she is feeling is nerve pain and that we can send gabapentin 100 mg TID and a PPI to see if this controls her pain. Gave her a refill on oxycodone. If 100 mg gabapentin does not improve her pain after a week, we may increase to 300 mg. Patient is in agreement with this plan.     A pain management referral was placed.

## 2024-12-23 ENCOUNTER — APPOINTMENT (OUTPATIENT)
Dept: INTEGRATIVE MEDICINE | Facility: CLINIC | Age: 69
End: 2024-12-23

## 2024-12-26 ENCOUNTER — APPOINTMENT (OUTPATIENT)
Dept: RADIOLOGY | Facility: HOSPITAL | Age: 69
End: 2024-12-26
Payer: COMMERCIAL

## 2024-12-26 ENCOUNTER — HOSPITAL ENCOUNTER (EMERGENCY)
Facility: HOSPITAL | Age: 69
Discharge: HOME | End: 2024-12-26
Attending: STUDENT IN AN ORGANIZED HEALTH CARE EDUCATION/TRAINING PROGRAM
Payer: COMMERCIAL

## 2024-12-26 VITALS
HEIGHT: 61 IN | RESPIRATION RATE: 20 BRPM | OXYGEN SATURATION: 98 % | BODY MASS INDEX: 21.71 KG/M2 | TEMPERATURE: 97.7 F | SYSTOLIC BLOOD PRESSURE: 111 MMHG | HEART RATE: 84 BPM | DIASTOLIC BLOOD PRESSURE: 59 MMHG | WEIGHT: 115 LBS

## 2024-12-26 DIAGNOSIS — S42.342A CLOSED DISPLACED SPIRAL FRACTURE OF SHAFT OF LEFT HUMERUS, INITIAL ENCOUNTER: Primary | ICD-10-CM

## 2024-12-26 LAB
ABO GROUP (TYPE) IN BLOOD: NORMAL
ANION GAP SERPL CALC-SCNC: 16 MMOL/L (ref 10–20)
ANTIBODY SCREEN: NORMAL
APTT PPP: 31 SECONDS (ref 27–38)
BASOPHILS # BLD AUTO: 0.06 X10*3/UL (ref 0–0.1)
BASOPHILS NFR BLD AUTO: 0.4 %
BUN SERPL-MCNC: 12 MG/DL (ref 6–23)
CALCIUM SERPL-MCNC: 10.2 MG/DL (ref 8.6–10.3)
CHLORIDE SERPL-SCNC: 104 MMOL/L (ref 98–107)
CO2 SERPL-SCNC: 23 MMOL/L (ref 21–32)
CREAT SERPL-MCNC: 0.7 MG/DL (ref 0.5–1.05)
EGFRCR SERPLBLD CKD-EPI 2021: >90 ML/MIN/1.73M*2
EOSINOPHIL # BLD AUTO: 0.07 X10*3/UL (ref 0–0.7)
EOSINOPHIL NFR BLD AUTO: 0.5 %
ERYTHROCYTE [DISTWIDTH] IN BLOOD BY AUTOMATED COUNT: 13.3 % (ref 11.5–14.5)
GLUCOSE SERPL-MCNC: 111 MG/DL (ref 74–99)
HCT VFR BLD AUTO: 42.8 % (ref 36–46)
HGB BLD-MCNC: 14.7 G/DL (ref 12–16)
IMM GRANULOCYTES # BLD AUTO: 0.06 X10*3/UL (ref 0–0.7)
IMM GRANULOCYTES NFR BLD AUTO: 0.4 % (ref 0–0.9)
INR PPP: 1 (ref 0.9–1.1)
LYMPHOCYTES # BLD AUTO: 2.42 X10*3/UL (ref 1.2–4.8)
LYMPHOCYTES NFR BLD AUTO: 18 %
MCH RBC QN AUTO: 31.1 PG (ref 26–34)
MCHC RBC AUTO-ENTMCNC: 34.3 G/DL (ref 32–36)
MCV RBC AUTO: 91 FL (ref 80–100)
MONOCYTES # BLD AUTO: 1.04 X10*3/UL (ref 0.1–1)
MONOCYTES NFR BLD AUTO: 7.7 %
NEUTROPHILS # BLD AUTO: 9.79 X10*3/UL (ref 1.2–7.7)
NEUTROPHILS NFR BLD AUTO: 73 %
NRBC BLD-RTO: 0 /100 WBCS (ref 0–0)
PLATELET # BLD AUTO: 260 X10*3/UL (ref 150–450)
POTASSIUM SERPL-SCNC: 4.1 MMOL/L (ref 3.5–5.3)
PROTHROMBIN TIME: 10.9 SECONDS (ref 9.8–12.8)
RBC # BLD AUTO: 4.72 X10*6/UL (ref 4–5.2)
RH FACTOR (ANTIGEN D): NORMAL
SODIUM SERPL-SCNC: 139 MMOL/L (ref 136–145)
WBC # BLD AUTO: 13.4 X10*3/UL (ref 4.4–11.3)

## 2024-12-26 PROCEDURE — 2500000004 HC RX 250 GENERAL PHARMACY W/ HCPCS (ALT 636 FOR OP/ED)

## 2024-12-26 PROCEDURE — 73200 CT UPPER EXTREMITY W/O DYE: CPT | Mod: LEFT SIDE | Performed by: RADIOLOGY

## 2024-12-26 PROCEDURE — 73030 X-RAY EXAM OF SHOULDER: CPT | Mod: LEFT SIDE | Performed by: RADIOLOGY

## 2024-12-26 PROCEDURE — 96372 THER/PROPH/DIAG INJ SC/IM: CPT

## 2024-12-26 PROCEDURE — 85025 COMPLETE CBC W/AUTO DIFF WBC: CPT

## 2024-12-26 PROCEDURE — 96375 TX/PRO/DX INJ NEW DRUG ADDON: CPT

## 2024-12-26 PROCEDURE — 73060 X-RAY EXAM OF HUMERUS: CPT | Mod: LT

## 2024-12-26 PROCEDURE — 73060 X-RAY EXAM OF HUMERUS: CPT | Mod: LEFT SIDE | Performed by: RADIOLOGY

## 2024-12-26 PROCEDURE — 99285 EMERGENCY DEPT VISIT HI MDM: CPT | Mod: 25 | Performed by: STUDENT IN AN ORGANIZED HEALTH CARE EDUCATION/TRAINING PROGRAM

## 2024-12-26 PROCEDURE — 73070 X-RAY EXAM OF ELBOW: CPT | Mod: LT

## 2024-12-26 PROCEDURE — 73030 X-RAY EXAM OF SHOULDER: CPT | Mod: LT

## 2024-12-26 PROCEDURE — 73200 CT UPPER EXTREMITY W/O DYE: CPT | Mod: LT

## 2024-12-26 PROCEDURE — 36415 COLL VENOUS BLD VENIPUNCTURE: CPT

## 2024-12-26 PROCEDURE — 96374 THER/PROPH/DIAG INJ IV PUSH: CPT

## 2024-12-26 PROCEDURE — 96376 TX/PRO/DX INJ SAME DRUG ADON: CPT

## 2024-12-26 PROCEDURE — 80048 BASIC METABOLIC PNL TOTAL CA: CPT

## 2024-12-26 PROCEDURE — 73070 X-RAY EXAM OF ELBOW: CPT | Mod: LEFT SIDE | Performed by: RADIOLOGY

## 2024-12-26 PROCEDURE — 86850 RBC ANTIBODY SCREEN: CPT

## 2024-12-26 PROCEDURE — 2500000001 HC RX 250 WO HCPCS SELF ADMINISTERED DRUGS (ALT 637 FOR MEDICARE OP)

## 2024-12-26 PROCEDURE — 86901 BLOOD TYPING SEROLOGIC RH(D): CPT

## 2024-12-26 PROCEDURE — 85610 PROTHROMBIN TIME: CPT

## 2024-12-26 RX ORDER — OXYCODONE AND ACETAMINOPHEN 5; 325 MG/1; MG/1
1 TABLET ORAL ONCE
Status: COMPLETED | OUTPATIENT
Start: 2024-12-26 | End: 2024-12-26

## 2024-12-26 RX ORDER — OXYCODONE AND ACETAMINOPHEN 7.5; 325 MG/1; MG/1
1 TABLET ORAL EVERY 6 HOURS PRN
Qty: 10 TABLET | Refills: 0 | Status: SHIPPED | OUTPATIENT
Start: 2024-12-26 | End: 2024-12-29 | Stop reason: HOSPADM

## 2024-12-26 RX ORDER — OXYCODONE HYDROCHLORIDE 5 MG/1
5 TABLET ORAL EVERY 6 HOURS PRN
Qty: 12 TABLET | Refills: 0 | Status: SHIPPED | OUTPATIENT
Start: 2024-12-26 | End: 2024-12-26

## 2024-12-26 RX ORDER — CYCLOBENZAPRINE HCL 5 MG
5 TABLET ORAL ONCE
Status: COMPLETED | OUTPATIENT
Start: 2024-12-26 | End: 2024-12-26

## 2024-12-26 RX ORDER — HYDROMORPHONE HYDROCHLORIDE 1 MG/ML
1 INJECTION, SOLUTION INTRAMUSCULAR; INTRAVENOUS; SUBCUTANEOUS ONCE
Status: COMPLETED | OUTPATIENT
Start: 2024-12-26 | End: 2024-12-26

## 2024-12-26 RX ORDER — CYCLOBENZAPRINE HCL 5 MG
TABLET ORAL
Status: DISCONTINUED
Start: 2024-12-26 | End: 2024-12-26 | Stop reason: HOSPADM

## 2024-12-26 RX ORDER — CYCLOBENZAPRINE HCL 10 MG
10 TABLET ORAL 2 TIMES DAILY PRN
Qty: 14 TABLET | Refills: 0 | Status: ON HOLD | OUTPATIENT
Start: 2024-12-26 | End: 2024-12-29

## 2024-12-26 RX ORDER — KETOROLAC TROMETHAMINE 30 MG/ML
7.5 INJECTION, SOLUTION INTRAMUSCULAR; INTRAVENOUS ONCE
Status: COMPLETED | OUTPATIENT
Start: 2024-12-26 | End: 2024-12-26

## 2024-12-26 RX ADMIN — HYDROMORPHONE HYDROCHLORIDE 0.5 MG: 1 INJECTION, SOLUTION INTRAMUSCULAR; INTRAVENOUS; SUBCUTANEOUS at 13:44

## 2024-12-26 RX ADMIN — HYDROMORPHONE HYDROCHLORIDE 1 MG: 1 INJECTION, SOLUTION INTRAMUSCULAR; INTRAVENOUS; SUBCUTANEOUS at 14:48

## 2024-12-26 RX ADMIN — HYDROMORPHONE HYDROCHLORIDE 0.5 MG: 1 INJECTION, SOLUTION INTRAMUSCULAR; INTRAVENOUS; SUBCUTANEOUS at 17:21

## 2024-12-26 RX ADMIN — KETOROLAC TROMETHAMINE 7.5 MG: 30 INJECTION, SOLUTION INTRAMUSCULAR at 17:21

## 2024-12-26 RX ADMIN — CYCLOBENZAPRINE HYDROCHLORIDE 5 MG: 5 TABLET, FILM COATED ORAL at 16:08

## 2024-12-26 RX ADMIN — OXYCODONE HYDROCHLORIDE AND ACETAMINOPHEN 1 TABLET: 5; 325 TABLET ORAL at 16:41

## 2024-12-26 ASSESSMENT — PAIN DESCRIPTION - PAIN TYPE
TYPE: ACUTE PAIN

## 2024-12-26 ASSESSMENT — PAIN SCALES - GENERAL
PAINLEVEL_OUTOF10: 10 - WORST POSSIBLE PAIN
PAINLEVEL_OUTOF10: 7
PAINLEVEL_OUTOF10: 5 - MODERATE PAIN
PAINLEVEL_OUTOF10: 10 - WORST POSSIBLE PAIN
PAINLEVEL_OUTOF10: 9

## 2024-12-26 ASSESSMENT — COLUMBIA-SUICIDE SEVERITY RATING SCALE - C-SSRS
6. HAVE YOU EVER DONE ANYTHING, STARTED TO DO ANYTHING, OR PREPARED TO DO ANYTHING TO END YOUR LIFE?: NO
1. IN THE PAST MONTH, HAVE YOU WISHED YOU WERE DEAD OR WISHED YOU COULD GO TO SLEEP AND NOT WAKE UP?: NO
2. HAVE YOU ACTUALLY HAD ANY THOUGHTS OF KILLING YOURSELF?: NO

## 2024-12-26 ASSESSMENT — PAIN DESCRIPTION - LOCATION
LOCATION: ARM
LOCATION: SHOULDER
LOCATION: ARM
LOCATION: ARM

## 2024-12-26 ASSESSMENT — PAIN - FUNCTIONAL ASSESSMENT
PAIN_FUNCTIONAL_ASSESSMENT: 0-10

## 2024-12-26 ASSESSMENT — PAIN DESCRIPTION - ORIENTATION
ORIENTATION: LEFT
ORIENTATION: LEFT

## 2024-12-26 NOTE — ED PROVIDER NOTES
Emergency Department Provider Note        History of Present Illness     History provided by: Patient and Family Member  Limitations to History: None  External Records Reviewed with Brief Summary:  Discharge summary 11/26 per patient was admitted for an ORIF of the left humerus    HPI:  Judith Bush is a 69 y.o. female history of recent ORIF of the left humerus after a fall complicated by ulnar neuropathy, NSTEMI, bipolar disorder, COPD, CVA presenting with a chief complaint of left shoulder pain after fall.  Initially patient's  provides history due to patient's pain.  He states that patient been doing well and then had a mechanical fall where she slipped off of 1 step and hit her left shoulder directly into a railing.  She did not hit her head or lose consciousness.  She states initially after the fall she was able to move her shoulder however felt some clicking and now feels like it cannot move.  Patient's not had any pain medications prior to arrival, states that she was taking oxycodone after discharge however this has been out.  Patient endorses numbness of the left pinky which is typically not a part of her ulnar neuropathy.    Physical Exam   Triage vitals:  T 36.5 °C (97.7 °F)  HR 98  BP (!) 175/100  RR 18  O2 98 % None (Room air)    General: Awake, alert, in no acute distress  Eyes: Gaze conjugate.  No scleral icterus or injection  HENT: Normo-cephalic, atraumatic. No stridor  CV: 2+ left radial pulse.  Resp: Breathing non-labored, speaking in full sentences.  Clear to auscultation bilaterally  GI: Soft, non-distended, non-tender. No rebound or guarding.  MSK/Extremities: No gross bony deformities.  Left shoulder is in a sling.  tenderness to palpation along the left shoulder and upper arm but no obvious step-off. No elbow or forearm tenderness.  Skin: Warm. Appropriate color  Neuro: Alert. Oriented. Face symmetric. Speech is fluent.  Normal  strength of the left upper extremity. SILT  LAKISHA.   Psych: Tearful    Medical Decision Making & ED Course   Medical Decision Makin y.o. female who presents after mechanical fall of her left shoulder [place].  On arrival patient is hemodynamically stable, afebrile saturating well on room air no acute distress.  She is very diffusely tender over the left side including the shoulder and is very tearful on exam.  Patient is endorsing subjective paresthesias of the left pinky however she has equal  strength and a palpable radial pulse. Has hx of known ulnar neuropathy on the L. Patient given dose of IM Dilaudid while awaiting plain films.  X-rays did show a periprosthetic spiral fracture for which orthopedics was engaged.  They recommended splinting with orthotics which was completed.  Patient required multiple additional doses of pain medication while in the emergency department.  Orthopedics also requesting a CT of the left upper extremity which was obtained and have set her up for an appointment with Dr. Roberts on Monday. Discharged in stable condition.   ----       Social Determinants of Health which Significantly Impact Care: None identified     EKG Independent Interpretation: EKG not obtained    Independent Result Review and Interpretation: Relevant laboratory and radiographic results were reviewed and independently interpreted by myself.  As necessary, they are commented on in the ED Course.    Chronic conditions affecting the patient's care: As documented above in [facility]    The patient was discussed with the following consultants/services:  orthopedics     Care Considerations: As documented above in [facility]    ED Course:  ED Course as of 24 2212   Thu Dec 26, 2024   1400 XR humerus left  I have personally reviewed and interpreted this XR L humerus, which shows periprosthetic fx. Final decision making pending radiology read.   [SS]   1440 Patient updated on presence of spiral fracture. Preop labs and additional pain medications ordered  at this time. Orthopedics consulted.  [AW]   1755 Orthopedics requesting a CT of the humerus for follow up arranged on Monday  [AW]      ED Course User Index  [AW] Kamilla Connors DO  [SS] Steffen Simerlink, MD         Diagnoses as of 12/26/24 2212   Closed displaced spiral fracture of shaft of left humerus, initial encounter     Disposition   As a result of the work-up, the patient was discharged home.  she was informed of her diagnosis and instructed to come back with any concerns or worsening of condition.  she and was agreeable to the plan as discussed above.  she was given the opportunity to ask questions.  All of the patient's questions were answered.    Procedures   Orthopaedic Injury Treatment - Fracture    Performed by: Steffen Simerlink, MD  Authorized by: Steffen Simerlink, MD    Consent:     Consent obtained:  Verbal    Consent given by:  Patient    Alternatives discussed:  No treatment  Injury:     Injury location:  Upper arm    Upper arm injury location:  L upper arm    Upper arm fracture type: humeral shaft    Pre-procedure details:     Distal neurologic exam:  Normal    Distal perfusion: distal pulses strong and brisk capillary refill      Range of motion: reduced    Sedation:     Sedation type:  None  Anesthesia:     Anesthesia method:  None  Procedure details:     Manipulation performed: no      Immobilization: orthotic brace.    Supplies used: orthotic brace.  Post-procedure details:     Distal neurologic exam:  Normal    Distal perfusion: distal pulses strong and brisk capillary refill      Range of motion: unchanged      Procedure completion:  Tolerated      Patient seen and discussed with ED attending physician.    Kamilla Connors DO  Emergency Medicine       Kamilla Connors DO  Resident  12/26/24 1396    Emergency Medicine Attending Attestation:     The patient was seen by the resident/fellow.  I have personally performed a substantive portion of the encounter.  I have seen and examined the  patient; agree with the workup, evaluation, MDM, management and diagnosis.  The care plan has been discussed with the resident; I have reviewed the resident’s note and agree with the documented findings.      Independent Test Interpretation: See ED Course or Below Text  Data Personally Interpreted: CBC: no anemia, BMP: no clinically significant electrolyte abnormalities, no ELIEZER    ED Course as of 12/26/24 2216   Thu Dec 26, 2024   1400 XR humerus left  I have personally reviewed and interpreted this XR L humerus, which shows periprosthetic fx. Final decision making pending radiology read.   [SS]   1440 Patient updated on presence of spiral fracture. Preop labs and additional pain medications ordered at this time. Orthopedics consulted.  [AW]   1757 Orthopedics requesting a CT of the humerus for follow up arranged on Monday  [AW]      ED Course User Index  [AW] Kamilla Connors DO  [SS] Steffen Simerlink, MD         Diagnoses as of 12/26/24 2216   Closed displaced spiral fracture of shaft of left humerus, initial encounter       Steffen Simerlink, MD Steffen Simerlink, MD  12/26/24 2216

## 2024-12-26 NOTE — ED TRIAGE NOTES
Pt to ED with complaint of missing a step walking upstairs and falling. Complaint of left shoulder pain, recently had surgery on shoulder. Pt did not head, no LOC, no blood thinner use. Pt did not fall down stairs. Pt st 10/10 left shoulder pain. Arrives in a sling.

## 2024-12-26 NOTE — CONSULTS
Orthopaedic Surgery Consult H&P    HPI:   Orthopaedic Problems/Injuries: L cliff-implant spiral midshaft humerus fx  Other Injuries: None    69 y.o. female PMH L proximal humerus fx s/p ORIF 11/21/24 presents after mGLF today sustaining above. Denies numbness, tingling, and open wounds on the affected limb.     PMH: per above/EMR  PSH: per above/EMR  SocHx:      - Current smoker      -  Denies EtOH use      -  Denies other drug use  FamHx:  Non-contributory to this patient's acute orthopaedic problem.   Allergies: Reviewed in EMR  Meds: Reviewed in EMR    ROS      - 14 point ROS negative except as above    Physical Exam:  Gen: AOx3, NAD  HEENT: normocephalic atraumatic  Psych: appropriate mood and affect  Resp: nonlabored breathing  Cardiac: Extremities WWP, RRR to peripheral palpation  Neuro: CN 2-12 grossly intact  Skin: no rashes    Left upper extremity:   -Skin intact.   -Tender at site of injury with painful ROM.  -Fires axillary/AIN/PIN/ulnar distributions  -SILT axillary/radial/median/ulnar distributions  -Hand warm, well perfused  -Palpable radial pulse, cap refill brisk  -Compartments soft and compressible    A full secondary exam was performed and all relevant findings discussed and noted above.    Imaging:  AP and lateral radiographs of the L shoulder and humerus display cliff-implant midshaft humerus fx. No glenohumeral dislocation    Assessment:  Orthopaedic Problems/Injuries:  L cliff-implant spiral midshaft humerus fx    69 y.o. female RHD PMH L proximal humerus fx s/p ORIF 11/21/24 presents after mGLF today sustaining above. Closed, NVI injury. Placed in saramiento brace by orthotics team    Plan:  - Follow up with Dr Roberts on Monday 12/30/24  - WB: NWBRENT BANUELOS in saramiento brace  - Abx: no indication  - Diet: ok for regular  - DVT: continue previous regimen  -CT Scan L shoulder necessary prior to patient leaving ED    - Dispo: Follow up with Dr Roberts on Monday 12/30/24    Juan Guardado MD  PGY-4  Orthopaedic Surgery  On-call Resident

## 2024-12-26 NOTE — DISCHARGE INSTRUCTIONS
You have been scheduled to see Dr. Roberts Monday, 12/30 at 1pm at [facility].      You were given a prescription for a medicine which has opioids in it.  Opioids are powerful pain medicines that can make you very sleepy.  You should not drive or operate machinery while taking this powerful pain medicine.  Opioids can be addicting.  Only take them if you absolutely need them for severe pain.  Opioids can cause constipation, you can get medicines (stool softners or laxatives) at your local pharmacy that can help this.

## 2024-12-27 ENCOUNTER — TELEPHONE (OUTPATIENT)
Dept: PRIMARY CARE | Facility: CLINIC | Age: 69
End: 2024-12-27
Payer: COMMERCIAL

## 2024-12-27 ENCOUNTER — TELEPHONE (OUTPATIENT)
Dept: ORTHOPEDIC SURGERY | Facility: HOSPITAL | Age: 69
End: 2024-12-27
Payer: COMMERCIAL

## 2024-12-27 ENCOUNTER — HOSPITAL ENCOUNTER (INPATIENT)
Facility: HOSPITAL | Age: 69
LOS: 1 days | Discharge: HOME | DRG: 683 | End: 2024-12-29
Attending: EMERGENCY MEDICINE | Admitting: STUDENT IN AN ORGANIZED HEALTH CARE EDUCATION/TRAINING PROGRAM
Payer: COMMERCIAL

## 2024-12-27 DIAGNOSIS — M79.7 FIBROMYALGIA: ICD-10-CM

## 2024-12-27 DIAGNOSIS — S42.202A CLOSED FRACTURE OF PROXIMAL END OF LEFT HUMERUS, UNSPECIFIED FRACTURE MORPHOLOGY, INITIAL ENCOUNTER: ICD-10-CM

## 2024-12-27 DIAGNOSIS — E87.1 HYPONATREMIA: ICD-10-CM

## 2024-12-27 DIAGNOSIS — Z87.81 HISTORY OF LEFT SHOULDER FRACTURE: ICD-10-CM

## 2024-12-27 DIAGNOSIS — S42.202S CLOSED FRACTURE OF PROXIMAL END OF LEFT HUMERUS, UNSPECIFIED FRACTURE MORPHOLOGY, SEQUELA: ICD-10-CM

## 2024-12-27 DIAGNOSIS — S42.342A CLOSED DISPLACED SPIRAL FRACTURE OF SHAFT OF LEFT HUMERUS, INITIAL ENCOUNTER: ICD-10-CM

## 2024-12-27 DIAGNOSIS — N17.9 AKI (ACUTE KIDNEY INJURY) (CMS-HCC): Primary | ICD-10-CM

## 2024-12-27 PROCEDURE — 93010 ELECTROCARDIOGRAM REPORT: CPT | Performed by: EMERGENCY MEDICINE

## 2024-12-27 PROCEDURE — 99285 EMERGENCY DEPT VISIT HI MDM: CPT | Performed by: EMERGENCY MEDICINE

## 2024-12-27 ASSESSMENT — PAIN DESCRIPTION - PAIN TYPE: TYPE: ACUTE PAIN

## 2024-12-27 ASSESSMENT — PAIN SCALES - GENERAL
PAINLEVEL_OUTOF10: 10 - WORST POSSIBLE PAIN
PAINLEVEL_OUTOF10: 10 - WORST POSSIBLE PAIN

## 2024-12-27 ASSESSMENT — PAIN DESCRIPTION - ORIENTATION: ORIENTATION: LEFT

## 2024-12-27 ASSESSMENT — PAIN DESCRIPTION - LOCATION: LOCATION: ARM

## 2024-12-27 ASSESSMENT — PAIN DESCRIPTION - DESCRIPTORS: DESCRIPTORS: ACHING

## 2024-12-27 ASSESSMENT — PAIN - FUNCTIONAL ASSESSMENT: PAIN_FUNCTIONAL_ASSESSMENT: 0-10

## 2024-12-27 NOTE — ED TRIAGE NOTES
Pt is here for a prior left arm fracture that now is a new fall and was seen at [facility] yesterday and told she has a spiral fracture and Monday is an appointment but today pt is stating the sling that was placed is making things more painful and causing nerve pain to the axillary . Pt is tearful and upset.

## 2024-12-27 NOTE — TELEPHONE ENCOUNTER
Patient called the office still c/o hand numbness after loosening her thapa brace.     Notified patient to go to [facility] ED.     Dr. Roberts notified.     Hiral Rosa LPN

## 2024-12-27 NOTE — TELEPHONE ENCOUNTER
Patient was seen at [facility] ED yesterday, s/p another mGLF and sustained a spiral fx of her L humerus. Same humerus recently operated on. Patient was placed in a Marcos Brace in the ED by the Ortho Resident and this nurse scheduled patient for Monday to see Dr. Roberts for surgical discussion.     Patient called and LM on office VM to update on her new injury.   Patient is c/o severe pain and increased anxiety.   Patient is taking percocet 7.5/325mg. Per patient her Marcos Brace is too tight, her hand is tingling. Notified patient okay to loosen the brace a little.     Dr. Roberts and Cheri WAYNE notified of above.     Patient notified of time of appt Monday and notified her should her pain get worse to go to the ED or call on-call number.     No further questions at this time.     Hiral Rosa LPN

## 2024-12-28 ENCOUNTER — APPOINTMENT (OUTPATIENT)
Dept: RADIOLOGY | Facility: HOSPITAL | Age: 69
DRG: 683 | End: 2024-12-28
Payer: COMMERCIAL

## 2024-12-28 ENCOUNTER — CLINICAL SUPPORT (OUTPATIENT)
Dept: EMERGENCY MEDICINE | Facility: HOSPITAL | Age: 69
DRG: 683 | End: 2024-12-28
Payer: COMMERCIAL

## 2024-12-28 PROBLEM — N17.9 AKI (ACUTE KIDNEY INJURY) (CMS-HCC): Status: ACTIVE | Noted: 2024-12-28

## 2024-12-28 LAB
ABO GROUP (TYPE) IN BLOOD: NORMAL
ALBUMIN SERPL BCP-MCNC: 4.2 G/DL (ref 3.4–5)
ALP SERPL-CCNC: 109 U/L (ref 33–136)
ALT SERPL W P-5'-P-CCNC: 6 U/L (ref 7–45)
ANION GAP SERPL CALC-SCNC: 12 MMOL/L (ref 10–20)
ANION GAP SERPL CALC-SCNC: 14 MMOL/L (ref 10–20)
ANTIBODY SCREEN: NORMAL
APPEARANCE UR: CLEAR
APTT PPP: 29 SECONDS (ref 27–38)
AST SERPL W P-5'-P-CCNC: 16 U/L (ref 9–39)
ATRIAL RATE: 55 BPM
BASOPHILS # BLD AUTO: 0.05 X10*3/UL (ref 0–0.1)
BASOPHILS NFR BLD AUTO: 0.4 %
BILIRUB SERPL-MCNC: 1.1 MG/DL (ref 0–1.2)
BILIRUB UR STRIP.AUTO-MCNC: NEGATIVE MG/DL
BUN SERPL-MCNC: 19 MG/DL (ref 6–23)
BUN SERPL-MCNC: 22 MG/DL (ref 6–23)
CALCIUM SERPL-MCNC: 10.4 MG/DL (ref 8.6–10.6)
CALCIUM SERPL-MCNC: 9.5 MG/DL (ref 8.6–10.6)
CHLORIDE SERPL-SCNC: 100 MMOL/L (ref 98–107)
CHLORIDE SERPL-SCNC: 95 MMOL/L (ref 98–107)
CHLORIDE UR-SCNC: <15 MMOL/L
CHLORIDE/CREATININE (MMOL/G) IN URINE: NORMAL
CO2 SERPL-SCNC: 23 MMOL/L (ref 21–32)
CO2 SERPL-SCNC: 24 MMOL/L (ref 21–32)
COLOR UR: COLORLESS
CREAT SERPL-MCNC: 1.71 MG/DL (ref 0.5–1.05)
CREAT SERPL-MCNC: 2.16 MG/DL (ref 0.5–1.05)
CREAT UR-MCNC: 45.5 MG/DL (ref 20–320)
CREAT UR-MCNC: 45.5 MG/DL (ref 20–320)
EGFRCR SERPLBLD CKD-EPI 2021: 24 ML/MIN/1.73M*2
EGFRCR SERPLBLD CKD-EPI 2021: 32 ML/MIN/1.73M*2
EOSINOPHIL # BLD AUTO: 0.22 X10*3/UL (ref 0–0.7)
EOSINOPHIL NFR BLD AUTO: 1.6 %
ERYTHROCYTE [DISTWIDTH] IN BLOOD BY AUTOMATED COUNT: 13.4 % (ref 11.5–14.5)
FERRITIN SERPL-MCNC: 157 NG/ML (ref 8–150)
FOLATE SERPL-MCNC: 8.6 NG/ML
GLUCOSE SERPL-MCNC: 83 MG/DL (ref 74–99)
GLUCOSE SERPL-MCNC: 93 MG/DL (ref 74–99)
GLUCOSE UR STRIP.AUTO-MCNC: NORMAL MG/DL
HAPTOGLOB SERPL NEPH-MCNC: 217 MG/DL (ref 30–200)
HCT VFR BLD AUTO: 32.8 % (ref 36–46)
HGB BLD-MCNC: 11.7 G/DL (ref 12–16)
HGB RETIC QN: 35 PG (ref 28–38)
HOLD SPECIMEN: NORMAL
HOLD SPECIMEN: NORMAL
IMM GRANULOCYTES # BLD AUTO: 0.08 X10*3/UL (ref 0–0.7)
IMM GRANULOCYTES NFR BLD AUTO: 0.6 % (ref 0–0.9)
IMMATURE RETIC FRACTION: 12.9 %
INR PPP: 1 (ref 0.9–1.1)
IRON SATN MFR SERPL: 19 % (ref 25–45)
IRON SERPL-MCNC: 60 UG/DL (ref 35–150)
KETONES UR STRIP.AUTO-MCNC: NEGATIVE MG/DL
LDH SERPL L TO P-CCNC: 302 U/L (ref 84–246)
LEUKOCYTE ESTERASE UR QL STRIP.AUTO: NEGATIVE
LYMPHOCYTES # BLD AUTO: 3.99 X10*3/UL (ref 1.2–4.8)
LYMPHOCYTES NFR BLD AUTO: 29.8 %
MCH RBC QN AUTO: 31.7 PG (ref 26–34)
MCHC RBC AUTO-ENTMCNC: 35.7 G/DL (ref 32–36)
MCV RBC AUTO: 89 FL (ref 80–100)
MONOCYTES # BLD AUTO: 1.39 X10*3/UL (ref 0.1–1)
MONOCYTES NFR BLD AUTO: 10.4 %
NEUTROPHILS # BLD AUTO: 7.65 X10*3/UL (ref 1.2–7.7)
NEUTROPHILS NFR BLD AUTO: 57.2 %
NITRITE UR QL STRIP.AUTO: NEGATIVE
NRBC BLD-RTO: 0 /100 WBCS (ref 0–0)
OSMOLALITY SERPL: 271 MOSM/KG (ref 280–300)
OSMOLALITY UR: 160 MOSM/KG (ref 200–1200)
P AXIS: -24 DEGREES
P OFFSET: 209 MS
P ONSET: 168 MS
PH UR STRIP.AUTO: 5 [PH]
PLATELET # BLD AUTO: 186 X10*3/UL (ref 150–450)
POTASSIUM SERPL-SCNC: 3.9 MMOL/L (ref 3.5–5.3)
POTASSIUM SERPL-SCNC: 4.4 MMOL/L (ref 3.5–5.3)
POTASSIUM UR-SCNC: 21 MMOL/L
POTASSIUM/CREAT UR-RTO: 46 MMOL/G CREAT
PR INTERVAL: 114 MS
PROT SERPL-MCNC: 7.1 G/DL (ref 6.4–8.2)
PROT UR STRIP.AUTO-MCNC: NEGATIVE MG/DL
PROTHROMBIN TIME: 11.1 SECONDS (ref 9.8–12.8)
Q ONSET: 225 MS
QRS COUNT: 9 BEATS
QRS DURATION: 74 MS
QT INTERVAL: 434 MS
QTC CALCULATION(BAZETT): 415 MS
QTC FREDERICIA: 421 MS
R AXIS: 3 DEGREES
RBC # BLD AUTO: 3.69 X10*6/UL (ref 4–5.2)
RBC # UR STRIP.AUTO: NEGATIVE /UL
RETICS #: 0.06 X10*6/UL (ref 0.02–0.11)
RETICS/RBC NFR AUTO: 1.6 % (ref 0.5–2)
RH FACTOR (ANTIGEN D): NORMAL
SODIUM SERPL-SCNC: 128 MMOL/L (ref 136–145)
SODIUM SERPL-SCNC: 132 MMOL/L (ref 136–145)
SODIUM UR-SCNC: <10 MMOL/L
SODIUM/CREAT UR-RTO: NORMAL
SP GR UR STRIP.AUTO: 1.01
T AXIS: 45 DEGREES
T OFFSET: 442 MS
TIBC SERPL-MCNC: 321 UG/DL (ref 240–445)
TRANSFERRIN SERPL-MCNC: 203 MG/DL (ref 200–360)
UIBC SERPL-MCNC: 261 UG/DL (ref 110–370)
UREA/CREAT UR-SRTO: 4.4 G/G CREAT
UROBILINOGEN UR STRIP.AUTO-MCNC: NORMAL MG/DL
UUN UR-MCNC: 200 MG/DL
VENTRICULAR RATE: 55 BPM
VIT B12 SERPL-MCNC: 387 PG/ML (ref 211–911)
WBC # BLD AUTO: 13.4 X10*3/UL (ref 4.4–11.3)

## 2024-12-28 PROCEDURE — 2500000004 HC RX 250 GENERAL PHARMACY W/ HCPCS (ALT 636 FOR OP/ED)

## 2024-12-28 PROCEDURE — 84466 ASSAY OF TRANSFERRIN: CPT

## 2024-12-28 PROCEDURE — 73060 X-RAY EXAM OF HUMERUS: CPT | Mod: LT

## 2024-12-28 PROCEDURE — 86901 BLOOD TYPING SEROLOGIC RH(D): CPT

## 2024-12-28 PROCEDURE — 96374 THER/PROPH/DIAG INJ IV PUSH: CPT

## 2024-12-28 PROCEDURE — 73070 X-RAY EXAM OF ELBOW: CPT | Mod: LEFT SIDE | Performed by: RADIOLOGY

## 2024-12-28 PROCEDURE — 83930 ASSAY OF BLOOD OSMOLALITY: CPT

## 2024-12-28 PROCEDURE — 83935 ASSAY OF URINE OSMOLALITY: CPT

## 2024-12-28 PROCEDURE — 94640 AIRWAY INHALATION TREATMENT: CPT

## 2024-12-28 PROCEDURE — 73070 X-RAY EXAM OF ELBOW: CPT | Mod: LT

## 2024-12-28 PROCEDURE — 36415 COLL VENOUS BLD VENIPUNCTURE: CPT

## 2024-12-28 PROCEDURE — 2500000001 HC RX 250 WO HCPCS SELF ADMINISTERED DRUGS (ALT 637 FOR MEDICARE OP)

## 2024-12-28 PROCEDURE — 82374 ASSAY BLOOD CARBON DIOXIDE: CPT | Performed by: EMERGENCY MEDICINE

## 2024-12-28 PROCEDURE — 96361 HYDRATE IV INFUSION ADD-ON: CPT

## 2024-12-28 PROCEDURE — 36415 COLL VENOUS BLD VENIPUNCTURE: CPT | Performed by: EMERGENCY MEDICINE

## 2024-12-28 PROCEDURE — 85045 AUTOMATED RETICULOCYTE COUNT: CPT

## 2024-12-28 PROCEDURE — 84540 ASSAY OF URINE/UREA-N: CPT

## 2024-12-28 PROCEDURE — 2500000005 HC RX 250 GENERAL PHARMACY W/O HCPCS

## 2024-12-28 PROCEDURE — 81003 URINALYSIS AUTO W/O SCOPE: CPT

## 2024-12-28 PROCEDURE — 2500000004 HC RX 250 GENERAL PHARMACY W/ HCPCS (ALT 636 FOR OP/ED): Performed by: EMERGENCY MEDICINE

## 2024-12-28 PROCEDURE — 85025 COMPLETE CBC W/AUTO DIFF WBC: CPT

## 2024-12-28 PROCEDURE — 93005 ELECTROCARDIOGRAM TRACING: CPT

## 2024-12-28 PROCEDURE — 82728 ASSAY OF FERRITIN: CPT

## 2024-12-28 PROCEDURE — 82607 VITAMIN B-12: CPT

## 2024-12-28 PROCEDURE — 83010 ASSAY OF HAPTOGLOBIN QUANT: CPT

## 2024-12-28 PROCEDURE — 71045 X-RAY EXAM CHEST 1 VIEW: CPT

## 2024-12-28 PROCEDURE — 96375 TX/PRO/DX INJ NEW DRUG ADDON: CPT

## 2024-12-28 PROCEDURE — 73060 X-RAY EXAM OF HUMERUS: CPT | Mod: LEFT SIDE | Performed by: RADIOLOGY

## 2024-12-28 PROCEDURE — 83615 LACTATE (LD) (LDH) ENZYME: CPT

## 2024-12-28 PROCEDURE — 2500000002 HC RX 250 W HCPCS SELF ADMINISTERED DRUGS (ALT 637 FOR MEDICARE OP, ALT 636 FOR OP/ED)

## 2024-12-28 PROCEDURE — 82746 ASSAY OF FOLIC ACID SERUM: CPT

## 2024-12-28 PROCEDURE — 85610 PROTHROMBIN TIME: CPT

## 2024-12-28 PROCEDURE — 73030 X-RAY EXAM OF SHOULDER: CPT | Mod: LT

## 2024-12-28 PROCEDURE — 82436 ASSAY OF URINE CHLORIDE: CPT

## 2024-12-28 PROCEDURE — 73030 X-RAY EXAM OF SHOULDER: CPT | Mod: LEFT SIDE | Performed by: RADIOLOGY

## 2024-12-28 PROCEDURE — 96376 TX/PRO/DX INJ SAME DRUG ADON: CPT

## 2024-12-28 PROCEDURE — 83540 ASSAY OF IRON: CPT

## 2024-12-28 PROCEDURE — 80053 COMPREHEN METABOLIC PANEL: CPT

## 2024-12-28 PROCEDURE — 1100000001 HC PRIVATE ROOM DAILY

## 2024-12-28 RX ORDER — DOCUSATE SODIUM 100 MG/1
100 CAPSULE, LIQUID FILLED ORAL 2 TIMES DAILY PRN
Status: DISCONTINUED | OUTPATIENT
Start: 2024-12-28 | End: 2024-12-29 | Stop reason: HOSPADM

## 2024-12-28 RX ORDER — PANTOPRAZOLE SODIUM 40 MG/1
40 TABLET, DELAYED RELEASE ORAL
Status: DISCONTINUED | OUTPATIENT
Start: 2024-12-29 | End: 2024-12-29 | Stop reason: HOSPADM

## 2024-12-28 RX ORDER — HYDROMORPHONE HYDROCHLORIDE 1 MG/ML
1 INJECTION, SOLUTION INTRAMUSCULAR; INTRAVENOUS; SUBCUTANEOUS ONCE
Status: COMPLETED | OUTPATIENT
Start: 2024-12-28 | End: 2024-12-28

## 2024-12-28 RX ORDER — POLYETHYLENE GLYCOL 3350 17 G/17G
17 POWDER, FOR SOLUTION ORAL DAILY
Status: DISCONTINUED | OUTPATIENT
Start: 2024-12-28 | End: 2024-12-29 | Stop reason: HOSPADM

## 2024-12-28 RX ORDER — OXYCODONE HYDROCHLORIDE 5 MG/1
10 TABLET ORAL EVERY 6 HOURS PRN
Status: DISCONTINUED | OUTPATIENT
Start: 2024-12-28 | End: 2024-12-29

## 2024-12-28 RX ORDER — SODIUM CHLORIDE, SODIUM LACTATE, POTASSIUM CHLORIDE, CALCIUM CHLORIDE 600; 310; 30; 20 MG/100ML; MG/100ML; MG/100ML; MG/100ML
100 INJECTION, SOLUTION INTRAVENOUS CONTINUOUS
Status: ACTIVE | OUTPATIENT
Start: 2024-12-28 | End: 2024-12-28

## 2024-12-28 RX ORDER — ACETAMINOPHEN 325 MG/1
TABLET ORAL
Status: COMPLETED
Start: 2024-12-28 | End: 2024-12-28

## 2024-12-28 RX ORDER — OXYCODONE HYDROCHLORIDE 5 MG/1
7.5 TABLET ORAL EVERY 6 HOURS PRN
Status: DISCONTINUED | OUTPATIENT
Start: 2024-12-28 | End: 2024-12-29

## 2024-12-28 RX ORDER — METHOCARBAMOL 100 MG/ML
1000 INJECTION, SOLUTION INTRAMUSCULAR; INTRAVENOUS ONCE
Status: COMPLETED | OUTPATIENT
Start: 2024-12-28 | End: 2024-12-28

## 2024-12-28 RX ORDER — ACETAMINOPHEN 325 MG/1
975 TABLET ORAL EVERY 8 HOURS
Status: DISCONTINUED | OUTPATIENT
Start: 2024-12-28 | End: 2024-12-29 | Stop reason: HOSPADM

## 2024-12-28 RX ORDER — ASPIRIN 81 MG/1
81 TABLET ORAL 2 TIMES DAILY
Status: DISCONTINUED | OUTPATIENT
Start: 2024-12-28 | End: 2024-12-29 | Stop reason: HOSPADM

## 2024-12-28 RX ORDER — LIDOCAINE 560 MG/1
1 PATCH PERCUTANEOUS; TOPICAL; TRANSDERMAL DAILY
Status: DISCONTINUED | OUTPATIENT
Start: 2024-12-28 | End: 2024-12-29 | Stop reason: HOSPADM

## 2024-12-28 RX ORDER — HYDROMORPHONE HYDROCHLORIDE 1 MG/ML
0.6 INJECTION, SOLUTION INTRAMUSCULAR; INTRAVENOUS; SUBCUTANEOUS
Status: DISCONTINUED | OUTPATIENT
Start: 2024-12-28 | End: 2024-12-29

## 2024-12-28 RX ORDER — ACETAMINOPHEN 325 MG/1
975 TABLET ORAL ONCE
Status: COMPLETED | OUTPATIENT
Start: 2024-12-28 | End: 2024-12-28

## 2024-12-28 RX ORDER — NALOXONE HYDROCHLORIDE 0.4 MG/ML
0.2 INJECTION, SOLUTION INTRAMUSCULAR; INTRAVENOUS; SUBCUTANEOUS EVERY 5 MIN PRN
Status: DISCONTINUED | OUTPATIENT
Start: 2024-12-28 | End: 2024-12-29 | Stop reason: HOSPADM

## 2024-12-28 RX ORDER — OXYBUTYNIN CHLORIDE 10 MG/1
10 TABLET, EXTENDED RELEASE ORAL 2 TIMES DAILY
Status: DISCONTINUED | OUTPATIENT
Start: 2024-12-28 | End: 2024-12-28

## 2024-12-28 RX ORDER — NALOXONE HYDROCHLORIDE 4 MG/.1ML
4 SPRAY NASAL AS NEEDED
Status: DISCONTINUED | OUTPATIENT
Start: 2024-12-28 | End: 2024-12-28

## 2024-12-28 RX ORDER — FLUTICASONE PROPIONATE 50 MCG
1 SPRAY, SUSPENSION (ML) NASAL DAILY
Status: DISCONTINUED | OUTPATIENT
Start: 2024-12-28 | End: 2024-12-29 | Stop reason: HOSPADM

## 2024-12-28 RX ORDER — HYDROMORPHONE HYDROCHLORIDE 1 MG/ML
0.5 INJECTION, SOLUTION INTRAMUSCULAR; INTRAVENOUS; SUBCUTANEOUS ONCE
Status: COMPLETED | OUTPATIENT
Start: 2024-12-28 | End: 2024-12-28

## 2024-12-28 RX ORDER — FORMOTEROL FUMARATE DIHYDRATE 20 UG/2ML
20 SOLUTION RESPIRATORY (INHALATION)
Status: DISCONTINUED | OUTPATIENT
Start: 2024-12-28 | End: 2024-12-29 | Stop reason: HOSPADM

## 2024-12-28 RX ORDER — OXYCODONE HYDROCHLORIDE 5 MG/1
TABLET ORAL
Status: COMPLETED
Start: 2024-12-28 | End: 2024-12-28

## 2024-12-28 RX ORDER — OXYBUTYNIN CHLORIDE 5 MG/1
5 TABLET ORAL 4 TIMES DAILY
Status: DISCONTINUED | OUTPATIENT
Start: 2024-12-28 | End: 2024-12-29 | Stop reason: HOSPADM

## 2024-12-28 RX ORDER — ALBUTEROL SULFATE 0.83 MG/ML
2.5 SOLUTION RESPIRATORY (INHALATION) EVERY 6 HOURS PRN
Status: DISCONTINUED | OUTPATIENT
Start: 2024-12-28 | End: 2024-12-29 | Stop reason: HOSPADM

## 2024-12-28 RX ADMIN — HYDROMORPHONE HYDROCHLORIDE 1 MG: 1 INJECTION, SOLUTION INTRAMUSCULAR; INTRAVENOUS; SUBCUTANEOUS at 02:17

## 2024-12-28 RX ADMIN — OXYCODONE HYDROCHLORIDE 10 MG: 5 TABLET ORAL at 16:30

## 2024-12-28 RX ADMIN — ACETAMINOPHEN 975 MG: 325 TABLET ORAL at 02:44

## 2024-12-28 RX ADMIN — ACETAMINOPHEN 975 MG: 325 TABLET ORAL at 10:57

## 2024-12-28 RX ADMIN — HYDROMORPHONE HYDROCHLORIDE 0.5 MG: 1 INJECTION, SOLUTION INTRAMUSCULAR; INTRAVENOUS; SUBCUTANEOUS at 00:37

## 2024-12-28 RX ADMIN — ACETAMINOPHEN 975 MG: 325 TABLET ORAL at 18:36

## 2024-12-28 RX ADMIN — SODIUM CHLORIDE 1000 ML: 0.9 INJECTION, SOLUTION INTRAVENOUS at 03:50

## 2024-12-28 RX ADMIN — HYDROMORPHONE HYDROCHLORIDE 1 MG: 1 INJECTION, SOLUTION INTRAMUSCULAR; INTRAVENOUS; SUBCUTANEOUS at 06:03

## 2024-12-28 RX ADMIN — METHOCARBAMOL 1000 MG: 1000 INJECTION, SOLUTION INTRAMUSCULAR; INTRAVENOUS at 02:45

## 2024-12-28 RX ADMIN — LIDOCAINE 4% 1 PATCH: 40 PATCH TOPICAL at 10:57

## 2024-12-28 RX ADMIN — ACETAMINOPHEN 975 MG: 325 TABLET, FILM COATED ORAL at 02:44

## 2024-12-28 RX ADMIN — HYDROMORPHONE HYDROCHLORIDE 0.6 MG: 1 INJECTION, SOLUTION INTRAMUSCULAR; INTRAVENOUS; SUBCUTANEOUS at 21:31

## 2024-12-28 RX ADMIN — SODIUM CHLORIDE, POTASSIUM CHLORIDE, SODIUM LACTATE AND CALCIUM CHLORIDE 100 ML/HR: 600; 310; 30; 20 INJECTION, SOLUTION INTRAVENOUS at 11:00

## 2024-12-28 RX ADMIN — OXYCODONE HYDROCHLORIDE 10 MG: 5 TABLET ORAL at 10:56

## 2024-12-28 RX ADMIN — ALBUTEROL SULFATE 2.5 MG: 2.5 SOLUTION RESPIRATORY (INHALATION) at 11:01

## 2024-12-28 RX ADMIN — ASPIRIN 81 MG: 81 TABLET, COATED ORAL at 10:56

## 2024-12-28 SDOH — SOCIAL STABILITY: SOCIAL INSECURITY
WITHIN THE LAST YEAR, HAVE YOU BEEN RAPED OR FORCED TO HAVE ANY KIND OF SEXUAL ACTIVITY BY YOUR PARTNER OR EX-PARTNER?: NO

## 2024-12-28 SDOH — ECONOMIC STABILITY: INCOME INSECURITY: IN THE PAST 12 MONTHS HAS THE ELECTRIC, GAS, OIL, OR WATER COMPANY THREATENED TO SHUT OFF SERVICES IN YOUR HOME?: NO

## 2024-12-28 SDOH — SOCIAL STABILITY: SOCIAL INSECURITY: DOES ANYONE TRY TO KEEP YOU FROM HAVING/CONTACTING OTHER FRIENDS OR DOING THINGS OUTSIDE YOUR HOME?: NO

## 2024-12-28 SDOH — SOCIAL STABILITY: SOCIAL INSECURITY: DO YOU FEEL ANYONE HAS EXPLOITED OR TAKEN ADVANTAGE OF YOU FINANCIALLY OR OF YOUR PERSONAL PROPERTY?: NO

## 2024-12-28 SDOH — SOCIAL STABILITY: SOCIAL INSECURITY: ARE YOU OR HAVE YOU BEEN THREATENED OR ABUSED PHYSICALLY, EMOTIONALLY, OR SEXUALLY BY ANYONE?: NO

## 2024-12-28 SDOH — SOCIAL STABILITY: SOCIAL INSECURITY
WITHIN THE LAST YEAR, HAVE YOU BEEN KICKED, HIT, SLAPPED, OR OTHERWISE PHYSICALLY HURT BY YOUR PARTNER OR EX-PARTNER?: NO

## 2024-12-28 SDOH — SOCIAL STABILITY: SOCIAL INSECURITY: HAVE YOU HAD THOUGHTS OF HARMING ANYONE ELSE?: NO

## 2024-12-28 SDOH — SOCIAL STABILITY: SOCIAL INSECURITY: HAVE YOU HAD ANY THOUGHTS OF HARMING ANYONE ELSE?: NO

## 2024-12-28 SDOH — SOCIAL STABILITY: SOCIAL INSECURITY: WITHIN THE LAST YEAR, HAVE YOU BEEN AFRAID OF YOUR PARTNER OR EX-PARTNER?: NO

## 2024-12-28 SDOH — ECONOMIC STABILITY: FOOD INSECURITY: WITHIN THE PAST 12 MONTHS, YOU WORRIED THAT YOUR FOOD WOULD RUN OUT BEFORE YOU GOT THE MONEY TO BUY MORE.: NEVER TRUE

## 2024-12-28 SDOH — SOCIAL STABILITY: SOCIAL INSECURITY: DO YOU FEEL UNSAFE GOING BACK TO THE PLACE WHERE YOU ARE LIVING?: NO

## 2024-12-28 SDOH — SOCIAL STABILITY: SOCIAL INSECURITY: HAS ANYONE EVER THREATENED TO HURT YOUR FAMILY OR YOUR PETS?: NO

## 2024-12-28 SDOH — SOCIAL STABILITY: SOCIAL INSECURITY: ARE THERE ANY APPARENT SIGNS OF INJURIES/BEHAVIORS THAT COULD BE RELATED TO ABUSE/NEGLECT?: NO

## 2024-12-28 SDOH — SOCIAL STABILITY: SOCIAL INSECURITY: WITHIN THE LAST YEAR, HAVE YOU BEEN HUMILIATED OR EMOTIONALLY ABUSED IN OTHER WAYS BY YOUR PARTNER OR EX-PARTNER?: NO

## 2024-12-28 SDOH — ECONOMIC STABILITY: FOOD INSECURITY: WITHIN THE PAST 12 MONTHS, THE FOOD YOU BOUGHT JUST DIDN'T LAST AND YOU DIDN'T HAVE MONEY TO GET MORE.: NEVER TRUE

## 2024-12-28 SDOH — SOCIAL STABILITY: SOCIAL INSECURITY: ABUSE: ADULT

## 2024-12-28 ASSESSMENT — PAIN SCALES - GENERAL
PAINLEVEL_OUTOF10: 10 - WORST POSSIBLE PAIN
PAINLEVEL_OUTOF10: 10 - WORST POSSIBLE PAIN
PAINLEVEL_OUTOF10: 9
PAINLEVEL_OUTOF10: 7
PAINLEVEL_OUTOF10: 9
PAINLEVEL_OUTOF10: 10 - WORST POSSIBLE PAIN
PAINLEVEL_OUTOF10: 7
PAINLEVEL_OUTOF10: 10 - WORST POSSIBLE PAIN
PAINLEVEL_OUTOF10: 9
PAINLEVEL_OUTOF10: 0 - NO PAIN

## 2024-12-28 ASSESSMENT — COGNITIVE AND FUNCTIONAL STATUS - GENERAL
DAILY ACTIVITIY SCORE: 24
PATIENT BASELINE BEDBOUND: NO
MOBILITY SCORE: 24

## 2024-12-28 ASSESSMENT — ACTIVITIES OF DAILY LIVING (ADL)
BATHING: INDEPENDENT
WALKS IN HOME: INDEPENDENT
DRESSING YOURSELF: INDEPENDENT
GROOMING: INDEPENDENT
FEEDING YOURSELF: INDEPENDENT
LACK_OF_TRANSPORTATION: YES
TOILETING: INDEPENDENT
PATIENT'S MEMORY ADEQUATE TO SAFELY COMPLETE DAILY ACTIVITIES?: YES
ASSISTIVE_DEVICE: EYEGLASSES
ADEQUATE_TO_COMPLETE_ADL: YES
JUDGMENT_ADEQUATE_SAFELY_COMPLETE_DAILY_ACTIVITIES: YES
HEARING - LEFT EAR: FUNCTIONAL
HEARING - RIGHT EAR: FUNCTIONAL

## 2024-12-28 ASSESSMENT — LIFESTYLE VARIABLES
SKIP TO QUESTIONS 9-10: 1
HAVE YOU EVER FELT YOU SHOULD CUT DOWN ON YOUR DRINKING: NO
AUDIT-C TOTAL SCORE: 0
HOW OFTEN DO YOU HAVE 6 OR MORE DRINKS ON ONE OCCASION: NEVER
EVER HAD A DRINK FIRST THING IN THE MORNING TO STEADY YOUR NERVES TO GET RID OF A HANGOVER: NO
HAVE PEOPLE ANNOYED YOU BY CRITICIZING YOUR DRINKING: NO
EVER FELT BAD OR GUILTY ABOUT YOUR DRINKING: NO
HOW MANY STANDARD DRINKS CONTAINING ALCOHOL DO YOU HAVE ON A TYPICAL DAY: PATIENT DOES NOT DRINK
AUDIT-C TOTAL SCORE: 0
TOTAL SCORE: 0
HOW OFTEN DO YOU HAVE A DRINK CONTAINING ALCOHOL: NEVER

## 2024-12-28 ASSESSMENT — PATIENT HEALTH QUESTIONNAIRE - PHQ9
2. FEELING DOWN, DEPRESSED OR HOPELESS: NOT AT ALL
SUM OF ALL RESPONSES TO PHQ9 QUESTIONS 1 & 2: 0
1. LITTLE INTEREST OR PLEASURE IN DOING THINGS: NOT AT ALL

## 2024-12-28 ASSESSMENT — PAIN DESCRIPTION - LOCATION
LOCATION: ARM

## 2024-12-28 ASSESSMENT — PAIN - FUNCTIONAL ASSESSMENT
PAIN_FUNCTIONAL_ASSESSMENT: 0-10
PAIN_FUNCTIONAL_ASSESSMENT: UNABLE TO SELF-REPORT
PAIN_FUNCTIONAL_ASSESSMENT: UNABLE TO SELF-REPORT

## 2024-12-28 NOTE — PROGRESS NOTES
Pharmacy Medication History Review    Judith Bush is a 69 y.o. female admitted for ELIEZER (acute kidney injury) ([facility]). Pharmacy reviewed the patient's [id number] medications and allergies for accuracy.    Medications ADDED:  None   Medications CHANGED:  None   Medications REMOVED:   Multivitamin      The list below reflects the updated PTA list.   Prior to Admission Medications   Prescriptions Last Dose Informant   SUMAtriptan (Imitrex) 50 mg tablet More than a month Self   Sig: Take 1 tablet (50 mg) by mouth 1 time if needed for migraine. May repeat after 2 hours.   Patient not taking: Reported on 10/21/2024   Tymlos 80 mcg (3,120 mcg/1.56 mL) pen injector  Self   Sig: Inject 1 Dose as directed once daily.   acetaminophen (Tylenol) 325 mg tablet  Self   Sig: Take 2 tablets (650 mg) by mouth every 6 hours if needed for mild pain (1 - 3).   albuterol 2.5 mg /3 mL (0.083 %) nebulizer solution  Self   Sig: Take 3 mL (2.5 mg) by nebulization every 6 hours if needed for wheezing.   albuterol 90 mcg/actuation inhaler  Self   Sig: USE 2 INHALATIONS BY MOUTH EVERY 4 HOURS AS NEEDED   aspirin 81 mg EC tablet  Self   Sig: Take 1 tablet (81 mg) by mouth 2 times a day.   cyclobenzaprine (Flexeril) 10 mg tablet  Self   Sig: Take 1 tablet (10 mg) by mouth 2 times a day as needed for muscle spasms for up to 7 days.   diclofenac sodium (Voltaren) 1 % gel  Self   Sig: APPLY 1 APPLICATION TOPICALLY 4 TIMES A DAY AS NEEDED (PAIN).   dilTIAZem CD (Cardizem CD) 180 mg 24 hr capsule More than a month Self   Sig: Take 1 capsule (180 mg) by mouth once daily.   Patient not taking: Reported on 12/28/2024   docusate sodium (Colace) 100 mg capsule More than a month Self   Sig: Take 1 capsule (100 mg) by mouth 2 times a day as needed for constipation.   Patient not taking: Reported on 12/28/2024   evolocumab (Repatha SureClick) 140 mg/mL injection     Sig: INJECT THE CONTENTS OF 1 PEN UNDER THE SKIN EVERY 2 WEEKS Patient stated her  injection is due today.    fluticasone (Flonase) 50 mcg/actuation nasal spray More than a month    Sig: Administer 1 spray into each nostril once daily. Shake gently. Before first use, prime pump. After use, clean tip and replace cap.   Patient not taking: Reported on 12/28/2024   gabapentin (Neurontin) 100 mg capsule Not Taking Self   Sig: Take 1 capsule (100 mg) by mouth 3 times a day.   Patient not taking: Reported on 12/28/2024   glycopyrrolate-formoteroL (Bevespi Aerosphere) 9-4.8 mcg HFA aerosol inhaler  Self   Sig: TAKE 2 PUFFS BY MOUTH TWICE A DAY   hydrocortisone acetate 1 % ointment  Self   Sig: Apply 1 Application topically 2 times a day as needed (for hemorrhoids).   lidocaine 4 % patch Not Taking Self   Sig: Place 1 patch over 12 hours on the skin once daily. Remove & discard patch within 12 hours or as directed by MD.   Patient not taking: Reported on 12/28/2024   naloxone (Narcan) 4 mg/0.1 mL nasal spray  Self   Sig: Administer 1 spray (4 mg) into affected nostril(s) if needed for opioid reversal for up to 2 doses. Give 1 spray as a single dose in one nostril. May repeat every 2-3 minutes in alternating nostrils until medical assistance becomes available.   onabotulinumtoxinA (Botox) 200 unit injection Not Taking Self   Sig: Inject 155 Units into the muscle every 3 months.   Patient not taking: Reported on 12/28/2024   ondansetron ODT (Zofran-ODT) 4 mg disintegrating tablet  Self   Sig: Take 1 tablet (4 mg) by mouth every 8 hours if needed for nausea or vomiting.   oxyCODONE-acetaminophen (Percocet) 7.5-325 mg tablet Not Taking Self   Sig: Take 1 tablet by mouth every 6 hours if needed for severe pain (7 - 10) for up to 3 days.   Patient not taking: Reported on 12/28/2024   oxybutynin XL (Ditropan-XL) 10 mg 24 hr tablet More than a month Self   Sig: Take 1 tablet (10 mg) by mouth 2 times a day.   Patient not taking: Reported on 12/28/2024   pantoprazole (ProtoNix) 40 mg EC tablet Not Taking Self  "  Sig: Take 1 tablet (40 mg) by mouth once daily in the morning. Take before meals for 28 days. Do not crush, chew, or split.   Patient not taking: Reported on 12/28/2024   polyethylene glycol (Glycolax, Miralax) 17 gram/dose powder More than a month Self   Sig: Use up to 3 times daily as directed as needed   Patient not taking: Mix of powder and drink. Reported on 12/28/2024   ubrogepant (Ubrelvy) 100 mg tablet tablet  Self   Sig: Take 1 tablet (100 mg) by mouth 1 time if needed (migraine headaches). Can repeat in 2 hours if no response. Not to exceed 200mg per 24 hours.      Facility-Administered Medications: None        The list below reflects the updated allergy list. Please review each documented allergy for additional clarification and justification.  Allergies  Reviewed by Niki Martinez on 12/28/2024        Severity Reactions Comments    Cephalosporins High Anaphylaxis     Penicillin High Anaphylaxis     Penicillins High Anaphylaxis, Unknown     Neomycin-polymyxin-gramicidin Low Rash             Patient declines M2B at discharge.     Sources:   [facility]  Pharmacy dispense history  Patient interview Good historian, Patient very anxious during interview  Chart Review  Care Everywhere     Additional Comments:  None       Niki Martinez  Pharmacy Technician  12/28/24     Secure Chat preferred   If no response call [phone] or [employer] [place] \"Med Rec\"   "

## 2024-12-28 NOTE — ED PROVIDER NOTES
CC: Arm Injury     HPI:  Judith Bush is a 69 y.o. female history of recent ORIF of the left humerus on 11/21/2024 after a fall complicated by ulnar neuropathy and fall on 12/26/2024 with a spiral fracture, NSTEMI, bipolar disorder, and COPD who presented to the ED for left arm pain.  Patient has noted significant worsening of her left shoulder and arm pain since 12/26/2024. She was noted placed in a Marcos brace on 12/26/2024 at [facility] and notes that the brace is causing her pain.  She discussed her symptoms with Dr. Roberts's team who advised her to present to the emergency department.  She is noted to have a follow-up scheduled potentially on 12/30/2024.  She notes that secondary to the pain, she has not been able to get sleep and has not been eating or drinking.  Notes that she has been taking oxy at home without any significant improvement of her pain.  Denied fevers, chills, chest pain, difficulty breathing, headache, abdominal pain, neck pain, trauma, falls, and back pain.    Limitations to history: None  Independent historian(s): Patient  Records Reviewed: Recent available ED and inpatient notes reviewed in EMR.    PMHx/PSHx:  Per HPI.   - has a past medical history of Abdominal distension (gaseous) (08/13/2018), Anxiety, Bipolar disorder, Carpal tunnel syndrome, right upper limb (08/13/2018), Collagenous colitis (08/13/2018), Compression fracture of cervical spine, COPD (chronic obstructive pulmonary disease) (Multi), Depression, unspecified (08/13/2018), Dermatitis, unspecified (07/09/2020), Dermatochalasis of unspecified eye, unspecified eyelid (10/25/2022), Displaced comminuted fracture of left patella, initial encounter for closed fracture (08/02/2018), Displaced comminuted fracture of right patella, initial encounter for closed fracture (08/13/2018), Dizziness, GERD (gastroesophageal reflux disease), Headache, Hyperlipidemia, Hypertension, Hypokalemia (08/13/2018), Low back pain, unspecified  (08/13/2018), Migraine, No blood products, Noninfective gastroenteritis and colitis, unspecified (08/02/2018), Nutritional deficiency, unspecified (08/02/2018), Old myocardial infarction (03/31/2014), Other chest pain (08/28/2017), Other fracture of right patella, sequela (08/13/2018), Other visual disturbances (04/04/2018), Pain in unspecified hip (03/31/2014), Pericarditis ([facility]) (2019), Pleural effusion, Polyosteoarthritis, unspecified (08/02/2018), Presence of intraocular lens (09/20/2018), Sleep apnea, Small intestinal bacterial overgrowth (SIBO), Stroke (Multi) (04/2024), Tobacco abuse, and Unspecified fall, sequela (08/13/2018).  - has a past surgical history that includes Hip surgery; Femur fracture surgery (07/16/2018); Hysterectomy; Other surgical history (07/16/2018); Shoulder surgery (07/16/2018); Other surgical history (05/27/2020); Cataract extraction; CT angio abdomen w and or wo IV IV contrast (07/28/2022); Mitral valve replacement (08/07/2019); Cholecystectomy; Colonoscopy; Upper gastrointestinal endoscopy; Foot surgery (09/13/2024); and Cardiac catheterization.    Medications:  Reviewed in EMR. See EMR for complete list of medications and doses.    Allergies:  Cephalosporins, Penicillin, Penicillins, and Neomycin-polymyxin-gramicidin    Social History:  - Tobacco:  reports that she has been smoking cigarettes. She has a 10 pack-year smoking history. She has never used smokeless tobacco.   - Alcohol:  reports that she does not currently use alcohol after a past usage of about 3.0 standard drinks of alcohol per week.   - Illicit Drugs:  reports current drug use. Drug: Marijuana.     ROS:  Per HPI.       ???????????????????????????????????????????????????????????????  Triage Vitals:  T 37.5 °C (99.5 °F)  HR 97  /68  RR 19  O2 98 %      Physical Exam  Vitals and nursing note reviewed.   Constitutional:       General: She is not in acute distress.  HENT:      Head: Normocephalic and  atraumatic.      Nose: Nose normal.      Mouth/Throat:      Mouth: Mucous membranes are moist.   Eyes:      Conjunctiva/sclera: Conjunctivae normal.   Cardiovascular:      Rate and Rhythm: Normal rate and regular rhythm.      Pulses: Normal pulses.   Pulmonary:      Effort: Pulmonary effort is normal. No respiratory distress.      Breath sounds: Normal breath sounds.   Abdominal:      Palpations: Abdomen is soft.      Tenderness: There is no abdominal tenderness.   Musculoskeletal:      Comments: Left upper extremity in Marcos brace.  Left upper extremity  strength intact.  Normal capillary perfusion of left hand digits.  SILT left hand.   Skin:     General: Skin is warm.   Neurological:      General: No focal deficit present.      Mental Status: She is alert.         ???????????????????????????????????????????????????????????????  Labs:   Labs Reviewed   COAGULATION SCREEN   TYPE AND SCREEN   CBC WITH AUTO DIFFERENTIAL   COMPREHENSIVE METABOLIC PANEL        Imaging:   No orders to display        EKG:  Rate is 55, sinus rhythm, normal axis, no interval prolongation, no st elevation or depression.  When compared to EKG on 11/20/24 review of EKG does not show any signs of STEMI, complete heart block, asystole, V-fib.    MDM:  Judith Bush is a 69 y.o. female history of recent ORIF of the left humerus on 11/21/2024 after a fall complicated by ulnar neuropathy and fall on 12/26/2024 with a spiral fracture, NSTEMI, bipolar disorder, and COPD who presented to the ED for left arm pain.  Patient presented HDS.  Physical exam finding significant for LUE NVI.  Low clinical concern for acute infectious process, neurovascular process, or new traumatic process.  Patient ordered analgesia.  Orthopedic surgery was consulted.  Preop labs and imaging ordered.  X-ray of the left shoulder, left humerus, left elbow ordered.  Please see ED course and disposition for remainder of care.    ED Course:  ED Course as of 12/29/24  1730   Sat Dec 28, 2024   0238 ED Attending Documentation: This patient was seen by the resident physician.  I have seen and examined the patient, agree with the workup, evaluation, management and diagnosis. I reviewed and edited the above documentation where necessary. I have looked at the EKG and agree with the interpretation. On my evaluation of the patient: 69-year-old female who has a humeral fracture that was splinted at [facility] on the 26th presents to the emergency room with worsening pain, the patient has a history of a ORIF on that arm from a previous injury but refractured it with a fall on the 26th.  She is here to see orthopedic surgery as her pain was uncontrolled at home.  She is good pulses, she is in a plastic co-op splint. Will re-image and engage ortho.     Ang Garcia MD  EM Attending Physician   [RG]   0427 CBC and Auto Differential(!)  BC significant for leukocytosis that is likely reactionary and similar to previous.  Patient has noted to have a 3 point drop of her hemoglobin compared to previous CBC 2 days ago.  No signs of bleeding noted. [MH]   0428 Comprehensive metabolic panel(!)  Metabolic panel significant for hyponatremia and ELIEZER. [MH]   0429 XR chest 1 view  CXR without an acute cardiopulmonary process. [MH]   0429 IMPRESSION:  Postsurgical change of internal fixation left humeral head and neck  fracture and superimposed acute periprosthetic spiral type fracture  of the proximal to mid left humeral diaphysis with fracture displacement.   []      ED Course User Index  [MH] Jem Gallego MD  [RG] Ang Garcia MD         Diagnoses as of 12/29/24 1730   ELIEZER (acute kidney injury) ([facility])   Hyponatremia       Social Determinants Limiting Care:  None identified    Disposition:  Orthopedic surgery recommended no acute surgical intervention at this time.  Given patient's acute ELIEZER and hyponatremia, discussed admission with the patient.  Patient stated understanding and agreement with  the plan.  All questions were answered.  Discussed patient presentation with admitting team.  Patient admitted to medicine in stable condition.    Jem Gallego MD   Emergency Medicine PGY-3  [facility]    Comment: Please note this report has been produced using speech recognition software and may contain errors related to that system including errors in grammar, punctuation, and spelling as well as words and phrases that may be inappropriate.  If there are any questions or concerns please feel free to contact the dictating provider for clarification.    Procedures ? SmartLinks last updated 12/28/2024 12:42 AM        Jem Gallego MD  Resident  12/29/24 1053

## 2024-12-28 NOTE — H&P
HPI  This is a 69 year old female with a past medical history of HTN, HLD, severe MR s/p MV replacement 2019, COPD, GERD, migraines, CVA (04/2024), insomnia,  bipolar, HSV, chronic low back pain, osteoporosis, and recent ORIF of the left humerus after a fall complicated by ulnar neuropathy who presented to [facility] ED with a chief complaint of left arm pain, found to be anemic and hyponatremic with new ELIEZER. She is admitted to Medicine for pain control and evaluation of her laboratory abnormalities.     On interview, she reports that she experienced a mechanical fall on 12/26 following an argument with person currently staying with her. She presented to the ED (details as below) and was ultimately discharged in brace. She reports that she called her outpt provider yesterday and endorsed that she felt the brace was to tight given increased pain and continued hand numbness. She states that her outpt provider instructed her to present to ED to have brace adjusted. She was found to have ELIEZER, hyponatremia, and anemia prompting admission to medicine. She does report significantly decreased PO intake since her original fall 11/2024, partly due to decreased availability at home and that she disliked the food at rehab. She reports 5 lb weight loss. She denies GI losses. She endorses significant orthostasis. She denies infectious sx, urinary sx, and abdominal pain. She denies recent rashes and reports only mild use of NSAIDs at home. She denies melena, hematochezia, hematemesis, hemoptysis, and vaginal bleeding. She denies B sx outside of weight loss.     Per chart review, was admitted to [facility] 11/20-26/2024 following left proxima humerus fracture. She was evaluated by Orthopedic Surgery and is now s/p ORIF L humerus on 11/21 with Dr. Roberts.  She was discharged to SNF. She represented to [facility] ED 12/26/2024 with a chief complaint of left shoulder pain after GLF. Radiographs revealed periprosthetic spiral fracture for which  orthopedics was engaged and recommended CT of LUE which revealed acute left mid humeral shaft fracture and subacute comminuted proximal humeral fracture status post ORIF. Orthopedics ultimately recommended splinting with orthotics and outpatient follow up as scheduled. She was discharged to home.     In the ED:  - Vitals:  T 99.5, HR 97, /68,  RR 19, SpO2 98% on RA    - Labs:  CBC: WBC  13.4, Hgb 11.7, plt 186  BMP: Na 128, K 4.4, Cl 95, HCO3 23, BUN 22, Cr 2.16, glu 93 (Midnight)  BMP: Na 132, K 3.9, Cl 100, HCO3 24, BUN 19, Cr 1.71, glu 83 (0600)--> following 1 L NaCl bolus   LFT: Ca 10.4, tprot 7.1, alb 4.2, alkphos 109, AST 16, ALT 6, tbili 1.1  Heme: PT 11.1, INR 1.0, aPTT 29  T+S drawn 12/28    - Imaging:  CXR:  IMPRESSION:  No airspace consolidation or pleural effusion.    XR left shoulder, humerus, elbow:  IMPRESSION:  Postsurgical change of internal fixation left humeral head and neck  fracture and superimposed acute periprosthetic spiral type fracture  of the proximal to mid left humeral diaphysis with fracture  displacement.    - ECG:  Sinus Bradycardia     In the ED, given PO tylenol 975 mg x1, IV Dilaudid 0.5 mg X1 and 1 mg X2, 1,000 mg Robaxin IV x1, and a 1 L NaCl bolus. She was evaluated by orthopedic surgery with no plans for acute surgical intervention, plans to adjust her brace 12/28, and to follow up in clinic with Dr. Roberts on 12/30/24 as scheduled. She was admitted to Medicine for further evaluation and management of her ELIEZER and hyponatremia.     PMH:   As above     SurgHx:   Past Surgical History:   Procedure Laterality Date    CARDIAC CATHETERIZATION      CATARACT EXTRACTION      CHOLECYSTECTOMY      COLONOSCOPY      CT ABDOMEN ANGIOGRAM W AND/OR WO IV CONTRAST  07/28/2022    CT ABDOMEN ANGIOGRAM W AND/OR WO IV CONTRAST 7/28/2022 [facility]    FEMUR FRACTURE SURGERY  07/16/2018    Femur Repair    FOOT SURGERY  09/13/2024    LEFT PROXIMAL  HALLUX PARTIAL EXCISION    HIP  SURGERY      HYSTERECTOMY      MITRAL VALVE REPLACEMENT  2019    OTHER SURGICAL HISTORY  2018    Oophorectomy - Bilateral (Removal Of Both Ovaries)    OTHER SURGICAL HISTORY  2020    Radius fracture repair    SHOULDER SURGERY  2018    Shoulder Surgery    UPPER GASTROINTESTINAL ENDOSCOPY         Allergies:   Allergies   Allergen Reactions    Cephalosporins Anaphylaxis    Penicillin Anaphylaxis    Penicillins Anaphylaxis and Unknown    Neomycin-Polymyxin-Gramicidin Rash       SocHx:   Tobacco- 25 pack yr, still smokes 0.5 PPD/ Alcohol- denies / Drugs - occasional THC pen     Home Medications:  Tylenol 650 mg q6H PRN   Albuterol neb q6 H   Albuterol inhaler 2 puff q4H prn   Asa 81 mg BID   Diltiazem  mg every day --> reports not taking   Repatha 1 pen q2 weeks  Diclofenac 1 niecy 4 x daily prn  Docusate 100 mg BID prn   Flonase   Gabapentin 100 mg TID --> reports not taking   Bevespi 2 puff BID   Lidocaine patch q24 h   Multivitamin   Narcan   Botox 155 units q3 month   Zofran 4 mg q8H PRN   Oxybutynin XL 10 mg BID   Percocet 7.5-325 nf q6H PRN x 3 days   Protinix 40 mg every day   Miralax   Imitrex 50 mg every day prn --> reports not taking   Ubrelvy 100 mg every day prn   Tymlos 80 mcg injection- 1 dose daily as directed       Objective:  24 Hour Vitals  Temperature:  [36.5 °C (97.7 °F)-37.5 °C (99.5 °F)] 36.6 °C (97.9 °F)  Heart Rate:  [53-97] 60  Respirations:  [14-19] 14  BP: ()/(50-68) 93/52    Temp (24hrs), Av.8 °C (98.3 °F), Min:36.5 °C (97.7 °F), Max:37.5 °C (99.5 °F)     24 hour Intake/Output  No intake or output data in the 24 hours ending 24 1229     Exam:  General: Resting in bed. Well developed, well nourished. Appears stated age. In no acute distress   HEENT: Normocephalic and atraumatic. EOMI, sclera non-icteric, dry MM   Cardiovascular: RRR, no r/m/g  Pulmonary: diffuse end isp wheezes, on RA, no increased wob   GI: +BS, soft, non-tender, non-distended  :  No suprapubic/ flank pain  Extremities: No LE edema. Left arm in sling    Skin: warm and dry. No rashes or lesions   Neurologic: CN II-XII grossly intact.  Psych: Pleasant. Appropriate mood and affect     Labs  CBC  Results from last 72 hours   Lab Units 12/28/24  0051 12/26/24  1436   WBC AUTO x10*3/uL 13.4* 13.4*   HEMOGLOBIN g/dL 11.7* 14.7   HEMATOCRIT % 32.8* 42.8   PLATELETS AUTO x10*3/uL 186 260        BMP  Results from last 72 hours   Lab Units 12/28/24  0602 12/28/24  0051 12/26/24  1436   SODIUM mmol/L 132* 128* 139   POTASSIUM mmol/L 3.9 4.4 4.1   CHLORIDE mmol/L 100 95* 104   BUN mg/dL 19 22 12   CREATININE mg/dL 1.71* 2.16* 0.70       Medications   Scheduled Medications  acetaminophen, 975 mg, oral, q8h  aspirin, 81 mg, oral, BID  fluticasone, 1 spray, Each Nostril, Daily  tiotropium, 2 puff, inhalation, Daily   And  formoterol, 20 mcg, nebulization, q12h  lidocaine, 1 patch, transdermal, Daily  oxybutynin XL, 10 mg, oral, BID  [START ON 12/29/2024] pantoprazole, 40 mg, oral, Daily before breakfast  polyethylene glycol, 17 g, oral, Daily     Continuous Medications  lactated Ringer's, 100 mL/hr, Last Rate: 100 mL/hr (12/28/24 1100)     PRN Medications  PRN medications: albuterol, docusate sodium, HYDROmorphone, naloxone, oxyCODONE, oxyCODONE          Assessment/Plan:  This is a 69 year old female with a past medical history of HTN, HLD, severe MR s/p MV replacement 2019, COPD, GERD, migraines, CVA (04/2024), insomnia,  bipolar, HSV, chronic low back pain, osteoporosis, and recent ORIF of the left humerus after a fall complicated by ulnar neuropathy who presented to [facility] ED with a chief complaint of left arm pain, found to be anemic and hyponatremic with new ELIEZER. She is admitted to Medicine for pain control and evaluation of her laboratory abnormalities. On initial eval, afebrile and normotensive, non toxic appearing. Overall suspect that ELIEZER and hyponatremia are likely 2/2 poor PO intake as evidenced  by improvement following 1 L NaCl in ED. Will give 1 L LR over 10 hours and monitor for continued improvement with PM RFP. Will obtain UA, Ultyes, Urine and serum osms; however, it should be noted that these are obtained following 1 L NaCl given by ED. As per her anemia no obvious signs of bleeding and no Bsx. Will send anemia workup and monitor clinically. As per her orthopedic injuries, will continue with PO/IV pain control and appreciate Orthopedic assistance with adjustment of her brace.     #ELIEZER  #Hyponatremia   :: Cr 2.16--> 1.71, Na 128-->132 following 1 L NaCl in ED  :: Suspect 2/2 poor PO intake   Plan:  - give 1 L LR over 10 hours   - repeat RFP and mag 20:00  - obtain UA, Ultyes, Urine and serum osms, follow up     #normocytic Anemia  :: Hgb 11.7, MCV89 on admission  Plan:  - Obtain- reticulocyte count, CBC with smear, B9/B12, iron studies/ferritin/transferrin, LDH/haptoglobin/hepatic function panel with direct bilirubin, coags  - Type and screen (Ordered 12/28)  - transfuse to keep Hgb >7     #Recent ORIF of the left humerus after a fall complicated by ulnar neuropathy  - Orthopedics following, no acute surgical intervention   - c/w Asa 81 mg BID for DVT Ppx  - PT/OT consulted  - Pain control with tylenol, lidocaine patch, oxy 7.5/10 qgH mod/severe, and dilaudid 0.6 mg q3H for breakthrough  - planned for outpt follow up with Ortho  with Dr. Roberts on 12/30/24 as scheduled     #HTN  #HLD  - c/w home aspirin  - hold home repatha here in house, resume on discharge  - pt reports self discontinued diltiazem, monitor BP resume as indicated      #COPD  - on RA, not in acute exacerbation   - continue with home meds      F: PRN for euvolemia, s/p 1 L NaCl in ED, giving 1 L LR over 10 hours   E: PRN K>4, Mg>2, P>3   N: Regular   A: PIV     Oxygen: RA  Abx: -  Gtts: -    DVT ppx: Aspirin 81 mg BID   GI ppx: home ppi     Code Status: FULL (confirmed on admit)  Surrogate Medical Decision-maker:    Pt reports she  does not have anyone to list as NOK or emergency contact

## 2024-12-28 NOTE — CONSULTS
ORTHOPAEDIC CONSULTATION     History Of Present Illness  Judith Bush is a 69 y.o. female presenting for brace adjustment.  She had a left proximal humerus ORIF in November 2024 with Dr. Roberts.  This is complicated by periimplant fracture when she fell on 1226.  She was placed into a Marcos brace at that time.  She presents today because her Marcos was causing her some axillary nerve symptoms.  She removed it.  She is currently in a sling.  She denies any new trauma to the area.    Review of Systems: 12 point ROS negative unless stated in HPI    Past Medical History  She has a past medical history of Abdominal distension (gaseous) (08/13/2018), Anxiety, Bipolar disorder, Carpal tunnel syndrome, right upper limb (08/13/2018), Collagenous colitis (08/13/2018), Compression fracture of cervical spine, COPD (chronic obstructive pulmonary disease) (Multi), Depression, unspecified (08/13/2018), Dermatitis, unspecified (07/09/2020), Dermatochalasis of unspecified eye, unspecified eyelid (10/25/2022), Displaced comminuted fracture of left patella, initial encounter for closed fracture (08/02/2018), Displaced comminuted fracture of right patella, initial encounter for closed fracture (08/13/2018), Dizziness, GERD (gastroesophageal reflux disease), Headache, Hyperlipidemia, Hypertension, Hypokalemia (08/13/2018), Low back pain, unspecified (08/13/2018), Migraine, No blood products, Noninfective gastroenteritis and colitis, unspecified (08/02/2018), Nutritional deficiency, unspecified (08/02/2018), Old myocardial infarction (03/31/2014), Other chest pain (08/28/2017), Other fracture of right patella, sequela (08/13/2018), Other visual disturbances (04/04/2018), Pain in unspecified hip (03/31/2014), Pericarditis ([facility]) (2019), Pleural effusion, Polyosteoarthritis, unspecified (08/02/2018), Presence of intraocular lens (09/20/2018), Sleep apnea, Small intestinal bacterial overgrowth (SIBO), Stroke (Multi)  (04/2024), Tobacco abuse, and Unspecified fall, sequela (08/13/2018).    Surgical History  She has a past surgical history that includes Hip surgery; Femur fracture surgery (07/16/2018); Hysterectomy; Other surgical history (07/16/2018); Shoulder surgery (07/16/2018); Other surgical history (05/27/2020); Cataract extraction; CT angio abdomen w and or wo IV IV contrast (07/28/2022); Mitral valve replacement (08/07/2019); Cholecystectomy; Colonoscopy; Upper gastrointestinal endoscopy; Foot surgery (09/13/2024); and Cardiac catheterization.     Social History  She reports that she has been smoking cigarettes. She has a 10 pack-year smoking history. She has never used smokeless tobacco. She reports that she does not currently use alcohol after a past usage of about 3.0 standard drinks of alcohol per week. She reports current drug use. Drug: Marijuana.    Family History  Family History   Adopted: Yes   Problem Relation Name Age of Onset    Other (Adopted) Mother      Other (Adopted child) Father          Allergies  Cephalosporins, Penicillin, Penicillins, and Neomycin-polymyxin-gramicidin    Review of Systems  12 point ROS negative unless stated in HPI     Physical Exam  GEN - NAD, resting comfortably in hospital bed  HEENT - MMM, EOMI, NCAT  CV - RRR by peripheral palpation, limbs wwp  PULM - NWOB on RA  NEURO - CRANE spontaneously, CNs II - XII grossly intact  PSYCH - Appropriate mood and affect    LUE:   -Incision well-appearing, clean dry and intact  -Motor intact in axillary/AIN/PIN/ulnar distributions  -SILT in hand  -Hand wwp, 2+ radial pulse, cap refill brisk  -Compartments soft and compressible, no pain with passive stretch of digits    A full secondary survey of all four extremities was performed and the significant orthopedic findings are noted above.     Last Recorded Vitals  Blood pressure 95/50, pulse 56, temperature 36.5 °C (97.7 °F), temperature source Temporal, resp. rate 16, SpO2 96%.    Imaging:  AP and  lateral radiographs of the left humerus display similar appearance of cliff-implant humeral shaft fracture compared to prior     Assessment/Plan   61-year-old female with left cliff-implant humeral shaft fracture presenting for brace adjustment.    Plan:  - No acute orthopaedic surgical intervention  -Orthotics to adjust brace on 12/28  - Patient to follow up in clinic with Dr. Roberts on 12/30/24 as scheduled    Consult seen and staffed within 30 minutes of notification.    Consult discussed with attending, Dr. Armando Mendez MD, PGY-2  Orthopaedic Surgery   Available via Epic Chat

## 2024-12-29 VITALS
SYSTOLIC BLOOD PRESSURE: 107 MMHG | TEMPERATURE: 97.3 F | BODY MASS INDEX: 21.71 KG/M2 | OXYGEN SATURATION: 94 % | WEIGHT: 115 LBS | HEART RATE: 58 BPM | HEIGHT: 61 IN | DIASTOLIC BLOOD PRESSURE: 72 MMHG | RESPIRATION RATE: 16 BRPM

## 2024-12-29 LAB
ALBUMIN SERPL BCP-MCNC: 3.3 G/DL (ref 3.4–5)
ANION GAP SERPL CALC-SCNC: 11 MMOL/L (ref 10–20)
BASOPHILS # BLD AUTO: 0.04 X10*3/UL (ref 0–0.1)
BASOPHILS NFR BLD AUTO: 0.6 %
BUN SERPL-MCNC: 13 MG/DL (ref 6–23)
CALCIUM SERPL-MCNC: 9.3 MG/DL (ref 8.6–10.6)
CHLORIDE SERPL-SCNC: 105 MMOL/L (ref 98–107)
CO2 SERPL-SCNC: 26 MMOL/L (ref 21–32)
CREAT SERPL-MCNC: 0.73 MG/DL (ref 0.5–1.05)
EGFRCR SERPLBLD CKD-EPI 2021: 90 ML/MIN/1.73M*2
EOSINOPHIL # BLD AUTO: 0.21 X10*3/UL (ref 0–0.7)
EOSINOPHIL NFR BLD AUTO: 3 %
ERYTHROCYTE [DISTWIDTH] IN BLOOD BY AUTOMATED COUNT: 13.4 % (ref 11.5–14.5)
GLUCOSE SERPL-MCNC: 102 MG/DL (ref 74–99)
HCT VFR BLD AUTO: 28.5 % (ref 36–46)
HGB BLD-MCNC: 9.5 G/DL (ref 12–16)
IMM GRANULOCYTES # BLD AUTO: 0.02 X10*3/UL (ref 0–0.7)
IMM GRANULOCYTES NFR BLD AUTO: 0.3 % (ref 0–0.9)
LYMPHOCYTES # BLD AUTO: 2.9 X10*3/UL (ref 1.2–4.8)
LYMPHOCYTES NFR BLD AUTO: 41.6 %
MAGNESIUM SERPL-MCNC: 1.57 MG/DL (ref 1.6–2.4)
MCH RBC QN AUTO: 31.5 PG (ref 26–34)
MCHC RBC AUTO-ENTMCNC: 33.3 G/DL (ref 32–36)
MCV RBC AUTO: 94 FL (ref 80–100)
MONOCYTES # BLD AUTO: 0.86 X10*3/UL (ref 0.1–1)
MONOCYTES NFR BLD AUTO: 12.3 %
NEUTROPHILS # BLD AUTO: 2.94 X10*3/UL (ref 1.2–7.7)
NEUTROPHILS NFR BLD AUTO: 42.2 %
NRBC BLD-RTO: 0 /100 WBCS (ref 0–0)
PHOSPHATE SERPL-MCNC: 3.4 MG/DL (ref 2.5–4.9)
PLATELET # BLD AUTO: 165 X10*3/UL (ref 150–450)
POTASSIUM SERPL-SCNC: 3.8 MMOL/L (ref 3.5–5.3)
RBC # BLD AUTO: 3.02 X10*6/UL (ref 4–5.2)
SODIUM SERPL-SCNC: 138 MMOL/L (ref 136–145)
WBC # BLD AUTO: 7 X10*3/UL (ref 4.4–11.3)

## 2024-12-29 PROCEDURE — 80069 RENAL FUNCTION PANEL: CPT

## 2024-12-29 PROCEDURE — 2500000001 HC RX 250 WO HCPCS SELF ADMINISTERED DRUGS (ALT 637 FOR MEDICARE OP): Performed by: STUDENT IN AN ORGANIZED HEALTH CARE EDUCATION/TRAINING PROGRAM

## 2024-12-29 PROCEDURE — 2500000001 HC RX 250 WO HCPCS SELF ADMINISTERED DRUGS (ALT 637 FOR MEDICARE OP)

## 2024-12-29 PROCEDURE — 2500000001 HC RX 250 WO HCPCS SELF ADMINISTERED DRUGS (ALT 637 FOR MEDICARE OP): Performed by: FAMILY MEDICINE

## 2024-12-29 PROCEDURE — 85025 COMPLETE CBC W/AUTO DIFF WBC: CPT

## 2024-12-29 PROCEDURE — 2500000005 HC RX 250 GENERAL PHARMACY W/O HCPCS

## 2024-12-29 PROCEDURE — 36415 COLL VENOUS BLD VENIPUNCTURE: CPT

## 2024-12-29 PROCEDURE — 2500000004 HC RX 250 GENERAL PHARMACY W/ HCPCS (ALT 636 FOR OP/ED)

## 2024-12-29 PROCEDURE — 83735 ASSAY OF MAGNESIUM: CPT

## 2024-12-29 RX ORDER — GABAPENTIN 100 MG/1
200 CAPSULE ORAL 2 TIMES DAILY
Status: DISCONTINUED | OUTPATIENT
Start: 2024-12-29 | End: 2024-12-29 | Stop reason: HOSPADM

## 2024-12-29 RX ORDER — CLONAZEPAM 0.5 MG/1
0.5 TABLET ORAL NIGHTLY PRN
Status: DISCONTINUED | OUTPATIENT
Start: 2024-12-29 | End: 2024-12-29 | Stop reason: HOSPADM

## 2024-12-29 RX ORDER — LIDOCAINE 560 MG/1
1 PATCH PERCUTANEOUS; TOPICAL; TRANSDERMAL DAILY
Qty: 30 PATCH | Refills: 0 | Status: SHIPPED | OUTPATIENT
Start: 2024-12-29

## 2024-12-29 RX ORDER — KETOROLAC TROMETHAMINE 10 MG/1
10 TABLET, FILM COATED ORAL EVERY 6 HOURS PRN
Status: DISCONTINUED | OUTPATIENT
Start: 2024-12-29 | End: 2024-12-29 | Stop reason: HOSPADM

## 2024-12-29 RX ORDER — CYCLOBENZAPRINE HCL 10 MG
10 TABLET ORAL 2 TIMES DAILY PRN
Qty: 10 TABLET | Refills: 0 | Status: SHIPPED | OUTPATIENT
Start: 2024-12-29 | End: 2025-01-02 | Stop reason: WASHOUT

## 2024-12-29 RX ORDER — DICLOFENAC SODIUM 10 MG/G
4 GEL TOPICAL 4 TIMES DAILY PRN
Status: DISCONTINUED | OUTPATIENT
Start: 2024-12-29 | End: 2024-12-29 | Stop reason: HOSPADM

## 2024-12-29 RX ORDER — OXYCODONE HYDROCHLORIDE 15 MG/1
15 TABLET ORAL EVERY 6 HOURS PRN
Qty: 20 TABLET | Refills: 0 | Status: SHIPPED | OUTPATIENT
Start: 2024-12-29 | End: 2025-01-11 | Stop reason: HOSPADM

## 2024-12-29 RX ORDER — OXYCODONE HYDROCHLORIDE 5 MG/1
15 TABLET ORAL EVERY 6 HOURS PRN
Status: DISCONTINUED | OUTPATIENT
Start: 2024-12-29 | End: 2024-12-29 | Stop reason: HOSPADM

## 2024-12-29 RX ORDER — ACETAMINOPHEN 325 MG/1
1000 TABLET ORAL 3 TIMES DAILY PRN
Qty: 90 TABLET | Refills: 0 | Status: SHIPPED | OUTPATIENT
Start: 2024-12-29 | End: 2025-01-11 | Stop reason: HOSPADM

## 2024-12-29 RX ORDER — CYCLOBENZAPRINE HCL 10 MG
5 TABLET ORAL 3 TIMES DAILY
Status: DISCONTINUED | OUTPATIENT
Start: 2024-12-29 | End: 2024-12-29 | Stop reason: HOSPADM

## 2024-12-29 RX ORDER — DICLOFENAC SODIUM 10 MG/G
4 GEL TOPICAL 4 TIMES DAILY PRN
Qty: 450 G | Refills: 1 | Status: SHIPPED | OUTPATIENT
Start: 2024-12-29

## 2024-12-29 RX ADMIN — OXYCODONE HYDROCHLORIDE 10 MG: 5 TABLET ORAL at 08:46

## 2024-12-29 RX ADMIN — LIDOCAINE 4% 1 PATCH: 40 PATCH TOPICAL at 08:47

## 2024-12-29 RX ADMIN — ASPIRIN 81 MG: 81 TABLET, COATED ORAL at 08:47

## 2024-12-29 RX ADMIN — KETOROLAC TROMETHAMINE 10 MG: 10 TABLET, FILM COATED ORAL at 13:24

## 2024-12-29 RX ADMIN — PANTOPRAZOLE SODIUM 40 MG: 40 TABLET, DELAYED RELEASE ORAL at 06:03

## 2024-12-29 RX ADMIN — ACETAMINOPHEN 975 MG: 325 TABLET ORAL at 08:47

## 2024-12-29 RX ADMIN — OXYCODONE HYDROCHLORIDE 10 MG: 5 TABLET ORAL at 00:21

## 2024-12-29 RX ADMIN — POLYETHYLENE GLYCOL 3350 17 G: 17 POWDER, FOR SOLUTION ORAL at 08:47

## 2024-12-29 RX ADMIN — CLONAZEPAM 0.5 MG: 0.5 TABLET ORAL at 01:18

## 2024-12-29 RX ADMIN — ACETAMINOPHEN 975 MG: 325 TABLET ORAL at 00:20

## 2024-12-29 ASSESSMENT — PAIN DESCRIPTION - LOCATION
LOCATION: ARM

## 2024-12-29 ASSESSMENT — COGNITIVE AND FUNCTIONAL STATUS - GENERAL
MOBILITY SCORE: 24
DRESSING REGULAR UPPER BODY CLOTHING: A LITTLE
DRESSING REGULAR LOWER BODY CLOTHING: A LITTLE
HELP NEEDED FOR BATHING: A LITTLE
DAILY ACTIVITIY SCORE: 19
TOILETING: A LITTLE
PERSONAL GROOMING: A LITTLE

## 2024-12-29 ASSESSMENT — PAIN - FUNCTIONAL ASSESSMENT: PAIN_FUNCTIONAL_ASSESSMENT: 0-10

## 2024-12-29 ASSESSMENT — PAIN DESCRIPTION - ORIENTATION
ORIENTATION: LEFT

## 2024-12-29 ASSESSMENT — PAIN SCALES - GENERAL
PAINLEVEL_OUTOF10: 7
PAINLEVEL_OUTOF10: 5 - MODERATE PAIN
PAINLEVEL_OUTOF10: 10 - WORST POSSIBLE PAIN
PAINLEVEL_OUTOF10: 8
PAINLEVEL_OUTOF10: 8

## 2024-12-29 ASSESSMENT — PAIN SCALES - WONG BAKER
WONGBAKER_NUMERICALRESPONSE: HURTS EVEN MORE
WONGBAKER_NUMERICALRESPONSE: HURTS WORST

## 2024-12-29 NOTE — DISCHARGE INSTRUCTIONS
Pato Bush     Thanks for coming to  for your care! As you know, you came in with L arm pain from your splint. While you were here we had the surgeons come see you who had the brace adjusted and wanted you to follow-up with them outpatient. You were also dehydrated when you arrived, so we gave you some fluids. These are some important things to remember when you are discharged:   We are sending you home with these medications: oxycodone 15 mg, Tylenol, lidocaine patches, flexeril (muscle relaxer) and diclofenac gel. Please pick this up at your [facility] pharmacy.   Consultations - you were referred to see the following specialists: orthopedic surgery (appt with Dr Roberts on 12/30). I have also set you up with pain management in case you need more assistance with pain control after discharge. You should receive a call from central scheduling in the next few days if you do not receive a call within 3-5 business days please call [phone] to schedule your appointment.     Best of luck,     Your  Medicine Team

## 2024-12-29 NOTE — PROGRESS NOTES
Physical Therapy                 Therapy Communication Note    Patient Name: Judith Bush  MRN: [id number]  Department: [place] [address]  Room: 60/6038-  Today's Date: 12/29/2024     Discipline: Physical Therapy    PT Missed Visit: Yes     Missed Visit Reason: Missed Visit Reason: Patient refused (Patient reports she hasn't slept in 2 days and she is not willing to get up at this time. Will re-attempt at a later time as able.)    Missed Time: Attempt

## 2024-12-29 NOTE — CARE PLAN
The patient's goals for the shift include      The clinical goals for the shift include to remain safe    Over the shift, the patient did make progress toward the following goals.

## 2024-12-30 ENCOUNTER — OFFICE VISIT (OUTPATIENT)
Dept: ORTHOPEDIC SURGERY | Facility: CLINIC | Age: 69
End: 2024-12-30
Payer: COMMERCIAL

## 2024-12-30 ENCOUNTER — PATIENT OUTREACH (OUTPATIENT)
Dept: PRIMARY CARE | Facility: CLINIC | Age: 69
End: 2024-12-30
Payer: COMMERCIAL

## 2024-12-30 DIAGNOSIS — S42.342A CLOSED DISPLACED SPIRAL FRACTURE OF SHAFT OF LEFT HUMERUS, INITIAL ENCOUNTER: Primary | ICD-10-CM

## 2024-12-30 DIAGNOSIS — S42.292A CLOSED 3-PART FRACTURE OF PROXIMAL HUMERUS, LEFT, INITIAL ENCOUNTER: ICD-10-CM

## 2024-12-30 PROCEDURE — 1123F ACP DISCUSS/DSCN MKR DOCD: CPT | Performed by: ORTHOPAEDIC SURGERY

## 2024-12-30 PROCEDURE — 1111F DSCHRG MED/CURRENT MED MERGE: CPT | Performed by: ORTHOPAEDIC SURGERY

## 2024-12-30 PROCEDURE — 99214 OFFICE O/P EST MOD 30 MIN: CPT | Performed by: ORTHOPAEDIC SURGERY

## 2024-12-30 PROCEDURE — 99214 OFFICE O/P EST MOD 30 MIN: CPT | Mod: 24 | Performed by: ORTHOPAEDIC SURGERY

## 2024-12-30 NOTE — PROGRESS NOTES
Subjective    Patient ID: Judith Bush is a 69 y.o. female.    Chief Complaint: POV- Left shoulder     Last Surgery: ORIF of left proximal humerus  Last Surgery Date: 11/21/2024    HPI  Patient is a 69 y.o. female who is s/p ORIF of left proximal humerus. Date of surgery was 11/21/2024.  Patient was home and she reported that she was going up some stairs and her foot slipped and she fell directly on left shoulder and felt a crack.  She had deformity of her arm.  She was in the emergency room and noted to have fracture of the humeral shaft below her previous proximal humerus fixation construct.  Patient was placed in a Marcos brace.  Patient subsequently represented back to the emergency room due to pain and was noted to have acute kidney injury which subsequently resolved.  Patient was referred back to me for management of humeral shaft fracture.  Her pain is managed with pain medication at this time.  However she is stating that if she is not taking medication on a continuous basis she has persistent pain.  ROS: All other systems have been reviewed and are negative except as previously noted in history of present illness.      IMP:  Problem List Items Addressed This Visit       Closed displaced spiral fracture of shaft of left humerus - Primary    Relevant Orders    Case Request Operating Room: Open Reduction Internal Fixation Humerus (Completed)     Objective   General: Alert and oriented x 3, NAD, respirations easy and unlabored with no audible wheezes, skin warm and dry, speech and dress appropriate for noted age, affect euthymic.     Musculoskeletal: Left Upper Extremity  incision well-healed  compartments soft  mild swelling to upper extremity  sensation intact to light touch  motor intact including R/U/M/AIN/PIN nn.  palpable radial pulse 2+   Patient has some into brace in place.  Tenderness to palpation.  Crepitus over the fracture site.  Unable to perform any shoulder range of motion at this  time.      X-ray: Images of left shoulder reviewed personally by me today and reveal maintenance of alignment of proximal humerus fracture with hardware in position however patient does have a new spiral humeral shaft fracture below previous hardware.  There is angulation.  Assessment/Plan   Encounter Diagnoses:  Closed displaced spiral fracture of shaft of left humerus, initial encounter    PLAN: Patient is s/p ORIF of left proximal humerus.  Patient was doing relatively well from a previous proximal humerus fracture however unfortunately she had a recent fall with a new humeral shaft fracture below her previous fixation.  I discussed treatment option with patient at this point.  Discussed close treatment versus operative repair.  Patient has persistent malalignment with the Marcos brace.  We discussed nonoperative treatment will require use of the Marcos brace and progressive use of the arm as her pain gets better.  However due to persistent pain and limitation and consideration of left shoulder stiffness due to inability to rehab the proximal humerus would be detrimental patient elected to proceed with ORIF of the humeral shaft.  We will use a separate anterior lateral approach to fix the humeral shaft and leave her previous construct for her proximal humerus as the bone is not fully healed.  We discussed risk-benefit alternative patient understood and wished to proceed at this time.  Orders Placed This Encounter    Case Request Operating Room: Open Reduction Internal Fixation Humerus     No follow-ups on file.

## 2024-12-30 NOTE — PROGRESS NOTES
Discharge Facility:[facility]  Discharge Diagnosis:ELIEZER  Admission Date:12/27/24  Discharge Date: 12/28/24    PCP Appointment Date:none made she is following with her surgeon at this time  Specialist Appointment Date:   Hospital Encounter and Summary Linked: Yes/  See discharge assessment below for further details  Engagement  Call Start Time: 1100 (12/30/2024 11:01 AM)    Medications  Medications reviewed with patient/caregiver?: Yes (no new meds) (12/30/2024 11:01 AM)  Is the patient having any side effects they believe may be caused by any medication additions or changes?: No (12/30/2024 11:01 AM)  Does the patient have all medications ordered at discharge?: Not applicable (12/30/2024 11:01 AM)  Is the patient taking all medications as directed (includes completed medication regime)?: Yes (12/30/2024 11:01 AM)    Appointments  Does the patient have a primary care provider?: Yes (12/30/2024 11:01 AM)  Care Management Interventions: Advised patient to make appointment (12/30/2024 11:01 AM)  Has the patient kept scheduled appointments due by today?: Yes (12/30/2024 11:01 AM)    Self Management  What is the home health agency?: n/a (12/30/2024 11:01 AM)  Has home health visited the patient within 72 hours of discharge?: Not applicable (12/30/2024 11:01 AM)  What Durable Medical Equipment (DME) was ordered?: n/a (12/30/2024 11:01 AM)  Has all Durable Medical Equipment (DME) been delivered?: No (12/30/2024 11:01 AM)    Patient Teaching  Does the patient have access to their discharge instructions?: Yes (12/30/2024 11:01 AM)  Care Management Interventions: Reviewed instructions with patient (12/30/2024 11:01 AM)  What is the patient's perception of their health status since discharge?: Same (12/30/2024 11:01 AM)  Is the patient/caregiver able to teach back the hierarchy of who to call/visit for symptoms/problems? PCP, Specialist, Home Health nurse, Urgent Care, ED, 911: Yes (12/30/2024 11:01 AM)    Wrap Up  Wrap Up  Additional Comments: discused discharge patient stated that she feels the same not much improvement she is seeing her surgeon today and will be following with them at this time. (12/30/2024 11:01 AM)  Call End Time: 1105 (12/30/2024 11:01 AM)

## 2024-12-31 ENCOUNTER — ANESTHESIA EVENT (OUTPATIENT)
Dept: OPERATING ROOM | Facility: HOSPITAL | Age: 69
End: 2024-12-31
Payer: COMMERCIAL

## 2025-01-02 ENCOUNTER — HOSPITAL ENCOUNTER (EMERGENCY)
Facility: HOSPITAL | Age: 70
Discharge: HOME | End: 2025-01-02
Attending: STUDENT IN AN ORGANIZED HEALTH CARE EDUCATION/TRAINING PROGRAM
Payer: COMMERCIAL

## 2025-01-02 ENCOUNTER — ANESTHESIA (OUTPATIENT)
Dept: OPERATING ROOM | Facility: HOSPITAL | Age: 70
End: 2025-01-02
Payer: COMMERCIAL

## 2025-01-02 ENCOUNTER — HOSPITAL ENCOUNTER (OUTPATIENT)
Facility: HOSPITAL | Age: 70
Setting detail: OUTPATIENT SURGERY
Discharge: OTHER INSTITUTION | End: 2025-01-02
Attending: ORTHOPAEDIC SURGERY | Admitting: ORTHOPAEDIC SURGERY
Payer: COMMERCIAL

## 2025-01-02 ENCOUNTER — APPOINTMENT (OUTPATIENT)
Dept: RADIOLOGY | Facility: HOSPITAL | Age: 70
End: 2025-01-02
Payer: COMMERCIAL

## 2025-01-02 VITALS
TEMPERATURE: 98.8 F | BODY MASS INDEX: 21.71 KG/M2 | DIASTOLIC BLOOD PRESSURE: 75 MMHG | RESPIRATION RATE: 16 BRPM | WEIGHT: 115 LBS | OXYGEN SATURATION: 94 % | HEIGHT: 61 IN | SYSTOLIC BLOOD PRESSURE: 134 MMHG | HEART RATE: 80 BPM

## 2025-01-02 DIAGNOSIS — F10.920 ALCOHOLIC INTOXICATION WITHOUT COMPLICATION (CMS-HCC): Primary | ICD-10-CM

## 2025-01-02 DIAGNOSIS — M79.601 RIGHT ARM PAIN: ICD-10-CM

## 2025-01-02 LAB
ALBUMIN SERPL BCP-MCNC: 4.2 G/DL (ref 3.4–5)
ALP SERPL-CCNC: 121 U/L (ref 33–136)
ALT SERPL W P-5'-P-CCNC: 6 U/L (ref 7–45)
AMMONIA PLAS-SCNC: 21 UMOL/L (ref 16–53)
ANION GAP BLDV CALCULATED.4IONS-SCNC: 11 MMOL/L (ref 10–25)
ANION GAP SERPL CALC-SCNC: 15 MMOL/L (ref 10–20)
APAP SERPL-MCNC: 17 UG/ML
APPEARANCE UR: CLEAR
AST SERPL W P-5'-P-CCNC: 14 U/L (ref 9–39)
BASE EXCESS BLDV CALC-SCNC: 1.3 MMOL/L (ref -2–3)
BASOPHILS # BLD AUTO: 0.04 X10*3/UL (ref 0–0.1)
BASOPHILS NFR BLD AUTO: 0.4 %
BILIRUB SERPL-MCNC: 0.8 MG/DL (ref 0–1.2)
BILIRUB UR STRIP.AUTO-MCNC: NEGATIVE MG/DL
BODY TEMPERATURE: 37 DEGREES CELSIUS
BUN SERPL-MCNC: 8 MG/DL (ref 6–23)
CA-I BLDV-SCNC: 1.29 MMOL/L (ref 1.1–1.33)
CALCIUM SERPL-MCNC: 10 MG/DL (ref 8.6–10.6)
CARDIAC TROPONIN I PNL SERPL HS: 22 NG/L (ref 0–34)
CHLORIDE BLDV-SCNC: 102 MMOL/L (ref 98–107)
CHLORIDE SERPL-SCNC: 101 MMOL/L (ref 98–107)
CO2 SERPL-SCNC: 26 MMOL/L (ref 21–32)
COLOR UR: NORMAL
CREAT SERPL-MCNC: 1.05 MG/DL (ref 0.5–1.05)
EGFRCR SERPLBLD CKD-EPI 2021: 58 ML/MIN/1.73M*2
EOSINOPHIL # BLD AUTO: 0.17 X10*3/UL (ref 0–0.7)
EOSINOPHIL NFR BLD AUTO: 1.9 %
ERYTHROCYTE [DISTWIDTH] IN BLOOD BY AUTOMATED COUNT: 13.5 % (ref 11.5–14.5)
ETHANOL SERPL-MCNC: 24 MG/DL
GLUCOSE BLDV-MCNC: 85 MG/DL (ref 74–99)
GLUCOSE SERPL-MCNC: 81 MG/DL (ref 74–99)
GLUCOSE UR STRIP.AUTO-MCNC: NORMAL MG/DL
HCO3 BLDV-SCNC: 27.6 MMOL/L (ref 22–26)
HCT VFR BLD AUTO: 33.9 % (ref 36–46)
HCT VFR BLD EST: 37 % (ref 36–46)
HGB BLD-MCNC: 11.6 G/DL (ref 12–16)
HGB BLDV-MCNC: 12.2 G/DL (ref 12–16)
IMM GRANULOCYTES # BLD AUTO: 0.04 X10*3/UL (ref 0–0.7)
IMM GRANULOCYTES NFR BLD AUTO: 0.4 % (ref 0–0.9)
INHALED O2 CONCENTRATION: 21 %
KETONES UR STRIP.AUTO-MCNC: NEGATIVE MG/DL
LACTATE BLDV-SCNC: 1.2 MMOL/L (ref 0.4–2)
LACTATE SERPL-SCNC: 1.4 MMOL/L (ref 0.4–2)
LEUKOCYTE ESTERASE UR QL STRIP.AUTO: NEGATIVE
LYMPHOCYTES # BLD AUTO: 2.62 X10*3/UL (ref 1.2–4.8)
LYMPHOCYTES NFR BLD AUTO: 28.7 %
MAGNESIUM SERPL-MCNC: 1.84 MG/DL (ref 1.6–2.4)
MCH RBC QN AUTO: 31.2 PG (ref 26–34)
MCHC RBC AUTO-ENTMCNC: 34.2 G/DL (ref 32–36)
MCV RBC AUTO: 91 FL (ref 80–100)
MONOCYTES # BLD AUTO: 0.85 X10*3/UL (ref 0.1–1)
MONOCYTES NFR BLD AUTO: 9.3 %
NEUTROPHILS # BLD AUTO: 5.4 X10*3/UL (ref 1.2–7.7)
NEUTROPHILS NFR BLD AUTO: 59.3 %
NITRITE UR QL STRIP.AUTO: NEGATIVE
NRBC BLD-RTO: 0 /100 WBCS (ref 0–0)
OXYHGB MFR BLDV: 38.2 % (ref 45–75)
PCO2 BLDV: 50 MM HG (ref 41–51)
PH BLDV: 7.35 PH (ref 7.33–7.43)
PH UR STRIP.AUTO: 6 [PH]
PLATELET # BLD AUTO: 306 X10*3/UL (ref 150–450)
PO2 BLDV: 28 MM HG (ref 35–45)
POTASSIUM BLDV-SCNC: 4.5 MMOL/L (ref 3.5–5.3)
POTASSIUM SERPL-SCNC: 4.3 MMOL/L (ref 3.5–5.3)
PROT SERPL-MCNC: 7 G/DL (ref 6.4–8.2)
PROT UR STRIP.AUTO-MCNC: NEGATIVE MG/DL
RBC # BLD AUTO: 3.72 X10*6/UL (ref 4–5.2)
RBC # UR STRIP.AUTO: NEGATIVE /UL
SALICYLATES SERPL-MCNC: <3 MG/DL
SAO2 % BLDV: 42 % (ref 45–75)
SODIUM BLDV-SCNC: 136 MMOL/L (ref 136–145)
SODIUM SERPL-SCNC: 138 MMOL/L (ref 136–145)
SP GR UR STRIP.AUTO: 1.01
UROBILINOGEN UR STRIP.AUTO-MCNC: NORMAL MG/DL
WBC # BLD AUTO: 9.1 X10*3/UL (ref 4.4–11.3)

## 2025-01-02 PROCEDURE — 81003 URINALYSIS AUTO W/O SCOPE: CPT

## 2025-01-02 PROCEDURE — 85025 COMPLETE CBC W/AUTO DIFF WBC: CPT

## 2025-01-02 PROCEDURE — 99284 EMERGENCY DEPT VISIT MOD MDM: CPT | Mod: 25 | Performed by: STUDENT IN AN ORGANIZED HEALTH CARE EDUCATION/TRAINING PROGRAM

## 2025-01-02 PROCEDURE — 84075 ASSAY ALKALINE PHOSPHATASE: CPT

## 2025-01-02 PROCEDURE — 99285 EMERGENCY DEPT VISIT HI MDM: CPT | Performed by: STUDENT IN AN ORGANIZED HEALTH CARE EDUCATION/TRAINING PROGRAM

## 2025-01-02 PROCEDURE — 84484 ASSAY OF TROPONIN QUANT: CPT

## 2025-01-02 PROCEDURE — 83735 ASSAY OF MAGNESIUM: CPT

## 2025-01-02 PROCEDURE — 82140 ASSAY OF AMMONIA: CPT

## 2025-01-02 PROCEDURE — 70450 CT HEAD/BRAIN W/O DYE: CPT | Performed by: RADIOLOGY

## 2025-01-02 PROCEDURE — 84132 ASSAY OF SERUM POTASSIUM: CPT

## 2025-01-02 PROCEDURE — 2500000001 HC RX 250 WO HCPCS SELF ADMINISTERED DRUGS (ALT 637 FOR MEDICARE OP)

## 2025-01-02 PROCEDURE — 70450 CT HEAD/BRAIN W/O DYE: CPT

## 2025-01-02 PROCEDURE — 80143 DRUG ASSAY ACETAMINOPHEN: CPT

## 2025-01-02 PROCEDURE — 83605 ASSAY OF LACTIC ACID: CPT

## 2025-01-02 PROCEDURE — 93010 ELECTROCARDIOGRAM REPORT: CPT | Performed by: STUDENT IN AN ORGANIZED HEALTH CARE EDUCATION/TRAINING PROGRAM

## 2025-01-02 PROCEDURE — 36415 COLL VENOUS BLD VENIPUNCTURE: CPT

## 2025-01-02 RX ORDER — CYCLOBENZAPRINE HCL 5 MG
5 TABLET ORAL 3 TIMES DAILY
Qty: 10 TABLET | Refills: 0 | Status: SHIPPED | OUTPATIENT
Start: 2025-01-02 | End: 2025-01-11 | Stop reason: HOSPADM

## 2025-01-02 RX ORDER — OXYCODONE HYDROCHLORIDE 5 MG/1
5 TABLET ORAL ONCE
Status: COMPLETED | OUTPATIENT
Start: 2025-01-02 | End: 2025-01-02

## 2025-01-02 RX ADMIN — OXYCODONE HYDROCHLORIDE 5 MG: 5 TABLET ORAL at 15:03

## 2025-01-02 ASSESSMENT — PAIN DESCRIPTION - ORIENTATION
ORIENTATION: LEFT
ORIENTATION: LEFT

## 2025-01-02 ASSESSMENT — PAIN SCALES - GENERAL
PAINLEVEL_OUTOF10: 5 - MODERATE PAIN
PAINLEVEL_OUTOF10: 10 - WORST POSSIBLE PAIN

## 2025-01-02 ASSESSMENT — LIFESTYLE VARIABLES
TOTAL SCORE: 0
HAVE PEOPLE ANNOYED YOU BY CRITICIZING YOUR DRINKING: NO
EVER FELT BAD OR GUILTY ABOUT YOUR DRINKING: NO
EVER HAD A DRINK FIRST THING IN THE MORNING TO STEADY YOUR NERVES TO GET RID OF A HANGOVER: NO
HAVE YOU EVER FELT YOU SHOULD CUT DOWN ON YOUR DRINKING: NO

## 2025-01-02 ASSESSMENT — PAIN DESCRIPTION - LOCATION
LOCATION: ARM
LOCATION: ARM

## 2025-01-02 ASSESSMENT — PAIN - FUNCTIONAL ASSESSMENT: PAIN_FUNCTIONAL_ASSESSMENT: 0-10

## 2025-01-02 NOTE — PRE-PROCEDURE NOTE
PATIENT WAS FOUND SITTING IN WHEELCHAIR WITH HER HEAD BACK EYES CLOSED UNABLE TO RESPOND TO NURSE.  ON FURTHER CONVERSATION WITH PATIENT PATIENT ENDORSES TAKING LORAZOPRAM.  PATIENT IS WITH A FRIEND WHO HAS BROUGHT HER HERE.  PATIENT WALKS AT BASELINE BUT IS UNABLE TO STAND WITHOUT ASSITANCE FROM STAFF.  PATIENT IS HELPED INTO BED.  WHEN ASKED PATIENT ENDORSES TAKING SEVERAL OXYCODONE AT HOME AND NOW DENIES TAKING LORAZOPRAM.  THIS NURSE MESSAGED ANES. MD RIZO AND SHE IS CURRENTLY TALKING WITH PATIENT

## 2025-01-02 NOTE — ED PROVIDER NOTES
Emergency Department Provider Note        History of Present Illness     History provided by: Patient and Friend  Limitations to History: None  External Records Reviewed with Brief Summary:  Planned outpatient H&P with  for revision ORIF of the left proximal humerus after having a fall on 12/26/2024.    HPI:  Judith Bush is a 69 y.o. female with recent fall on 12/26/2024 resulting in fracture after recent ORIF of the left proximal humerus presenting to the emergency department with altered mental status.  Was previously in preop this morning, however was not responsive, unable to stand on her own.  Did initially endorse taking lorazepam, however then denied it and did endorse taking oxycodone for pain yesterday per handoff from surgical team.  On my interview, patient does admit to a glass of wine at 5 PM yesterday, had declined taking her medications.  Her friend at bedside was significantly concerned because she had initially appeared very altered, not acting herself, now able to converse with me, still feels slightly off and intermittently forgetful but corrects herself.    Physical Exam   Triage vitals:  T 37.1 °C (98.8 °F)  HR 79  BP (!) 117/47  RR 14  O2 94 % None (Room air)    GEN:  A&Ox3, in no acute distress, appears comfortable.   HEENT: Normocephalic, atraumatic. Conjunctiva pink with no redness or exudates. Hearing grossly intact. Moist mucous membranes.   CARDIO: Normal rate and regular rhythm. Normal S1, S2  without murmurs, rubs, or gallops.   PULM: Clear to auscultation bilaterally. No rales, rhonchi, or wheezes. No accessory muscle use or stridor.  GI: Soft, non-tender, non-distended. No rebound tenderness or guarding.   SKIN: Warm and dry, no rashes, lesions, petechiae, or purpura.  MSK: Swelling of the left humerus noted no tenderness to palpation.  2+ pulses in all 4 extremities.  Normal range of motion in all 3 extremities except left upper extremity which has tenderness with  range of motion.  NEURO: No focal findings identified. No confusion or gross mental status changes.  CN II-XII intact. Normal strength and sensation in the bilateral upper and lower extremities. No facial asymmetry. Normal finger-to-nose on the right. Unable to perform on the left since patient is unable to lift her left arm.  Normal heel-to-shin bilaterally. Steady gait. No nystagmus.   PSYCH: Intermittently agitated with questions.  Intermittently laughing.    Medical Decision Making & ED Course     Medical Decision Makin y.o. female who presents emergency department with altered mental status, likely in the setting of alcohol intoxication complicated by benzodiazepine/narcotic use.  On arrival to the emergency department she had a normal neurological exam, with CT head that was unremarkable.  She is at baseline per friend at bedside, urinalysis unremarkable and lab work not suspicion for any sort of infection.  Physical exam without any concerning findings, no meningismus, rashes, no tenderness to palpation aside from her left proximal humerus, low suspicion for UTI, meningitis, or systemic infection.  I think her altered mental status is likely in the setting of alcohol with benzodiazepine and narcotic use.  She was counseled on avoiding combining alcohol and benzo narcotic medication.  In addition, was advised to not drink alcohol the evening prior to her surgery.  Did discuss with orthopedic surgery, who noted that they would schedule her for outpatient surgery next week given that she is neurovascularly intact.  Discussed this with patient, will plan to discharge home with Flexeril as she has run out of this prescription.  She has Tylenol, ibuprofen, and oxycodone at home to take as needed.  She is discharged home in stable condition.  Return precautions given.    ----      Differential diagnoses considered include but are not limited to: Alcohol intoxication, stroke, medication misuse     Social  Determinants of Health which Significantly Impact Care: None identified     EKG Independent Interpretation: EKG interpreted by myself. Please see ED Course for full interpretation.    Independent Result Review and Interpretation: Relevant laboratory and radiographic results were reviewed and independently interpreted by myself.  As necessary, they are commented on in the ED Course.    Chronic conditions affecting the patient's care: As documented above in MDM    The patient was discussed with the following consultants/services:  Discussed with orthopedic surgery, who noted that patient will be rescheduled for surgery next week    Care Considerations: As documented above in MDM    ED Course:  ED Course as of 01/03/25 2013   Thu Jan 02, 2025   1200 EKG noting bigeminy at rate of 70 bpm, normal QT interval, no ST elevations or depressions.  Declining any chest pain or shortness of breath or additional cardiac symptoms.  New from prior EKG. [AD]   1502 Alcohol(!): 24  Patient did admit that she had 1 glass of alcohol yesterday evening around 10 PM [AD]   1649 Patient back to her baseline, denying any complaints aside from left humeral pain in the setting of her new fracture  [AD]      ED Course User Index  [AD] Cony Cuevas DO         Diagnoses as of 01/03/25 2013   Alcoholic intoxication without complication (CMS-HCC)   Right arm pain     Disposition   As a result of the work-up, the patient was discharged home.  she was informed of her diagnosis and instructed to come back with any concerns or worsening of condition.  she and was agreeable to the plan as discussed above.  she was given the opportunity to ask questions.  All of the patient's questions were answered.    Procedures   Procedures    Patient seen and discussed with ED attending physician.    Cony Cuevas DO  Emergency Medicine       Cony Cuevas DO  Resident  01/03/25 2013

## 2025-01-02 NOTE — DISCHARGE INSTRUCTIONS
Please do not combine muscle relaxers with other opiates or with alcohol.  This can cause severe respiratory depression and can lead to lack of breathing.  This may result in death.  Please use caution when driving or using any heavy machinery.    Your orthopedic surgeon will call you in the next few days to schedule your surgery for next week.    Please return to the emergency department, should you have any worsening symptoms, are unable to get an appointment with your primary care physician within the discussed time frame, or have any further concerns.     Please follow up with:   Primary care physician as needed.    You may take ibuprofen or tylenol for pain. You may alternate ibuprofen and tylenol every 6 hours for better pain control.    Do not exceed 2400mg of ibuprofen or 4000mg of tylenol in a 24 hour period.

## 2025-01-02 NOTE — SIGNIFICANT EVENT
I was called to preop by the patient's preop nurse for further evaluation of lethargy and slurred speech.    Patient is a 70 yo F who presented to [facility] for elective L humerus ORIF. Her friend drove her here today and states that when she arrived to the patient's house she was in her usual state. The friend then went to warm the car and by the time the patient was coming to the car she was no longer normal - at which point the friend asked if she took some medication. The patient stated Tylenol.    Per preop nursing report, when she first interacted with the patient, the patient was legathric and asleep in the chair. The patient was able to move to a patient cart with assistance. On orientation questions, the patient did not know the president or the date - stating the Bernal was the president and that she thought it was the fourth or the fifth.    On my evaluation the patient does have slurred speech. She is having difficulty with mentation. She states that she was at the hospital yesterday to get her shoulder fixed (which her friend confirms is not true). She is also having some difficulty with remembering which medications she took and which doses. According to the patient's friend, the patient said she took Tylenol. Per nursing report, the patient mentioned taking lorazepam and 15 mg oxycodone. On my interview, the patient states she took 5 mg oxycodone and Tylenol.    In addition to the above, the patient has a history of stroke last April (2024). At that time she had word finding difficulties. She does not have persistent word finding difficulties, but does state that every once in a while she has trouble with words.     When I talked with the patient's friend, she states that she is not herself at present.    Given the above information, the patient needs further evaluation prior to elective surgery. The above may be attributed to medications however with her history of stroke with word finding difficulties, I am  sending her to the ED for further evaluation.

## 2025-01-02 NOTE — ED TRIAGE NOTES
2021 03:00 Pt brought from [facility] was scheduled ORIF after breaking L arm 12/26. On assessment in OR pt altered, drowsy, difficult to arouse and slurred speech. On arrival to ED pt A&Ox4, states she is over tired. Pt friend states pt speech sounds slurred. No other deficits noted. History of stroke in April states she has had difficulty with speech since

## 2025-01-03 ENCOUNTER — PATIENT MESSAGE (OUTPATIENT)
Dept: ORTHOPEDIC SURGERY | Facility: CLINIC | Age: 70
End: 2025-01-03
Payer: COMMERCIAL

## 2025-01-03 LAB — HOLD SPECIMEN: NORMAL

## 2025-01-03 NOTE — PATIENT COMMUNICATION
Called patient over the phone regarding message. States that her arm is hurting her very badly, bruised, and swollen. She is unable to place her thapa brace back on. She does not feel like she can wait until Thursday for surgery. Along with arm pain symptoms, she is having symptoms of racing heart, chest, pain, and shortness of breath. Patient states that she does have anxiety and thinks it is a panic attack. I advised patient that based on her increased arm pain and along with her additional symptoms of increased heart rate, shortness of breath, and chest pain that she should go to the ER to be evaluated as the symptoms she is having could be more serious then just a panic attack. While there she can be evaluated by orthopedics and possibly scheduled for earlier surgery date. Patient agreed and stated that she will go to the ER.     While on the phone, patient stated that the person taking care of her is an alcoholic and is not feeding her or going to get groceries. This, along with a delayed return to work, has caused the patient increased stress. Discussed that she should bring these topics up at the ED and possibly get a  involved.

## 2025-01-04 ENCOUNTER — PATIENT MESSAGE (OUTPATIENT)
Dept: ORTHOPEDIC SURGERY | Facility: CLINIC | Age: 70
End: 2025-01-04
Payer: COMMERCIAL

## 2025-01-04 DIAGNOSIS — S42.292A CLOSED 3-PART FRACTURE OF PROXIMAL HUMERUS, LEFT, INITIAL ENCOUNTER: ICD-10-CM

## 2025-01-06 RX ORDER — OXYCODONE HYDROCHLORIDE 5 MG/1
5 TABLET ORAL EVERY 4 HOURS PRN
Qty: 24 TABLET | Refills: 0 | Status: SHIPPED | OUTPATIENT
Start: 2025-01-06 | End: 2025-01-11 | Stop reason: HOSPADM

## 2025-01-06 NOTE — PATIENT COMMUNICATION
Pt called for pain medicine. Rates pain a 7/10. I have personally reviewed the OARRS report for the patient, no issues identified. This report is scanned into the EMR. I have considered the risks of abuse, dependence, addiction, and diversion. The 7 day Rx sent to pharmacy on file. ROSA MCNULTY

## 2025-01-06 NOTE — PATIENT COMMUNICATION
Patient was notified via [employer] message that the refill of Oxycodone will be for 5mg only every 4 hrs NOT the Oxycodone 15mg that she sent a picture of.     Pended med.     Surgery is on Thursday. Notified patient should she have increased pain to go to [facility] ED.     Hiral Rosa LPN

## 2025-01-07 ENCOUNTER — APPOINTMENT (OUTPATIENT)
Dept: PHYSICAL THERAPY | Facility: CLINIC | Age: 70
End: 2025-01-07
Payer: COMMERCIAL

## 2025-01-08 ENCOUNTER — DOCUMENTATION (OUTPATIENT)
Dept: PHYSICAL THERAPY | Facility: CLINIC | Age: 70
End: 2025-01-08
Payer: COMMERCIAL

## 2025-01-08 ENCOUNTER — ANESTHESIA EVENT (OUTPATIENT)
Dept: OPERATING ROOM | Facility: HOSPITAL | Age: 70
End: 2025-01-08
Payer: COMMERCIAL

## 2025-01-08 NOTE — PROGRESS NOTES
Discharge Summary    Name: Judith Bush  MRN: [id number]  : 1955  Date: 25    Discharge Summary: PT    Discharge Information: Date of discharge 25, Date of last visit 24, Date of evaluation 24, Number of attended visits 2, Referred by Cheri Givens PA-C, and Referred for Closed fracture of proximal end of L humerus    Therapy Summary: POC ending abruptly d/t fall.    Discharge Status: DC to surgery. Encourage pt to start new POC after surgery.     Rehab Discharge Reason: Other Fall sustaining new fracture, scheduled for surgery.

## 2025-01-09 ENCOUNTER — APPOINTMENT (OUTPATIENT)
Dept: PHYSICAL THERAPY | Facility: CLINIC | Age: 70
End: 2025-01-09
Payer: COMMERCIAL

## 2025-01-09 ENCOUNTER — PATIENT MESSAGE (OUTPATIENT)
Dept: ORTHOPEDIC SURGERY | Facility: CLINIC | Age: 70
End: 2025-01-09

## 2025-01-09 ENCOUNTER — HOSPITAL ENCOUNTER (OUTPATIENT)
Facility: HOSPITAL | Age: 70
Discharge: HOME HEALTH | End: 2025-01-11
Attending: ORTHOPAEDIC SURGERY | Admitting: ORTHOPAEDIC SURGERY
Payer: COMMERCIAL

## 2025-01-09 ENCOUNTER — ANESTHESIA (OUTPATIENT)
Dept: OPERATING ROOM | Facility: HOSPITAL | Age: 70
End: 2025-01-09
Payer: COMMERCIAL

## 2025-01-09 ENCOUNTER — PATIENT OUTREACH (OUTPATIENT)
Dept: PRIMARY CARE | Facility: CLINIC | Age: 70
End: 2025-01-09

## 2025-01-09 ENCOUNTER — APPOINTMENT (OUTPATIENT)
Dept: RADIOLOGY | Facility: HOSPITAL | Age: 70
End: 2025-01-09
Payer: COMMERCIAL

## 2025-01-09 DIAGNOSIS — S42.342D CLOSED DISPLACED SPIRAL FRACTURE OF SHAFT OF LEFT HUMERUS WITH ROUTINE HEALING, SUBSEQUENT ENCOUNTER: ICD-10-CM

## 2025-01-09 DIAGNOSIS — S42.392A OTHER FRACTURE OF SHAFT OF LEFT HUMERUS, INITIAL ENCOUNTER FOR CLOSED FRACTURE: Primary | ICD-10-CM

## 2025-01-09 LAB
ABO GROUP (TYPE) IN BLOOD: NORMAL
ANTIBODY SCREEN: NORMAL
RH FACTOR (ANTIGEN D): NORMAL

## 2025-01-09 PROCEDURE — A6213 FOAM DRG >16<=48 SQ IN W/BDR: HCPCS | Performed by: ORTHOPAEDIC SURGERY

## 2025-01-09 PROCEDURE — 7100000024 HC EXTENDED STAY RECOVERY PER MINUTE- PACU: Performed by: ORTHOPAEDIC SURGERY

## 2025-01-09 PROCEDURE — 2500000002 HC RX 250 W HCPCS SELF ADMINISTERED DRUGS (ALT 637 FOR MEDICARE OP, ALT 636 FOR OP/ED): Performed by: STUDENT IN AN ORGANIZED HEALTH CARE EDUCATION/TRAINING PROGRAM

## 2025-01-09 PROCEDURE — 99231 SBSQ HOSP IP/OBS SF/LOW 25: CPT

## 2025-01-09 PROCEDURE — 2500000004 HC RX 250 GENERAL PHARMACY W/ HCPCS (ALT 636 FOR OP/ED)

## 2025-01-09 PROCEDURE — C1713 ANCHOR/SCREW BN/BN,TIS/BN: HCPCS | Performed by: ORTHOPAEDIC SURGERY

## 2025-01-09 PROCEDURE — 7100000011 HC EXTENDED STAY RECOVERY HOURLY - NURSING UNIT

## 2025-01-09 PROCEDURE — 94640 AIRWAY INHALATION TREATMENT: CPT

## 2025-01-09 PROCEDURE — 3600000004 HC OR TIME - INITIAL BASE CHARGE - PROCEDURE LEVEL FOUR: Performed by: ORTHOPAEDIC SURGERY

## 2025-01-09 PROCEDURE — 2500000001 HC RX 250 WO HCPCS SELF ADMINISTERED DRUGS (ALT 637 FOR MEDICARE OP)

## 2025-01-09 PROCEDURE — 7100000001 HC RECOVERY ROOM TIME - INITIAL BASE CHARGE: Performed by: ORTHOPAEDIC SURGERY

## 2025-01-09 PROCEDURE — A24515 PR OPEN FIXATN MID HUMERUS FRACTURE: Performed by: STUDENT IN AN ORGANIZED HEALTH CARE EDUCATION/TRAINING PROGRAM

## 2025-01-09 PROCEDURE — 2500000005 HC RX 250 GENERAL PHARMACY W/O HCPCS: Performed by: ORTHOPAEDIC SURGERY

## 2025-01-09 PROCEDURE — 7100000002 HC RECOVERY ROOM TIME - EACH INCREMENTAL 1 MINUTE: Performed by: ORTHOPAEDIC SURGERY

## 2025-01-09 PROCEDURE — 3600000009 HC OR TIME - EACH INCREMENTAL 1 MINUTE - PROCEDURE LEVEL FOUR: Performed by: ORTHOPAEDIC SURGERY

## 2025-01-09 PROCEDURE — 2500000004 HC RX 250 GENERAL PHARMACY W/ HCPCS (ALT 636 FOR OP/ED): Performed by: STUDENT IN AN ORGANIZED HEALTH CARE EDUCATION/TRAINING PROGRAM

## 2025-01-09 PROCEDURE — 86901 BLOOD TYPING SEROLOGIC RH(D): CPT | Performed by: STUDENT IN AN ORGANIZED HEALTH CARE EDUCATION/TRAINING PROGRAM

## 2025-01-09 PROCEDURE — A24515 PR OPEN FIXATN MID HUMERUS FRACTURE

## 2025-01-09 PROCEDURE — 2780000003 HC OR 278 NO HCPCS: Performed by: ORTHOPAEDIC SURGERY

## 2025-01-09 PROCEDURE — 2720000007 HC OR 272 NO HCPCS: Performed by: ORTHOPAEDIC SURGERY

## 2025-01-09 PROCEDURE — 2500000004 HC RX 250 GENERAL PHARMACY W/ HCPCS (ALT 636 FOR OP/ED): Performed by: ORTHOPAEDIC SURGERY

## 2025-01-09 PROCEDURE — 3700000001 HC GENERAL ANESTHESIA TIME - INITIAL BASE CHARGE: Performed by: ORTHOPAEDIC SURGERY

## 2025-01-09 PROCEDURE — 3700000002 HC GENERAL ANESTHESIA TIME - EACH INCREMENTAL 1 MINUTE: Performed by: ORTHOPAEDIC SURGERY

## 2025-01-09 DEVICE — IMPLANTABLE DEVICE: Type: IMPLANTABLE DEVICE | Site: ARM | Status: FUNCTIONAL

## 2025-01-09 DEVICE — SCREW, CORTICAL, SELF-TAPPING, 4.5 X 26 MM, STAINLESS STEEL: Type: IMPLANTABLE DEVICE | Site: ARM | Status: FUNCTIONAL

## 2025-01-09 DEVICE — SCREW, CORTICAL, SELF-TAPPING, 4.5 X 28 MM, STAINLESS STEEL: Type: IMPLANTABLE DEVICE | Site: ARM | Status: FUNCTIONAL

## 2025-01-09 DEVICE — SCREW, CORTICAL, SELF-TAPPING, 4.5 X 32 MM, STAINLESS STEEL: Type: IMPLANTABLE DEVICE | Site: ARM | Status: FUNCTIONAL

## 2025-01-09 RX ORDER — VANCOMYCIN HYDROCHLORIDE 1 G/20ML
INJECTION, POWDER, LYOPHILIZED, FOR SOLUTION INTRAVENOUS AS NEEDED
Status: DISCONTINUED | OUTPATIENT
Start: 2025-01-09 | End: 2025-01-09 | Stop reason: HOSPADM

## 2025-01-09 RX ORDER — POLYETHYLENE GLYCOL 3350 17 G/17G
17 POWDER, FOR SOLUTION ORAL DAILY
Status: DISCONTINUED | OUTPATIENT
Start: 2025-01-09 | End: 2025-01-09

## 2025-01-09 RX ORDER — ONDANSETRON 4 MG/1
4 TABLET, ORALLY DISINTEGRATING ORAL EVERY 8 HOURS PRN
Status: DISCONTINUED | OUTPATIENT
Start: 2025-01-09 | End: 2025-01-11 | Stop reason: HOSPADM

## 2025-01-09 RX ORDER — ALBUTEROL SULFATE 0.83 MG/ML
2.5 SOLUTION RESPIRATORY (INHALATION) ONCE AS NEEDED
Status: DISCONTINUED | OUTPATIENT
Start: 2025-01-09 | End: 2025-01-09 | Stop reason: HOSPADM

## 2025-01-09 RX ORDER — ROCURONIUM BROMIDE 10 MG/ML
INJECTION, SOLUTION INTRAVENOUS AS NEEDED
Status: DISCONTINUED | OUTPATIENT
Start: 2025-01-09 | End: 2025-01-09

## 2025-01-09 RX ORDER — PROPOFOL 10 MG/ML
INJECTION, EMULSION INTRAVENOUS AS NEEDED
Status: DISCONTINUED | OUTPATIENT
Start: 2025-01-09 | End: 2025-01-09

## 2025-01-09 RX ORDER — OXYCODONE HYDROCHLORIDE 5 MG/1
5 TABLET ORAL EVERY 6 HOURS PRN
Qty: 28 TABLET | Refills: 0 | Status: SHIPPED | OUTPATIENT
Start: 2025-01-09 | End: 2025-01-16

## 2025-01-09 RX ORDER — OXYCODONE HYDROCHLORIDE 5 MG/1
2.5 TABLET ORAL EVERY 4 HOURS PRN
Status: DISCONTINUED | OUTPATIENT
Start: 2025-01-09 | End: 2025-01-11 | Stop reason: HOSPADM

## 2025-01-09 RX ORDER — ALBUTEROL SULFATE 90 UG/1
2 INHALANT RESPIRATORY (INHALATION) EVERY 4 HOURS PRN
Status: DISCONTINUED | OUTPATIENT
Start: 2025-01-09 | End: 2025-01-11 | Stop reason: HOSPADM

## 2025-01-09 RX ORDER — ROPIVACAINE HYDROCHLORIDE 5 MG/ML
INJECTION, SOLUTION EPIDURAL; INFILTRATION; PERINEURAL AS NEEDED
Status: DISCONTINUED | OUTPATIENT
Start: 2025-01-09 | End: 2025-01-09

## 2025-01-09 RX ORDER — SODIUM CHLORIDE, SODIUM LACTATE, POTASSIUM CHLORIDE, CALCIUM CHLORIDE 600; 310; 30; 20 MG/100ML; MG/100ML; MG/100ML; MG/100ML
100 INJECTION, SOLUTION INTRAVENOUS CONTINUOUS
Status: ACTIVE | OUTPATIENT
Start: 2025-01-09 | End: 2025-01-10

## 2025-01-09 RX ORDER — HYDRALAZINE HYDROCHLORIDE 20 MG/ML
5 INJECTION INTRAMUSCULAR; INTRAVENOUS EVERY 30 MIN PRN
Status: DISCONTINUED | OUTPATIENT
Start: 2025-01-09 | End: 2025-01-09 | Stop reason: HOSPADM

## 2025-01-09 RX ORDER — CEFAZOLIN SODIUM 2 G/100ML
2 INJECTION, SOLUTION INTRAVENOUS EVERY 8 HOURS
Status: COMPLETED | OUTPATIENT
Start: 2025-01-09 | End: 2025-01-10

## 2025-01-09 RX ORDER — BISACODYL 5 MG
10 TABLET, DELAYED RELEASE (ENTERIC COATED) ORAL DAILY PRN
Status: DISCONTINUED | OUTPATIENT
Start: 2025-01-09 | End: 2025-01-11 | Stop reason: HOSPADM

## 2025-01-09 RX ORDER — POLYETHYLENE GLYCOL 3350 17 G/17G
17 POWDER, FOR SOLUTION ORAL DAILY
Status: DISCONTINUED | OUTPATIENT
Start: 2025-01-09 | End: 2025-01-11 | Stop reason: HOSPADM

## 2025-01-09 RX ORDER — LIDOCAINE HYDROCHLORIDE 20 MG/ML
INJECTION, SOLUTION INFILTRATION; PERINEURAL AS NEEDED
Status: DISCONTINUED | OUTPATIENT
Start: 2025-01-09 | End: 2025-01-09

## 2025-01-09 RX ORDER — TOBRAMYCIN 1.2 G/30ML
INJECTION, POWDER, LYOPHILIZED, FOR SOLUTION INTRAVENOUS AS NEEDED
Status: DISCONTINUED | OUTPATIENT
Start: 2025-01-09 | End: 2025-01-09 | Stop reason: HOSPADM

## 2025-01-09 RX ORDER — OXYCODONE HYDROCHLORIDE 5 MG/1
10 TABLET ORAL EVERY 4 HOURS PRN
Status: DISCONTINUED | OUTPATIENT
Start: 2025-01-09 | End: 2025-01-09 | Stop reason: HOSPADM

## 2025-01-09 RX ORDER — ALBUTEROL SULFATE 0.83 MG/ML
2.5 SOLUTION RESPIRATORY (INHALATION) ONCE AS NEEDED
Status: COMPLETED | OUTPATIENT
Start: 2025-01-09 | End: 2025-01-09

## 2025-01-09 RX ORDER — CEFAZOLIN 1 G/1
INJECTION, POWDER, FOR SOLUTION INTRAVENOUS AS NEEDED
Status: DISCONTINUED | OUTPATIENT
Start: 2025-01-09 | End: 2025-01-09

## 2025-01-09 RX ORDER — HYDROMORPHONE HYDROCHLORIDE 1 MG/ML
INJECTION, SOLUTION INTRAMUSCULAR; INTRAVENOUS; SUBCUTANEOUS AS NEEDED
Status: DISCONTINUED | OUTPATIENT
Start: 2025-01-09 | End: 2025-01-09

## 2025-01-09 RX ORDER — MIDAZOLAM HYDROCHLORIDE 1 MG/ML
INJECTION INTRAMUSCULAR; INTRAVENOUS AS NEEDED
Status: DISCONTINUED | OUTPATIENT
Start: 2025-01-09 | End: 2025-01-09

## 2025-01-09 RX ORDER — CEFAZOLIN SODIUM 2 G/100ML
2 INJECTION, SOLUTION INTRAVENOUS EVERY 8 HOURS
Status: DISCONTINUED | OUTPATIENT
Start: 2025-01-09 | End: 2025-01-09

## 2025-01-09 RX ORDER — ONDANSETRON HYDROCHLORIDE 2 MG/ML
4 INJECTION, SOLUTION INTRAVENOUS ONCE AS NEEDED
Status: DISCONTINUED | OUTPATIENT
Start: 2025-01-09 | End: 2025-01-09 | Stop reason: HOSPADM

## 2025-01-09 RX ORDER — HYDROMORPHONE HYDROCHLORIDE 1 MG/ML
0.5 INJECTION, SOLUTION INTRAMUSCULAR; INTRAVENOUS; SUBCUTANEOUS EVERY 2 HOUR PRN
Status: DISCONTINUED | OUTPATIENT
Start: 2025-01-09 | End: 2025-01-11

## 2025-01-09 RX ORDER — SODIUM CHLORIDE 0.9 G/100ML
IRRIGANT IRRIGATION AS NEEDED
Status: DISCONTINUED | OUTPATIENT
Start: 2025-01-09 | End: 2025-01-09 | Stop reason: HOSPADM

## 2025-01-09 RX ORDER — TRANEXAMIC ACID 10 MG/ML
INJECTION, SOLUTION INTRAVENOUS AS NEEDED
Status: DISCONTINUED | OUTPATIENT
Start: 2025-01-09 | End: 2025-01-09

## 2025-01-09 RX ORDER — LABETALOL HYDROCHLORIDE 5 MG/ML
INJECTION, SOLUTION INTRAVENOUS AS NEEDED
Status: DISCONTINUED | OUTPATIENT
Start: 2025-01-09 | End: 2025-01-09

## 2025-01-09 RX ORDER — OXYCODONE HYDROCHLORIDE 5 MG/1
10 TABLET ORAL EVERY 4 HOURS PRN
Status: DISCONTINUED | OUTPATIENT
Start: 2025-01-09 | End: 2025-01-11 | Stop reason: HOSPADM

## 2025-01-09 RX ORDER — FENTANYL CITRATE 50 UG/ML
INJECTION, SOLUTION INTRAMUSCULAR; INTRAVENOUS AS NEEDED
Status: DISCONTINUED | OUTPATIENT
Start: 2025-01-09 | End: 2025-01-09

## 2025-01-09 RX ORDER — ACETAMINOPHEN 325 MG/1
650 TABLET ORAL EVERY 6 HOURS SCHEDULED
Status: DISCONTINUED | OUTPATIENT
Start: 2025-01-09 | End: 2025-01-10

## 2025-01-09 RX ORDER — CYCLOBENZAPRINE HCL 10 MG
10 TABLET ORAL 3 TIMES DAILY PRN
Status: DISCONTINUED | OUTPATIENT
Start: 2025-01-09 | End: 2025-01-11 | Stop reason: HOSPADM

## 2025-01-09 RX ORDER — PHENYLEPHRINE HCL IN 0.9% NACL 0.4MG/10ML
SYRINGE (ML) INTRAVENOUS AS NEEDED
Status: DISCONTINUED | OUTPATIENT
Start: 2025-01-09 | End: 2025-01-09

## 2025-01-09 RX ORDER — NALOXONE HYDROCHLORIDE 0.4 MG/ML
0.2 INJECTION, SOLUTION INTRAMUSCULAR; INTRAVENOUS; SUBCUTANEOUS EVERY 5 MIN PRN
Status: DISCONTINUED | OUTPATIENT
Start: 2025-01-09 | End: 2025-01-11 | Stop reason: HOSPADM

## 2025-01-09 RX ORDER — SODIUM CHLORIDE, SODIUM LACTATE, POTASSIUM CHLORIDE, CALCIUM CHLORIDE 600; 310; 30; 20 MG/100ML; MG/100ML; MG/100ML; MG/100ML
INJECTION, SOLUTION INTRAVENOUS CONTINUOUS PRN
Status: DISCONTINUED | OUTPATIENT
Start: 2025-01-09 | End: 2025-01-09

## 2025-01-09 RX ORDER — BISACODYL 5 MG
10 TABLET, DELAYED RELEASE (ENTERIC COATED) ORAL DAILY PRN
Status: DISCONTINUED | OUTPATIENT
Start: 2025-01-09 | End: 2025-01-09

## 2025-01-09 RX ORDER — ASPIRIN 81 MG/1
81 TABLET ORAL 2 TIMES DAILY
Qty: 56 TABLET | Refills: 0 | Status: SHIPPED | OUTPATIENT
Start: 2025-01-09 | End: 2025-02-06

## 2025-01-09 RX ORDER — ESMOLOL HYDROCHLORIDE 10 MG/ML
INJECTION INTRAVENOUS AS NEEDED
Status: DISCONTINUED | OUTPATIENT
Start: 2025-01-09 | End: 2025-01-09

## 2025-01-09 RX ORDER — CYCLOBENZAPRINE HCL 10 MG
10 TABLET ORAL 3 TIMES DAILY PRN
Status: DISCONTINUED | OUTPATIENT
Start: 2025-01-09 | End: 2025-01-09

## 2025-01-09 RX ORDER — HYDROMORPHONE HYDROCHLORIDE 0.2 MG/ML
0.2 INJECTION INTRAMUSCULAR; INTRAVENOUS; SUBCUTANEOUS EVERY 5 MIN PRN
Status: DISCONTINUED | OUTPATIENT
Start: 2025-01-09 | End: 2025-01-09 | Stop reason: HOSPADM

## 2025-01-09 RX ORDER — HYDROXYZINE HYDROCHLORIDE 25 MG/1
25 TABLET, FILM COATED ORAL EVERY 6 HOURS PRN
Status: DISCONTINUED | OUTPATIENT
Start: 2025-01-09 | End: 2025-01-11 | Stop reason: HOSPADM

## 2025-01-09 RX ORDER — OXYCODONE HYDROCHLORIDE 5 MG/1
5 TABLET ORAL EVERY 4 HOURS PRN
Status: DISCONTINUED | OUTPATIENT
Start: 2025-01-09 | End: 2025-01-09 | Stop reason: HOSPADM

## 2025-01-09 RX ORDER — OXYCODONE HYDROCHLORIDE 5 MG/1
5 TABLET ORAL EVERY 6 HOURS PRN
Status: DISCONTINUED | OUTPATIENT
Start: 2025-01-09 | End: 2025-01-09

## 2025-01-09 RX ORDER — DEXMEDETOMIDINE HYDROCHLORIDE 4 UG/ML
INJECTION, SOLUTION INTRAVENOUS CONTINUOUS PRN
Status: DISCONTINUED | OUTPATIENT
Start: 2025-01-09 | End: 2025-01-09

## 2025-01-09 RX ORDER — ONDANSETRON HYDROCHLORIDE 2 MG/ML
4 INJECTION, SOLUTION INTRAVENOUS EVERY 8 HOURS PRN
Status: DISCONTINUED | OUTPATIENT
Start: 2025-01-09 | End: 2025-01-09

## 2025-01-09 RX ORDER — LIDOCAINE HYDROCHLORIDE 10 MG/ML
0.1 INJECTION, SOLUTION INFILTRATION; PERINEURAL ONCE
Status: DISCONTINUED | OUTPATIENT
Start: 2025-01-09 | End: 2025-01-09 | Stop reason: HOSPADM

## 2025-01-09 RX ORDER — SODIUM CHLORIDE, SODIUM LACTATE, POTASSIUM CHLORIDE, CALCIUM CHLORIDE 600; 310; 30; 20 MG/100ML; MG/100ML; MG/100ML; MG/100ML
5 INJECTION, SOLUTION INTRAVENOUS CONTINUOUS
Status: ACTIVE | OUTPATIENT
Start: 2025-01-09 | End: 2025-01-09

## 2025-01-09 RX ORDER — LABETALOL HYDROCHLORIDE 5 MG/ML
5 INJECTION, SOLUTION INTRAVENOUS ONCE AS NEEDED
Status: DISCONTINUED | OUTPATIENT
Start: 2025-01-09 | End: 2025-01-09 | Stop reason: HOSPADM

## 2025-01-09 RX ORDER — METOCLOPRAMIDE HYDROCHLORIDE 5 MG/ML
10 INJECTION INTRAMUSCULAR; INTRAVENOUS ONCE AS NEEDED
Status: DISCONTINUED | OUTPATIENT
Start: 2025-01-09 | End: 2025-01-09 | Stop reason: HOSPADM

## 2025-01-09 RX ORDER — SODIUM CHLORIDE, SODIUM LACTATE, POTASSIUM CHLORIDE, CALCIUM CHLORIDE 600; 310; 30; 20 MG/100ML; MG/100ML; MG/100ML; MG/100ML
100 INJECTION, SOLUTION INTRAVENOUS CONTINUOUS
Status: DISCONTINUED | OUTPATIENT
Start: 2025-01-09 | End: 2025-01-09

## 2025-01-09 RX ORDER — OXYCODONE HYDROCHLORIDE 5 MG/1
5 TABLET ORAL EVERY 4 HOURS PRN
Status: DISCONTINUED | OUTPATIENT
Start: 2025-01-09 | End: 2025-01-11 | Stop reason: HOSPADM

## 2025-01-09 RX ORDER — MULTIVITAMIN
1 TABLET ORAL 2 TIMES DAILY
Qty: 60 TABLET | Refills: 0 | Status: SHIPPED | OUTPATIENT
Start: 2025-01-09 | End: 2025-02-08

## 2025-01-09 RX ORDER — ONDANSETRON 4 MG/1
4 TABLET, ORALLY DISINTEGRATING ORAL EVERY 8 HOURS PRN
Status: DISCONTINUED | OUTPATIENT
Start: 2025-01-09 | End: 2025-01-09

## 2025-01-09 RX ORDER — ONDANSETRON HYDROCHLORIDE 2 MG/ML
4 INJECTION, SOLUTION INTRAVENOUS EVERY 8 HOURS PRN
Status: DISCONTINUED | OUTPATIENT
Start: 2025-01-09 | End: 2025-01-11 | Stop reason: HOSPADM

## 2025-01-09 RX ORDER — DOCUSATE SODIUM 100 MG/1
100 CAPSULE, LIQUID FILLED ORAL 2 TIMES DAILY PRN
Qty: 14 CAPSULE | Refills: 0 | Status: SHIPPED | OUTPATIENT
Start: 2025-01-09 | End: 2025-01-16

## 2025-01-09 RX ADMIN — SUGAMMADEX 200 MG: 100 INJECTION, SOLUTION INTRAVENOUS at 08:52

## 2025-01-09 RX ADMIN — HYDROMORPHONE HYDROCHLORIDE 0.4 MG: 1 INJECTION, SOLUTION INTRAMUSCULAR; INTRAVENOUS; SUBCUTANEOUS at 08:47

## 2025-01-09 RX ADMIN — SUGAMMADEX 200 MG: 100 INJECTION, SOLUTION INTRAVENOUS at 09:00

## 2025-01-09 RX ADMIN — ONDANSETRON 4 MG: 2 INJECTION INTRAMUSCULAR; INTRAVENOUS at 22:32

## 2025-01-09 RX ADMIN — SODIUM CHLORIDE, POTASSIUM CHLORIDE, SODIUM LACTATE AND CALCIUM CHLORIDE: 600; 310; 30; 20 INJECTION, SOLUTION INTRAVENOUS at 07:15

## 2025-01-09 RX ADMIN — PROPOFOL 50 MG: 10 INJECTION, EMULSION INTRAVENOUS at 07:28

## 2025-01-09 RX ADMIN — OXYCODONE 5 MG: 5 TABLET ORAL at 16:22

## 2025-01-09 RX ADMIN — ACETAMINOPHEN 650 MG: 325 TABLET ORAL at 18:05

## 2025-01-09 RX ADMIN — LABETALOL HYDROCHLORIDE 2.5 MG: 5 INJECTION INTRAVENOUS at 07:40

## 2025-01-09 RX ADMIN — MIDAZOLAM HYDROCHLORIDE 2 MG: 1 INJECTION, SOLUTION INTRAMUSCULAR; INTRAVENOUS at 07:18

## 2025-01-09 RX ADMIN — PROPOFOL 50 MG: 10 INJECTION, EMULSION INTRAVENOUS at 07:27

## 2025-01-09 RX ADMIN — ROCURONIUM 50 MG: 50 INJECTION, SOLUTION INTRAVENOUS at 07:25

## 2025-01-09 RX ADMIN — ALBUTEROL SULFATE 2.5 MG: 2.5 SOLUTION RESPIRATORY (INHALATION) at 06:50

## 2025-01-09 RX ADMIN — CEFAZOLIN 2 G: 1 INJECTION, POWDER, FOR SOLUTION INTRAMUSCULAR; INTRAVENOUS at 07:38

## 2025-01-09 RX ADMIN — ESMOLOL HYDROCHLORIDE 20 MG: 10 INJECTION, SOLUTION INTRAVENOUS at 07:40

## 2025-01-09 RX ADMIN — PROPOFOL 40 MG: 10 INJECTION, EMULSION INTRAVENOUS at 07:50

## 2025-01-09 RX ADMIN — ROCURONIUM 20 MG: 50 INJECTION, SOLUTION INTRAVENOUS at 07:45

## 2025-01-09 RX ADMIN — OXYCODONE 10 MG: 5 TABLET ORAL at 21:14

## 2025-01-09 RX ADMIN — CYCLOBENZAPRINE 10 MG: 10 TABLET, FILM COATED ORAL at 21:14

## 2025-01-09 RX ADMIN — FENTANYL CITRATE 50 MCG: 50 INJECTION, SOLUTION INTRAMUSCULAR; INTRAVENOUS at 07:25

## 2025-01-09 RX ADMIN — LABETALOL HYDROCHLORIDE 5 MG: 5 INJECTION INTRAVENOUS at 07:52

## 2025-01-09 RX ADMIN — ROCURONIUM 10 MG: 50 INJECTION, SOLUTION INTRAVENOUS at 08:02

## 2025-01-09 RX ADMIN — CEFAZOLIN SODIUM 2 G: 2 INJECTION, SOLUTION INTRAVENOUS at 16:22

## 2025-01-09 RX ADMIN — HYDROMORPHONE HYDROCHLORIDE 0.5 MG: 1 INJECTION, SOLUTION INTRAMUSCULAR; INTRAVENOUS; SUBCUTANEOUS at 22:21

## 2025-01-09 RX ADMIN — DEXMEDETOMIDINE HYDROCHLORIDE 0.4 MCG/KG/HR: 4 INJECTION, SOLUTION INTRAVENOUS at 07:48

## 2025-01-09 RX ADMIN — ACETAMINOPHEN 650 MG: 325 TABLET ORAL at 12:47

## 2025-01-09 RX ADMIN — Medication 160 MCG: at 08:34

## 2025-01-09 RX ADMIN — HYDROMORPHONE HYDROCHLORIDE 0.1 MG: 1 INJECTION, SOLUTION INTRAMUSCULAR; INTRAVENOUS; SUBCUTANEOUS at 08:34

## 2025-01-09 RX ADMIN — Medication 40 MCG: at 08:27

## 2025-01-09 RX ADMIN — MIDAZOLAM HYDROCHLORIDE 2 MG: 1 INJECTION, SOLUTION INTRAMUSCULAR; INTRAVENOUS at 09:00

## 2025-01-09 RX ADMIN — HYDROMORPHONE HYDROCHLORIDE 0.5 MG: 1 INJECTION, SOLUTION INTRAMUSCULAR; INTRAVENOUS; SUBCUTANEOUS at 18:05

## 2025-01-09 RX ADMIN — FENTANYL CITRATE 50 MCG: 50 INJECTION, SOLUTION INTRAMUSCULAR; INTRAVENOUS at 07:22

## 2025-01-09 RX ADMIN — ROPIVACAINE HYDROCHLORIDE 15 ML: 5 INJECTION, SOLUTION EPIDURAL; INFILTRATION; PERINEURAL at 06:39

## 2025-01-09 RX ADMIN — HYDROMORPHONE HYDROCHLORIDE 0.5 MG: 1 INJECTION, SOLUTION INTRAMUSCULAR; INTRAVENOUS; SUBCUTANEOUS at 09:11

## 2025-01-09 RX ADMIN — ALBUTEROL SULFATE 2 PUFF: 90 AEROSOL, METERED RESPIRATORY (INHALATION) at 21:37

## 2025-01-09 RX ADMIN — Medication 80 MCG: at 08:31

## 2025-01-09 RX ADMIN — PROPOFOL 100 MG: 10 INJECTION, EMULSION INTRAVENOUS at 07:25

## 2025-01-09 RX ADMIN — LIDOCAINE HYDROCHLORIDE 50 MG: 20 INJECTION, SOLUTION INFILTRATION; PERINEURAL at 07:25

## 2025-01-09 RX ADMIN — SODIUM CHLORIDE, POTASSIUM CHLORIDE, SODIUM LACTATE AND CALCIUM CHLORIDE 5 ML/HR: 600; 310; 30; 20 INJECTION, SOLUTION INTRAVENOUS at 09:23

## 2025-01-09 RX ADMIN — HYDROXYZINE HYDROCHLORIDE 25 MG: 25 TABLET, FILM COATED ORAL at 22:33

## 2025-01-09 RX ADMIN — TRANEXAMIC ACID 530 MG: 10 INJECTION, SOLUTION INTRAVENOUS at 07:34

## 2025-01-09 SDOH — ECONOMIC STABILITY: FOOD INSECURITY: WITHIN THE PAST 12 MONTHS, YOU WORRIED THAT YOUR FOOD WOULD RUN OUT BEFORE YOU GOT THE MONEY TO BUY MORE.: NEVER TRUE

## 2025-01-09 SDOH — SOCIAL STABILITY: SOCIAL INSECURITY: HAS ANYONE EVER THREATENED TO HURT YOUR FAMILY OR YOUR PETS?: NO

## 2025-01-09 SDOH — SOCIAL STABILITY: SOCIAL INSECURITY: ABUSE: ADULT

## 2025-01-09 SDOH — ECONOMIC STABILITY: FOOD INSECURITY: WITHIN THE PAST 12 MONTHS, THE FOOD YOU BOUGHT JUST DIDN'T LAST AND YOU DIDN'T HAVE MONEY TO GET MORE.: NEVER TRUE

## 2025-01-09 SDOH — SOCIAL STABILITY: SOCIAL INSECURITY: HAVE YOU HAD ANY THOUGHTS OF HARMING ANYONE ELSE?: NO

## 2025-01-09 SDOH — SOCIAL STABILITY: SOCIAL INSECURITY: WITHIN THE LAST YEAR, HAVE YOU BEEN HUMILIATED OR EMOTIONALLY ABUSED IN OTHER WAYS BY YOUR PARTNER OR EX-PARTNER?: NO

## 2025-01-09 SDOH — ECONOMIC STABILITY: INCOME INSECURITY: IN THE PAST 12 MONTHS HAS THE ELECTRIC, GAS, OIL, OR WATER COMPANY THREATENED TO SHUT OFF SERVICES IN YOUR HOME?: NO

## 2025-01-09 SDOH — SOCIAL STABILITY: SOCIAL INSECURITY
ASK PARENT OR GUARDIAN: ARE THERE TIMES WHEN YOU, YOUR CHILD(REN), OR ANY MEMBER OF YOUR HOUSEHOLD FEEL UNSAFE, HARMED, OR THREATENED AROUND PERSONS WITH WHOM YOU KNOW OR LIVE?: NO

## 2025-01-09 SDOH — SOCIAL STABILITY: SOCIAL INSECURITY: ARE THERE ANY APPARENT SIGNS OF INJURIES/BEHAVIORS THAT COULD BE RELATED TO ABUSE/NEGLECT?: NO

## 2025-01-09 SDOH — HEALTH STABILITY: MENTAL HEALTH: CURRENT SMOKER: 1

## 2025-01-09 SDOH — SOCIAL STABILITY: SOCIAL INSECURITY: DOES ANYONE TRY TO KEEP YOU FROM HAVING/CONTACTING OTHER FRIENDS OR DOING THINGS OUTSIDE YOUR HOME?: NO

## 2025-01-09 SDOH — SOCIAL STABILITY: SOCIAL INSECURITY: WITHIN THE LAST YEAR, HAVE YOU BEEN AFRAID OF YOUR PARTNER OR EX-PARTNER?: NO

## 2025-01-09 SDOH — SOCIAL STABILITY: SOCIAL INSECURITY: ARE YOU OR HAVE YOU BEEN THREATENED OR ABUSED PHYSICALLY, EMOTIONALLY, OR SEXUALLY BY ANYONE?: NO

## 2025-01-09 SDOH — SOCIAL STABILITY: SOCIAL INSECURITY: DO YOU FEEL UNSAFE GOING BACK TO THE PLACE WHERE YOU ARE LIVING?: NO

## 2025-01-09 SDOH — SOCIAL STABILITY: SOCIAL INSECURITY: WERE YOU ABLE TO COMPLETE ALL THE BEHAVIORAL HEALTH SCREENINGS?: YES

## 2025-01-09 SDOH — SOCIAL STABILITY: SOCIAL INSECURITY: HAVE YOU HAD THOUGHTS OF HARMING ANYONE ELSE?: NO

## 2025-01-09 SDOH — SOCIAL STABILITY: SOCIAL INSECURITY: DO YOU FEEL ANYONE HAS EXPLOITED OR TAKEN ADVANTAGE OF YOU FINANCIALLY OR OF YOUR PERSONAL PROPERTY?: NO

## 2025-01-09 ASSESSMENT — PAIN SCALES - GENERAL
PAIN_LEVEL: 6
PAINLEVEL_OUTOF10: 10 - WORST POSSIBLE PAIN
PAINLEVEL_OUTOF10: 4
PAINLEVEL_OUTOF10: 10 - WORST POSSIBLE PAIN
PAINLEVEL_OUTOF10: 9
PAINLEVEL_OUTOF10: 5 - MODERATE PAIN
PAINLEVEL_OUTOF10: 10 - WORST POSSIBLE PAIN
PAINLEVEL_OUTOF10: 5 - MODERATE PAIN
PAINLEVEL_OUTOF10: 6
PAINLEVEL_OUTOF10: 5 - MODERATE PAIN
PAINLEVEL_OUTOF10: 6
PAINLEVEL_OUTOF10: 10 - WORST POSSIBLE PAIN
PAINLEVEL_OUTOF10: 4
PAINLEVEL_OUTOF10: 10 - WORST POSSIBLE PAIN
PAINLEVEL_OUTOF10: 6

## 2025-01-09 ASSESSMENT — PATIENT HEALTH QUESTIONNAIRE - PHQ9
1. LITTLE INTEREST OR PLEASURE IN DOING THINGS: NOT AT ALL
SUM OF ALL RESPONSES TO PHQ9 QUESTIONS 1 & 2: 0
2. FEELING DOWN, DEPRESSED OR HOPELESS: NOT AT ALL

## 2025-01-09 ASSESSMENT — COGNITIVE AND FUNCTIONAL STATUS - GENERAL
DAILY ACTIVITIY SCORE: 18
EATING MEALS: A LITTLE
DRESSING REGULAR LOWER BODY CLOTHING: A LITTLE
HELP NEEDED FOR BATHING: A LITTLE
WALKING IN HOSPITAL ROOM: A LITTLE
MOVING TO AND FROM BED TO CHAIR: A LITTLE
TOILETING: A LITTLE
DRESSING REGULAR UPPER BODY CLOTHING: A LITTLE
HELP NEEDED FOR BATHING: A LITTLE
STANDING UP FROM CHAIR USING ARMS: A LITTLE
MOBILITY SCORE: 18
DRESSING REGULAR LOWER BODY CLOTHING: A LITTLE
DAILY ACTIVITIY SCORE: 18
CLIMB 3 TO 5 STEPS WITH RAILING: A LITTLE
EATING MEALS: A LITTLE
TURNING FROM BACK TO SIDE WHILE IN FLAT BAD: A LITTLE
PATIENT BASELINE BEDBOUND: NO
TOILETING: A LITTLE
MOBILITY SCORE: 18
MOVING FROM LYING ON BACK TO SITTING ON SIDE OF FLAT BED WITH BEDRAILS: A LITTLE
DRESSING REGULAR UPPER BODY CLOTHING: A LITTLE
MOVING TO AND FROM BED TO CHAIR: A LITTLE
MOVING FROM LYING ON BACK TO SITTING ON SIDE OF FLAT BED WITH BEDRAILS: A LITTLE
WALKING IN HOSPITAL ROOM: A LITTLE
TURNING FROM BACK TO SIDE WHILE IN FLAT BAD: A LITTLE
STANDING UP FROM CHAIR USING ARMS: A LITTLE
PERSONAL GROOMING: A LITTLE
PERSONAL GROOMING: A LITTLE
CLIMB 3 TO 5 STEPS WITH RAILING: A LITTLE

## 2025-01-09 ASSESSMENT — PAIN DESCRIPTION - DESCRIPTORS
DESCRIPTORS: ACHING

## 2025-01-09 ASSESSMENT — PAIN - FUNCTIONAL ASSESSMENT
PAIN_FUNCTIONAL_ASSESSMENT: UNABLE TO SELF-REPORT
PAIN_FUNCTIONAL_ASSESSMENT: 0-10
PAIN_FUNCTIONAL_ASSESSMENT: UNABLE TO SELF-REPORT
PAIN_FUNCTIONAL_ASSESSMENT: 0-10
PAIN_FUNCTIONAL_ASSESSMENT: 0-10
PAIN_FUNCTIONAL_ASSESSMENT: UNABLE TO SELF-REPORT
PAIN_FUNCTIONAL_ASSESSMENT: 0-10
PAIN_FUNCTIONAL_ASSESSMENT: UNABLE TO SELF-REPORT
PAIN_FUNCTIONAL_ASSESSMENT: 0-10
PAIN_FUNCTIONAL_ASSESSMENT: UNABLE TO SELF-REPORT
PAIN_FUNCTIONAL_ASSESSMENT: 0-10
PAIN_FUNCTIONAL_ASSESSMENT: 0-10

## 2025-01-09 ASSESSMENT — ACTIVITIES OF DAILY LIVING (ADL)
DRESSING YOURSELF: NEEDS ASSISTANCE
PATIENT'S MEMORY ADEQUATE TO SAFELY COMPLETE DAILY ACTIVITIES?: YES
ASSISTIVE_DEVICE: SLING LUE
FEEDING YOURSELF: NEEDS ASSISTANCE
JUDGMENT_ADEQUATE_SAFELY_COMPLETE_DAILY_ACTIVITIES: YES
LACK_OF_TRANSPORTATION: NO
HEARING - LEFT EAR: FUNCTIONAL
BATHING: NEEDS ASSISTANCE
WALKS IN HOME: NEEDS ASSISTANCE
HEARING - RIGHT EAR: FUNCTIONAL
LACK_OF_TRANSPORTATION: NO
LACK_OF_TRANSPORTATION: NO
TOILETING: NEEDS ASSISTANCE
GROOMING: NEEDS ASSISTANCE
ADEQUATE_TO_COMPLETE_ADL: YES

## 2025-01-09 NOTE — ANESTHESIA PROCEDURE NOTES
Peripheral IV  Date/Time: 1/9/2025 9:00 AM  Inserted by: Abby Lara MD    Placement  Needle size: 20 G  Laterality: right  Location: foot  Site prep: alcohol  Attempts: 2

## 2025-01-09 NOTE — ANESTHESIA PREPROCEDURE EVALUATION
Patient: Judith Bush    Procedure Information       Date/Time: 01/09/25 0655    Procedure: Open Reduction Internal Fixation Humerus - Left (Left: Arm Upper)    Location: [facility] OR 09 / [facility] [place]    Surgeons: Guillermo Roberts MD          ALLERGIES:  Allergies   Allergen Reactions    Cephalosporins Anaphylaxis    Penicillin Anaphylaxis    Neomycin-Polymyxin-Gramicidin Rash        MEDICAL HISTORY:  Past Medical History:   Diagnosis Date    Abdominal distension (gaseous) 08/13/2018    Anxiety     Bipolar disorder     Carpal tunnel syndrome, right upper limb 08/13/2018    Collagenous colitis 08/13/2018    Compression fracture of cervical spine     COPD (chronic obstructive pulmonary disease) (Multi)     Depression, unspecified 08/13/2018    Dermatitis, unspecified 07/09/2020    eczematoid    Dermatochalasis of unspecified eye, unspecified eyelid 10/25/2022    Displaced comminuted fracture of left patella, initial encounter for closed fracture 08/02/2018    Displaced comminuted fracture of right patella, initial encounter for closed fracture 08/13/2018    Dizziness     GERD (gastroesophageal reflux disease)     Headache     Hyperlipidemia     Hypertension     Hypokalemia 08/13/2018    Low back pain, unspecified 08/13/2018    Migraine     No blood products     Noninfective gastroenteritis and colitis, unspecified 08/02/2018    Chronic colitis    Nutritional deficiency, unspecified 08/02/2018    Poor diet    Old myocardial infarction 03/31/2014    NSTEMI    Other chest pain 08/28/2017    Atypical chest pain    Other fracture of right patella, sequela 08/13/2018    Patellar sleeve fracture, right, sequela    Other visual disturbances 04/04/2018    Blurred vision, bilateral    Pain in unspecified hip 03/31/2014    Pericarditis ([facility]) 2019    Pleural effusion     Polyosteoarthritis, unspecified 08/02/2018    Presence of intraocular lens 09/20/2018    Pseudophakia of right eye    Sleep apnea     Small  intestinal bacterial overgrowth (SIBO)     Stroke (Multi) 04/2024    Tobacco abuse     Unspecified fall, sequela 08/13/2018        Relevant Problems   Cardiac   (+) Hyperlipidemia   (+) Hypertension      Pulmonary   (+) COPD (chronic obstructive pulmonary disease) (Multi)   (+) SHON (obstructive sleep apnea)      Neuro   (+) Anxiety disorder   (+) Bipolar 1 disorder, mixed, mild (Multi)   (+) Bipolar affective disorder, rapid cycling (Multi)   (+) Cerebrovascular accident (CVA) (Multi)   (+) Lumbar radiculopathy        SURGICAL HISTORY:  Past Surgical History:   Procedure Laterality Date    CARDIAC CATHETERIZATION      CATARACT EXTRACTION      CHOLECYSTECTOMY      COLONOSCOPY      CT ABDOMEN ANGIOGRAM W AND/OR WO IV CONTRAST  07/28/2022    CT ABDOMEN ANGIOGRAM W AND/OR WO IV CONTRAST 7/28/2022 [facility]    FEMUR FRACTURE SURGERY  07/16/2018    Femur Repair    FOOT SURGERY  09/13/2024    LEFT PROXIMAL  HALLUX PARTIAL EXCISION    HIP SURGERY      HYSTERECTOMY      MITRAL VALVE REPLACEMENT  08/07/2019    OTHER SURGICAL HISTORY  07/16/2018    Oophorectomy - Bilateral (Removal Of Both Ovaries)    OTHER SURGICAL HISTORY  05/27/2020    Radius fracture repair    SHOULDER SURGERY  07/16/2018    Shoulder Surgery    UPPER GASTROINTESTINAL ENDOSCOPY          MEDICATIONS:  Current Outpatient Medications   Medication Instructions    acetaminophen (TYLENOL) 975 mg, oral, 3 times daily PRN    albuterol 90 mcg/actuation inhaler 2 puffs, inhalation, Every 4 hours PRN    albuterol 2.5 mg, nebulization, Every 6 hours PRN    cyclobenzaprine (FLEXERIL) 5 mg, oral, 3 times daily    diclofenac sodium (VOLTAREN) 4 g, Topical, 4 times daily PRN    dilTIAZem CD (CARDIZEM CD) 180 mg, oral, Daily    evolocumab (Repatha SureClick) 140 mg/mL injection INJECT THE CONTENTS OF 1 PEN UNDER THE SKIN EVERY 2 WEEKS    fluticasone (Flonase) 50 mcg/actuation nasal spray 1 spray, Each Nostril, Daily, Shake gently. Before first use, prime pump.  After use, clean tip and replace cap.    gabapentin (NEURONTIN) 100 mg, oral, 3 times daily    glycopyrrolate-formoteroL (Bevespi Aerosphere) 9-4.8 mcg HFA aerosol inhaler 2 puffs, oral, 2 times daily    hydrocortisone acetate 1 % ointment 1 Application, Topical, 2 times daily PRN    lidocaine 4 % patch 1 patch, transdermal, Daily, Remove & discard patch within 12 hours or as directed by MD.    naloxone (NARCAN) 4 mg, nasal, As needed, Give 1 spray as a single dose in one nostril. May repeat every 2-3 minutes in alternating nostrils until medical assistance becomes available.    onabotulinumtoxinA (BOTOX) 155 Units, intramuscular, Every 3 months    ondansetron ODT (ZOFRAN-ODT) 4 mg, oral, Every 8 hours PRN    oxybutynin XL (DITROPAN-XL) 10 mg, oral, 2 times daily    oxyCODONE (ROXICODONE) 5 mg, oral, Every 4 hours PRN    pantoprazole (PROTONIX) 40 mg, oral, Daily before breakfast, Do not crush, chew, or split.    polyethylene glycol (Glycolax, Miralax) 17 gram/dose powder Use up to 3 times daily as directed as needed    SUMAtriptan (IMITREX) 50 mg, oral, Once as needed, May repeat after 2 hours.    Tymlos 80 mcg (3,120 mcg/1.56 mL) pen injector Inject 1 Dose as directed once daily.    ubrogepant (UBRELVY) 100 mg, oral, Once as needed, Can repeat in 2 hours if no response. Not to exceed 200mg per 24 hours.        VITALS:      1/2/2025     5:00 PM 1/2/2025     4:00 PM 1/2/2025     3:00 PM   Vitals   Systolic 134 130 109   Diastolic 75 62 59   Heart Rate 80 66 65   Resp 16 14 14       LABS:   BMP   Lab Results   Component Value Date    GLUCOSE 81 01/02/2025    CALCIUM 10.0 01/02/2025     01/02/2025    K 4.3 01/02/2025    CO2 26 01/02/2025     01/02/2025    BUN 8 01/02/2025    CREATININE 1.05 01/02/2025   , CBC  Lab Results   Component Value Date    WBC 9.1 01/02/2025    HGB 11.6 (L) 01/02/2025    HCT 33.9 (L) 01/02/2025    MCV 91 01/02/2025     01/02/2025          IMAGES:  EKG          Encounter  Date: 12/27/24   ECG 12 lead   Result Value    Ventricular Rate 55    Atrial Rate 55    RI Interval 114    QRS Duration 74    QT Interval 434    QTC Calculation(Bazett) 415    P Axis -24    R Axis 3    T Axis 45    QRS Count 9    Q Onset 225    P Onset 168    P Offset 209    T Offset 442    QTC Fredericia 421    Narrative    Sinus bradycardia  Septal infarct , age undetermined  Abnormal ECG  When compared with ECG of 20-NOV-2024 20:02,  Vent. rate has decreased BY  27 BPM  Septal infarct is now Present  Nonspecific T wave abnormality has replaced inverted T waves in Inferior leads  T wave inversion no longer evident in Anterior leads  QT has shortened      See ED provider note for full interpretation and clinical correlation  Confirmed by Liz Hale ([id number]) on 12/28/2024 6:03:22 AM      , ECHO         Transthoracic Echo (TTE) Complete 04/15/2024    [facility], [address] [place] [address]  Tel [phone] and Fax [phone]    TRANSTHORACIC ECHOCARDIOGRAM REPORT      Patient Name:      DEX BELLE     Reading Physician:    [id number] Ace Guardado MD  Study Date:        4/15/2024           Ordering Provider:    [id number] HARRISON PRECIADO  MRN/PID:           [id number]            Fellow:  Accession#:        [id number]        Nurse:  Date of Birth/Age: 1955 / 68      Sonographer:          Lucy Pang RDCS  years  Gender:            F                   Additional Staff:  Height:            154.94 cm           Admit Date:           4/15/2024  Weight:            61.69 kg            Admission Status:     Observation -  Priority discharge  BSA / BMI:         1.60 m2 / 25.70     Encounter#:           [id number]  kg/m2  Department Location:  [facility] Non  Invasive  Blood Pressure: 151 /74 mmHg    Study Type:    TRANSTHORACIC ECHO (TTE) COMPLETE  Diagnosis/ICD: Cerebral infarction due to embolism of left middle cerebral  artery-I63.412  Indication:    Cerebrovascular Accident  CPT  Code:      Echo Complete w Full Doppler-02828    Patient History:  MI Location/Type:  Non-ST Elevation MI  Smoker:            Current.  Valve Disorders:   Mitral Valve Replacement.  Pertinent History: HTN, Hyperlipidemia and COPD. 29mm Epic Porcine MVR 8/7/19.    Study Detail: The following Echo studies were performed: 2D, M-Mode, Doppler and  color flow. Agitated saline used as a contrast agent for  intraseptal flow evaluation.      PHYSICIAN INTERPRETATION:  Left Ventricle: The left ventricular systolic function is normal, with an estimated ejection fraction of 60%. There are no regional wall motion abnormalities. The left ventricular cavity size is normal. There is left ventricular concentric remodeling. Abnormal (paradoxical) septal motion consistent with post-operative status. Spectral Doppler shows a pseudonormal pattern of left ventricular diastolic filling.  Left Atrium: The left atrium is moderately dilated. A bubble study using agitated saline was performed. Bubble study is negative.  Right Ventricle: The right ventricle is normal in size. There is normal right ventricular global systolic function.  Right Atrium: The right atrium is normal in size.  Aortic Valve: The aortic valve is trileaflet. There is no evidence of aortic valve regurgitation. The peak instantaneous gradient of the aortic valve is 9.8 mmHg. The mean gradient of the aortic valve is 5.0 mmHg.  Mitral Valve: There is a prosthetic mitral valve present. Echo findings are consistent with normal mitral valve prosthesis structure and function. There is a Epic mitral valve bioprosthesis with a 29 mm reported size. There is no evidence of mitral valve regurgitation. Chordae tendinae / papillary muscle remnant noted attached to the left ventricular wall (not significantly changed compared with TTE 12/2022).  Tricuspid Valve: The tricuspid valve is structurally normal. There is trace to mild tricuspid regurgitation. The Doppler estimated RVSP is  slightly elevated at 33.7 mmHg.  Pulmonic Valve: The pulmonic valve is structurally normal. There is no indication of pulmonic valve regurgitation.  Pericardium: There is no pericardial effusion noted.  Aorta: The aortic root is normal.  Systemic Veins: The inferior vena cava appears to be of normal size. There is IVC inspiratory collapse greater than 50%.  In comparison to the previous echocardiogram(s): Compared with study from 12/30/2022, no significant change.      CONCLUSIONS:  1. Left ventricular systolic function is normal with a 60% estimated ejection fraction.  2. Abnormal septal motion consistent with post-operative status.  3. Spectral Doppler shows a pseudonormal pattern of left ventricular diastolic filling.  4. The left atrium is moderately dilated.  5. Chordae tendinae / papillary muscle remnant noted attached to the left ventricular wall (not significantly changed compared with TTE 12/2022).  6. Slightly elevated RVSP.    QUANTITATIVE DATA SUMMARY:  2D MEASUREMENTS:  Normal Ranges:  LAs:           4.08 cm   (2.7-4.0cm)  IVSd:          0.95 cm   (0.6-1.1cm)  LVPWd:         0.95 cm   (0.6-1.1cm)  LVIDd:         4.20 cm   (3.9-5.9cm)  LVIDs:         2.99 cm  LV Mass Index: 79.9 g/m2  LV % FS        28.7 %    LA VOLUME:  Normal Ranges:  LA Vol A4C:        63.3 ml    (22+/-6mL/m2)  LA Vol A2C:        57.9 ml  LA Vol BP:         62.5 ml  LA Vol Index A4C:  39.5ml/m2  LA Vol Index A2C:  36.1 ml/m2  LA Vol Index BP:   39.0 ml/m2  LA Area A4C:       19.1 cm2  LA Area A2C:       17.7 cm2  LA Major Axis A4C: 4.9 cm  LA Major Axis A2C: 4.6 cm  LA Volume Index:   38.8 ml/m2  LA Vol A4C:        57.6 ml  LA Vol A2C:        56.4 ml    RA VOLUME BY A/L METHOD:  Normal Ranges:  RA Vol A4C:        30.0 ml    (8.3-19.5ml)  RA Vol Index A4C:  18.7 ml/m2  RA Area A4C:       12.6 cm2  RA Major Axis A4C: 4.5 cm    M-MODE MEASUREMENTS:  Normal Ranges:  Ao Root: 3.20 cm (2.0-3.7cm)  LAs:     4.13 cm (2.7-4.0cm)    AORTA  MEASUREMENTS:  Normal Ranges:  Ao Sinus, d: 3.00 cm (2.1-3.5cm)  Ao STJ, d:   2.00 cm (1.7-3.4cm)  Asc Ao, d:   2.90 cm (2.1-3.4cm)    LV SYSTOLIC FUNCTION BY 2D PLANIMETRY (MOD):  Normal Ranges:  EF-A4C View: 61.9 % (>=55%)  EF-A2C View: 62.2 %  EF-Biplane:  61.9 %    LV DIASTOLIC FUNCTION:  Normal Ranges:  MV Peak E:        1.45 m/s    (0.7-1.2 m/s)  MV Peak A:        1.28 m/s    (0.42-0.7 m/s)  E/A Ratio:        1.13        (1.0-2.2)  MV e'             0.08 m/s    (>8.0)  MV lateral e'     0.08 m/s  MV medial e'      0.05 m/s  MV A Dur:         108.42 msec  E/e' Ratio:       18.08       (<8.0)  a'                0.03 m/s  PulmV Sys Jamarcus:    41.69 cm/s  PulmV Swift Jamarcus:   40.88 cm/s  PulmV S/D Jamarcus:    1.02  PulmV A Revs Jamarcus: 21.55 cm/s  PulmV A Revs Dur: 85.35 msec    MITRAL VALVE:  Normal Ranges:  MV Vmax:    1.71 m/s  (<=1.3m/s)  MV peak P.6 mmHg (<5mmHg)  MV mean PG: 3.3 mmHg  (<2mmHg)  MV VTI:     57.19 cm  (10-13cm)  MV DT:      332 msec  (150-240msec)    AORTIC VALVE:  Normal Ranges:  AoV Vmax:                1.57 m/s (<=1.7m/s)  AoV Peak P.8 mmHg (<20mmHg)  AoV Mean P.0 mmHg (1.7-11.5mmHg)  LVOT Max Jamarcus:            1.09 m/s (<=1.1m/s)  AoV VTI:                 36.90 cm (18-25cm)  LVOT VTI:                24.28 cm  LVOT Diameter:           1.83 cm  (1.8-2.4cm)  AoV Area, VTI:           1.73 cm2 (2.5-5.5cm2)  AoV Area,Vmax:           1.82 cm2 (2.5-4.5cm2)  AoV Dimensionless Index: 0.66      RIGHT VENTRICLE:  RV Basal 3.50 cm  RV Mid   3.10 cm  RV Major 6.2 cm  TAPSE:   18.0 mm  RV s'    0.11 m/s    TRICUSPID VALVE/RVSP:  Normal Ranges:  Peak TR Velocity: 2.77 m/s  Est. RA Pressure: 3 mmHg  RV Syst Pressure: 33.7 mmHg (< 30mmHg)  IVC Diam:         1.40 cm    PULMONIC VALVE:  Normal Ranges:  RVOT Vmax:  0.61 m/s (0.6-0.9m/s)  PV Max Jamarcus: 0.7 m/s  (0.6-0.9m/s)  PV Max P.8 mmHg    Pulmonary Veins:  PulmV A Revs Dur: 85.35 msec  PulmV A Revs Jamarcus: 21.55 cm/s  PulmV Swift Jamarcus:    40.88 cm/s  PulmV S/D Jamarcus:    1.02  PulmV Sys Jamarcus:    41.69 cm/s    AORTA:  Asc Ao Diam 2.94 cm      [id number] Ace Guardado MD  Electronically signed on 4/15/2024 at 5:44:33 PM        ** Final **    , CARDIAC CATH      No results found for this or any previous visit from the past 730 days.       SOCIAL:  Social History     Tobacco Use   Smoking Status Every Day    Current packs/day: 0.25    Average packs/day: 0.3 packs/day for 40.0 years (10.0 ttl pk-yrs)    Types: Cigarettes   Smokeless Tobacco Never      Social History     Substance and Sexual Activity   Alcohol Use Not Currently    Alcohol/week: 3.0 standard drinks of alcohol    Types: 3 Standard drinks or equivalent per week      Social History     Substance and Sexual Activity   Drug Use Yes    Types: Marijuana        NPO STATUS:  No data recorded    Clinical Areas Reviewed:                   Anesthesia Assessment:    Physical Exam    Airway  Mallampati: II  TM distance: >3 FB  Neck ROM: full     Cardiovascular   Rhythm: regular  Rate: normal     Dental    Pulmonary   Comments: Non labored resp    Abdominal            Anesthesia Plan    History of general anesthesia?: yes  History of complications of general anesthesia?: no    ASA 3     general     The patient is a current smoker.  Patient did not smoke on day of procedure.    intravenous induction   Postoperative administration of opioids is intended.  Trial extubation is planned.  Anesthetic plan and risks discussed with patient.  Use of blood products discussed with patient who consented to blood products.    Plan discussed with CAA and CRNA.

## 2025-01-09 NOTE — ANESTHESIA PROCEDURE NOTES
Airway  Date/Time: 1/9/2025 7:27 AM  Urgency: elective    Airway not difficult    Staffing  Performed: ELIDA   Authorized by: Abby Lara MD    Performed by: ELIDA Seymour  Patient location during procedure: OR    Indications and Patient Condition  Indications for airway management: anesthesia  Spontaneous Ventilation: absent  Sedation level: deep  Preoxygenated: yes  Patient position: sniffing  MILS maintained throughout  Mask difficulty assessment: 1 - vent by mask  Planned trial extubation    Final Airway Details  Final airway type: endotracheal airway      Successful airway: ETT  Cuffed: yes   Successful intubation technique: direct laryngoscopy  Facilitating devices/methods: intubating stylet  Endotracheal tube insertion site: oral  Blade: Andi  Blade size: #3  ETT size (mm): 6.5  Cormack-Lehane Classification: grade I - full view of glottis  Placement verified by: chest auscultation and capnometry   Measured from: teeth  ETT to teeth (cm): 22  Number of attempts at approach: 1  Number of other approaches attempted: 0

## 2025-01-09 NOTE — CARE PLAN
Ortho Trauma Post Operative Plan    69F with L cliff-implant humeral shaft fracture, now s/p ORIF L humerus on 1/9 with Dr. Roberts.    Plan:  - Weight-bearing status: coffee cup weight bearing, range of motion as tolerated, sling for comport  - Perioperative Ancef x24 hours  - Ambulation and SCDs for DVT prophylaxis  - Analgesia per primary  - OT/PT    Ernie Sanchez MD [facility]  Orthopaedic Surgery, PGY-5  p. [phone]  Epic Chat Preferred    While admitted, this patient will be followed by the Ortho Trauma Team. Please contact the residents listed below with any questions (available via Epic Chat weekdays). Please page [phone] (ortho on-call) after 6pm and on weekends.     Ortho Trauma  First Call: Jose Elias Martinez, PGY-1  First Call: Mitch Turner, PGY-1  Second Call: Clarissa Jameson, PGY-2  Third Call: Scott Doherty, PGY-3

## 2025-01-09 NOTE — CONSULTS
Judith Bush is a 69 y.o. year old female patient who presents for Open Reduction Internal Fixation Humerus - Left (Left: Arm Upper)  with Guillermo Roberts MD on 01/09/2025. Acute Pain consulted for block for postoperative pain control.     Anticipated Postop Pain Issues -   Palliative: typically relieved with IV analgesics and regional local anesthetics  Provocative: typically with movement  Quality: typically burning and aching  Radiation: typically none  Severity: typically severe 8-10/10  Timing: typically constant    Past Medical History:   Diagnosis Date    Abdominal distension (gaseous) 08/13/2018    Anxiety     Bipolar disorder     Carpal tunnel syndrome, right upper limb 08/13/2018    Collagenous colitis 08/13/2018    Compression fracture of cervical spine     COPD (chronic obstructive pulmonary disease) (Multi)     Depression, unspecified 08/13/2018    Dermatitis, unspecified 07/09/2020    eczematoid    Dermatochalasis of unspecified eye, unspecified eyelid 10/25/2022    Displaced comminuted fracture of left patella, initial encounter for closed fracture 08/02/2018    Displaced comminuted fracture of right patella, initial encounter for closed fracture 08/13/2018    Dizziness     GERD (gastroesophageal reflux disease)     Headache     Hyperlipidemia     Hypertension     Hypokalemia 08/13/2018    Low back pain, unspecified 08/13/2018    Migraine     No blood products     Noninfective gastroenteritis and colitis, unspecified 08/02/2018    Chronic colitis    Nutritional deficiency, unspecified 08/02/2018    Poor diet    Old myocardial infarction 03/31/2014    NSTEMI    Other chest pain 08/28/2017    Atypical chest pain    Other fracture of right patella, sequela 08/13/2018    Patellar sleeve fracture, right, sequela    Other visual disturbances 04/04/2018    Blurred vision, bilateral    Pain in unspecified hip 03/31/2014    Pericarditis ([facility]) 2019    Pleural effusion     Polyosteoarthritis,  unspecified 08/02/2018    Presence of intraocular lens 09/20/2018    Pseudophakia of right eye    Sleep apnea     Small intestinal bacterial overgrowth (SIBO)     Stroke (Multi) 04/2024    Tobacco abuse     Unspecified fall, sequela 08/13/2018        Past Surgical History:   Procedure Laterality Date    CARDIAC CATHETERIZATION      CATARACT EXTRACTION      CHOLECYSTECTOMY      COLONOSCOPY      CT ABDOMEN ANGIOGRAM W AND/OR WO IV CONTRAST  07/28/2022    CT ABDOMEN ANGIOGRAM W AND/OR WO IV CONTRAST 7/28/2022 [facility]    FEMUR FRACTURE SURGERY  07/16/2018    Femur Repair    FOOT SURGERY  09/13/2024    LEFT PROXIMAL  HALLUX PARTIAL EXCISION    HIP SURGERY      HYSTERECTOMY      MITRAL VALVE REPLACEMENT  08/07/2019    OTHER SURGICAL HISTORY  07/16/2018    Oophorectomy - Bilateral (Removal Of Both Ovaries)    OTHER SURGICAL HISTORY  05/27/2020    Radius fracture repair    SHOULDER SURGERY  07/16/2018    Shoulder Surgery    UPPER GASTROINTESTINAL ENDOSCOPY          Family History   Adopted: Yes   Problem Relation Name Age of Onset    Other (Adopted) Mother      Other (Adopted child) Father          Social History     Socioeconomic History    Marital status: Single     Spouse name: Not on file    Number of children: 0    Years of education: Not on file    Highest education level: Not on file   Occupational History    Not on file   Tobacco Use    Smoking status: Every Day     Current packs/day: 0.25     Average packs/day: 0.3 packs/day for 40.0 years (10.0 ttl pk-yrs)     Types: Cigarettes    Smokeless tobacco: Never   Substance and Sexual Activity    Alcohol use: Not Currently     Alcohol/week: 3.0 standard drinks of alcohol     Types: 3 Standard drinks or equivalent per week    Drug use: Yes     Types: Marijuana    Sexual activity: Not Currently   Other Topics Concern    Not on file   Social History Narrative    Not on file     Social Drivers of Health     Financial Resource Strain: Medium Risk (12/28/2024)     Overall Financial Resource Strain (CARDIA)     Difficulty of Paying Living Expenses: Somewhat hard   Food Insecurity: No Food Insecurity (12/28/2024)    Hunger Vital Sign     Worried About Running Out of Food in the Last Year: Never true     Ran Out of Food in the Last Year: Never true   Transportation Needs: Unmet Transportation Needs (12/28/2024)    PRAPARE - Transportation     Lack of Transportation (Medical): Yes     Lack of Transportation (Non-Medical): Yes   Physical Activity: Not on file   Stress: Not on file   Social Connections: Not on file   Intimate Partner Violence: Not At Risk (12/28/2024)    Humiliation, Afraid, Rape, and Kick questionnaire     Fear of Current or Ex-Partner: No     Emotionally Abused: No     Physically Abused: No     Sexually Abused: No   Housing Stability: High Risk (12/28/2024)    Housing Stability Vital Sign     Unable to Pay for Housing in the Last Year: Yes     Number of Times Moved in the Last Year: 0     Homeless in the Last Year: No        Allergies   Allergen Reactions    Cephalosporins Anaphylaxis    Penicillin Anaphylaxis    Neomycin-Polymyxin-Gramicidin Rash         Review of Systems  Gen: No fatigue, anorexia, insomnia, fever.   Eyes: No vision loss, double vision, drainage, eye pain.   ENT: No pharyngitis, dry mouth, no hearing changes or ear discharge  Cardiac: No chest pain, palpitations, syncope, near syncope.   Pulmonary: No shortness of breath, cough, hemoptysis.   Heme/lymph: No swollen glands, fever, bleeding.   GI: No abdominal pain, change in bowel habits, melena, hematemesis, hematochezia, nausea, vomiting, diarrhea.   : No discharge, dysuria, frequency, urgency, hematuria.  Endo: No polyuria or weight loss.   Musculoskeletal: Negative for any pain or loss of ROM/weakness  Skin: No rashes or lesions  Neuro: Normal speech, no numbness or weakness. No gait difficulties  Review of systems is otherwise negative unless stated above or in history of present  illness.    Physical Exam:  Constitutional:  no distress, alert and cooperative  Eyes: clear sclera  Head/Neck: No apparent injury, trachea midline  Respiratory/Thorax: Patent airways, thorax symmetric, breathing comfortably  Cardiovascular: no pitting edema  Gastrointestinal: Nondistended  Musculoskeletal: ROM intact  Extremities: no clubbing  Neurological: alert, rosado x4  Psychological: Appropriate affect    No results found. However, due to the size of the patient record, not all encounters were searched. Please check Results Review for a complete set of results.     Judith Bush is a 69 y.o. year old female patient who presents for Open Reduction Internal Fixation Humerus - Left (Left: Arm Upper) with Guillermo Roberts MD on 01/09/2025. Acute Pain consulted for block for postoperative pain control.     Plan:    - Left supraclavicular block performed preoperatively on 01/09/2025  - Pain medications per primary team  - Will see on POD1 if inpatient    Acute Pain Team  pg [phone] ph [phone]. Consults  Acute Pain Service

## 2025-01-09 NOTE — ANESTHESIA POSTPROCEDURE EVALUATION
Patient: Judith Bush    Procedure Summary       Date: 01/09/25 Room / Location: [facility] OR 09 / [facility] OR    Anesthesia Start: 0715 Anesthesia Stop: 0913    Procedure: Open Reduction Internal Fixation Humerus - Left (Left: Arm Upper) Diagnosis:       Closed displaced spiral fracture of shaft of left humerus, initial encounter      (Closed displaced spiral fracture of shaft of left humerus, initial encounter [S42.342A])    Surgeons: Guillermo Roberts MD Responsible Provider: Abby Lara MD    Anesthesia Type: general ASA Status: 3            Anesthesia Type: general    Vitals Value Taken Time   /65 01/09/25 0907   Temp 36.3 °C (97.3 °F) 01/09/25 0907   Pulse 69 01/09/25 0913   Resp 17 01/09/25 0913   SpO2 96 % 01/09/25 0913   Vitals shown include unfiled device data.    Anesthesia Post Evaluation    Patient location during evaluation: PACU  Patient participation: complete - patient participated  Level of consciousness: awake  Pain score: 6  Pain management: inadequate  Airway patency: patent  Cardiovascular status: acceptable  Respiratory status: acceptable  Hydration status: acceptable  Postoperative Nausea and Vomiting: none        No notable events documented.

## 2025-01-09 NOTE — CARE PLAN
Problem: Fall/Injury  Goal: Not fall by end of shift  Outcome: Progressing  Goal: Be free from injury by end of the shift  Outcome: Progressing  Goal: Verbalize understanding of personal risk factors for fall in the hospital  Outcome: Progressing  Goal: Verbalize understanding of risk factor reduction measures to prevent injury from fall in the home  Outcome: Progressing  Goal: Use assistive devices by end of the shift  Outcome: Progressing  Goal: Pace activities to prevent fatigue by end of the shift  Outcome: Progressing     Problem: Pain - Adult  Goal: Verbalizes/displays adequate comfort level or baseline comfort level  Outcome: Progressing     Problem: Safety - Adult  Goal: Free from fall injury  Outcome: Progressing     Problem: Discharge Planning  Goal: Discharge to home or other facility with appropriate resources  Outcome: Progressing     Problem: Chronic Conditions and Co-morbidities  Goal: Patient's chronic conditions and co-morbidity symptoms are monitored and maintained or improved  Outcome: Progressing     Problem: Pain  Goal: Takes deep breaths with improved pain control throughout the shift  Outcome: Progressing  Goal: Turns in bed with improved pain control throughout the shift  Outcome: Progressing  Goal: Walks with improved pain control throughout the shift  Outcome: Progressing  Goal: Performs ADL's with improved pain control throughout shift  Outcome: Progressing  Goal: Participates in PT with improved pain control throughout the shift  Outcome: Progressing  Goal: Free from opioid side effects throughout the shift  Outcome: Progressing  Goal: Free from acute confusion related to pain meds throughout the shift  Outcome: Progressing   The patient's goals for the shift include      The clinical goals for the shift include pain control

## 2025-01-09 NOTE — OP NOTE
Open Reduction Internal Fixation Humerus - Left (L) Operative Note     Date: 2025  OR Location: [facility] OR    Name: Judith Bush, : 1955, Age: 69 y.o., MRN: [id number], Sex: female    Diagnosis  Pre-op Diagnosis      * Closed displaced spiral fracture of shaft of left humerus, initial encounter [S42.342A] Post-op Diagnosis     * Closed displaced spiral fracture of shaft of left humerus, initial encounter [S42.342A]     Procedures  Open Reduction Internal Fixation Humerus - Left  [id number] - CA OPTX HUMERAL SHFT FX W/PLATE/SCREWS W/WOCERCLAGE      Surgeons      * Guillermo Roberts - Primary    Resident/Fellow/Other Assistant:  Surgeons and Role:     * Ernie Sanchez MD - Resident - Assisting     * MATTHEW Ortiz-C - [facility] Assist    Staff:   Nickyulator: Suman Hollis Person: Juan  Circulator: Samir  Circulator: Timmy    Anesthesia Staff: Anesthesiologist: Abby Lara MD  C-AA: ELIDA Seymour    Procedure Summary  Anesthesia: Anesthesia type not filed in the log.  ASA: III  Estimated Blood Loss: 15mL  Intra-op Medications:   Administrations occurring from 0655 to 0940 on 25:   Medication Name Total Dose   vancomycin (Vancocin) vial for injection 1 g   tobramycin (Nebcin) injection 1,200 mg   sodium chloride 0.9 % irrigation solution 1,000 mL   ceFAZolin (Ancef) vial 1 g 2 g   dexmedeTOMIDine 4 mcg/mL in 100 mL NS infusion 16.54 mcg   dexmedeTOMIDine (Precedex) bolus from bag 10 mcg   esmolol (Brevibloc) 10 mcg/mL  IV bolus 20 mg   fentaNYL (Sublimaze) injection 50 mcg/mL 100 mcg   HYDROmorphone (Dilaudid) injection 1 mg/mL 0.5 mg   labetalol 5 mg/mL 7.5 mg   lidocaine (Xylocaine) injection 2 % 50 mg   midazolam PF (Versed) injection 1 mg/mL 2 mg   phenylephrine 40 mcg/mL syringe 10 mL 280 mcg   propofol (Diprivan) injection 10 mg/mL 240 mg   rocuronium (ZeMuron) 50 mg/5 mL injection 80 mg   tranexamic acid 1,000 mg/100 mL NS (premix) 530 mg              Anesthesia Record                Intraprocedure I/O Totals          Intake    Dexmedetomidine 0.00 mL    The total shown is the total volume documented since Anesthesia Start was filed.    Tranexamic Acid 0.00 mL    The total shown is the total volume documented since Anesthesia Start was filed.    Total Intake 0 mL          Specimen: No specimens collected              Drains and/or Catheters: * None in log *    Tourniquet Times:         Implants:  Implants       Type Name Action Serial No.      Screw SCREW, CORTEX SELF, 2.7MM X 28MM - [id number] Implanted      Screw SCREW, CORTEX SELF, 2.7MM X 22MM - [id number] Implanted      Screw PLATE, LCP NARROW 4.5MM X 170MM 9H - [id number] Implanted      Screw SCREW, CORTICAL, SELF-TAPPING, 4.5 X 28 MM, STAINLESS STE[employer] - [id number] Implanted      Screw SCREW, CORTICAL, SELF-TAPPING, 4.5 X 26 MM, STAINLESS STEEL - [id number] Implanted      Screw SCREW, CORTICAL, SELF-TAPPING, 4.5 X 32 MM, STAINLESS STEEL - [id number] Implanted      Screw SCREW, LOCK 5.0 X 30 VA ST T25 - [id number] Implanted      Screw SCREW, CORTICAL, SELF-TAPPING, 4.5 X 24 MM, STAINLESS STEEL - [id number] Implanted      Screw SCREW, LOCK 5.0 X 26 VA ST T25 - [id number] Implanted               Findings: consistent with diagnosis    Indications: Judith Bush is an 69 y.o. female who is having surgery for Closed displaced spiral fracture of shaft of left humerus, initial encounter [S42.342A].  Patient sustained a proximal humerus fracture that was treated with ORIF.  Patient sustained another fall at home down some stairs and landed and sustained left humeral shaft spiral fracture distal to her previous fixation construct.  We discussed treatment options and patient ultimately elected to proceed with operative repair of her humerus fracture.    The patient was seen in the preoperative area. The risks, benefits, complications, treatment options, non-operative alternatives, expected recovery and outcomes were discussed with the  patient. The possibilities of reaction to medication, pulmonary aspiration, injury to surrounding structures, bleeding, recurrent infection, the need for additional procedures, failure to diagnose a condition, and creating a complication requiring transfusion or operation were discussed with the patient. The patient concurred with the proposed plan, giving informed consent.  The site of surgery was properly noted/marked if necessary per policy. The patient has been actively warmed in preoperative area. Preoperative antibiotics have been ordered and given within 1 hours of incision. Venous thrombosis prophylaxis have been ordered including bilateral sequential compression devices    Procedure Details:   The patient was brought to the operating room on the operative cart. A surgical timeout was performed. The patient was transferred to the operating table and placed under general anesthesia. The patient was placed in the supine position and all bony prominences were padded. Pre-operative antibiotics were administered. The operative extremity was prepped and draped in the usual sterile fashion.     A longitudinal skin incision was made over the anterolateral aspect of the arm and anterior shoulder. Soft tissues were dissected down to fascia. The lateral border of the biceps brachii was identified and retracted medially exposing the brachialis. Proximally the deltopectoral interval was identified and the inferior margin of the interval was split bluntly. The anterior portion of the deltoid attachment was released to expose the anterior humerus. The brachialis was then split midline over the humerus. The fracture was then exposed and debrided. Standard reduction clamps were placed around the proximal and distal fracture fragments. The fracture was then able to be reduced anatomically and a pointed reduction clamp was placed obliquely across the fracture. Two anterior to posterior 2.7mm lag screws were placed through the  fracture. Anatomic reduction of the fracture was assessed fluoroscopically. A 9 hole 4.5mm LCP plate was chosen and aligned over the anterior humerus. Satisfied with its length, the plate was gently contoured to accommodate the proximal humeral flare and to avoid posterior fracture gapping. The plate was secured to bone using 2 cortical screws. Plate alignment was assessed fluoroscopically and determined to be adequate. At this time the plate was definitively secured to bone.  Final fluoroscopic images were taken and saved to the PACS system.    The wound was copiously irrigated with sterile normal saline. Antibiotic powders were placed in the wound. The incision was closed in a layered fashion. A silver impregnated Mepilex dressing was utilized. A sling was applied.  The patient was awakened from anesthesia, transferred to the [facility], and taken to the PACU in stable condition.      Complications:  None; patient tolerated the procedure well.    Disposition: PACU - hemodynamically stable.  Condition: stable             Task Performed by [facility] Assist or Physician Assistant:   PA/NP, was necessary to assist on this case due to the nature of the case and difficulty. During the case  Cheri Givens PA-C served as my assist by prepping and positioning the patient, retracting and assisting during surgery, and closing the wound.      Additional Details:   The patient will be transferred to the regular nursing floor post operatively. She will be coffee cup weight bearing/ range of motion as tolerated. She will received 24hrs of Ancef. The patient will follow up in clinic in 2 weeks for a wound check and repeat radiographs of her L humerus and shoulder.    Attending Attestation: I was present and scrubbed for the entire procedure.    Guillermo Roberts  Phone Number: [phone]

## 2025-01-09 NOTE — ANESTHESIA PROCEDURE NOTES
Peripheral Block    Patient location during procedure: pre-op  Start time: 1/9/2025 6:35 AM  End time: 1/9/2025 6:40 AM  Reason for block: at surgeon's request and post-op pain management  Staffing  Performed: fellow   Authorized by: Florence Robertson MD    Performed by: Amanda Giordano MD  Preanesthetic Checklist  Completed: patient identified, IV checked, site marked, risks and benefits discussed, surgical consent, monitors and equipment checked, pre-op evaluation and timeout performed   Timeout performed at: 1/9/2025 6:34 AM  Peripheral Block  Patient position: laying flat  Prep: ChloraPrep  Patient monitoring: heart rate, continuous pulse ox and cardiac monitor  Block type: supraclavicular  Laterality: left  Injection technique: single-shot  Guidance: ultrasound guided  Needle  Needle type: short-bevel   Needle gauge: 22 G  Needle length: 5 cm  Needle localization: ultrasound guidance     image stored in chart  Assessment  Injection assessment: negative aspiration for heme, incremental injection and local visualized surrounding nerve on ultrasound  Heart rate change: no  Slow fractionated injection: yes  Additional Notes  Supraclavicular brachial plexus block: Informed consent obtained.  Risks and benefits discussed.  ASA monitors placed, and timeout performed.  Patient position, prepped with chlorhexidine, and draped with sterile towels.      Ultrasound guidance used to visualize the brachial plexus and surrounding structures with visualization of the needle throughout duration of the procedure.  Aspiration was negative.  A total of ropi 0.5 % 15 ml, 4mg decadron and 1:200,000 epinephrine was divided and injected on left side.    Timeout done by ROB Brennan

## 2025-01-10 LAB
ANION GAP SERPL CALC-SCNC: 17 MMOL/L (ref 10–20)
BUN SERPL-MCNC: 9 MG/DL (ref 6–23)
CALCIUM SERPL-MCNC: 9.9 MG/DL (ref 8.6–10.6)
CHLORIDE SERPL-SCNC: 102 MMOL/L (ref 98–107)
CO2 SERPL-SCNC: 24 MMOL/L (ref 21–32)
CREAT SERPL-MCNC: 0.66 MG/DL (ref 0.5–1.05)
EGFRCR SERPLBLD CKD-EPI 2021: >90 ML/MIN/1.73M*2
ERYTHROCYTE [DISTWIDTH] IN BLOOD BY AUTOMATED COUNT: 14.4 % (ref 11.5–14.5)
GLUCOSE SERPL-MCNC: 87 MG/DL (ref 74–99)
HCT VFR BLD AUTO: 39 % (ref 36–46)
HGB BLD-MCNC: 12.9 G/DL (ref 12–16)
MCH RBC QN AUTO: 30.4 PG (ref 26–34)
MCHC RBC AUTO-ENTMCNC: 33.1 G/DL (ref 32–36)
MCV RBC AUTO: 92 FL (ref 80–100)
NRBC BLD-RTO: 0 /100 WBCS (ref 0–0)
PLATELET # BLD AUTO: 395 X10*3/UL (ref 150–450)
POTASSIUM SERPL-SCNC: 3.5 MMOL/L (ref 3.5–5.3)
RBC # BLD AUTO: 4.24 X10*6/UL (ref 4–5.2)
SODIUM SERPL-SCNC: 139 MMOL/L (ref 136–145)
WBC # BLD AUTO: 15.7 X10*3/UL (ref 4.4–11.3)

## 2025-01-10 PROCEDURE — 2500000001 HC RX 250 WO HCPCS SELF ADMINISTERED DRUGS (ALT 637 FOR MEDICARE OP)

## 2025-01-10 PROCEDURE — 2500000004 HC RX 250 GENERAL PHARMACY W/ HCPCS (ALT 636 FOR OP/ED)

## 2025-01-10 PROCEDURE — 2500000005 HC RX 250 GENERAL PHARMACY W/O HCPCS

## 2025-01-10 PROCEDURE — 97165 OT EVAL LOW COMPLEX 30 MIN: CPT | Mod: GO

## 2025-01-10 PROCEDURE — 80048 BASIC METABOLIC PNL TOTAL CA: CPT

## 2025-01-10 PROCEDURE — 99231 SBSQ HOSP IP/OBS SF/LOW 25: CPT

## 2025-01-10 PROCEDURE — 7100000011 HC EXTENDED STAY RECOVERY HOURLY - NURSING UNIT

## 2025-01-10 PROCEDURE — 97535 SELF CARE MNGMENT TRAINING: CPT | Mod: GO

## 2025-01-10 PROCEDURE — 85027 COMPLETE CBC AUTOMATED: CPT

## 2025-01-10 PROCEDURE — 36415 COLL VENOUS BLD VENIPUNCTURE: CPT

## 2025-01-10 RX ORDER — ACETAMINOPHEN 10 MG/ML
1000 INJECTION, SOLUTION INTRAVENOUS EVERY 6 HOURS SCHEDULED
Status: DISCONTINUED | OUTPATIENT
Start: 2025-01-10 | End: 2025-01-11 | Stop reason: HOSPADM

## 2025-01-10 RX ORDER — ASPIRIN 81 MG/1
81 TABLET ORAL 2 TIMES DAILY
Status: DISCONTINUED | OUTPATIENT
Start: 2025-01-10 | End: 2025-01-11 | Stop reason: HOSPADM

## 2025-01-10 RX ORDER — GABAPENTIN 100 MG/1
200 CAPSULE ORAL 2 TIMES DAILY
Status: DISCONTINUED | OUTPATIENT
Start: 2025-01-10 | End: 2025-01-10

## 2025-01-10 RX ORDER — LIDOCAINE 560 MG/1
1 PATCH PERCUTANEOUS; TOPICAL; TRANSDERMAL DAILY
Status: DISCONTINUED | OUTPATIENT
Start: 2025-01-10 | End: 2025-01-11 | Stop reason: HOSPADM

## 2025-01-10 RX ORDER — METHOCARBAMOL 500 MG/1
750 TABLET, FILM COATED ORAL EVERY 8 HOURS SCHEDULED
Status: DISCONTINUED | OUTPATIENT
Start: 2025-01-10 | End: 2025-01-11 | Stop reason: HOSPADM

## 2025-01-10 RX ORDER — BISMUTH SUBSALICYLATE 262 MG
1 TABLET,CHEWABLE ORAL DAILY
COMMUNITY

## 2025-01-10 RX ORDER — HYDRALAZINE HYDROCHLORIDE 20 MG/ML
10 INJECTION INTRAMUSCULAR; INTRAVENOUS EVERY 4 HOURS PRN
Status: DISCONTINUED | OUTPATIENT
Start: 2025-01-10 | End: 2025-01-11 | Stop reason: HOSPADM

## 2025-01-10 RX ORDER — MAGNESIUM SULFATE HEPTAHYDRATE 40 MG/ML
2 INJECTION, SOLUTION INTRAVENOUS ONCE
Status: COMPLETED | OUTPATIENT
Start: 2025-01-10 | End: 2025-01-10

## 2025-01-10 RX ORDER — GABAPENTIN 100 MG/1
200 CAPSULE ORAL 3 TIMES DAILY
Status: DISCONTINUED | OUTPATIENT
Start: 2025-01-10 | End: 2025-01-11 | Stop reason: HOSPADM

## 2025-01-10 RX ORDER — KETOROLAC TROMETHAMINE 15 MG/ML
7.5 INJECTION, SOLUTION INTRAMUSCULAR; INTRAVENOUS EVERY 6 HOURS PRN
Status: DISCONTINUED | OUTPATIENT
Start: 2025-01-10 | End: 2025-01-11 | Stop reason: HOSPADM

## 2025-01-10 RX ADMIN — OXYCODONE 10 MG: 5 TABLET ORAL at 06:56

## 2025-01-10 RX ADMIN — OXYCODONE 10 MG: 5 TABLET ORAL at 23:49

## 2025-01-10 RX ADMIN — ACETAMINOPHEN 1000 MG: 10 INJECTION, SOLUTION INTRAVENOUS at 11:45

## 2025-01-10 RX ADMIN — HYDROMORPHONE HYDROCHLORIDE 0.5 MG: 1 INJECTION, SOLUTION INTRAMUSCULAR; INTRAVENOUS; SUBCUTANEOUS at 00:47

## 2025-01-10 RX ADMIN — HYDROMORPHONE HYDROCHLORIDE 0.5 MG: 1 INJECTION, SOLUTION INTRAMUSCULAR; INTRAVENOUS; SUBCUTANEOUS at 21:02

## 2025-01-10 RX ADMIN — GABAPENTIN 200 MG: 100 CAPSULE ORAL at 14:24

## 2025-01-10 RX ADMIN — KETOROLAC TROMETHAMINE 7.5 MG: 15 INJECTION, SOLUTION INTRAMUSCULAR; INTRAVENOUS at 23:53

## 2025-01-10 RX ADMIN — GABAPENTIN 200 MG: 100 CAPSULE ORAL at 03:51

## 2025-01-10 RX ADMIN — POLYETHYLENE GLYCOL 3350 17 G: 17 POWDER, FOR SOLUTION ORAL at 09:22

## 2025-01-10 RX ADMIN — ASPIRIN 81 MG: 81 TABLET, COATED ORAL at 21:02

## 2025-01-10 RX ADMIN — ACETAMINOPHEN 1000 MG: 10 INJECTION, SOLUTION INTRAVENOUS at 17:24

## 2025-01-10 RX ADMIN — METHOCARBAMOL 750 MG: 500 TABLET ORAL at 21:02

## 2025-01-10 RX ADMIN — CEFAZOLIN SODIUM 2 G: 2 INJECTION, SOLUTION INTRAVENOUS at 09:21

## 2025-01-10 RX ADMIN — LIDOCAINE 4% 1 PATCH: 40 PATCH TOPICAL at 09:21

## 2025-01-10 RX ADMIN — HYDROMORPHONE HYDROCHLORIDE 0.5 MG: 1 INJECTION, SOLUTION INTRAMUSCULAR; INTRAVENOUS; SUBCUTANEOUS at 13:49

## 2025-01-10 RX ADMIN — MAGNESIUM SULFATE HEPTAHYDRATE 2 G: 40 INJECTION, SOLUTION INTRAVENOUS at 17:52

## 2025-01-10 RX ADMIN — HYDROXYZINE HYDROCHLORIDE 25 MG: 25 TABLET, FILM COATED ORAL at 09:39

## 2025-01-10 RX ADMIN — METHOCARBAMOL 750 MG: 500 TABLET ORAL at 13:46

## 2025-01-10 RX ADMIN — ACETAMINOPHEN 650 MG: 325 TABLET ORAL at 00:08

## 2025-01-10 RX ADMIN — ASPIRIN 81 MG: 81 TABLET, COATED ORAL at 09:21

## 2025-01-10 RX ADMIN — KETOROLAC TROMETHAMINE 7.5 MG: 15 INJECTION, SOLUTION INTRAMUSCULAR; INTRAVENOUS at 11:16

## 2025-01-10 RX ADMIN — HYDROMORPHONE HYDROCHLORIDE 0.5 MG: 1 INJECTION, SOLUTION INTRAMUSCULAR; INTRAVENOUS; SUBCUTANEOUS at 09:21

## 2025-01-10 RX ADMIN — SODIUM CHLORIDE, POTASSIUM CHLORIDE, SODIUM LACTATE AND CALCIUM CHLORIDE 100 ML/HR: 600; 310; 30; 20 INJECTION, SOLUTION INTRAVENOUS at 03:54

## 2025-01-10 RX ADMIN — HYDROMORPHONE HYDROCHLORIDE 0.5 MG: 1 INJECTION, SOLUTION INTRAMUSCULAR; INTRAVENOUS; SUBCUTANEOUS at 03:51

## 2025-01-10 RX ADMIN — OXYCODONE 10 MG: 5 TABLET ORAL at 02:12

## 2025-01-10 RX ADMIN — OXYCODONE 10 MG: 5 TABLET ORAL at 17:24

## 2025-01-10 RX ADMIN — ACETAMINOPHEN 650 MG: 325 TABLET ORAL at 06:57

## 2025-01-10 RX ADMIN — CEFAZOLIN SODIUM 2 G: 2 INJECTION, SOLUTION INTRAVENOUS at 00:08

## 2025-01-10 RX ADMIN — METHOCARBAMOL 750 MG: 500 TABLET ORAL at 06:57

## 2025-01-10 RX ADMIN — GABAPENTIN 200 MG: 100 CAPSULE ORAL at 21:02

## 2025-01-10 RX ADMIN — OXYCODONE 10 MG: 5 TABLET ORAL at 11:16

## 2025-01-10 RX ADMIN — ALBUTEROL SULFATE 2 PUFF: 90 AEROSOL, METERED RESPIRATORY (INHALATION) at 00:47

## 2025-01-10 RX ADMIN — CYCLOBENZAPRINE 10 MG: 10 TABLET, FILM COATED ORAL at 09:39

## 2025-01-10 ASSESSMENT — COGNITIVE AND FUNCTIONAL STATUS - GENERAL
DRESSING REGULAR UPPER BODY CLOTHING: A LITTLE
MOBILITY SCORE: 18
CLIMB 3 TO 5 STEPS WITH RAILING: A LITTLE
TOILETING: A LITTLE
TOILETING: A LITTLE
EATING MEALS: A LITTLE
TURNING FROM BACK TO SIDE WHILE IN FLAT BAD: A LITTLE
DAILY ACTIVITIY SCORE: 17
DRESSING REGULAR LOWER BODY CLOTHING: A LOT
DAILY ACTIVITIY SCORE: 17
DRESSING REGULAR UPPER BODY CLOTHING: A LITTLE
STANDING UP FROM CHAIR USING ARMS: A LITTLE
WALKING IN HOSPITAL ROOM: A LITTLE
DRESSING REGULAR UPPER BODY CLOTHING: A LITTLE
MOVING TO AND FROM BED TO CHAIR: A LITTLE
MOVING FROM LYING ON BACK TO SITTING ON SIDE OF FLAT BED WITH BEDRAILS: A LITTLE
EATING MEALS: A LITTLE
CLIMB 3 TO 5 STEPS WITH RAILING: A LITTLE
PERSONAL GROOMING: A LITTLE
MOVING FROM LYING ON BACK TO SITTING ON SIDE OF FLAT BED WITH BEDRAILS: A LITTLE
PERSONAL GROOMING: A LITTLE
WALKING IN HOSPITAL ROOM: A LITTLE
HELP NEEDED FOR BATHING: A LITTLE
DAILY ACTIVITIY SCORE: 18
DRESSING REGULAR LOWER BODY CLOTHING: A LOT
MOBILITY SCORE: 18
STANDING UP FROM CHAIR USING ARMS: A LITTLE
MOVING TO AND FROM BED TO CHAIR: A LITTLE
TOILETING: A LITTLE
HELP NEEDED FOR BATHING: A LITTLE
HELP NEEDED FOR BATHING: A LOT
DRESSING REGULAR LOWER BODY CLOTHING: A LITTLE
TURNING FROM BACK TO SIDE WHILE IN FLAT BAD: A LITTLE
PERSONAL GROOMING: A LITTLE

## 2025-01-10 ASSESSMENT — PAIN DESCRIPTION - LOCATION
LOCATION: ARM

## 2025-01-10 ASSESSMENT — PAIN SCALES - GENERAL
PAINLEVEL_OUTOF10: 8
PAINLEVEL_OUTOF10: 8
PAINLEVEL_OUTOF10: 10 - WORST POSSIBLE PAIN
PAINLEVEL_OUTOF10: 10 - WORST POSSIBLE PAIN
PAINLEVEL_OUTOF10: 8
PAINLEVEL_OUTOF10: 10 - WORST POSSIBLE PAIN
PAINLEVEL_OUTOF10: 9
PAINLEVEL_OUTOF10: 9
PAINLEVEL_OUTOF10: 7
PAINLEVEL_OUTOF10: 8
PAINLEVEL_OUTOF10: 9
PAINLEVEL_OUTOF10: 10 - WORST POSSIBLE PAIN

## 2025-01-10 ASSESSMENT — PAIN - FUNCTIONAL ASSESSMENT
PAIN_FUNCTIONAL_ASSESSMENT: 0-10

## 2025-01-10 ASSESSMENT — PAIN DESCRIPTION - DESCRIPTORS
DESCRIPTORS: THROBBING
DESCRIPTORS: THROBBING

## 2025-01-10 ASSESSMENT — PAIN DESCRIPTION - ORIENTATION
ORIENTATION: LEFT

## 2025-01-10 ASSESSMENT — ACTIVITIES OF DAILY LIVING (ADL)
BATHING_ASSISTANCE: MODERATE
HOME_MANAGEMENT_TIME_ENTRY: 15
LACK_OF_TRANSPORTATION: NO
ADL_ASSISTANCE: INDEPENDENT

## 2025-01-10 ASSESSMENT — PAIN SCALES - WONG BAKER: WONGBAKER_NUMERICALRESPONSE: HURTS WHOLE LOT

## 2025-01-10 NOTE — PROGRESS NOTES
Occupational Therapy    Evaluation and Treatment    Patient Name: Judith Bush  MRN: [id number]  Today's Date: 1/10/2025  Room: [address] [place] [address]  Time Calculation  Start Time: 0907  Stop Time: 0942  Time Calculation (min): 35 min    Assessment  IP OT Assessment  Prognosis: Good  Barriers to Discharge Home: No anticipated barriers  Evaluation/Treatment Tolerance: Patient limited by pain  End of Session Communication: Bedside nurse  End of Session Patient Position: Bed, 3 rail up, Alarm on  Plan:  Inpatient Plan  Treatment Interventions: ADL retraining, Functional transfer training, Endurance training, UE strengthening/ROM, Compensatory technique education  OT Frequency: 3 times per week  OT Discharge Recommendations: Low intensity level of continued care (pt will benefit from additional overnight stay at hospital and increased family assistance at home)  OT Recommended Transfer Status: Minimal assist, Assist of 1  OT - OK to Discharge: Yes  OT Assessment  OT Assessment Results: Decreased ADL status, Decreased upper extremity strength, Decreased functional mobility, Decreased IADLs  Prognosis: Good  Barriers to Discharge: Decreased caregiver support  Evaluation/Treatment Tolerance: Patient limited by pain    Subjective   Current Problem:  1. Other fracture of shaft of left humerus, initial encounter for closed fracture  aspirin 81 mg EC tablet    oxyCODONE (Roxicodone) 5 mg immediate release tablet    docusate sodium (Colace) 100 mg capsule    calcium carbonate-vitamin D3 600 mg-10 mcg (400 unit) tablet        General:  Reason for Referral: L cliff-implant humeral shaft fracture, now s/p ORIF L humerus on 1/9  Past Medical History Relevant to Rehab: recent L UE ORIF, s/p falls, COPD, SOHN, anxiety, Bipolar, CVA, lumbar radiculopathy  Prior to Session Communication: Bedside nurse  Patient Position Received: Bed, 3 rail up, Alarm on  General Comment: Pt in supine on arrival, willing to participate with encouragement  "  Precautions:  UE Weight Bearing Status:  (L UE PWB, coffee cup WB in LUE, ROM as tolerated, sling for comfort)  Medical Precautions: Fall precautions      Pain:  Pain Assessment  0-10 (Numeric) Pain Score: 10 - Worst possible pain  Pain Type: Acute pain, Surgical pain  Pain Location: Arm  Pain Orientation: Left  Pain Interventions: Medication (See MAR), Repositioned, Cold applied        Objective   Cognition:  Overall Cognitive Status: Within Functional Limits  Orientation Level: Oriented X4  Insight: Mild  Impulsive: Mildly           Home Living:  Type of Home: House  Lives With: Significant other (\"friend\")  Home Adaptive Equipment: Walker rolling or standard, Scooter, Cane  Home Layout: Two level  Home Access: Stairs to enter with rails  Entrance Stairs-Number of Steps: 2  Bathroom Shower/Tub: Walk-in shower  Bathroom Equipment: Grab bars in shower   Prior Function:  Level of [place]: Independent with ADLs and functional transfers, Independent with homemaking with ambulation  Receives Help From: Family  ADL Assistance: Independent  Homemaking Assistance: Independent  Ambulatory Assistance: Independent  Vocational: Full time employment ([employer])       ADL:  Eating Assistance: Stand by  Eating Deficit: Setup  Grooming Assistance: Stand by  Grooming Deficit: Setup  Bathing Assistance: Moderate  UE Dressing Assistance: Moderate  UE Dressing Deficit: Thread LUE, Pull around back, Supervision/safety, Verbal cueing  LE Dressing Assistance: Moderate  Toileting Assistance with Device: Minimal  Activity Tolerance:  Endurance: Decreased tolerance for upright activites       Bed Mobility/Transfers: Bed Mobility/Transfers: Bed Mobility  Bed Mobility: Yes  Bed Mobility 1  Bed Mobility 1: Supine to sitting, Sitting to supine  Level of Assistance 1: Contact guard  Bed Mobility Comments 1: HOB elevated  Functional Mobility  Functional Mobility Performed: Yes  Functional Mobility 1  Surface 1: Level tile  Device 1: No " device (one hand suport)  Assistance 1: Contact guard  Comments 1: took few forwad/side steps, limited distance d/t pain in  [place] from IV and L shouolder pain   and Transfers  Transfer: Yes  Transfer 1  Transfer From 1: Sit to, Stand to  Transfer to 1: Stand, Sit  Technique 1: Sit to stand, Stand to sit  Transfer Level of Assistance 1: Contact guard  Trials/Comments 1: completed 2 trials       Vision: Vision - Basic Assessment  Current Vision: Wears glasses all the time    Sensation:  Light Touch: No apparent deficits  Strength:  Strength Comments: R UE WFL        Hand Function:  Hand Function  Gross Grasp: Functional  Coordination: Functional  Extremities:   RUE   RUE : Within Functional Limits, LUE   LUE: Exceptions to WFL  LUE AROM (degrees)  LUE AROM Comment: limited d/t pain, distance ROM WFL, RLE   RLE : Within Functional Limits, and LLE   LLE : Within Functional Limits      Outcome Measures: [facility] Daily Activity  Putting on and taking off regular lower body clothing: A lot  Bathing (including washing, rinsing, drying): A lot  Putting on and taking off regular upper body clothing: A little  Toileting, which includes using toilet, bedpan or urinal: A little  Taking care of personal grooming such as brushing teeth: A little  Eating Meals: None  Daily Activity - Total Score: 17         ,     OT Adult Other Outcome Measures  4AT: negative    Education Documentation  Body Mechanics, taught by Zoë Pedersen OT at 1/10/2025  1:42 PM.  Learner: Patient  Readiness: Eager  Method: Explanation  Response: Verbalizes Understanding    Precautions, taught by Zoë Pedersen OT at 1/10/2025  1:42 PM.  Learner: Patient  Readiness: Eager  Method: Explanation  Response: Verbalizes Understanding    ADL Training, taught by Zoë Pedersen OT at 1/10/2025  1:42 PM.  Learner: Patient  Readiness: Eager  Method: Explanation  Response: Verbalizes Understanding    Education Comments  No comments found.        Goals:    Encounter Problems       Encounter Problems (Active)       ADLs       Patient with complete upper body dressing with modified independent level of assistance donning and doffing all UE clothes with no adaptive equipment while edge of bed  (Progressing)       Start:  01/10/25    Expected End:  01/24/25            Patient with complete lower body dressing with modified independent level of assistance donning and doffing all LE clothes  with PRN adaptive equipment while edge of bed  (Progressing)       Start:  01/10/25    Expected End:  01/24/25            Patient will complete toileting including hygiene clothing management/hygiene with modified independent level of assistance and raised toilet seat. (Progressing)       Start:  01/10/25    Expected End:  01/24/25               COGNITION/SAFETY       Patient will recall and adhere to weight bearing and /or ROM restrictions with all ADL and functional mobility in order to promote healing and safety with functional tasks (Progressing)       Start:  01/10/25    Expected End:  01/24/25               EXERCISE/STRENGTHENING       Patient will be educated on LUE HEP for increased ADL performance. (Progressing)       Start:  01/10/25    Expected End:  01/24/25               MOBILITY       Patient will perform Functional mobility min Household distances with modified independent level of assistance and least restrictive device in order to improve safety and functional mobility. (Progressing)       Start:  01/10/25    Expected End:  01/24/25               TRANSFERS       Patient will perform bed mobility modified independent level of assistance and bed rails in order to improve safety and independence with mobility (Progressing)       Start:  01/10/25    Expected End:  01/24/25            Patient will complete sit to stand transfer with modified independent level of assistance and least restrictive device in order to improve safety and prepare for out of bed mobility.  (Progressing)       Start:  01/10/25    Expected End:  01/24/25                   Treatment Completed on Evaluation    Activities of Daily Living:          UE Dressing  UE Dressing Level of Assistance: Minimum assistance  UE Dressing Where Assessed: Bed level  UE Dressing Comments: Donned clean gown with Min A, pt educated on one handed dressing technique. Pt required Mod A to amrit sling.          Therapy/Activity:     Therapeutic Activity  Therapeutic Activity Performed: Yes  Therapeutic Activity 1: Pt educated on L UE WB, gentle ROM and positioning. Pt completed 2 trial of sit to stand with CGA, limited functional mobility performed d/t L UE pain and R ankle pain d/t IV.            01/10/25 at 1:43 PM   Zoë Pedersen OT   Rehab Office: [phone]

## 2025-01-10 NOTE — CARE PLAN
The patient's goals for the shift include Keep pt comfortable      The clinical goals for the shift include Pain control

## 2025-01-10 NOTE — PROGRESS NOTES
Postop Pain HPI -   Palliative: relieved with IV analgesics and regional local anesthetics  Provocative: movement  Quality:  burning and aching  Radiation:  none  Severity:  6/10  Timing: constant    24-HOUR OPIOID CONSUMPTION:  Oxycodone 25 mg, Dilaudid 2mg     Scheduled medications  acetaminophen, 650 mg, oral, q6h ISAÍAS  aspirin, 81 mg, oral, BID  ceFAZolin, 2 g, intravenous, q8h  influenza, 0.5 mL, intramuscular, During hospitalization  gabapentin, 200 mg, oral, BID  lidocaine, 1 patch, transdermal, Daily  methocarbamol, 750 mg, oral, q8h ISAÍAS  polyethylene glycol, 17 g, oral, Daily      Continuous medications  lactated Ringer's, 100 mL/hr, Last Rate: 100 mL/hr (01/10/25 0354)      PRN medications  PRN medications: albuterol, bisacodyl, cyclobenzaprine, hydrALAZINE, HYDROmorphone, hydrOXYzine HCL, ketorolac, naloxone, ondansetron ODT **OR** ondansetron, oxyCODONE, oxyCODONE, oxyCODONE     Physical Exam:  Constitutional:  no distress, alert and cooperative  Eyes: clear sclera  Head/Neck: No apparent injury, trachea midline  Respiratory/Thorax: Patent airways, thorax symmetric, breathing comfortably  Cardiovascular: no pitting edema  Gastrointestinal: Nondistended  Musculoskeletal: ROM intact  Extremities: no clubbing  Neurological: alert, rosado x4  Psychological: Appropriate affect    Results for orders placed or performed during the hospital encounter of 01/09/25 (from the past 24 hours)   CBC   Result Value Ref Range    WBC 15.7 (H) 4.4 - 11.3 x10*3/uL    nRBC 0.0 0.0 - 0.0 /100 WBCs    RBC 4.24 4.00 - 5.20 x10*6/uL    Hemoglobin 12.9 12.0 - 16.0 g/dL    Hematocrit 39.0 36.0 - 46.0 %    MCV 92 80 - 100 fL    MCH 30.4 26.0 - 34.0 pg    MCHC 33.1 32.0 - 36.0 g/dL    RDW 14.4 11.5 - 14.5 %    Platelets 395 150 - 450 x10*3/uL   Basic metabolic panel   Result Value Ref Range    Glucose 87 74 - 99 mg/dL    Sodium 139 136 - 145 mmol/L    Potassium 3.5 3.5 - 5.3 mmol/L    Chloride 102 98 - 107 mmol/L    Bicarbonate 24 21 -  32 mmol/L    Anion Gap 17 10 - 20 mmol/L    Urea Nitrogen 9 6 - 23 mg/dL    Creatinine 0.66 0.50 - 1.05 mg/dL    eGFR >90 >60 mL/min/1.73m*2    Calcium 9.9 8.6 - 10.6 mg/dL     *Note: Due to a large number of results and/or encounters for the requested time period, some results have not been displayed. A complete set of results can be found in Results Review.       Judith Bush is a 69 y.o. year old female patient who presents for Open Reduction Internal Fixation Humerus - Left (Left: Arm Upper) with Guillermo Roberts MD on 01/09/2025. Acute Pain consulted for block for postoperative pain control.      Plan:     - Left supraclavicular block performed preoperatively on 01/09/2025  - Considering increasing gabapentin to 200 mg TID   - Consider giving 2G magnesium IV once  - Increase tylenol 975 mg IV q6h   - Pain medications per primary team  - Acute pain will sign off.     Acute Pain Team  pg [phone] ph [phone]. Consults  Acute Pain Service

## 2025-01-10 NOTE — CARE PLAN
The patient's goals for the shift include      The clinical goals for the shift include pain control      Problem: Fall/Injury  Goal: Not fall by end of shift  Outcome: Progressing  Goal: Be free from injury by end of the shift  Outcome: Progressing  Goal: Verbalize understanding of personal risk factors for fall in the hospital  Outcome: Progressing  Goal: Verbalize understanding of risk factor reduction measures to prevent injury from fall in the home  Outcome: Progressing  Goal: Use assistive devices by end of the shift  Outcome: Progressing  Goal: Pace activities to prevent fatigue by end of the shift  Outcome: Progressing     Problem: Pain - Adult  Goal: Verbalizes/displays adequate comfort level or baseline comfort level  Outcome: Progressing     Problem: Safety - Adult  Goal: Free from fall injury  Outcome: Progressing     Problem: Discharge Planning  Goal: Discharge to home or other facility with appropriate resources  Outcome: Progressing     Problem: Chronic Conditions and Co-morbidities  Goal: Patient's chronic conditions and co-morbidity symptoms are monitored and maintained or improved  Outcome: Progressing     Problem: Pain  Goal: Takes deep breaths with improved pain control throughout the shift  Outcome: Progressing  Goal: Turns in bed with improved pain control throughout the shift  Outcome: Progressing  Goal: Walks with improved pain control throughout the shift  Outcome: Progressing  Goal: Performs ADL's with improved pain control throughout shift  Outcome: Progressing  Goal: Participates in PT with improved pain control throughout the shift  Outcome: Progressing  Goal: Free from opioid side effects throughout the shift  Outcome: Progressing  Goal: Free from acute confusion related to pain meds throughout the shift  Outcome: Progressing

## 2025-01-10 NOTE — PROGRESS NOTES
01/10/25 1000   Discharge Planning   Living Arrangements Alone   Support Systems Friends/neighbors   Assistance Needed Yes   Type of Residence Private residence   Number of Stairs to Enter Residence 2   Number of Stairs Within Residence 13   Do you have animals or pets at home? Yes   Type of Animals or Pets Dog   Who is requesting discharge planning? Provider   Home or Post Acute Services Post acute facilities (Rehab/SNF/etc)   Type of Post Acute Facility Services Rehab   Expected Discharge Disposition Rehab   Financial Resource Strain   How hard is it for you to pay for the very basics like food, housing, medical care, and heating? Not very   Housing Stability   In the last 12 months, was there a time when you were not able to pay the mortgage or rent on time? N   In the past 12 months, how many times have you moved where you were living? 0   At any time in the past 12 months, were you homeless or living in a shelter (including now)? N   Transportation Needs   In the past 12 months, has lack of transportation kept you from medical appointments or from getting medications? no   In the past 12 months, has lack of transportation kept you from meetings, work, or from getting things needed for daily living? No   Stroke Family Assessment   Stroke Family Assessment Needed No   Intensity of Service   Intensity of Service 0-30 min     I spoke with Judith regarding discharge planning and home going needs. 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 Patient states that she is independent with ADL's she does have a walker, rollator and cane in the home. Patient is pending medical clearance and therapy recommendations. I will continue to follow with a safe discharge plan.

## 2025-01-10 NOTE — PROGRESS NOTES
"Orthopaedic Surgery Progress Note  Subjective    S:    SBP up to 180s overnight. Pain poorly controlled on current regimen. Tearful during conversation.       Objective    O:  BP (!) 178/91 (Patient Position: Lying)   Pulse 84   Temp 37.4 °C (99.3 °F) (Temporal)   Resp 16   Ht 1.549 m (5' 1\")   Wt 52.8 kg (116 lb 6.5 oz)   SpO2 95%   BMI 21.99 kg/m²     GEN - NAD, resting comfortably in hospital bed  HEENT - MMM, EOMI, NCAT  CV - RRR by peripheral palpation, limbs wwp  PULM - NWOB on RA  ABD - Non-distended  NEURO - CRANE spontaneously, CNs II - XII grossly intact  PSYCH - Appropriate mood and affect    MSK:  LUE  -Mepilex to L shoulder cdi  -Fires AIN/PIN/u  -SILT m/r/u  -hand wwp, brisk cap refill, 2+ radial pulse  -Compartments soft and compressible, no pain with passive stretch         Assessment:  69 y.o. female with L cliff-implant humeral shaft fracture, now s/p ORIF L humerus on 1/9 with Dr. Roberts.       Plan:  - PRN hydralazine for SBP >170  - Clear liquid diet, okay to advance as tolerated. Bowel Regimen: Colace, senna, dulcolax.  - Multimodal pain therapy: scheduled tylenol, prn oxycodone, dilaudid prn for breakthrough; will consider toradol/pain management consult  - mIVF to continue until patient is tolerating good PO intake, HLIV w/ good PO; Will monitor BMP on POD 1 and prn thereafter  - Weightbearing: coffee cup WB in LUE, ROM as tolerated, sling for comfort. OT consult, to see by POD1 at the latest.   - Perioperative ancef q8 for 24 hours  - No indication for transfusion; monitor CBC POD1, repeat prn thereafter.  - DVT PPx: SCD, ASA 81 mg BID for chemoPPx   - Maintain PIV  - Drain: none  - Continue home meds  - Glycemic: No issues    Dispo: pending OT, pain control    This plan was discussed with the attending, Dr. Roberts.    Clarissa Jameson MD, PGY-2  Orthopaedic Surgery  On-call: [phone]  Epic Chat Preferred    This patient will be followed by the Orthopaedic Trauma service. Please " page or Epic Chat the corresponding residents below with questions or concerns.     Ortho Trauma Service (Epic Chat Preferred)  First Call: Mitch Turner, PGY-1; Jose Elias Martinez, PGY-1  Second Call: Clarissa Jameson, PGY-2  Third Call: Scott Doherty, PGY-3    6pm-6am M-F, Holidays, and weekends page Ortho on-call @[phone] with urgent questions/concerns.

## 2025-01-11 ENCOUNTER — HOME HEALTH ADMISSION (OUTPATIENT)
Dept: HOME HEALTH SERVICES | Facility: HOME HEALTH | Age: 70
End: 2025-01-11
Payer: COMMERCIAL

## 2025-01-11 ENCOUNTER — PHARMACY VISIT (OUTPATIENT)
Dept: PHARMACY | Facility: CLINIC | Age: 70
End: 2025-01-11
Payer: COMMERCIAL

## 2025-01-11 ENCOUNTER — DOCUMENTATION (OUTPATIENT)
Dept: HOME HEALTH SERVICES | Facility: HOME HEALTH | Age: 70
End: 2025-01-11
Payer: COMMERCIAL

## 2025-01-11 VITALS
HEIGHT: 61 IN | DIASTOLIC BLOOD PRESSURE: 78 MMHG | OXYGEN SATURATION: 94 % | SYSTOLIC BLOOD PRESSURE: 128 MMHG | BODY MASS INDEX: 21.98 KG/M2 | TEMPERATURE: 98.4 F | WEIGHT: 116.4 LBS | HEART RATE: 88 BPM | RESPIRATION RATE: 18 BRPM

## 2025-01-11 LAB
ANION GAP SERPL CALC-SCNC: 11 MMOL/L (ref 10–20)
BUN SERPL-MCNC: 10 MG/DL (ref 6–23)
CALCIUM SERPL-MCNC: 8.6 MG/DL (ref 8.6–10.6)
CHLORIDE SERPL-SCNC: 101 MMOL/L (ref 98–107)
CO2 SERPL-SCNC: 26 MMOL/L (ref 21–32)
CREAT SERPL-MCNC: 0.71 MG/DL (ref 0.5–1.05)
EGFRCR SERPLBLD CKD-EPI 2021: >90 ML/MIN/1.73M*2
ERYTHROCYTE [DISTWIDTH] IN BLOOD BY AUTOMATED COUNT: 14.6 % (ref 11.5–14.5)
GLUCOSE SERPL-MCNC: 91 MG/DL (ref 74–99)
HCT VFR BLD AUTO: 31.6 % (ref 36–46)
HGB BLD-MCNC: 10.3 G/DL (ref 12–16)
MCH RBC QN AUTO: 30.9 PG (ref 26–34)
MCHC RBC AUTO-ENTMCNC: 32.6 G/DL (ref 32–36)
MCV RBC AUTO: 95 FL (ref 80–100)
NRBC BLD-RTO: 0 /100 WBCS (ref 0–0)
PLATELET # BLD AUTO: 308 X10*3/UL (ref 150–450)
POTASSIUM SERPL-SCNC: 3.6 MMOL/L (ref 3.5–5.3)
RBC # BLD AUTO: 3.33 X10*6/UL (ref 4–5.2)
SODIUM SERPL-SCNC: 134 MMOL/L (ref 136–145)
WBC # BLD AUTO: 9.2 X10*3/UL (ref 4.4–11.3)

## 2025-01-11 PROCEDURE — 80048 BASIC METABOLIC PNL TOTAL CA: CPT

## 2025-01-11 PROCEDURE — 2500000001 HC RX 250 WO HCPCS SELF ADMINISTERED DRUGS (ALT 637 FOR MEDICARE OP)

## 2025-01-11 PROCEDURE — 36415 COLL VENOUS BLD VENIPUNCTURE: CPT

## 2025-01-11 PROCEDURE — 85027 COMPLETE CBC AUTOMATED: CPT

## 2025-01-11 PROCEDURE — 7100000011 HC EXTENDED STAY RECOVERY HOURLY - NURSING UNIT

## 2025-01-11 PROCEDURE — RXMED WILLOW AMBULATORY MEDICATION CHARGE

## 2025-01-11 PROCEDURE — 2500000004 HC RX 250 GENERAL PHARMACY W/ HCPCS (ALT 636 FOR OP/ED)

## 2025-01-11 PROCEDURE — 2500000005 HC RX 250 GENERAL PHARMACY W/O HCPCS

## 2025-01-11 PROCEDURE — 2500000004 HC RX 250 GENERAL PHARMACY W/ HCPCS (ALT 636 FOR OP/ED): Mod: JZ,JG,TB

## 2025-01-11 RX ORDER — CYCLOBENZAPRINE HCL 5 MG
5 TABLET ORAL 3 TIMES DAILY PRN
Qty: 30 TABLET | Refills: 0 | Status: SHIPPED | OUTPATIENT
Start: 2025-01-11

## 2025-01-11 RX ORDER — NAPROXEN 500 MG/1
500 TABLET ORAL 2 TIMES DAILY
Qty: 60 TABLET | Refills: 0 | Status: SHIPPED | OUTPATIENT
Start: 2025-01-11 | End: 2025-02-10

## 2025-01-11 RX ORDER — ACETAMINOPHEN 325 MG/1
650 TABLET ORAL EVERY 6 HOURS PRN
Qty: 30 TABLET | Refills: 0 | Status: SHIPPED | OUTPATIENT
Start: 2025-01-11

## 2025-01-11 RX ADMIN — ASPIRIN 81 MG: 81 TABLET, COATED ORAL at 08:57

## 2025-01-11 RX ADMIN — OXYCODONE 10 MG: 5 TABLET ORAL at 05:08

## 2025-01-11 RX ADMIN — OXYCODONE 10 MG: 5 TABLET ORAL at 08:57

## 2025-01-11 RX ADMIN — METHOCARBAMOL 750 MG: 500 TABLET ORAL at 05:08

## 2025-01-11 RX ADMIN — BISACODYL 10 MG: 5 TABLET, COATED ORAL at 09:13

## 2025-01-11 RX ADMIN — LIDOCAINE 4% 1 PATCH: 40 PATCH TOPICAL at 08:56

## 2025-01-11 RX ADMIN — HYDROMORPHONE HYDROCHLORIDE 0.5 MG: 1 INJECTION, SOLUTION INTRAMUSCULAR; INTRAVENOUS; SUBCUTANEOUS at 06:34

## 2025-01-11 RX ADMIN — CYCLOBENZAPRINE 10 MG: 10 TABLET, FILM COATED ORAL at 06:34

## 2025-01-11 RX ADMIN — GABAPENTIN 200 MG: 100 CAPSULE ORAL at 08:57

## 2025-01-11 RX ADMIN — OXYCODONE 10 MG: 5 TABLET ORAL at 13:22

## 2025-01-11 RX ADMIN — ACETAMINOPHEN 1000 MG: 10 INJECTION, SOLUTION INTRAVENOUS at 00:23

## 2025-01-11 RX ADMIN — POLYETHYLENE GLYCOL 3350 17 G: 17 POWDER, FOR SOLUTION ORAL at 08:57

## 2025-01-11 ASSESSMENT — PAIN - FUNCTIONAL ASSESSMENT
PAIN_FUNCTIONAL_ASSESSMENT: 0-10

## 2025-01-11 ASSESSMENT — PAIN SCALES - GENERAL
PAINLEVEL_OUTOF10: 10 - WORST POSSIBLE PAIN
PAINLEVEL_OUTOF10: 6
PAINLEVEL_OUTOF10: 7
PAINLEVEL_OUTOF10: 8
PAINLEVEL_OUTOF10: 0 - NO PAIN
PAINLEVEL_OUTOF10: 7
PAINLEVEL_OUTOF10: 7
PAINLEVEL_OUTOF10: 8

## 2025-01-11 ASSESSMENT — PAIN DESCRIPTION - DESCRIPTORS
DESCRIPTORS: THROBBING;SHARP
DESCRIPTORS: ACHING;THROBBING
DESCRIPTORS: THROBBING

## 2025-01-11 NOTE — DISCHARGE INSTRUCTIONS
Orthopaedic Surgery Discharge Instructions:  Follow-Up Instructions  You will need to be seen in clinic by Dr. Roberts in 1-2 weeks for a post-operative evaluation.    You will need to call and schedule an appointment, unless there is a previous appointment that appears on your discharge instructions.  The direct orthopaedic clinic appointment line phone number is [phone].  Please do not delay in calling to make this appointment.    You should also follow up with your primary care provider in 1-2 weeks.    Activity Restrictions  1) No driving until further instructed by your orthopaedic physician, which will be addressed at your outpatient appointments.    2) No driving or operating heavy machinery while taking narcotic pain medication.    3) Weight bearing status --> coffee cup  weight bearing in LUE, range of motion as tolerated    Discharge Medications  You have been sent home with the following home medications: Oxycodone, Colace, and Aspirin.  Please wean yourself off the oxycodone, as tolerated. A good time to take the medication is before physical therapy sessions and bedtime. Colace is a stool softener to reduce the narcotic pain medications cause. Take it twice a day while taking narcotic pain medication to ensure you maintain your regular bowel movement frequency. Baby aspirin is taken twice daily to help reduce your risk of blood clots.    You should also take tylenol 650mg every 6 hours as needed to reduce the amount of oxycodone you need for pain.    Wound care instructions:   1) Leave operative dressing in place until post op day 7. Then remove and leave incision open to air. Let water run freely over incision when showering, do not scrub. Do not soak in pool or tub.    2) Call if any drainage after 7 days, increased redness/warmth/swelling at incision site, abnormal pain/tenderness of the extremity, abnormal swelling of the extremity that does not respond to elevation, SOB/chest pain.

## 2025-01-11 NOTE — PROGRESS NOTES
"Orthopaedic Surgery Progress Note  Subjective    S:    Doing much better. Pain controlled. Eager to go home today.      Objective    O:  /68 (BP Location: Right arm, Patient Position: Lying)   Pulse 70   Temp 36.7 °C (98 °F) (Temporal)   Resp 18   Ht 1.549 m (5' 1\")   Wt 52.8 kg (116 lb 6.5 oz)   SpO2 96%   BMI 21.99 kg/m²     GEN - NAD, resting comfortably in hospital bed  HEENT - MMM, EOMI, NCAT  CV - RRR by peripheral palpation, limbs wwp  PULM - NWOB on RA  ABD - Non-distended  NEURO - CRANE spontaneously, CNs II - XII grossly intact  PSYCH - Appropriate mood and affect    MSK:  LUE  -Mepilex to L shoulder cdi  -Fires AIN/PIN/u  -SILT m/r/u  -hand wwp, brisk cap refill, 2+ radial pulse  -Compartments soft and compressible, no pain with passive stretch         Assessment:  69 y.o. female with L cliff-implant humeral shaft fracture, now s/p ORIF L humerus on 1/9 with Dr. Roberts.       Plan:  - PRN hydralazine for SBP >170  - Clear liquid diet, okay to advance as tolerated. Bowel Regimen: Colace, senna, dulcolax.  - Multimodal pain therapy: scheduled tylenol, prn oxycodone, dilaudid prn for breakthrough; will consider toradol/pain management consult  - mIVF to continue until patient is tolerating good PO intake, HLIV w/ good PO; Will monitor BMP on POD 1 and prn thereafter  - Weightbearing: coffee cup WB in LUE, ROM as tolerated, sling for comfort. OT consult, to see by POD1 at the latest.   - Perioperative ancef q8 for 24 hours  - No indication for transfusion; monitor CBC POD1, repeat prn thereafter.  - DVT PPx: SCD, ASA 81 mg BID for chemoPPx   - Maintain PIV  - Drain: none  - Continue home meds  - Glycemic: No issues    Dispo: Home with [facility] today    This plan was discussed with the attending, Dr. Roberts.    Clarissa Jameson MD, PGY-2  Orthopaedic Surgery  On-call: [phone]  Epic Chat Preferred    This patient will be followed by the Orthopaedic Trauma service. Please page or Epic Chat the " corresponding residents below with questions or concerns.     Ortho Trauma Service (Epic Chat Preferred)  First Call: Mitch Turner, PGY-1; Jose Elias Martinez, PGY-1  Second Call: Clarissa Jameson, PGY-2  Third Call: Scott Doherty, PGY-3    6pm-6am M-F, Holidays, and weekends page Ortho on-call @[phone] with urgent questions/concerns.

## 2025-01-11 NOTE — HH CARE COORDINATION
Home Care received a Referral for Physical Therapy and Occupational Therapy. We have processed the referral for a Start of Care on 1/12-1/13.     If you have any questions or concerns, please feel free to contact us at [phone]. Follow the prompts, enter your five digit zip code, and you will be directed to your care team on [facility] 1.

## 2025-01-12 NOTE — DISCHARGE SUMMARY
Discharge Diagnosis  Other fracture of shaft of left humerus, initial encounter for closed fracture    Issues Requiring Follow-Up  Left cliff-implant humeral shaft fracture s/p ORIF L humerus on 1/9/25 with Dr. Roberts.    Test Results Pending At Discharge  Pending Labs       No current pending labs.            Hospital Course  69 year-old female who presented with a left cliff-implant humeral shaft fracture. She is now s/p open reduction internal fixation of the left humerus on 1/9 with Dr. Roberts. On the day of surgery, patient was identified in the pre-operative holding area and agreeable to proceed with surgery. Written consent was obtained.  Please see operative note for further details of this procedure. Patient received 24 hours of cliff-operative antibiotics. Patient recovered in the PACU before transfer to a regular nursing floor. Patient was started on oxycodone, tylenol, and dilaudid prn for breakthrough for pain control and ASA 81 mg bid for DVT prophylaxis. Physical therapy recommended continued recovery at home with continued physical therapy/home health care. On the day of discharge, patient was afebrile with stable vital signs. Patient was neurovascularly intact at time of discharge. Patient was discharged with prescription of ASA 81 mg bid for DVT prophylaxis for 4 weeks. Patient will follow-up with Dr. Roberts in 2 weeks for post-operative visit.    Exam on the day of discharge:  GEN - NAD, resting comfortably in hospital bed  HEENT - MMM, EOMI, NCAT  CV - RRR by peripheral palpation, limbs wwp  PULM - NWOB on RA  ABD - Non-distended  NEURO - CRANE spontaneously, CNs II - XII grossly intact  PSYCH - Appropriate mood and affect     MSK:  LUE  -Mepilex to L shoulder cdi  -Fires AIN/PIN/u  -SILT m/r/u  -hand wwp, brisk cap refill, 2+ radial pulse  -Compartments soft and compressible, no pain with passive stretch     Home Medications     Medication List      START taking these medications     calcium  carbonate-vitamin D3 600 mg-10 mcg (400 unit) tablet; Take 1   tablet by mouth 2 times a day.   docusate sodium 100 mg capsule; Commonly known as: Colace; Take 1   capsule (100 mg) by mouth 2 times a day as needed for constipation for up   to 7 days.   naproxen 500 mg tablet; Commonly known as: Naprosyn; Take 1 tablet (500   mg) by mouth 2 times a day.     CHANGE how you take these medications     acetaminophen 325 mg tablet; Commonly known as: Tylenol; Take 2 tablets   (650 mg) by mouth every 6 hours if needed for mild pain (1 - 3).; What   changed: how much to take, when to take this   aspirin 81 mg EC tablet; Take 1 tablet (81 mg) by mouth 2 times a day   for 28 days.; What changed: when to take this   cyclobenzaprine 5 mg tablet; Commonly known as: Flexeril; Take 1 tablet   (5 mg) by mouth 3 times a day as needed for muscle spasms.; What changed:   when to take this, reasons to take this   fluticasone 50 mcg/actuation nasal spray; Commonly known as: Flonase;   Administer 1 spray into each nostril once daily. Shake gently. Before   first use, prime pump. After use, clean tip and replace cap.; What   changed: when to take this, reasons to take this   lidocaine 4 % patch; Place 1 patch over 12 hours on the skin once daily.   Remove & discard patch within 12 hours or as directed by MD.; What   changed: when to take this, reasons to take this   oxyCODONE 5 mg immediate release tablet; Commonly known as: Roxicodone;   Take 1 tablet (5 mg) by mouth every 6 hours if needed for severe pain (7 -   10) for up to 7 days.; What changed: medication strength, how much to   take, Another medication with the same name was removed. Continue taking   this medication, and follow the directions you see here.   Repatha SureClick 140 mg/mL injection; Generic drug: evolocumab; INJECT   THE CONTENTS OF 1 PEN UNDER THE SKIN EVERY 2 WEEKS; What changed: how much   to take, how to take this, when to take this     CONTINUE taking these  medications     * albuterol 2.5 mg /3 mL (0.083 %) nebulizer solution; Take 3 mL (2.5   mg) by nebulization every 6 hours if needed for wheezing.   * albuterol 90 mcg/actuation inhaler; USE 2 INHALATIONS BY MOUTH EVERY 4   HOURS AS NEEDED   Bevespi Aerosphere 9-4.8 mcg HFA aerosol inhaler; Generic drug:   glycopyrrolate-formoteroL; TAKE 2 PUFFS BY MOUTH TWICE A DAY   diclofenac sodium 1 % gel; Commonly known as: Voltaren; Apply 4.5 inches   (4 g) topically 4 times a day as needed (pain).   gabapentin 100 mg capsule; Commonly known as: Neurontin; Take 1 capsule   (100 mg) by mouth 3 times a day.   hydrocortisone acetate 1 % ointment; Apply 1 Application topically 2   times a day as needed (for hemorrhoids).   multivitamin tablet   ondansetron ODT 4 mg disintegrating tablet; Commonly known as:   Zofran-ODT; Take 1 tablet (4 mg) by mouth every 8 hours if needed for   nausea or vomiting.   oxybutynin XL 10 mg 24 hr tablet; Commonly known as: Ditropan-XL; Take 1   tablet (10 mg) by mouth 2 times a day.   pantoprazole 40 mg EC tablet; Commonly known as: ProtoNix; Take 1 tablet   (40 mg) by mouth once daily in the morning. Take before meals for 28 days.   Do not crush, chew, or split.   Tymlos 80 mcg (3,120 mcg/1.56 mL) pen injector; Generic drug:   abaloparatide   ubrogepant 100 mg tablet tablet; Commonly known as: Ubrelvy; Take 1   tablet (100 mg) by mouth 1 time if needed (migraine headaches). Can repeat   in 2 hours if no response. Not to exceed 200mg per 24 hours.  * This list has 2 medication(s) that are the same as other medications   prescribed for you. Read the directions carefully, and ask your doctor or   other care provider to review them with you.     STOP taking these medications     naloxone 4 mg/0.1 mL nasal spray; Commonly known as: Narcan   onabotulinumtoxinA 200 unit injection; Commonly known as: Botox   polyethylene glycol 17 gram/dose powder; Commonly known as: Glycolax,   Miralax     ASK your doctor  about these medications     dilTIAZem  mg 24 hr capsule; Commonly known as: Cardizem CD; Take   1 capsule (180 mg) by mouth once daily.   SUMAtriptan 50 mg tablet; Commonly known as: Imitrex; Take 1 tablet (50   mg) by mouth 1 time if needed for migraine. May repeat after 2 hours.       Outpatient Follow-Up  Future Appointments   Date Time Provider Department Center   1/27/2025  1:00 PM Cheri Givens PA-C [id number] [facility]   2/11/2025  2:45 PM Jaz Woo DO [id number] [facility]   2/24/2025  2:00 PM Guillermo Roberts MD [id number] [facility]   10/9/2025 11:30 AM Deacon Vera MD [id number] None       Brandon Martinez MD  Orthopaedic Surgery PGY-1  [facility]  Available by Do It In Person

## 2025-01-13 ENCOUNTER — HOME CARE VISIT (OUTPATIENT)
Dept: HOME HEALTH SERVICES | Facility: HOME HEALTH | Age: 70
End: 2025-01-13

## 2025-01-14 ENCOUNTER — APPOINTMENT (OUTPATIENT)
Dept: PHYSICAL THERAPY | Facility: CLINIC | Age: 70
End: 2025-01-14
Payer: COMMERCIAL

## 2025-01-14 ENCOUNTER — APPOINTMENT (OUTPATIENT)
Dept: OPHTHALMOLOGY | Facility: CLINIC | Age: 70
End: 2025-01-14
Payer: COMMERCIAL

## 2025-01-14 ENCOUNTER — HOME CARE VISIT (OUTPATIENT)
Dept: HOME HEALTH SERVICES | Facility: HOME HEALTH | Age: 70
End: 2025-01-14

## 2025-01-16 ENCOUNTER — APPOINTMENT (OUTPATIENT)
Dept: PHYSICAL THERAPY | Facility: CLINIC | Age: 70
End: 2025-01-16
Payer: COMMERCIAL

## 2025-01-16 ENCOUNTER — HOME CARE VISIT (OUTPATIENT)
Dept: HOME HEALTH SERVICES | Facility: HOME HEALTH | Age: 70
End: 2025-01-16
Payer: COMMERCIAL

## 2025-01-16 VITALS
DIASTOLIC BLOOD PRESSURE: 70 MMHG | HEIGHT: 61 IN | RESPIRATION RATE: 16 BRPM | TEMPERATURE: 97.8 F | SYSTOLIC BLOOD PRESSURE: 130 MMHG | BODY MASS INDEX: 21.71 KG/M2 | WEIGHT: 115 LBS | HEART RATE: 74 BPM

## 2025-01-16 PROCEDURE — G0151 HHCP-SERV OF PT,EA 15 MIN: HCPCS

## 2025-01-16 SDOH — HEALTH STABILITY: PHYSICAL HEALTH: PHYSICAL EXERCISE: STAND

## 2025-01-16 SDOH — HEALTH STABILITY: PHYSICAL HEALTH: PHYSICAL EXERCISE: 10

## 2025-01-16 SDOH — HEALTH STABILITY: PHYSICAL HEALTH: EXERCISE TYPE: BALANCE EX

## 2025-01-16 SDOH — HEALTH STABILITY: PHYSICAL HEALTH: EXERCISE ACTIVITY: CLOSED CHAIN EX

## 2025-01-16 SDOH — HEALTH STABILITY: PHYSICAL HEALTH: EXERCISE ACTIVITIES SETS: 2

## 2025-01-16 ASSESSMENT — ENCOUNTER SYMPTOMS
PAIN LOCATION - PAIN SEVERITY: 5/10
HIGHEST PAIN SEVERITY IN PAST 24 HOURS: 5/10
PAIN: 1
PAIN LOCATION: LEFT SHOULDER
HYPERTENSION: 1
LOWEST PAIN SEVERITY IN PAST 24 HOURS: 1/10
LIMITED RANGE OF MOTION: 1
PAIN LOCATION - EXACERBATING FACTORS: MVT
PAIN LOCATION - RELIEVING FACTORS: MEDS
PAIN LOCATION - PAIN QUALITY: ACHE
MUSCLE WEAKNESS: 1
PAIN SEVERITY GOAL: 0/10
PAIN LOCATION - PAIN FREQUENCY: INTERMITTENT
SUBJECTIVE PAIN PROGRESSION: WAXING AND WANING
PAIN LOCATION - PAIN DURATION: MIN
PERSON REPORTING PAIN: PATIENT

## 2025-01-16 ASSESSMENT — ACTIVITIES OF DAILY LIVING (ADL)
AMBULATION_DISTANCE/DURATION_TOLERATED: 100 FT
CURRENT_FUNCTION: MINIMUM ASSIST
ENTERING_EXITING_HOME: CONTACT GUARD ASSIST
PHYSICAL TRANSFERS ASSESSED: 1
OASIS_M1830: 03
AMBULATION ASSISTANCE ON FLAT SURFACES: 1

## 2025-01-17 ENCOUNTER — HOME CARE VISIT (OUTPATIENT)
Dept: HOME HEALTH SERVICES | Facility: HOME HEALTH | Age: 70
End: 2025-01-17
Payer: COMMERCIAL

## 2025-01-17 PROCEDURE — G0152 HHCP-SERV OF OT,EA 15 MIN: HCPCS

## 2025-01-17 ASSESSMENT — ACTIVITIES OF DAILY LIVING (ADL)
GROOMING ASSESSED: 1
DRESSING_UB_CURRENT_FUNCTION: INDEPENDENT
DRESSING_LB_CURRENT_FUNCTION: INDEPENDENT
BATHING_CURRENT_FUNCTION: INDEPENDENT
TOILETING: 1
TOILETING: INDEPENDENT
GROOMING_CURRENT_FUNCTION: INDEPENDENT
BATHING ASSESSED: 1

## 2025-01-17 ASSESSMENT — ENCOUNTER SYMPTOMS
HIGHEST PAIN SEVERITY IN PAST 24 HOURS: 8/10
PAIN: 1
LOWEST PAIN SEVERITY IN PAST 24 HOURS: 5/10
PAIN LOCATION: LEFT ARM
PERSON REPORTING PAIN: PATIENT

## 2025-01-20 ENCOUNTER — APPOINTMENT (OUTPATIENT)
Dept: PRIMARY CARE | Facility: CLINIC | Age: 70
End: 2025-01-20
Payer: COMMERCIAL

## 2025-01-20 ENCOUNTER — APPOINTMENT (OUTPATIENT)
Dept: ORTHOPEDIC SURGERY | Facility: CLINIC | Age: 70
End: 2025-01-20
Payer: COMMERCIAL

## 2025-01-20 ENCOUNTER — HOME CARE VISIT (OUTPATIENT)
Dept: HOME HEALTH SERVICES | Facility: HOME HEALTH | Age: 70
End: 2025-01-20
Payer: COMMERCIAL

## 2025-01-20 VITALS — OXYGEN SATURATION: 97 % | HEART RATE: 85 BPM

## 2025-01-20 DIAGNOSIS — S42.392A OTHER FRACTURE OF SHAFT OF LEFT HUMERUS, INITIAL ENCOUNTER FOR CLOSED FRACTURE: ICD-10-CM

## 2025-01-20 DIAGNOSIS — J43.9 PULMONARY EMPHYSEMA, UNSPECIFIED EMPHYSEMA TYPE (MULTI): Primary | ICD-10-CM

## 2025-01-20 DIAGNOSIS — F31.9 BIPOLAR AFFECTIVE DISORDER, REMISSION STATUS UNSPECIFIED (MULTI): ICD-10-CM

## 2025-01-20 DIAGNOSIS — N95.2 ATROPHIC VAGINITIS: ICD-10-CM

## 2025-01-20 DIAGNOSIS — G43.009 MIGRAINE WITHOUT AURA AND WITHOUT STATUS MIGRAINOSUS, NOT INTRACTABLE: ICD-10-CM

## 2025-01-20 PROBLEM — S42.202A CLOSED FRACTURE OF PROXIMAL END OF LEFT HUMERUS, UNSPECIFIED FRACTURE MORPHOLOGY, INITIAL ENCOUNTER: Status: RESOLVED | Noted: 2024-11-20 | Resolved: 2025-01-20

## 2025-01-20 PROBLEM — E55.9 VITAMIN D DEFICIENCY: Status: RESOLVED | Noted: 2023-08-15 | Resolved: 2025-01-20

## 2025-01-20 PROBLEM — I63.9 CEREBROVASCULAR ACCIDENT (CVA) (MULTI): Status: RESOLVED | Noted: 2024-04-30 | Resolved: 2025-01-20

## 2025-01-20 PROBLEM — G47.33 OSA (OBSTRUCTIVE SLEEP APNEA): Status: RESOLVED | Noted: 2024-04-30 | Resolved: 2025-01-20

## 2025-01-20 PROBLEM — N17.9 AKI (ACUTE KIDNEY INJURY) (CMS-HCC): Status: RESOLVED | Noted: 2024-12-28 | Resolved: 2025-01-20

## 2025-01-20 PROBLEM — S42.342A CLOSED DISPLACED SPIRAL FRACTURE OF SHAFT OF LEFT HUMERUS: Status: RESOLVED | Noted: 2024-12-30 | Resolved: 2025-01-20

## 2025-01-20 PROBLEM — F31.61: Status: RESOLVED | Noted: 2023-09-10 | Resolved: 2025-01-20

## 2025-01-20 PROBLEM — E78.5 DYSLIPIDEMIA: Status: RESOLVED | Noted: 2024-04-30 | Resolved: 2025-01-20

## 2025-01-20 PROBLEM — S42.292A CLOSED 3-PART FRACTURE OF PROXIMAL HUMERUS, LEFT, INITIAL ENCOUNTER: Status: RESOLVED | Noted: 2024-11-18 | Resolved: 2025-01-20

## 2025-01-20 PROBLEM — M65.30 TRIGGER FINGER, ACQUIRED: Status: RESOLVED | Noted: 2024-05-06 | Resolved: 2025-01-20

## 2025-01-20 PROBLEM — E53.8 VITAMIN B12 DEFICIENCY: Status: RESOLVED | Noted: 2023-08-15 | Resolved: 2025-01-20

## 2025-01-20 PROCEDURE — 1123F ACP DISCUSS/DSCN MKR DOCD: CPT | Performed by: FAMILY MEDICINE

## 2025-01-20 PROCEDURE — G0151 HHCP-SERV OF PT,EA 15 MIN: HCPCS

## 2025-01-20 PROCEDURE — 99495 TRANSJ CARE MGMT MOD F2F 14D: CPT | Performed by: FAMILY MEDICINE

## 2025-01-20 PROCEDURE — 1159F MED LIST DOCD IN RCRD: CPT | Performed by: FAMILY MEDICINE

## 2025-01-20 PROCEDURE — 1111F DSCHRG MED/CURRENT MED MERGE: CPT | Performed by: FAMILY MEDICINE

## 2025-01-20 PROCEDURE — 1160F RVW MEDS BY RX/DR IN RCRD: CPT | Performed by: FAMILY MEDICINE

## 2025-01-20 RX ORDER — CYCLOBENZAPRINE HCL 10 MG
10 TABLET ORAL 3 TIMES DAILY PRN
Qty: 90 TABLET | Refills: 1 | Status: SHIPPED | OUTPATIENT
Start: 2025-01-20

## 2025-01-20 SDOH — HEALTH STABILITY: PHYSICAL HEALTH: EXERCISE TYPE: HEP

## 2025-01-20 ASSESSMENT — ENCOUNTER SYMPTOMS
SUBJECTIVE PAIN PROGRESSION: WAXING AND WANING
PAIN: 1
LIMITED RANGE OF MOTION: 1
LOWEST PAIN SEVERITY IN PAST 24 HOURS: 2/10
PAIN LOCATION - EXACERBATING FACTORS: ROM
PAIN LOCATION - PAIN FREQUENCY: INTERMITTENT
PERSON REPORTING PAIN: PATIENT
PAIN LOCATION: LEFT SHOULDER
HIGHEST PAIN SEVERITY IN PAST 24 HOURS: 5/10
MUSCLE WEAKNESS: 1
PAIN LOCATION - RELIEVING FACTORS: ROM
PAIN SEVERITY GOAL: 0/10
PAIN LOCATION - PAIN QUALITY: ACHE
PAIN LOCATION - PAIN SEVERITY: 5/10
PAIN LOCATION - PAIN DURATION: MIN

## 2025-01-20 ASSESSMENT — ACTIVITIES OF DAILY LIVING (ADL)
CURRENT_FUNCTION: INDEPENDENT
AMBULATION ASSISTANCE ON FLAT SURFACES: 1
AMBULATION_DISTANCE/DURATION_TOLERATED: 50 FT
PHYSICAL TRANSFERS ASSESSED: 1

## 2025-01-20 NOTE — ASSESSMENT & PLAN NOTE
Patient is not following up with psych.   Orders:    Referral to [facility]; Future

## 2025-01-20 NOTE — PROGRESS NOTES
Subjective   Patient ID: Judith Bush is a 69 y.o. female who presents for Hospital Follow-up.    HPI     The patient presents for a hospital follow up, she was in the hospital for left cliff-implant humeral shaft fracture. She is now s/p open reduction internal fixation of the left humerus on 1/9 with Dr. Roberts.     The patient is taking tylenol as needed, albuterol and Flonase as needed. She is taking flexeril daily, Gabapentin for her neuropathy, naproxen twice daily, Oxybutynin for OAB , Oxycodone for post op pain. She is also taking Tymlos for osteoporosis , Ubrelvy as needed for migraines. She is not any medications for her blood pressures. She is not taking Repatha for her cholesterol.       Review of Systems:  Constitutional: No fever or chills  Cardiovascular: no chest pain, no palpitations and no syncope.   Respiratory: no cough, no shortness of breath during exertion and no shortness of breath at rest.   Gastrointestinal: no abdominal pain, no nausea and no vomiting.  Neuro: No Headache, no dizziness    Physical Exam:   Constitutional: Alert and in no acute distress. Well developed, well nourished.   Head and Face: Head and face: Normal.     Eyes: Normal external exam.    Ears, Nose, Mouth, and Throat: External inspection of ears and nose: Normal.  Hearing: Normal.   Neck: No neck mass was observed. Supple.  Pulmonary: No respiratory distress.    Musculoskeletal: Range of motion: Normal.     Skin: Normal skin color and pigmentation, normal skin turgor, and no rash.    Neurologic: Coordination: Normal.    Psychiatric: Judgment and insight: Intact. Mood and affect: Normal.    Lab Results   Component Value Date    WBC 9.2 01/11/2025    HGB 10.3 (L) 01/11/2025    HCT 31.6 (L) 01/11/2025     01/11/2025    CHOL 157 04/23/2024    TRIG 114 04/23/2024    HDL 77.0 04/23/2024    ALT 6 (L) 01/02/2025    AST 14 01/02/2025     (L) 01/11/2025    K 3.6 01/11/2025     01/11/2025    CREATININE 0.71  01/11/2025    BUN 10 01/11/2025    CO2 26 01/11/2025    TSH 1.98 04/23/2024    INR 1.0 12/28/2024    HGBA1C 5.2 04/23/2024       FL fluoro images no charge  These images are not reportable by radiology and will not be interpreted   by  Radiologists.      Assessment/Plan   Assessment & Plan  Bipolar affective disorder, remission status unspecified (Multi)  Patient is not following up with psych.   Orders:    Referral to [facility]; Future    Pulmonary emphysema, unspecified emphysema type (Multi)   Continue Albuterol as needed.        Migraine without aura and without status migrainosus, not intractable  Continue Ubrelvy.        Other fracture of shaft of left humerus, initial encounter for closed fracture  Continue Flexeril as needed.   Orders:    cyclobenzaprine (Flexeril) 10 mg tablet; Take 1 tablet (10 mg) by mouth 3 times a day as needed for muscle spasms.    Atrophic vaginitis  Continue Oxybutynin  as needed.                This Medical recommendation has been made based on the Telephonic/Video conversation and the history is given by the patient, which I as a Physician and the patient also understand that there are limitations given the lack of an in-person Physical Exam. Patient will call back if symptoms don't improve.    Your yearly Physical is due in: May  2025.   When you call the office for your yearly Physical, please ask them to inform me to order your blood work, so that you can get the fasting blood work before your appointment and we can discuss the results at your physical.      A Doctor is always available by phone when the office is closed. Please feel free to call for help with any problem that you feel shouldn't wait until the office re-opens.     Scribe Attestation  By signing my name below, I, Cee Coates MD, Scribe attest that this documentation has been prepared under the direction and in the presence of Cee Coates MD. All medical record entries made by the  Scribe were at my direction or personally dictated by me. I have reviewed the chart and agree that the record accurately reflects my personal performance of the history, physical exam, discussion and plan.

## 2025-01-21 ENCOUNTER — APPOINTMENT (OUTPATIENT)
Dept: PHYSICAL THERAPY | Facility: CLINIC | Age: 70
End: 2025-01-21
Payer: COMMERCIAL

## 2025-01-21 ENCOUNTER — HOME CARE VISIT (OUTPATIENT)
Dept: HOME HEALTH SERVICES | Facility: HOME HEALTH | Age: 70
End: 2025-01-21
Payer: COMMERCIAL

## 2025-01-21 DIAGNOSIS — S42.392A OTHER FRACTURE OF SHAFT OF LEFT HUMERUS, INITIAL ENCOUNTER FOR CLOSED FRACTURE: ICD-10-CM

## 2025-01-21 PROCEDURE — G0152 HHCP-SERV OF OT,EA 15 MIN: HCPCS

## 2025-01-21 ASSESSMENT — ENCOUNTER SYMPTOMS
PAIN LOCATION: LEFT ARM
PAIN: 1
HIGHEST PAIN SEVERITY IN PAST 24 HOURS: 7/10
PERSON REPORTING PAIN: PATIENT

## 2025-01-22 RX ORDER — OXYCODONE HYDROCHLORIDE 5 MG/1
5 TABLET ORAL EVERY 6 HOURS PRN
Qty: 28 TABLET | Refills: 0 | Status: SHIPPED | OUTPATIENT
Start: 2025-01-22 | End: 2025-01-29

## 2025-01-22 NOTE — TELEPHONE ENCOUNTER
Pt called for pain medicine. DOS 1/9/2025. Rates pain a 7/10. I have personally reviewed the OARRS report for the patient, no issues identified. This report is scanned into the EMR. I have considered the risks of abuse, dependence, addiction, and diversion. The 7 day Rx sent to pharmacy on file. ROSA MCNULTY

## 2025-01-23 ENCOUNTER — PATIENT OUTREACH (OUTPATIENT)
Dept: PRIMARY CARE | Facility: CLINIC | Age: 70
End: 2025-01-23
Payer: COMMERCIAL

## 2025-01-23 ENCOUNTER — APPOINTMENT (OUTPATIENT)
Dept: PHYSICAL THERAPY | Facility: CLINIC | Age: 70
End: 2025-01-23
Payer: COMMERCIAL

## 2025-01-23 ENCOUNTER — HOME CARE VISIT (OUTPATIENT)
Dept: HOME HEALTH SERVICES | Facility: HOME HEALTH | Age: 70
End: 2025-01-23
Payer: COMMERCIAL

## 2025-01-23 PROCEDURE — G0152 HHCP-SERV OF OT,EA 15 MIN: HCPCS

## 2025-01-23 ASSESSMENT — ENCOUNTER SYMPTOMS
HIGHEST PAIN SEVERITY IN PAST 24 HOURS: 5/10
PAIN: 1
PERSON REPORTING PAIN: PATIENT
PAIN LOCATION: LEFT SHOULDER

## 2025-01-27 ENCOUNTER — HOSPITAL ENCOUNTER (OUTPATIENT)
Dept: RADIOLOGY | Facility: CLINIC | Age: 70
Discharge: HOME | End: 2025-01-27
Payer: COMMERCIAL

## 2025-01-27 ENCOUNTER — OFFICE VISIT (OUTPATIENT)
Dept: ORTHOPEDIC SURGERY | Facility: CLINIC | Age: 70
End: 2025-01-27
Payer: COMMERCIAL

## 2025-01-27 DIAGNOSIS — S42.342A CLOSED DISPLACED SPIRAL FRACTURE OF SHAFT OF LEFT HUMERUS, INITIAL ENCOUNTER: ICD-10-CM

## 2025-01-27 DIAGNOSIS — S42.292A CLOSED 3-PART FRACTURE OF PROXIMAL HUMERUS, LEFT, INITIAL ENCOUNTER: Primary | ICD-10-CM

## 2025-01-27 DIAGNOSIS — S42.292A CLOSED 3-PART FRACTURE OF PROXIMAL HUMERUS, LEFT, INITIAL ENCOUNTER: ICD-10-CM

## 2025-01-27 PROCEDURE — 1111F DSCHRG MED/CURRENT MED MERGE: CPT

## 2025-01-27 PROCEDURE — 99211 OFF/OP EST MAY X REQ PHY/QHP: CPT

## 2025-01-27 PROCEDURE — 73030 X-RAY EXAM OF SHOULDER: CPT | Mod: LEFT SIDE | Performed by: RADIOLOGY

## 2025-01-27 PROCEDURE — 1159F MED LIST DOCD IN RCRD: CPT

## 2025-01-27 PROCEDURE — 73060 X-RAY EXAM OF HUMERUS: CPT | Mod: LT

## 2025-01-27 PROCEDURE — 73060 X-RAY EXAM OF HUMERUS: CPT | Mod: LEFT SIDE | Performed by: RADIOLOGY

## 2025-01-27 PROCEDURE — 1123F ACP DISCUSS/DSCN MKR DOCD: CPT

## 2025-01-27 PROCEDURE — 73030 X-RAY EXAM OF SHOULDER: CPT | Mod: LT

## 2025-01-28 ENCOUNTER — APPOINTMENT (OUTPATIENT)
Dept: PHYSICAL THERAPY | Facility: CLINIC | Age: 70
End: 2025-01-28
Payer: COMMERCIAL

## 2025-01-28 ENCOUNTER — HOME CARE VISIT (OUTPATIENT)
Dept: HOME HEALTH SERVICES | Facility: HOME HEALTH | Age: 70
End: 2025-01-28
Payer: COMMERCIAL

## 2025-01-28 PROCEDURE — G0152 HHCP-SERV OF OT,EA 15 MIN: HCPCS

## 2025-01-28 ASSESSMENT — ENCOUNTER SYMPTOMS
PERSON REPORTING PAIN: PATIENT
PAIN: 1

## 2025-01-28 NOTE — PROGRESS NOTES
Subjective    Patient ID: Judith Bush is a 69 y.o. female.    Chief Complaint: Follow-up of the Left Shoulder (Lt. Humerus ORIF SR24)     Last Surgery: Open Reduction Internal Fixation Humerus - Left - Left  Last Surgery Date: 2025    HPI  Patient is a 69 y.o. female who is s/p left proximal humerus ORIF on 2024 with subsequent left cliff-hardware humeral shaft ORIF on 2025. Patient continues to be coffee cup weight bearing on the left arm at this time and denies issues with incision. Patient continues on ASA for DVT ppx. Patient continues with therapy sessions, performing exercise program at home. States that she has some swelling still present and tingling from the elbow down. Patient denies fever or chills, N/T or arm pain.     ROS: All other systems have been reviewed and are negative except as previously noted in history of present illness.      IMP:  Problem List Items Addressed This Visit    None  Visit Diagnoses       Closed 3-part fracture of proximal humerus, left, initial encounter    -  Primary    Relevant Orders    XR shoulder left 2+ views    Closed displaced spiral fracture of shaft of left humerus, initial encounter        Relevant Orders    XR humerus left          Objective     General: Alert and oriented x 3, NAD, respirations easy and unlabored with no audible wheezes, skin warm and dry, speech and dress appropriate for noted age, affect euthymic.     Musculoskeletal: left upper extremity  incision well-healed  compartments soft  mild swelling to upper extremity  sensation intact to light touch  motor intact including R/U/M/AIN/PIN nn.  palpable radial pulse 2+     X-ray: Images of left humerus and shoulder reviewed personally by me today and reveal maintenance of alignment of proximal humerus and humeral shaft fracture with hardware in position and interval change of fracture line healing and bony callus formation at the proximal humerus.      Assessment/Plan   Encounter  Diagnoses:  Closed 3-part fracture of proximal humerus, left, initial encounter    Closed displaced spiral fracture of shaft of left humerus, initial encounter    PLAN: Patient is s/p left proximal humerus ORIF on 11/21/2024 with subsequent left cliff-hardware humeral shaft ORIF on 1/9/2025. Staples were removed at this visit. Patient is overall doing well. She is coffee cup weight bearing on the left arm. Patient states that she has still has some swelling and tingling from the elbow down to the fingers. Working with home PT. Imaging reveals alignment of left proximal humerus and humeral shaft fracture with stable hardware and interval change of fracture line healing and bony callus formation at the proximal humerus. Patient is educated that her old proximal humerus fracture continues to heal while her new humeral shaft fracture is stable with stable hardware. Patient is educated that she may fully weight bear as tolerated on the left arm on February 21st, which is 3 months from her proximal humerus surgery. Patient will continue to perform coffee cup weight bearing in the mean time. She will continue to work with PT on shoulder and elbow ROM, arm strengthening, and stretching exercises. Patient is educated that she will follow up in 3 months. Patient is in agreement with this plan. Xrays of the left shoulder and humerus will be needed.     Orders Placed This Encounter    XR humerus left    XR shoulder left 2+ views     No follow-ups on file.

## 2025-01-30 ENCOUNTER — APPOINTMENT (OUTPATIENT)
Dept: PHYSICAL THERAPY | Facility: CLINIC | Age: 70
End: 2025-01-30
Payer: COMMERCIAL

## 2025-01-30 ENCOUNTER — HOME CARE VISIT (OUTPATIENT)
Dept: HOME HEALTH SERVICES | Facility: HOME HEALTH | Age: 70
End: 2025-01-30
Payer: COMMERCIAL

## 2025-01-30 PROCEDURE — G0152 HHCP-SERV OF OT,EA 15 MIN: HCPCS

## 2025-01-30 NOTE — DISCHARGE SUMMARY
Discharge Diagnosis  ELIEZER (acute kidney injury) (CMS-[facility])    Issues Requiring Follow-Up  -ortho surgery referral   -pain management referral     Discharge Meds     Medication List      START taking these medications     lidocaine 4 % patch; Place 1 patch over 12 hours on the skin once daily.   Remove & discard patch within 12 hours or as directed by MD.     CONTINUE taking these medications     * albuterol 2.5 mg /3 mL (0.083 %) nebulizer solution; Take 3 mL (2.5   mg) by nebulization every 6 hours if needed for wheezing.   * albuterol 90 mcg/actuation inhaler; USE 2 INHALATIONS BY MOUTH EVERY 4   HOURS AS NEEDED   Bevespi Aerosphere 9-4.8 mcg HFA aerosol inhaler; Generic drug:   glycopyrrolate-formoteroL; TAKE 2 PUFFS BY MOUTH TWICE A DAY   diclofenac sodium 1 % gel; Commonly known as: Voltaren; Apply 4.5 inches   (4 g) topically 4 times a day as needed (pain).   dilTIAZem  mg 24 hr capsule; Commonly known as: Cardizem CD; Take   1 capsule (180 mg) by mouth once daily.   hydrocortisone acetate 1 % ointment; Apply 1 Application topically 2   times a day as needed (for hemorrhoids).   ondansetron ODT 4 mg disintegrating tablet; Commonly known as:   Zofran-ODT; Take 1 tablet (4 mg) by mouth every 8 hours if needed for   nausea or vomiting.   oxybutynin XL 10 mg 24 hr tablet; Commonly known as: Ditropan-XL; Take 1   tablet (10 mg) by mouth 2 times a day.   SUMAtriptan 50 mg tablet; Commonly known as: Imitrex; Take 1 tablet (50   mg) by mouth 1 time if needed for migraine. May repeat after 2 hours.   Tymlos 80 mcg (3,120 mcg/1.56 mL) pen injector; Generic drug:   abaloparatide   ubrogepant 100 mg tablet tablet; Commonly known as: Ubrelvy; Take 1   tablet (100 mg) by mouth 1 time if needed (migraine headaches). Can repeat   in 2 hours if no response. Not to exceed 200mg per 24 hours.  * This list has 2 medication(s) that are the same as other medications   prescribed for you. Read the directions carefully, and ask  your doctor or   other care provider to review them with you.     STOP taking these medications     acetaminophen 325 mg tablet; Commonly known as: Tylenol   cyclobenzaprine 10 mg tablet; Commonly known as: Flexeril   docusate sodium 100 mg capsule; Commonly known as: Colace   oxyCODONE-acetaminophen 7.5-325 mg tablet; Commonly known as: Percocet       Test Results Pending At Discharge  Pending Labs       No current pending labs.            Hospital Course   69 year old female with a past medical history of HTN, HLD, severe MR s/p MV replacement 2019, COPD, GERD, migraines, CVA (04/2024), insomnia,  bipolar, HSV, chronic low back pain, osteoporosis, and recent ORIF of the left humerus after a fall complicated by ulnar neuropathy who presented to [facility] ED with a chief complaint of left arm pain while in brace, found to be hyponatremic with new ELIEZER. Lab abnormalities corrected with fluids. Ortho consulted, no surgical intervention, however adjusted brace and recommended follow-up in clinic as an outpatient.Pt started on multimodal pain regimen and discharged in stable condition home.    Pertinent Physical Exam At Time of Discharge  Physical Exam  General: Resting in bed. Well developed, well nourished. Appears stated age. In no acute distress   HEENT: Normocephalic and atraumatic. EOMI, sclera non-icteric, dry MM   Cardiovascular: RRR, no r/m/g  Pulmonary: on RA, no increased wob   GI: +BS, soft, non-tender, non-distended  : No suprapubic/ flank pain  Extremities: No LE edema. Left arm in sling    Skin: warm and dry. No rashes or lesions   Neurologic: CN II-XII grossly intact.  Psych: Pleasant. Appropriate mood and affect     Outpatient Follow-Up  Future Appointments   Date Time Provider Department Center   1/30/2025  9:00 AM Viv Cedillo OT [facility]   2/4/2025 To Be Determined Viv Cedillo OT [facility]   2/6/2025 To Be Determined Viv Cedillo OT [facility]   2/10/2025  8:00 AM Cathleen Nichols[id number]    2/11/2025 To Be Determined Viv Cedillo, OT [facility]   2/11/2025  2:45 PM Jaz Woo DO [id number] [facility]   2/13/2025 To Be Determined Viv Cedillo, OT [facility]   2/17/2025  3:45 PM Cathleen Nichols, PT [id number]   2/19/2025  9:30 AM Cathleen Nichols, PT [id number]   2/24/2025  2:00 PM Guillermo Roberts MD [id number] [facility]   2/24/2025  3:45 PM Cathleen Nichols, PT [email or web address]   2/26/2025  3:00 PM Cathleen Nichols, PT [id number] [facility]   3/4/2025  2:45 PM Amanda Humphrey MD [id number] [facility]   4/7/2025  1:00 PM Guillermo Roberts MD [id number] [facility]   10/9/2025 11:30 AM Deacon Vera MD [id number] None         Stephanie Jung MD

## 2025-02-03 ENCOUNTER — HOME CARE VISIT (OUTPATIENT)
Dept: HOME HEALTH SERVICES | Facility: HOME HEALTH | Age: 70
End: 2025-02-03
Payer: COMMERCIAL

## 2025-02-03 ENCOUNTER — PATIENT MESSAGE (OUTPATIENT)
Dept: ORTHOPEDIC SURGERY | Facility: CLINIC | Age: 70
End: 2025-02-03
Payer: COMMERCIAL

## 2025-02-03 DIAGNOSIS — S42.292A CLOSED 3-PART FRACTURE OF PROXIMAL HUMERUS, LEFT, INITIAL ENCOUNTER: ICD-10-CM

## 2025-02-03 DIAGNOSIS — S42.342A CLOSED DISPLACED SPIRAL FRACTURE OF SHAFT OF LEFT HUMERUS, INITIAL ENCOUNTER: ICD-10-CM

## 2025-02-03 PROCEDURE — G0152 HHCP-SERV OF OT,EA 15 MIN: HCPCS

## 2025-02-04 ENCOUNTER — TELEPHONE (OUTPATIENT)
Dept: NEUROLOGY | Facility: CLINIC | Age: 70
End: 2025-02-04
Payer: COMMERCIAL

## 2025-02-04 ENCOUNTER — APPOINTMENT (OUTPATIENT)
Dept: PHYSICAL THERAPY | Facility: CLINIC | Age: 70
End: 2025-02-04
Payer: COMMERCIAL

## 2025-02-04 DIAGNOSIS — G43.719 INTRACTABLE CHRONIC MIGRAINE WITHOUT AURA AND WITHOUT STATUS MIGRAINOSUS: Primary | ICD-10-CM

## 2025-02-04 DIAGNOSIS — S42.392A OTHER FRACTURE OF SHAFT OF LEFT HUMERUS, INITIAL ENCOUNTER FOR CLOSED FRACTURE: ICD-10-CM

## 2025-02-04 RX ORDER — OXYCODONE HYDROCHLORIDE 5 MG/1
5 TABLET ORAL EVERY 6 HOURS PRN
Qty: 28 TABLET | Refills: 0 | Status: SHIPPED | OUTPATIENT
Start: 2025-02-04 | End: 2025-02-11

## 2025-02-04 NOTE — TELEPHONE ENCOUNTER
Dx of chronic migraine detailed out in my 11/18/29024 documentation. She was previously approved but injection has been delayed and now may need a re-approval.    Botox request sent again today.

## 2025-02-04 NOTE — TELEPHONE ENCOUNTER
Patient wondering the status of botox injections. Did we get approval for this year? I When can she receive injections.

## 2025-02-05 ENCOUNTER — HOME CARE VISIT (OUTPATIENT)
Dept: HOME HEALTH SERVICES | Facility: HOME HEALTH | Age: 70
End: 2025-02-05
Payer: COMMERCIAL

## 2025-02-05 PROCEDURE — G0152 HHCP-SERV OF OT,EA 15 MIN: HCPCS

## 2025-02-05 SDOH — ECONOMIC STABILITY: HOUSING INSECURITY: HOME SAFETY: PATIENT WILL BE GETTING A HAND RAIL ON RIGHT SIDE OF STAIRS LEADING UPSTAIRS.

## 2025-02-05 ASSESSMENT — ENCOUNTER SYMPTOMS
PAIN LOCATION: LEFT ARM
HIGHEST PAIN SEVERITY IN PAST 24 HOURS: 5/10
PERSON REPORTING PAIN: PATIENT
PAIN: 1

## 2025-02-05 ASSESSMENT — ACTIVITIES OF DAILY LIVING (ADL)
HOME_HEALTH_OASIS: 00
OASIS_M1830: 00

## 2025-02-06 ENCOUNTER — APPOINTMENT (OUTPATIENT)
Dept: PHYSICAL THERAPY | Facility: CLINIC | Age: 70
End: 2025-02-06
Payer: COMMERCIAL

## 2025-02-10 ENCOUNTER — EVALUATION (OUTPATIENT)
Dept: PHYSICAL THERAPY | Facility: CLINIC | Age: 70
End: 2025-02-10
Payer: COMMERCIAL

## 2025-02-10 DIAGNOSIS — S42.292A CLOSED 3-PART FRACTURE OF PROXIMAL HUMERUS, LEFT, INITIAL ENCOUNTER: ICD-10-CM

## 2025-02-10 DIAGNOSIS — S42.342A CLOSED DISPLACED SPIRAL FRACTURE OF SHAFT OF LEFT HUMERUS, INITIAL ENCOUNTER: ICD-10-CM

## 2025-02-10 DIAGNOSIS — M25.512 LEFT SHOULDER PAIN: Primary | ICD-10-CM

## 2025-02-10 DIAGNOSIS — M25.612 SHOULDER STIFFNESS, LEFT: ICD-10-CM

## 2025-02-10 PROCEDURE — 97110 THERAPEUTIC EXERCISES: CPT | Mod: GP

## 2025-02-10 PROCEDURE — 97161 PT EVAL LOW COMPLEX 20 MIN: CPT | Mod: GP

## 2025-02-10 ASSESSMENT — ENCOUNTER SYMPTOMS
LOSS OF SENSATION IN FEET: 0
OCCASIONAL FEELINGS OF UNSTEADINESS: 1

## 2025-02-10 ASSESSMENT — PAIN SCALES - GENERAL: PAINLEVEL_OUTOF10: 5 - MODERATE PAIN

## 2025-02-10 NOTE — PROGRESS NOTES
Physical Therapy  Physical Therapy Orthopedic Evaluation    Patient Name: Judith Bush  MRN: [id number]  Encounter Date: 2/10/2025  Time Calculation  Start Time: 0800  Stop Time: 0845  Time Calculation (min): 45 min    Insurance:  Visit number: 1  Approved number of visits:  [place]  Insurance: MEd[employer]are  Medicare cert date 2/10/25  to 5/8/25    General:  Reason for visit: L shoulder ORIF  Referred by: more    Current Problem  1. Left shoulder pain        2. Closed 3-part fracture of proximal humerus, left, initial encounter  Referral to Occupational Therapy    Follow Up In Physical Therapy      3. Closed displaced spiral fracture of shaft of left humerus, initial encounter  Referral to Occupational Therapy    Follow Up In Physical Therapy      4. Shoulder stiffness, left              General  Reason for Referral: L shoulder ORIF  Referred By: More  Precautions  Precautions  STEADI Fall Risk Score (The score of 4 or more indicates an increased risk of falling): 4  Precautions Comment: fall risk  Pain  0-10 (Numeric) Pain Score: 5 - Moderate pain      Subjective:     Chief Complaint: L shoulder ORIF 2 surgeries in 4 weeks    Had a fall and break 11/16 surgery humeral neck fx, 2nd break tripped up stairs on 12/26 the surgery 1/9/25 mid humeral spiral fx ORIF      Had home care OT 4 weeks.      Has to get back to work.   RTW 3/8/25.  Has 3 bartending jobs.    Pt is Right handed    Onset:  12/26/24  GUILLE:  fall    Current Condition:   better     PAIN  increases pain/difficulty:  mobility, dressing, doing hair, sleeping on shoulder, ADLs  decrease pain:  pain meds, icing, rubs, patches    Intensity (0-10): current 5, on a bad day 9, on a good day 5  Location: shoulder heaviness in arm  Description: achy click    Relevant Information (PMH & Previous Tests/Imaging): xrays  Previous Interventions/Treatments: home OT    Current level of function/exercise: walking  Prior Level of Function (PLOF)  Patient  previously independent with all ADLs  Exercise/Physical Activity: walking  Work/School: [employer]      Self Reported Function (0-100%) = 40%  - 100% being back to PLOF    Work status: RTW 3/8/25    Living situation   - house with stairs rails both    - lives with alone   - reviewed and no concern  Personal factors Impacting care:   - language: ENG    Pt stated goal(s) return to work.     Medical History Form: Reviewed (scanned into chart)    Red Flags: Do you have any of the following? none  Fever/chills, unexplained weight changes, dizziness/fainting, unexplained change in bowel or bladder functions, unexplained malaise or muscle weakness, night pain/sweats, numbness or tingling    Problem list:  Pain, Decreased flexibility, Decreased strength, Limitations to normal ADL's, and Decreased knowledge of HEP     Objective:    Shoulder ROM  Flexion 144/75  Abd 139/73  ER 27  IR 50  Behind back T12/ buttocks  Supine flexion 80    UE Strength  Shoulder flexion NT  Shoulder abd NT  Biceps 5/4+  Triceps 5/4  ER 5/3  IR 5/3    Elbow ROM     WRIST EXT 74  WRIST FLEX 58    Scar healed slightly adhered scab noted mid scar      Outcome Measures:        EDUCATION: home exercise program, plan of care, activity modifications, pain management, and injury pathology      HEP: Performed and provided:  Access Code: [email or web address]  URL: [email or web address]  Date: 02/10/2025  Prepared by: Cathleen Nichols    Exercises  - Supine Shoulder Flexion AAROM with Hands Clasped  - 1 x daily - 7 x weekly - 1-2 sets - 10-15 reps - 5-10 seconds hold  - Supine Shoulder External Rotation with Dowel  - 1 x daily - 7 x weekly - 1-2 sets - 10-15 reps - 5-10 seconds hold  - Standing Shoulder Internal Rotation Stretch Behind Back  - 1 x daily - 7 x weekly - 1-2 sets - 10-15 reps - 2-5 seconds hold  - Seated Shoulder Flexion Towel Slide at Table Top  - 1 x daily - 7 x weekly - 1-3 sets - 10-15 reps - 3-5 seconds hold  - Seated Shoulder  Abduction Towel Slide at Table Top  - 1 x daily - 7 x weekly - 1-3 sets - 10-15 reps - 3-5 seconds hold  - Seated Shoulder External Rotation PROM on Table  - 1 x daily - 7 x weekly - 1-3 sets - 10-15 reps - 3-5 seconds hold    Charges: EVAL X 1 te x 1  Level of Eval Complexity:  low  Clinical Presentation:   Stable and/or uncomplicated characteristics,         Assessment: Patient is a 70 yo f with c/o L shoulder pain s/p ORIF humerus 1/9/25.   Pt presents with signs and symptoms consistent with recent surgery, resulting in limited participation in pain-free ADLs and inability to perform at their prior level of function. Pt would benefit from physical therapy to address the impairments found & listed previously in the objective section in order to return to safe and pain-free ADLs and prior level of function.    Prognosis: Good   Plan:     Planned Interventions include: therapeutic exercise, self-care home management, manual therapy, therapeutic activities, gait training, neuromuscular coordination, aquatic therapy  Frequency: 2  Duration: 8-12 weeks     Goals:  Active       PT Problem       STG       Start:  02/10/25    Expected End:  02/24/25       Independent HEP by 2 weeks         LTG       Start:  02/10/25    Expected End:  04/11/25       Sohulder [id number] abd 120, behind back to sacrum, ER to 70, IR to 70 by 10-12 weeks  MMT 5/5 B UE by 8-12 weeks  Sleep on side by 6-8 weeks  Dress with ease by 4-6 weeks  ADLs independently by 8 weeks  Lift carry and push pull to return to work safely 10-12 weeks  Quick dash improved by 10 points by 12 weeks             Plan of Care was developed with input and agreement by the patient.

## 2025-02-11 ENCOUNTER — APPOINTMENT (OUTPATIENT)
Dept: ENDOCRINOLOGY | Facility: CLINIC | Age: 70
End: 2025-02-11
Payer: COMMERCIAL

## 2025-02-11 VITALS
TEMPERATURE: 97 F | HEART RATE: 76 BPM | BODY MASS INDEX: 22.84 KG/M2 | SYSTOLIC BLOOD PRESSURE: 93 MMHG | DIASTOLIC BLOOD PRESSURE: 60 MMHG | WEIGHT: 121 LBS | HEIGHT: 61 IN

## 2025-02-11 DIAGNOSIS — R53.83 OTHER FATIGUE: Primary | ICD-10-CM

## 2025-02-11 PROCEDURE — 99203 OFFICE O/P NEW LOW 30 MIN: CPT | Performed by: STUDENT IN AN ORGANIZED HEALTH CARE EDUCATION/TRAINING PROGRAM

## 2025-02-11 PROCEDURE — 3078F DIAST BP <80 MM HG: CPT | Performed by: STUDENT IN AN ORGANIZED HEALTH CARE EDUCATION/TRAINING PROGRAM

## 2025-02-11 PROCEDURE — 1123F ACP DISCUSS/DSCN MKR DOCD: CPT | Performed by: STUDENT IN AN ORGANIZED HEALTH CARE EDUCATION/TRAINING PROGRAM

## 2025-02-11 PROCEDURE — 3008F BODY MASS INDEX DOCD: CPT | Performed by: STUDENT IN AN ORGANIZED HEALTH CARE EDUCATION/TRAINING PROGRAM

## 2025-02-11 PROCEDURE — 3074F SYST BP LT 130 MM HG: CPT | Performed by: STUDENT IN AN ORGANIZED HEALTH CARE EDUCATION/TRAINING PROGRAM

## 2025-02-11 PROCEDURE — 1159F MED LIST DOCD IN RCRD: CPT | Performed by: STUDENT IN AN ORGANIZED HEALTH CARE EDUCATION/TRAINING PROGRAM

## 2025-02-11 ASSESSMENT — PATIENT HEALTH QUESTIONNAIRE - PHQ9
2. FEELING DOWN, DEPRESSED OR HOPELESS: NEARLY EVERY DAY
SUM OF ALL RESPONSES TO PHQ9 QUESTIONS 1 AND 2: 6
6. FEELING BAD ABOUT YOURSELF - OR THAT YOU ARE A FAILURE OR HAVE LET YOURSELF OR YOUR FAMILY DOWN: NOT AT ALL
3. TROUBLE FALLING OR STAYING ASLEEP OR SLEEPING TOO MUCH: NEARLY EVERY DAY
7. TROUBLE CONCENTRATING ON THINGS, SUCH AS READING THE NEWSPAPER OR WATCHING TELEVISION: SEVERAL DAYS
SUM OF ALL RESPONSES TO PHQ QUESTIONS 1-9: 16
8. MOVING OR SPEAKING SO SLOWLY THAT OTHER PEOPLE COULD HAVE NOTICED. OR THE OPPOSITE, BEING SO FIGETY OR RESTLESS THAT YOU HAVE BEEN MOVING AROUND A LOT MORE THAN USUAL: NOT AT ALL
5. POOR APPETITE OR OVEREATING: NEARLY EVERY DAY
9. THOUGHTS THAT YOU WOULD BE BETTER OFF DEAD, OR OF HURTING YOURSELF: NOT AT ALL
1. LITTLE INTEREST OR PLEASURE IN DOING THINGS: NEARLY EVERY DAY
4. FEELING TIRED OR HAVING LITTLE ENERGY: NEARLY EVERY DAY
10. IF YOU CHECKED OFF ANY PROBLEMS, HOW DIFFICULT HAVE THESE PROBLEMS MADE IT FOR YOU TO DO YOUR WORK, TAKE CARE OF THINGS AT HOME, OR GET ALONG WITH OTHER PEOPLE: NOT DIFFICULT AT ALL

## 2025-02-11 ASSESSMENT — ENCOUNTER SYMPTOMS
OCCASIONAL FEELINGS OF UNSTEADINESS: 1
DEPRESSION: 1
LOSS OF SENSATION IN FEET: 0

## 2025-02-11 NOTE — PROGRESS NOTES
Endocrinology  2/11/2025    History of Present Illness   Judith Bush is a 69 y.o. year old female with medical history of osteoporosis (on abaloparatide) with multiple fractures, CVA (4/2024), fibromyalgia, NSTEMI, MV replacement, HTN, COPD, SHON (on CPAP), bipolar disorder, tobacco use, here for multiple symptoms and wondering if they are related to the endocrine system.    Her main complaint today is fatigue.   She has SHON and has not been using CPAP regularly. Working on restarting it now.   Recently, while hospitalized for a humerus fracture, Hgb was low. She is not aware of having anemia.      About 1 year ago she was concerned she had high cortisol.   She no longer feels that this is an issue for her.   No history of thyroid disease.     Has a lot of personal stress going on right now too. Friend who is an alcoholic is living with her.       Medications     Current Outpatient Medications   Medication Instructions    acetaminophen (TYLENOL) 650 mg, oral, Every 6 hours PRN    albuterol 90 mcg/actuation inhaler 2 puffs, inhalation, Every 4 hours PRN    albuterol 2.5 mg, nebulization, Every 6 hours PRN    cyclobenzaprine (FLEXERIL) 10 mg, oral, 3 times daily PRN    diclofenac sodium (VOLTAREN) 4 g, Topical, 4 times daily PRN    dilTIAZem CD (CARDIZEM CD) 180 mg, oral, Daily    gabapentin (NEURONTIN) 300 mg, oral, 3 times daily    glycopyrrolate-formoteroL (Bevespi Aerosphere) 9-4.8 mcg HFA aerosol inhaler 2 puffs, oral, 2 times daily    hydrocortisone acetate 1 % ointment 1 Application, Topical, 2 times daily PRN    lidocaine 4 % patch 1 patch, transdermal, Daily, Remove & discard patch within 12 hours or as directed by MD.    multivitamin tablet 1 tablet, Daily    onabotulinumtoxinA (Botox) 200 unit injection To be administered by provider. Inject 155 Units into the muscle every 3 months. Discard remainder.    ondansetron ODT (ZOFRAN-ODT) 4 mg, oral, Every 8 hours PRN    oxybutynin XL (DITROPAN-XL) 10 mg,  oral, 2 times daily    oxyCODONE (ROXICODONE) 5 mg, oral, Every 6 hours PRN    SUMAtriptan (IMITREX) 50 mg, oral, Once as needed, May repeat after 2 hours.    Tymlos 80 mcg (3,120 mcg/1.56 mL) pen injector Inject 1 Dose as directed once daily.    ubrogepant (UBRELVY) 100 mg, oral, Once as needed, Can repeat in 2 hours if no response. Not to exceed 200mg per 24 hours.          History     Past Medical History:   Diagnosis Date    Abdominal distension (gaseous) 08/13/2018    ADHD (attention deficit hyperactivity disorder)     Anxiety     Arthritis     Bipolar disorder     Carpal tunnel syndrome, right upper limb 08/13/2018    Cataract 8/22    Collagenous colitis 08/13/2018    Compression fracture of cervical spine     COPD (chronic obstructive pulmonary disease) (Multi)     Depression, unspecified 08/13/2018    Dermatitis, unspecified 07/09/2020    eczematoid    Dermatochalasis of unspecified eye, unspecified eyelid 10/25/2022    Displaced comminuted fracture of left patella, initial encounter for closed fracture 08/02/2018    Displaced comminuted fracture of right patella, initial encounter for closed fracture 08/13/2018    Dizziness     GERD (gastroesophageal reflux disease)     Headache     Hyperlipidemia     Hypertension     Hypokalemia 08/13/2018    Low back pain, unspecified 08/13/2018    Migraine     No blood products     Noninfective gastroenteritis and colitis, unspecified 08/02/2018    Chronic colitis    Nutritional deficiency, unspecified 08/02/2018    Poor diet    Old myocardial infarction 03/31/2014    NSTEMI    Other chest pain 08/28/2017    Atypical chest pain    Other fracture of right patella, sequela 08/13/2018    Patellar sleeve fracture, right, sequela    Other visual disturbances 04/04/2018    Blurred vision, bilateral    Pain in unspecified hip 03/31/2014    [facility]icard[facility] ([facility]) 2019    Pleural effusion     Polyosteoarthritis, unspecified 08/02/2018    Presence of intraocular lens 09/20/2018     "Pseudophakia of right eye    Sleep apnea     Small intestinal bacterial overgrowth (SIBO)     Stroke (Multi) 04/2024    Tobacco abuse     Unspecified fall, sequela 08/13/2018       Past Surgical History:   Procedure Laterality Date    CARDIAC CATHETERIZATION      CATARACT EXTRACTION      CHOLECYSTECTOMY      COLONOSCOPY      CT ABDOMEN ANGIOGRAM W AND/OR WO IV CONTRAST  07/28/2022    CT ABDOMEN ANGIOGRAM W AND/OR WO IV CONTRAST 7/28/2022 [facility]    FEMUR FRACTURE SURGERY  07/16/2018    Femur Repair    FOOT SURGERY  09/13/2024    LEFT PROXIMAL  HALLUX PARTIAL EXCISION    HIP SURGERY      HYSTERECTOMY      MITRAL VALVE REPLACEMENT  08/07/2019    OTHER SURGICAL HISTORY  07/16/2018    Oophorectomy - Bilateral (Removal Of Both Ovaries)    OTHER SURGICAL HISTORY  05/27/2020    Radius fracture repair    SHOULDER SURGERY  07/16/2018    Shoulder Surgery    UPPER GASTROINTESTINAL ENDOSCOPY         Family History   Adopted: Yes   Problem Relation Name Age of Onset    Other (Adopted) Mother      Other (Adopted child) Father          Allergies   Allergen Reactions    Cephalosporins Anaphylaxis    Penicillin Anaphylaxis    Neomycin-Polymyxin-Gramicidin Rash        Social history:   - Works as a  and caterer    - Tobacco use   - Denies recreational drug use   - Denies alcohol use       Physical Exam   BP 93/60 (BP Location: Right arm, Patient Position: Sitting, BP Cuff Size: Adult)   Pulse 76   Temp 36.1 °C (97 °F) (Temporal)   Ht 1.549 m (5' 1\")   Wt 54.9 kg (121 lb)   BMI 22.86 kg/m²   General: Well appearing, no acute distress  Heart: Normal rate  Neck: Soft, nontender, no lymphadenopathy, thyroid normal in size   Lungs: Breathing comfortably on room air   Abdomen: Soft, nontender  Extremities: Warm, no edema  Skin: No rashes      Labs and Imaging     Component      Latest Ref Rng 4/23/2024   Thyroid Stimulating Hormone      0.44 - 3.98 mIU/L 1.98    Vitamin D, 25-Hydroxy, Total      30 - 100 ng/mL 35 "    CORTISOL      2.5 - 20.0 ug/dL 8.3      Component      Latest Ref Rng 1/11/2025   WBC      4.4 - 11.3 x10*3/uL 9.2    nRBC      0.0 - 0.0 /100 WBCs 0.0    RBC      4.00 - 5.20 x10*6/uL 3.33 (L)    HEMOGLOBIN      12.0 - 16.0 g/dL 10.3 (L)    HEMATOCRIT      36.0 - 46.0 % 31.6 (L)    MCV      80 - 100 fL 95    MCH      26.0 - 34.0 pg 30.9    MCHC      32.0 - 36.0 g/dL 32.6    RED CELL DISTRIBUTION WIDTH      11.5 - 14.5 % 14.6 (H)    Platelets      150 - 450 x10*3/uL 308           Assessment and Plan   Judith Bush is a 69 y.o. year old female with medical history of osteoporosis (on abaloparatide) with multiple fractures, CVA (4/2024), fibromyalgia, NSTEMI, MV replacement, HTN, COPD, SHON (on CPAP), bipolar disorder, tobacco use, here for fatigue. Unlikely that she has an endocrinopathy leading to fatigue. Her TSH has always been normal. She has no clinical evidence of hypercortisolism. She does have new anemia, which could be contributing, so I have suggested she follow up with her PCP regarding this. Additionally, untreated SHON can cause fatigue. She will try to use her CPAP more regularly.     RTC: MU Woo DO   Endocrinology

## 2025-02-12 DIAGNOSIS — S42.292A CLOSED 3-PART FRACTURE OF PROXIMAL HUMERUS, LEFT, INITIAL ENCOUNTER: ICD-10-CM

## 2025-02-12 DIAGNOSIS — I63.9 CEREBROVASCULAR ACCIDENT (CVA), UNSPECIFIED MECHANISM (MULTI): ICD-10-CM

## 2025-02-12 DIAGNOSIS — I10 PRIMARY HYPERTENSION: ICD-10-CM

## 2025-02-12 RX ORDER — DILTIAZEM HYDROCHLORIDE 180 MG/1
180 CAPSULE, COATED, EXTENDED RELEASE ORAL DAILY
Qty: 90 CAPSULE | Refills: 3 | Status: SHIPPED | OUTPATIENT
Start: 2025-02-12 | End: 2026-02-12

## 2025-02-17 ENCOUNTER — TREATMENT (OUTPATIENT)
Dept: PHYSICAL THERAPY | Facility: CLINIC | Age: 70
End: 2025-02-17
Payer: COMMERCIAL

## 2025-02-17 ENCOUNTER — OFFICE VISIT (OUTPATIENT)
Dept: ORTHOPEDIC SURGERY | Facility: CLINIC | Age: 70
End: 2025-02-17
Payer: COMMERCIAL

## 2025-02-17 ENCOUNTER — HOSPITAL ENCOUNTER (OUTPATIENT)
Dept: RADIOLOGY | Facility: CLINIC | Age: 70
Discharge: HOME | End: 2025-02-17
Payer: COMMERCIAL

## 2025-02-17 DIAGNOSIS — R29.898 WEAKNESS OF LEFT SHOULDER: ICD-10-CM

## 2025-02-17 DIAGNOSIS — S42.292A CLOSED 3-PART FRACTURE OF PROXIMAL HUMERUS, LEFT, INITIAL ENCOUNTER: ICD-10-CM

## 2025-02-17 DIAGNOSIS — S42.342A CLOSED DISPLACED SPIRAL FRACTURE OF SHAFT OF LEFT HUMERUS, INITIAL ENCOUNTER: ICD-10-CM

## 2025-02-17 DIAGNOSIS — S42.202A CLOSED FRACTURE OF PROXIMAL END OF LEFT HUMERUS, UNSPECIFIED FRACTURE MORPHOLOGY, INITIAL ENCOUNTER: ICD-10-CM

## 2025-02-17 DIAGNOSIS — M25.512 LEFT SHOULDER PAIN: ICD-10-CM

## 2025-02-17 DIAGNOSIS — S42.392A OTHER FRACTURE OF SHAFT OF LEFT HUMERUS, INITIAL ENCOUNTER FOR CLOSED FRACTURE: ICD-10-CM

## 2025-02-17 DIAGNOSIS — M25.612 SHOULDER STIFFNESS, LEFT: ICD-10-CM

## 2025-02-17 PROCEDURE — 99211 OFF/OP EST MAY X REQ PHY/QHP: CPT

## 2025-02-17 PROCEDURE — 73100 X-RAY EXAM OF WRIST: CPT | Mod: LEFT SIDE | Performed by: RADIOLOGY

## 2025-02-17 PROCEDURE — 97140 MANUAL THERAPY 1/> REGIONS: CPT | Mod: GP

## 2025-02-17 PROCEDURE — 73100 X-RAY EXAM OF WRIST: CPT | Mod: LT

## 2025-02-17 PROCEDURE — 73080 X-RAY EXAM OF ELBOW: CPT | Mod: LEFT SIDE | Performed by: RADIOLOGY

## 2025-02-17 PROCEDURE — 1123F ACP DISCUSS/DSCN MKR DOCD: CPT

## 2025-02-17 PROCEDURE — 73080 X-RAY EXAM OF ELBOW: CPT | Mod: LT

## 2025-02-17 PROCEDURE — 97110 THERAPEUTIC EXERCISES: CPT | Mod: GP

## 2025-02-17 RX ORDER — OXYCODONE HYDROCHLORIDE 5 MG/1
5 TABLET ORAL EVERY 6 HOURS PRN
Qty: 28 TABLET | Refills: 0 | Status: SHIPPED | OUTPATIENT
Start: 2025-02-17 | End: 2025-02-24

## 2025-02-17 ASSESSMENT — PAIN SCALES - GENERAL: PAINLEVEL_OUTOF10: 7

## 2025-02-17 NOTE — PROGRESS NOTES
Physical Therapy Treatment    Patient Name: Judith Bush  MRN: [id number]  Encounter Date: 2/17/2025  Time Calculation  Start Time: 1545  Stop Time: 1630  Time Calculation (min): 45 min    Insurance:     Visit number: 2  Approved number of visits:  [place]  Insurance: MEdicare  Medicare cert date 2/10/25  to 5/8/25    Current Problem  1. Left shoulder pain  Follow Up In Physical Therapy      2. Shoulder stiffness, left  Follow Up In Physical Therapy      3. Weakness of left shoulder  Follow Up In Physical Therapy          General  Reason for Referral: L shoulder ORIF  Referred By: Polifrone  Precautions  Precautions  Precautions Comment: fall risk  Pain  0-10 (Numeric) Pain Score: 7    Subjective:     Patient reports one of her stitches was coming out so had to go to surgery and they pulled out a long stitch.  Had an elbow and wrist xray due to her wrist pain.  Wrist and hand feel very swollen and full can't close her hand.  Seeing an MD about the wrist.      Shoulder pain about 7/10.      Compliant with HEP? yes    Objective:   Shoulder ROM  Flexion 144/75  Abd 139/73  ER 27  IR 50  Behind back T12/ buttocks  Supine flexion 80     UE Strength  Shoulder flexion NT  Shoulder abd NT  Biceps 5/4+  Triceps 5/4  ER 5/3  IR 5/3     Elbow ROM       Treatments:   Pulley 3 min  Wall wash 10 x flexion  Theragun to RTC   Wand abd behind back ext 15 x  Wrist flexion and ext stretch 1 min  x 2 L  Wrist supination stretch  1 min x 2 L  Instruct in self STM to wrist and extensor muscles     Charges: te x 2 man 1    Assessment: hand pain seem more muscular in nature given the lack of mobility in wrist and tight quality of tissue around elbow and forearm due to recent injury and immobilization.  Reported feeling better post treatment upgraded HEP to include wrist stretching and self STM.       Plan: PROM ROM repeat theragun

## 2025-02-17 NOTE — PROGRESS NOTES
Subjective    Patient ID: Judith Bush is a 69 y.o. female.    Chief Complaint: No chief complaint on file.     Last Surgery: Open Reduction Internal Fixation Humerus - Left - Left  Last Surgery Date: 1/9/2025    HPI  Patient is a 69 y.o. female who is s/p left proximal humerus ORIF on 11/21/2024 with subsequent left cliff-hardware humeral shaft ORIF on 1/9/2025. Patient continues to be coffee cup weight bearing on the left arm at this time. Patient states that she has a suture sticking at her proximal humerus incision. Tried to trim it herself, but couldn't get to it. Patient continues with therapy sessions, performing exercise program at home. States that her swelling has reduced, but is having pain in the hand at night from the elbow down. Patient denies fever or chills, N/T or arm pain.     ROS: All other systems have been reviewed and are negative except as previously noted in history of present illness.      IMP:  Problem List Items Addressed This Visit       Closed fracture of left proximal humerus    Relevant Orders    XR elbow left 3+ views    XR wrist left 1-2 views     Other Visit Diagnoses       Closed 3-part fracture of proximal humerus, left, initial encounter        Relevant Orders    XR elbow left 3+ views    XR wrist left 1-2 views          Objective     General: Alert and oriented x 3, NAD, respirations easy and unlabored with no audible wheezes, skin warm and dry, speech and dress appropriate for noted age, affect euthymic.     Musculoskeletal: left upper extremity  incision well-healed  compartments soft  mild swelling to upper extremity  sensation intact to light touch  motor intact including R/U/M/AIN/PIN nn.  palpable radial pulse 2+   Subcutaneous suture noted to be sticking out at medial proximal humerus incision    X-ray: Images of left elbow and wrist reviewed personally by me today and reveal a normal bony exam of the left elbow and wrist with no acute fractures or deformities.       Assessment/Plan   Encounter Diagnoses:  Closed 3-part fracture of proximal humerus, left, initial encounter    Closed fracture of proximal end of left humerus, unspecified fracture morphology, initial encounter    PLAN: Patient is s/p left proximal humerus ORIF on 11/21/2024 with subsequent left cliff-hardware humeral shaft ORIF on 1/9/2025. Suture was removed at medial incision. Patient is overall doing well. She is coffee cup weight bearing on the left arm. Patient states that she has a suture sticking at her proximal humerus incision. Tried to trim it herself, but couldn't get to it. Patient states that her swelling has reduced, but is having pain in the hand at night from the elbow down. Working with home PT. Imaging reveals normal bony exam of the left elbow and wrist with no acute fractures or deformities. Patient is educated that she had a subcutaneous suture abscess that was removed. Deeper suture was palpated, but was not present. She should continue to wear a dry dressing over suture abscess opening. Patient is educated that she does not have any fractures at her elbow or wrist. Most likely the pain is from nerve compression, but should follow up with her established orthopedic hand specialist Dr. Hansen. Patient is educated that she may fully weight bear as tolerated on the left arm on February 21st, which is 3 months from her proximal humerus surgery. Patient will continue to perform coffee cup weight bearing in the mean time. She will continue to work with PT on shoulder and elbow ROM, arm strengthening, and stretching exercises. Patient is educated that she will follow up in 2 months. Patient is in agreement with this plan. This is patient's last refill of pain medication. Xrays of the left shoulder and humerus will be needed.     Orders Placed This Encounter    XR elbow left 3+ views    XR wrist left 1-2 views     No follow-ups on file.  Ortho Exam

## 2025-02-18 ENCOUNTER — PATIENT MESSAGE (OUTPATIENT)
Dept: ORTHOPEDIC SURGERY | Facility: CLINIC | Age: 70
End: 2025-02-18
Payer: COMMERCIAL

## 2025-02-19 ENCOUNTER — TREATMENT (OUTPATIENT)
Dept: PHYSICAL THERAPY | Facility: CLINIC | Age: 70
End: 2025-02-19
Payer: COMMERCIAL

## 2025-02-19 DIAGNOSIS — M25.612 SHOULDER STIFFNESS, LEFT: ICD-10-CM

## 2025-02-19 DIAGNOSIS — R29.898 WEAKNESS OF LEFT SHOULDER: ICD-10-CM

## 2025-02-19 DIAGNOSIS — S42.292A CLOSED 3-PART FRACTURE OF PROXIMAL HUMERUS, LEFT, INITIAL ENCOUNTER: ICD-10-CM

## 2025-02-19 DIAGNOSIS — S42.342A CLOSED DISPLACED SPIRAL FRACTURE OF SHAFT OF LEFT HUMERUS, INITIAL ENCOUNTER: ICD-10-CM

## 2025-02-19 DIAGNOSIS — M25.512 LEFT SHOULDER PAIN: ICD-10-CM

## 2025-02-19 PROCEDURE — RXMED WILLOW AMBULATORY MEDICATION CHARGE

## 2025-02-19 PROCEDURE — 97140 MANUAL THERAPY 1/> REGIONS: CPT | Mod: GP

## 2025-02-19 PROCEDURE — 97110 THERAPEUTIC EXERCISES: CPT | Mod: GP

## 2025-02-19 ASSESSMENT — ENCOUNTER SYMPTOMS
LOSS OF SENSATION IN FEET: 0
OCCASIONAL FEELINGS OF UNSTEADINESS: 1
DEPRESSION: 1

## 2025-02-19 ASSESSMENT — PAIN SCALES - GENERAL: PAINLEVEL_OUTOF10: 8

## 2025-02-19 NOTE — PROGRESS NOTES
Physical Therapy Treatment    Patient Name: Judith Bush  MRN: [id number]  Encounter Date: 2/19/2025  Time Calculation  Start Time: 0930  Stop Time: 1015  Time Calculation (min): 45 min    Insurance:   Visit number: 3  Approved number of visits:  [place]  Insurance: MEdicare  Medicare cert date 2/10/25  to 5/8/25      Current Problem  1. Left shoulder pain  Follow Up In Physical Therapy      2. Shoulder stiffness, left  Follow Up In Physical Therapy      3. Weakness of left shoulder  Follow Up In Physical Therapy          General  Reason for Referral: L shoulder ORIF  Referred By: Polifrone  Precautions  Precautions  STEADI Fall Risk Score (The score of 4 or more indicates an increased risk of falling): 7  Precautions Comment: fall risk  Pain  0-10 (Numeric) Pain Score: 8    Subjective:     Patient reports shoulder is stiff and heavy.  Pain about 8/10 this morning.  Feeling pretty discouraged.      Compliant with HEP? yes    Objective:     Shoulder ROM  Flexion 90  IR to belly        Treatments:     Pulley 5 flexion  UE ranger L: flexion circles cw  PROM flexion abd, ER, IR   STM forearm biceps wrist  Row YTB 10 x 2      Charges: man x 1 te x 2    Assessment: reported feeling better post treatment.  Able to move and swing arm more freely after stretching and massage.       Plan: check range

## 2025-02-21 ENCOUNTER — PHARMACY VISIT (OUTPATIENT)
Dept: PHARMACY | Facility: CLINIC | Age: 70
End: 2025-02-21
Payer: COMMERCIAL

## 2025-02-24 ENCOUNTER — APPOINTMENT (OUTPATIENT)
Dept: ORTHOPEDIC SURGERY | Facility: CLINIC | Age: 70
End: 2025-02-24
Payer: COMMERCIAL

## 2025-02-24 ENCOUNTER — TREATMENT (OUTPATIENT)
Dept: PHYSICAL THERAPY | Facility: CLINIC | Age: 70
End: 2025-02-24
Payer: COMMERCIAL

## 2025-02-24 DIAGNOSIS — S42.342A CLOSED DISPLACED SPIRAL FRACTURE OF SHAFT OF LEFT HUMERUS, INITIAL ENCOUNTER: ICD-10-CM

## 2025-02-24 DIAGNOSIS — E78.00 PURE HYPERCHOLESTEROLEMIA: ICD-10-CM

## 2025-02-24 DIAGNOSIS — S42.292A CLOSED 3-PART FRACTURE OF PROXIMAL HUMERUS, LEFT, INITIAL ENCOUNTER: ICD-10-CM

## 2025-02-24 PROCEDURE — 97110 THERAPEUTIC EXERCISES: CPT | Mod: GP

## 2025-02-24 PROCEDURE — RXMED WILLOW AMBULATORY MEDICATION CHARGE

## 2025-02-24 PROCEDURE — 97140 MANUAL THERAPY 1/> REGIONS: CPT | Mod: GP

## 2025-02-24 RX ORDER — EVOLOCUMAB 140 MG/ML
INJECTION, SOLUTION SUBCUTANEOUS
Qty: 6 ML | Refills: 3 | Status: SHIPPED | OUTPATIENT
Start: 2025-02-24 | End: 2026-02-23

## 2025-02-24 ASSESSMENT — PAIN SCALES - GENERAL: PAINLEVEL_OUTOF10: 6

## 2025-02-24 NOTE — PROGRESS NOTES
Physical Therapy Treatment    Patient Name: Judith Bush  MRN: [id number]  Encounter Date: 2/24/2025  Time Calculation  Start Time: 1545  Stop Time: 1630  Time Calculation (min): 45 min    Insurance:     Visit number: 4  Approved number of visits:  [place]  Insurance: MEdicare  Medicare cert date 2/10/25  to 5/8/25    Current Problem  1. Closed 3-part fracture of proximal humerus, left, initial encounter  Follow Up In Physical Therapy      2. Closed displaced spiral fracture of shaft of left humerus, initial encounter  Follow Up In Physical Therapy          General  Reason for Referral: L shoulder ORIF  Referred By: Polifrone  Precautions  Precautions  Precautions Comment: fall risk  Pain  0-10 (Numeric) Pain Score: 6    Subjective:     Patient reports doesn't feel like she has improved since last visit.    UT and neck stiff today.   Elbow pain nerve.  Hand is just pain not N & T.    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     Pain about 6/10.   Compliant with HEP? yes    Objective:   Shoulder ROM  Flexion 97  Abd 88  ER 45 - 50  IR 51  Behind back buttocks   Supine flexion        Treatments:     UBE 5 min  Wand up back IR 15 x  Iso 4 way 10 x 5 sec hold L  PROM: flexion, abd, ER, IR elbow ext, wrist supination  STM to biceps and scar    Charges: te x 2 man x 1    Assessment: ROM improving overall      Plan: PROM stability iso to HEP, biceps curls

## 2025-02-25 ENCOUNTER — PHARMACY VISIT (OUTPATIENT)
Dept: PHARMACY | Facility: CLINIC | Age: 70
End: 2025-02-25
Payer: COMMERCIAL

## 2025-02-25 ENCOUNTER — OFFICE VISIT (OUTPATIENT)
Dept: ORTHOPEDIC SURGERY | Facility: HOSPITAL | Age: 70
End: 2025-02-25
Payer: COMMERCIAL

## 2025-02-25 DIAGNOSIS — M25.642 JOINT STIFFNESS OF HAND, LEFT: Primary | ICD-10-CM

## 2025-02-25 PROCEDURE — 99213 OFFICE O/P EST LOW 20 MIN: CPT | Performed by: STUDENT IN AN ORGANIZED HEALTH CARE EDUCATION/TRAINING PROGRAM

## 2025-02-25 NOTE — PROGRESS NOTES
History of Present Illness   Patient presents today for evaluation of side: left upper extremity pain.    The patient sustained a left proximal humerus fracture and underwent surgery by Dr. Roberts in November 2024.  She subsequently had a periprosthetic implant fracture when she fell into the beams/stairway banister.  She underwent open reduction internal fixation of her humeral shaft in January by    The patient denies any loss of consciousness or additional significant injuries.     She has noticed 3 weeks of pain in her left elbow, forearm, wrist and hand.  She states that she may not be focused on this as she was having more significant shoulder and arm.  She denies any numbness or tingling of her extremity.  The pain is primarily located on the dorsum of her forearm.    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    Past Medical History:   Diagnosis Date    Abdominal distension (gaseous) 08/13/2018    ADHD (attention deficit hyperactivity disorder)     Anxiety     Arthritis     Bipolar disorder     Carpal tunnel syndrome, right upper limb 08/13/2018    Cataract 8/22    Collagenous colitis 08/13/2018    Compression fracture of cervical spine     COPD (chronic obstructive pulmonary disease) (Multi)     Depression, unspecified 08/13/2018    Dermatitis, unspecified 07/09/2020    eczematoid    Dermatochalasis of unspecified eye, unspecified eyelid 10/25/2022    Displaced comminuted fracture of left patella, initial encounter for closed fracture 08/02/2018    Displaced comminuted fracture of right patella, initial encounter for closed fracture 08/13/2018    Dizziness     GERD (gastroesophageal reflux disease)     Headache     Hyperlipidemia     Hypertension     Hypokalemia 08/13/2018    Low back pain, unspecified 08/13/2018    Migraine     No blood products     Noninfective gastroenteritis and colitis, unspecified 08/02/2018    Chronic colitis     Nutritional deficiency, unspecified 08/02/2018    Poor diet    Old myocardial infarction 03/31/2014    NSTEMI    Other chest pain 08/28/2017    Atypical chest pain    Other fracture of right patella, sequela 08/13/2018    Patellar sleeve fracture, right, sequela    Other visual disturbances 04/04/2018    Blurred vision, bilateral    Pain in unspecified hip 03/31/2014    Pericarditis ([facility]) 2019    Pleural effusion     Polyosteoarthritis, unspecified 08/02/2018    Presence of intraocular lens 09/20/2018    Pseudophakia of right eye    Sleep apnea     Small intestinal bacterial overgrowth (SIBO)     Stroke (Multi) 04/2024    Tobacco abuse     Unspecified fall, sequela 08/13/2018       Medication Documentation Review Audit       Reviewed by Cee Anaya LPN (Licensed Nurse) on 02/25/25 at 1456      Medication Order Taking? Sig Documenting Provider Last Dose Status   acetaminophen (Tylenol) 325 mg tablet [id number]  Take 2 tablets (650 mg) by mouth every 6 hours if needed for mild pain (1 - 3). Clarissa Jameson MD  Active   albuterol 2.5 mg /3 mL (0.083 %) nebulizer solution [id number]  Take 3 mL (2.5 mg) by nebulization every 6 hours if needed for wheezing. Azam Burns MD MPH  Active   albuterol 90 mcg/actuation inhaler [id number]  USE 2 INHALATIONS BY MOUTH EVERY 4 HOURS AS NEEDED Azam Burns MD MPH  Active   calcium carbonate-vitamin D3 (Calcium 600 with Vitamin D3) 600 mg-10 mcg (400 unit) chewable tablet [id number]  Take 1 tablet by mouth twice daily Scott Doherty MD  Active   cyclobenzaprine (Flexeril) 10 mg tablet [id number]  Take 1 tablet (10 mg) by mouth 3 times a day as needed for muscle spasms. Cee Coates MD  Active   diclofenac sodium (Voltaren) 1 % gel [id number]  Apply 4.5 inches (4 g) topically 4 times a day as needed (pain). Stephanie Jung MD  Active   dilTIAZem CD (Cardizem CD) 180 mg 24 hr capsule [id number]  Take 1 capsule (180 mg) by mouth once daily. Gurpreet Wood,  MD  Active   evolocumab (Repatha SureClick) 140 mg/mL injection [id number]  INJECT THE CONTENTS OF 1 PEN UNDER THE SKIN EVERY 2 WEEKS Gurpreet Wood MD  Active   gabapentin (Neurontin) 300 mg capsule [id number]  Take 1 capsule (300 mg) by mouth 3 times a day. Cheri Givens PA-C  Active   glycopyrrolate-formoteroL (Bevespi Aerosphere) 9-4.8 mcg HFA aerosol inhaler [id number]  TAKE 2 PUFFS BY MOUTH TWICE A DAY Azam Burns MD [facility]  Active   hydrocortisone acetate 1 % ointment [id number]  Apply 1 Application topically 2 times a day as needed (for hemorrhoids). Cee Coates MD  Active   multivitamin tablet [id number]  Take 1 tablet by mouth once daily. Historical Provider, MD  Active   onabotulinumtoxinA (Botox) 200 unit injection [id number]  To be administered by provider. Inject 155 Units into the muscle every 3 months. Discard remainder. Deacon Vera MD  Active   ondansetron ODT (Zofran-ODT) 4 mg disintegrating tablet [id number]  Take 1 tablet (4 mg) by mouth every 8 hours if needed for nausea or vomiting. Abraham Mercedes PA-C  Active   oxybutynin XL (Ditropan-XL) 10 mg 24 hr tablet [id number]  Take 1 tablet (10 mg) by mouth 2 times a day. Cee Coates MD  Active   oxyCODONE (Roxicodone) 5 mg immediate release tablet [id number]  Take 1 tablet (5 mg) by mouth every 6 hours if needed for severe pain (7 - 10) for up to 7 days. Cheri Givens PA-C   25 [id number]   SUMAtriptan (Imitrex) 50 mg tablet [id number] No Take 1 tablet (50 mg) by mouth 1 time if needed for migraine. May repeat after 2 hours. Cee Coates MD Not Taking Active   Tymlos 80 mcg (3,120 mcg/1.56 mL) pen injector [id number]  Inject 1 Dose as directed once daily. Historical Provider, MD  Active   ubrogepant (Ubrelvy) 100 mg tablet tablet [id number]  Take 1 tablet (100 mg) by mouth 1 time if needed (migraine headaches). Can repeat in 2 hours if no response. Not to exceed 200mg per 24 hours. Deacon Vera MD  Active                    Allergies    Allergen Reactions    Cephalosporins Anaphylaxis    Penicillin Anaphylaxis    Neomycin-Polymyxin-Gramicidin Rash       Social History     Socioeconomic History    Marital status: Single     Spouse name: Not on file    Number of children: 0    Years of education: Not on file    Highest education level: Not on file   Occupational History    Not on file   Tobacco Use    Smoking status: Some Days     Current packs/day: 0.25     Average packs/day: 0.3 packs/day for 60.0 years (20.0 ttl pk-yrs)     Types: Cigarettes    Smokeless tobacco: Never   Substance and Sexual Activity    Alcohol use: Not Currently     Alcohol/week: 3.0 standard drinks of alcohol     Types: 3 Standard drinks or equivalent per week    Drug use: Not Currently     Types: Marijuana    Sexual activity: Not Currently   Other Topics Concern    Not on file   Social History Narrative    Not on file     Social Drivers of Health     Financial Resource Strain: Low Risk  (1/10/2025)    Overall Financial Resource Strain (CARDIA)     Difficulty of Paying Living Expenses: Not very hard   Recent Concern: Financial Resource Strain - Medium Risk (12/28/2024)    Overall Financial Resource Strain (CARDIA)     Difficulty of Paying Living Expenses: Somewhat hard   Food Insecurity: No Food Insecurity (1/9/2025)    Hunger Vital Sign     Worried About Running Out of Food in the Last Year: Never true     Ran Out of Food in the Last Year: Never true   Transportation Needs: No Transportation Needs (2/5/2025)    OASIS : Transportation     Lack of Transportation (Medical): No     Lack of Transportation (Non-Medical): No     Patient Unable or Declines to Respond: No   Recent Concern: Transportation Needs - Unmet Transportation Needs (12/28/2024)    PRAPARE - Transportation     Lack of Transportation (Medical): Yes     Lack of Transportation (Non-Medical): Yes   Physical Activity: Not on file   Stress: Not on file   Social Connections: Feeling Socially Integrated (2/5/2025)     OASIS : Social Isolation     Frequency of experiencing loneliness or isolation: Never   Intimate Partner Violence: Not At Risk (1/9/2025)    Humiliation, Afraid, Rape, and Kick questionnaire     Fear of Current or Ex-Partner: No     Emotionally Abused: No     Physically Abused: No     Sexually Abused: No   Housing Stability: Low Risk  (1/10/2025)    Housing Stability Vital Sign     Unable to Pay for Housing in the Last Year: No     Number of Times Moved in the Last Year: 0     Homeless in the Last Year: No   Recent Concern: Housing Stability - High Risk (12/28/2024)    Housing Stability Vital Sign     Unable to Pay for Housing in the Last Year: Yes     Number of Times Moved in the Last Year: 0     Homeless in the Last Year: No       Past Surgical History:   Procedure Laterality Date    CARDIAC CATHETERIZATION      CATARACT EXTRACTION      CHOLECYSTECTOMY      COLONOSCOPY      CT ABDOMEN ANGIOGRAM W AND/OR WO IV CONTRAST  07/28/2022    CT ABDOMEN ANGIOGRAM W AND/OR WO IV CONTRAST 7/28/2022 [facility]    FEMUR FRACTURE SURGERY  07/16/2018    Femur Repair    FOOT SURGERY  09/13/2024    LEFT PROXIMAL  HALLUX PARTIAL EXCISION    HIP SURGERY      HYSTERECTOMY      MITRAL VALVE REPLACEMENT  08/07/2019    OTHER SURGICAL HISTORY  07/16/2018    Oophorectomy - Bilateral (Removal Of Both Ovaries)    OTHER SURGICAL HISTORY  05/27/2020    Radius fracture repair    SHOULDER SURGERY  07/16/2018    Shoulder Surgery    UPPER GASTROINTESTINAL ENDOSCOPY            Review of Systems   GENERAL: Negative  GI: Negative  MUSCULOSKELETAL: See HPI  SKIN: Negative  NEURO:  Negative     Physical Exam:  side: left upper extremity:  Skin healthy to gross inspection, no breakdown.  Previous surgical incisions are well-healed.  There is no erythema warmth or drainage.  She does have elbow stiffness primarily in extension which does cause her pain.  There is some mild swelling this to the elbow.  Her elbow range of motion is from  approximately 30 degrees to 130 degrees.  She has full pronosupination.  She has negative Tinel's to the carpal tunnel, Guyon's canal, cubital tunnel.  Negative elbow flexion test.  Negative ulnar nerve compression test.  Negative Durkan's.  Negative Phalen's.  Full finger range of motion to the distal palmar crease however there is stiffness and tightness in the dorsum of her fingers and hand when this occurs.  Intact flexion and extension of 1st IP joint and finger abduction  Sensation intact to light touch medial / ulnar and radial nerve distribution   Good cap refill     Imaging  Previous x-rays of the left upper extremity were reviewed in office today.     Assessment   Patient with an acute postoperative stiffness in the setting of 2 previous traumas and surgery.    Plan:  We discussed that based on her current symptoms and her clinical exam findings I do not think she has a compressive neuropathy.  We did discuss possibly obtaining an EMG/nerve conduction study for further evaluation he is content to just observe this and see how she does.  I believe that most of her pain in her elbow, forearm and wrist is likely related to stiffness and this should improve with time.  They should work on this with her in therapy to try to get full extension of her elbow and to work on finger and wrist range of motion and may do modalities.  At this point we will continue to monitor this.  If she is not seeing any significant improvement in 6 weeks that she should follow back up for repeat clinical exam.     Follow-up  as needed but she should call and schedule an appointment if not improving over the next 4 to 8 weeks.

## 2025-02-26 ENCOUNTER — APPOINTMENT (OUTPATIENT)
Dept: PHYSICAL THERAPY | Facility: CLINIC | Age: 70
End: 2025-02-26
Payer: COMMERCIAL

## 2025-03-01 PROCEDURE — RXMED WILLOW AMBULATORY MEDICATION CHARGE

## 2025-03-01 ASSESSMENT — EXTERNAL EXAM - RIGHT EYE: OD_EXAM: NORMAL

## 2025-03-01 ASSESSMENT — SLIT LAMP EXAM - LIDS
COMMENTS: GOOD POSITION
COMMENTS: GOOD POSITION

## 2025-03-01 ASSESSMENT — CUP TO DISC RATIO
OS_RATIO: .15
OD_RATIO: .15

## 2025-03-01 ASSESSMENT — EXTERNAL EXAM - LEFT EYE: OS_EXAM: NORMAL

## 2025-03-01 NOTE — PROGRESS NOTES
Binocular visual [id number]  -Since around 2019 has haze starting peripherally going into central vision. Each episode lasting about 2 minutes maximum. Goes away after closing eyes. Previously occurred about 3 times a week but has been improving - now a few times a month. Also gets migraine headaches. Sees a neurologist - Dr. Zuleima Hollins. Treats headaches with OTC medications.   -Color plates (1/12/24) - 11/11 OD and 11/11 OS.  -OCT RNFL (1/12/24) - OD: Bord ST. OS: Bord ST. 84/86. Stable from 7/9/20.  -OCT macula (1/12/24) - Normal thickness and contour OU. Intact IS-OS OU. No edema OU. 241/240. Stable from 7/9/20.   -HVF 24-2 (1/12/24) - OD: 3%FP 1%FN. Scattered nasal depression. OS: 2/16FL 4%FP 8%FN. Scattered. Similar, improved pattern from 3/17/22.   -MRI brain without contrast (10/4/16) - unremarkable  -History of mitral valve leak s/p valve replacement August 6 2019.   -Plan: Similar symptoms now since 2019, slightly improved previously due to decreased stress. Dry eyes vs ocular migraine vs other. Continue to treat dry eyes as below. Continued episodes a few times a month, has been under a lot of stress. On Ubrelvy, seeing a neurologist, may receive Botox in the future.   -Will consider referral to neuro-ophthalmology as needed.   -F/u 1 year for comprehensive exam.     YyuysiuiafcH63.009  ApnibqyckakuivlN50.839  -Continue warm compresses and artificial tears PRN    PCO (posterior capsular opacification), fgzktQ65.491  PCO (posterior capsular opacification), leftH26.492  Pseudophakia of both eyesZ96.1  -Symptoms: Some difficulty seeing words/numbers on TV. PCO stable from previous visit - monitor for now. May consider YAG capsulotomy in the future if condition worsens.    CewemkpbbF17.00  FrskdiamageP58.209  EcltaobmdzF54.4  -Patient prefers increased add (+3.00).   -New Rx for glasses given per patient request. Patient's signature obtained to acknowledge and confirm that a paper  copy of glasses Rx was given to patient in compliance with [facility] Eyeglass Rule. Electronic copy of Rx will also be available via [employer].   -May see Optometry in the future if interested in CL. Used to wear SCL's 20 years ago.       No history of refractive surgery.   Does not know FH - patient adopted

## 2025-03-03 ENCOUNTER — OFFICE VISIT (OUTPATIENT)
Dept: PAIN MEDICINE | Facility: HOSPITAL | Age: 70
End: 2025-03-03
Payer: COMMERCIAL

## 2025-03-03 DIAGNOSIS — G89.18 POSTOPERATIVE PAIN: ICD-10-CM

## 2025-03-03 DIAGNOSIS — G56.92 NEUROPATHY OF LEFT UPPER EXTREMITY: Primary | ICD-10-CM

## 2025-03-03 DIAGNOSIS — M75.101 TEAR OF RIGHT ROTATOR CUFF, UNSPECIFIED TEAR EXTENT, UNSPECIFIED WHETHER TRAUMATIC: ICD-10-CM

## 2025-03-03 DIAGNOSIS — S42.292A CLOSED 3-PART FRACTURE OF PROXIMAL HUMERUS, LEFT, INITIAL ENCOUNTER: ICD-10-CM

## 2025-03-03 PROCEDURE — 99204 OFFICE O/P NEW MOD 45 MIN: CPT | Performed by: PAIN MEDICINE

## 2025-03-03 PROCEDURE — 1125F AMNT PAIN NOTED PAIN PRSNT: CPT | Performed by: PAIN MEDICINE

## 2025-03-03 PROCEDURE — 1159F MED LIST DOCD IN RCRD: CPT | Performed by: PAIN MEDICINE

## 2025-03-03 PROCEDURE — 1123F ACP DISCUSS/DSCN MKR DOCD: CPT | Performed by: PAIN MEDICINE

## 2025-03-03 PROCEDURE — 99214 OFFICE O/P EST MOD 30 MIN: CPT | Performed by: PAIN MEDICINE

## 2025-03-03 RX ORDER — GABAPENTIN 300 MG/1
CAPSULE ORAL
Qty: 120 CAPSULE | Refills: 6 | Status: SHIPPED | OUTPATIENT
Start: 2025-03-03

## 2025-03-03 ASSESSMENT — PAIN SCALES - GENERAL: PAINLEVEL_OUTOF10: 7

## 2025-03-03 NOTE — PROGRESS NOTES
Subjective   Patient ID: Judith Bush is a 69 y.o. female with a past medical history of L humerus ORIF, fibromyalgia        HPI:   69-year-old female with 2 recent humerus surgeries presenting for continued left-sided elbow, upper arm, and shoulder pain.  Patient notes that her pain has continued since her surgery and continues to worsen over the last several weeks.  She currently is in physical therapy but has noticed continued shoulder stiffening and decreased range of motion.  She does however state that with physical therapy she has noticed improvements since her surgery in terms of function but her pain continues to persist.  She describes the pain as over her incisional site and sharp and pinprick like.  She also has pain over her tricep near her elbow joint and pain directly over the elbow joint.  She is described several instances of edema in the arm.  She also notes a feeling of hotness over her left shoulder.  She notes some weakness in her hands and has difficulty opening jars.  She describes this is impacting her ability to work as she is very active with both of her arms.  She currently also takes gabapentin which does not improve her pain.       Physical Therapy: The patient has done six or more weeks of physical therapy in the past six months with minimal improvement  Other Conservative Measures she has tried:   Classes of medications tried in the past: Gabapentenoids      Last Urine Drug Screen:  Recent Results (from the past 8760 hours)   Opiate Confirmation, Urine    Collection Time: 08/05/24  4:00 PM   Result Value Ref Range    6-Acetylmorphine <25 <25 ng/mL    Codeine <50 <50 ng/mL    Hydrocodone <25 <25 ng/mL    Hydromorphone <25 <25 ng/mL    Morphine  <50 <50 ng/mL    Norhydrocodone <25 <25 ng/mL    Noroxycodone <25 <25 ng/mL    Oxycodone <25 <25 ng/mL    Oxymorphone <25 <25 ng/mL   Benzodiazepine Confirmation, Urine    Collection Time: 08/05/24  4:00 PM   Result Value Ref Range     Clonazepam <25 <25 ng/mL    7-Aminoclonazepam <25 <25 ng/mL    Alprazolam <25 <25 ng/mL    Alpha-Hydroxyalprazolam <25 <25 ng/mL    Midazolam <25 <25 ng/mL    Alpha-Hydroxymidazolam <25 <25 ng/mL    Chlordiazepoxide <25 <25 ng/mL    Diazepam <25 <25 ng/mL    Nordiazepam <25 <25 ng/mL    Temazepam <25 <25 ng/mL    Oxazepam <25 <25 ng/mL    Lorazepam <25 <25 ng/mL   Drug Screen, Urine With Reflex to Confirmation    Collection Time: 08/05/24  4:00 PM   Result Value Ref Range    Amphetamine Screen, Urine Presumptive Negative Presumptive Negative    Barbiturate Screen, Urine Presumptive Negative Presumptive Negative    Benzodiazepines Screen, Urine Presumptive Negative Presumptive Negative    Cannabinoid Screen, Urine Presumptive Positive (A) Presumptive Negative    Cocaine Metabolite Screen, Urine Presumptive Negative Presumptive Negative    Fentanyl Screen, Urine Presumptive Negative Presumptive Negative    Opiate Screen, Urine Presumptive Negative Presumptive Negative    Oxycodone Screen, Urine Presumptive Negative Presumptive Negative    PCP Screen, Urine Presumptive Negative Presumptive Negative    Methadone Screen, Urine Presumptive Negative Presumptive Negative     Results are as expected.       Review of Systems   13-point ROS done and negative except for HPI.     Current Outpatient Medications   Medication Instructions    acetaminophen (TYLENOL) 650 mg, oral, Every 6 hours PRN    albuterol 90 mcg/actuation inhaler 2 puffs, inhalation, Every 4 hours PRN    albuterol 2.5 mg, nebulization, Every 6 hours PRN    calcium carbonate-vitamin D3 (Calcium 600 with Vitamin D3) 600 mg-10 mcg (400 unit) chewable tablet Take 1 tablet by mouth twice daily    cyclobenzaprine (FLEXERIL) 10 mg, oral, 3 times daily PRN    diclofenac sodium (VOLTAREN) 4 g, Topical, 4 times daily PRN    dilTIAZem CD (CARDIZEM CD) 180 mg, oral, Daily    evolocumab (Repatha SureClick) 140 mg/mL injection INJECT THE CONTENTS OF 1 PEN UNDER THE SKIN  EVERY 2 WEEKS    gabapentin (NEURONTIN) 300 mg, oral, 3 times daily    glycopyrrolate-formoteroL (Bevespi Aerosphere) 9-4.8 mcg HFA aerosol inhaler 2 puffs, oral, 2 times daily    hydrocortisone acetate 1 % ointment 1 Application, Topical, 2 times daily PRN    multivitamin tablet 1 tablet, Daily    onabotulinumtoxinA (Botox) 200 unit injection To be administered by provider. Inject 155 Units into the muscle every 3 months. Discard remainder.    ondansetron ODT (ZOFRAN-ODT) 4 mg, oral, Every 8 hours PRN    oxybutynin XL (DITROPAN-XL) 10 mg, oral, 2 times daily    SUMAtriptan (IMITREX) 50 mg, oral, Once as needed, May repeat after 2 hours.    Tymlos 80 mcg (3,120 mcg/1.56 mL) pen injector Inject 1 Dose as directed once daily.    ubrogepant (UBRELVY) 100 mg, oral, Once as needed, Can repeat in 2 hours if no response. Not to exceed 200mg per 24 hours.       Past Medical History:   Diagnosis Date    Abdominal distension (gaseous) 08/13/2018    ADHD (attention deficit hyperactivity disorder)     Anxiety     Arthritis     Bipolar disorder     Carpal tunnel syndrome, right upper limb 08/13/2018    Cataract 8/22    Collagenous colitis 08/13/2018    Compression fracture of cervical spine     COPD (chronic obstructive pulmonary disease) (Multi)     Depression, unspecified 08/13/2018    Dermatitis, unspecified 07/09/2020    eczematoid    Dermatochalasis of unspecified eye, unspecified eyelid 10/25/2022    Displaced comminuted fracture of left patella, initial encounter for closed fracture 08/02/2018    Displaced comminuted fracture of right patella, initial encounter for closed fracture 08/13/2018    Dizziness     GERD (gastroesophageal reflux disease)     Headache     Hyperlipidemia     Hypertension     Hypokalemia 08/13/2018    Low back pain, unspecified 08/13/2018    Migraine     No blood products     Noninfective gastroenteritis and colitis, unspecified 08/02/2018    Chronic colitis    Nutritional deficiency, unspecified  08/02/2018    Poor diet    Old myocardial infarction 03/31/2014    NSTEMI    Other chest pain 08/28/2017    Atypical chest pain    Other fracture of right patella, sequela 08/13/2018    Patellar sleeve fracture, right, sequela    Other visual disturbances 04/04/2018    Blurred vision, bilateral    Pain in unspecified hip 03/31/2014    Pericarditis ([facility]) 2019    Pleural effusion     Polyosteoarthritis, unspecified 08/02/2018    Presence of intraocular lens 09/20/2018    Pseudophakia of right eye    Sleep apnea     Small intestinal bacterial overgrowth (SIBO)     Stroke (Multi) 04/2024    Tobacco abuse     Unspecified fall, sequela 08/13/2018        Past Surgical History:   Procedure Laterality Date    CARDIAC CATHETERIZATION      CATARACT EXTRACTION      CHOLECYSTECTOMY      COLONOSCOPY      CT ABDOMEN ANGIOGRAM W AND/OR WO IV CONTRAST  07/28/2022    CT ABDOMEN ANGIOGRAM W AND/OR WO IV CONTRAST 7/28/2022 [facility]    FEMUR FRACTURE SURGERY  07/16/2018    Femur Repair    FOOT SURGERY  09/13/2024    LEFT PROXIMAL  HALLUX PARTIAL EXCISION    HIP SURGERY      HYSTERECTOMY      MITRAL VALVE REPLACEMENT  08/07/2019    OTHER SURGICAL HISTORY  07/16/2018    Oophorectomy - Bilateral (Removal Of Both Ovaries)    OTHER SURGICAL HISTORY  05/27/2020    Radius fracture repair    SHOULDER SURGERY  07/16/2018    Shoulder Surgery    UPPER GASTROINTESTINAL ENDOSCOPY          Family History   Adopted: Yes   Problem Relation Name Age of Onset    Other (Adopted) Mother      Other (Adopted child) Father          Allergies   Allergen Reactions    Cephalosporins Anaphylaxis    Penicillin Anaphylaxis    Neomycin-Polymyxin-Gramicidin Rash        Objective     There were no vitals filed for this visit.     Physical Exam  General: NAD, well groomed, well nourished  Eyes: Non-icteric sclera, EOMI  Ears, Nose, Mouth, and Throat: External ears and nose appear to be without deformity or rash. No lesions or masses noted. Hearing is  grossly intact.   Neck: Trachea midline  Respiratory: Nonlabored breathing   Cardiovascular: no peripheral edema   Skin: No rashes or open lesions/ulcers identified on skin.    Shoulder: ROM intact with passive internal/external rotation, decreased ROM with shoulder abduction. Hypersensitivity to touch over incisional site. Pain over elbow. 3/5 shoulder strength to flexion/extension/abduction.      Neurologic:   Cranial nerves grossly intact.     Psychiatric: Alert, orientation to person, place, and time. Cooperative.    Imaging personally reviewed and independently interpreted    No significant new fractures or injuries.    Assessment/Plan   69-year-old female with a history of 2 recent humerus ORIF surgeries.  Patient continues to note pain over the incisional site along with symptoms of edema, heat, hypersensitivity, hyperalgesia, and dryness and skin changes.  Patient's symptoms are likely currently consistent with postoperative nerve injury that has not yet progressed to a full CRPS picture.  Patient was advised to continue physical therapy.  Discussion was had in regards to a left stellate ganglion block as a diagnostic measure as well as therapeutic for a possible CRPS picture.  Patient currently endorses concerns related to return to work and she was reassured that she should remain active and work on improving her range of motion.    Plan:  -L stellate ganglion block with ultrasound  -600 mg gabapentin at night      We discussed  the risks, benefits and alternatives of the procedure including but not limited to: , Lack of efficacy , Transiently worsening pain , Bleeding, Infection , and Nerve Damage    Follow up: After procedure    The patient was invited to contact us back anytime with any questions or concerns and follow-up with us in the office as needed.     Diagnoses and all orders for this visit:  Neuropathy of left upper extremity  -     Sympathetic Block; Future  -     US guided pain procedure;  Future  Closed 3-part fracture of proximal humerus, left, initial encounter  -     Referral to Pain Management  Tear of right rotator cuff, unspecified tear extent, unspecified whether traumatic  -     Referral to Pain Management  Postoperative pain  Other orders  -     NPO Diet Except: Sips with meds; Effective now; Standing  -     Height and weight; Standing  -     Insert and maintain peripheral IV; Standing  -     Saline lock IV; Standing  -     Type And Screen; Standing  -     Adult diet Regular; Standing  -     Vital Signs; Standing  -     Notify physician - Standard Parameters; Standing  -     Continue IV fluids ordered pre-procedure; Standing  -     Prior to Discharge O2 Weaning; Standing  -     Pulse oximetry, continuous; Standing      This note was generated with the aid of dictation software, there may be typos despite my attempts at proofreading.

## 2025-03-04 ENCOUNTER — APPOINTMENT (OUTPATIENT)
Dept: INTEGRATIVE MEDICINE | Facility: CLINIC | Age: 70
End: 2025-03-04

## 2025-03-04 ENCOUNTER — PHARMACY VISIT (OUTPATIENT)
Dept: PHARMACY | Facility: CLINIC | Age: 70
End: 2025-03-04
Payer: COMMERCIAL

## 2025-03-04 ENCOUNTER — APPOINTMENT (OUTPATIENT)
Dept: PHYSICAL THERAPY | Facility: CLINIC | Age: 70
End: 2025-03-04
Payer: COMMERCIAL

## 2025-03-04 ENCOUNTER — APPOINTMENT (OUTPATIENT)
Dept: OPHTHALMOLOGY | Facility: CLINIC | Age: 70
End: 2025-03-04
Payer: COMMERCIAL

## 2025-03-04 DIAGNOSIS — H02.831 DERMATOCHALASIS OF BOTH UPPER EYELIDS: ICD-10-CM

## 2025-03-04 DIAGNOSIS — H52.4 PRESBYOPIA: ICD-10-CM

## 2025-03-04 DIAGNOSIS — H53.30 BINOCULAR VISUAL DISTURBANCE: Primary | ICD-10-CM

## 2025-03-04 DIAGNOSIS — H01.004 BLEPHARITIS OF UPPER EYELIDS OF BOTH EYES, UNSPECIFIED TYPE: ICD-10-CM

## 2025-03-04 DIAGNOSIS — Z96.1 PSEUDOPHAKIA: ICD-10-CM

## 2025-03-04 DIAGNOSIS — H26.492 PCO (POSTERIOR CAPSULAR OPACIFICATION), LEFT: ICD-10-CM

## 2025-03-04 DIAGNOSIS — H52.203 ASTIGMATISM OF BOTH EYES, UNSPECIFIED TYPE: ICD-10-CM

## 2025-03-04 DIAGNOSIS — H02.834 DERMATOCHALASIS OF BOTH UPPER EYELIDS: ICD-10-CM

## 2025-03-04 DIAGNOSIS — H26.491 PCO (POSTERIOR CAPSULAR OPACIFICATION), RIGHT: ICD-10-CM

## 2025-03-04 DIAGNOSIS — H52.03 HYPEROPIA, BILATERAL: ICD-10-CM

## 2025-03-04 DIAGNOSIS — H01.001 BLEPHARITIS OF UPPER EYELIDS OF BOTH EYES, UNSPECIFIED TYPE: ICD-10-CM

## 2025-03-04 PROCEDURE — 92014 COMPRE OPH EXAM EST PT 1/>: CPT | Performed by: OPHTHALMOLOGY

## 2025-03-04 PROCEDURE — 92015 DETERMINE REFRACTIVE STATE: CPT | Performed by: OPHTHALMOLOGY

## 2025-03-04 ASSESSMENT — ENCOUNTER SYMPTOMS
EYES NEGATIVE: 1
CARDIOVASCULAR NEGATIVE: 0
CONSTITUTIONAL NEGATIVE: 0
NEUROLOGICAL NEGATIVE: 0
MUSCULOSKELETAL NEGATIVE: 0
ENDOCRINE NEGATIVE: 0
RESPIRATORY NEGATIVE: 0
PSYCHIATRIC NEGATIVE: 0
HEMATOLOGIC/LYMPHATIC NEGATIVE: 0
GASTROINTESTINAL NEGATIVE: 0
ALLERGIC/IMMUNOLOGIC NEGATIVE: 0

## 2025-03-04 ASSESSMENT — CONF VISUAL FIELD
OD_SUPERIOR_NASAL_RESTRICTION: 0
OS_SUPERIOR_NASAL_RESTRICTION: 0
OD_SUPERIOR_TEMPORAL_RESTRICTION: 0
OS_SUPERIOR_TEMPORAL_RESTRICTION: 0
OD_NORMAL: 1
OD_INFERIOR_TEMPORAL_RESTRICTION: 0
OS_INFERIOR_TEMPORAL_RESTRICTION: 0
OS_INFERIOR_NASAL_RESTRICTION: 0
OS_NORMAL: 1
OD_INFERIOR_NASAL_RESTRICTION: 0

## 2025-03-04 ASSESSMENT — REFRACTION_WEARINGRX
OD_CYLINDER: -1.75
OS_CYLINDER: -1.50
OS_AXIS: 100
OS_ADD: +3.00
OD_SPHERE: +1.75
OS_SPHERE: +1.75
OD_AXIS: 090
OD_ADD: +3.00

## 2025-03-04 ASSESSMENT — REFRACTION_MANIFEST
OS_SPHERE: +1.50
OS_CYLINDER: -1.50
OS_AXIS: 100
OD_AXIS: 090
OD_ADD: +3.00
OD_SPHERE: +2.00
OD_CYLINDER: -1.50
OS_ADD: +3.00

## 2025-03-04 ASSESSMENT — VISUAL ACUITY
METHOD: SNELLEN - LINEAR
OS_CC: 20/25
CORRECTION_TYPE: GLASSES
OD_CC: 20/20

## 2025-03-04 ASSESSMENT — TONOMETRY
OS_IOP_MMHG: 14
IOP_METHOD: GOLDMANN APPLANATION
OD_IOP_MMHG: 14

## 2025-03-05 ENCOUNTER — TELEPHONE (OUTPATIENT)
Dept: NEUROLOGY | Facility: CLINIC | Age: 70
End: 2025-03-05

## 2025-03-05 ENCOUNTER — TREATMENT (OUTPATIENT)
Dept: PHYSICAL THERAPY | Facility: CLINIC | Age: 70
End: 2025-03-05
Payer: COMMERCIAL

## 2025-03-05 DIAGNOSIS — M25.512 LEFT SHOULDER PAIN: Primary | ICD-10-CM

## 2025-03-05 DIAGNOSIS — S42.342A CLOSED DISPLACED SPIRAL FRACTURE OF SHAFT OF LEFT HUMERUS, INITIAL ENCOUNTER: ICD-10-CM

## 2025-03-05 DIAGNOSIS — S42.292A CLOSED 3-PART FRACTURE OF PROXIMAL HUMERUS, LEFT, INITIAL ENCOUNTER: ICD-10-CM

## 2025-03-05 PROCEDURE — 97110 THERAPEUTIC EXERCISES: CPT | Mod: GP

## 2025-03-05 PROCEDURE — RXMED WILLOW AMBULATORY MEDICATION CHARGE

## 2025-03-05 ASSESSMENT — PAIN SCALES - GENERAL: PAINLEVEL_OUTOF10: 5 - MODERATE PAIN

## 2025-03-05 NOTE — PROGRESS NOTES
Physical Therapy Treatment    Patient Name: Judith Bush  MRN: [id number]  Encounter Date: 3/5/2025  Time Calculation  Start Time: 0930  Stop Time: 1015  Time Calculation (min): 45 min    Insurance:     Visit number: 5  Approved number of visits:  [place]  Insurance: [employer]  Medicare cert date 2/10/25  to 5/8/25    Current Problem  1. Left shoulder pain        2. Closed 3-part fracture of proximal humerus, left, initial encounter  Follow Up In Physical Therapy      3. Closed displaced spiral fracture of shaft of left humerus, initial encounter  Follow Up In Physical Therapy          General  Reason for Referral: L shoulder ORIF  Referred By: Polifrone  Precautions  Precautions  Precautions Comment: fall risk  Pain  0-10 (Numeric) Pain Score: 5 - Moderate pain    Subjective:     Patient reports been to a lot of MD appointments in the last week.  Doing pretty decent pain about 5/10.  Was able to walk the dog yesterday, fixed car and driving now.      Compliant with HEP? partially    Objective:     Shoulder ROM  Flexion   Abd  ER 34  IR 58  Behind back l3  Supine flexion 96      Treatments:   Pulley 5 min  Shoulder IR behind back wand 15 x   Shoulder ext  15x  Biceps curls 2# 10 x 3   ER standing GTB 10 x 2   PROM L : flexion abd, ER , IR    Charges: te x 3    Assessment: very stiff ER today.  Encouraged more stretching through the day to decrease stiffness.       Plan: iso PROM

## 2025-03-05 NOTE — TELEPHONE ENCOUNTER
Rx for botox was sent in 2/4/25.     Barbara/ Dr Vera, have you  heard from the specialty pharmacy about approval or delivery by chance? Patient is calling trying to schedule    Thank you!

## 2025-03-06 ENCOUNTER — PHARMACY VISIT (OUTPATIENT)
Dept: PHARMACY | Facility: CLINIC | Age: 70
End: 2025-03-06
Payer: COMMERCIAL

## 2025-03-07 ENCOUNTER — APPOINTMENT (OUTPATIENT)
Dept: PHYSICAL THERAPY | Facility: CLINIC | Age: 70
End: 2025-03-07
Payer: COMMERCIAL

## 2025-03-09 NOTE — PROGRESS NOTES
Primary Care Physician: Cee Coates MD  Date of Visit: 03/10/2025  9:20 AM EDT  Location of visit: [facility] [place] [address]   Last office visit: 5/1/2024    Chief Complaint:     Status post mitral valve replacement    HPI/Summary  Judith Bush is a 69 y.o. female who presents for followup cardiology evaluation.     The patient underwent mitral valve replacement on August 7, 2019 for management of severe MR.  Her course was complicated by postoperative pericarditis and by recurrent right pleural effusion that required a thoracentesis.  She was treated with colchicine and nonsteroidals.  Preoperative coronary angiography was normal.  The problem list includes hypertension, COPD, sleep apnea, migraine headache, chronic low back pain, and tobacco abuse.  In April, 2024 she presented with left arm clumsiness and weakness, and slurred speech.  CT angiography showed distal right M2 segment narrowing.  MRI showed a small right centrum semiovale infarct.  Symptom onset was greater than 24 hours.  She was recommended for DAPT for 90 days, and smoking cessation.  An echocardiogram showed normal LV function, no other significant pathology.  A subsequent 14-day Holter monitor showed a 10% burden of supraventricular premature beats, and 68 episodes of SVT, the longest 8 beats.  Also, 7% PVC burden, and 19 episodes of VT, the longest 3 beats.    The patient continues on diltiazem 180 mg daily, Repatha, and low-dose aspirin.    A lipid panel on April 23, 2024 showed a cholesterol of 157, HDL 77, LDL 57, triglycerides 114.  A1c 5.2%.    Recent problems related to fracture left proximal humerus which required ORIF and November, 2024, then a second fall with spiral fracture of the shaft of the left humerus that required another ORIF.  Now with limited motion left upper extremity, receiving physical therapy, pain management.  Also, flare of PMR.  He has follow-up with rheumatology, pain management, primary care.  No cardiac issues.   Currently, not smoking cigarettes.  Migraine headaches have been stable.  Specialty Problems          Cardiology Problems    Other nonthrombocytopenic purpura    History of non-ST elevation myocardial infarction (NSTEMI)    Hyperlipidemia    Hypertension    S/P MVR (mitral valve replacement)    Tobacco use      Social History     Tobacco Use    Smoking status: Some Days     Current packs/day: 0.25     Average packs/day: 0.3 packs/day for 60.0 years (20.0 ttl pk-yrs)     Types: Cigarettes    Smokeless tobacco: Never   Substance Use Topics    Alcohol use: Not Currently     Alcohol/week: 3.0 standard drinks of alcohol     Types: 3 Standard drinks or equivalent per week    Drug use: Not Currently     Types: Marijuana      Allergies   Allergen Reactions    Asparagus Shortness of breath    Benzalkonium Chloride Rash    Cephalosporins Anaphylaxis    Penicillin Anaphylaxis    Neomycin-Polymyxin-Gramicidin Rash     Current Outpatient Medications   Medication Instructions    acetaminophen (TYLENOL) 650 mg, oral, Every 6 hours PRN    albuterol 90 mcg/actuation inhaler 2 puffs, inhalation, Every 4 hours PRN    albuterol 2.5 mg, nebulization, Every 6 hours PRN    aspirin 81 mg EC tablet 1 tablet, Daily    bisacodyl (Dulcolax) 10 mg suppository Every 24 hours    calcium carbonate-vitamin D3 (Calcium 600 with Vitamin D3) 600 mg-10 mcg (400 unit) chewable tablet Take 1 tablet by mouth twice daily    cyclobenzaprine (FLEXERIL) 10 mg, oral, 3 times daily PRN    diclofenac sodium (VOLTAREN) 4 g, Topical, 4 times daily PRN    dilTIAZem CD (CARDIZEM CD) 180 mg, oral, Daily    evolocumab (Repatha SureClick) 140 mg/mL injection INJECT THE CONTENTS OF 1 PEN UNDER THE SKIN EVERY 2 WEEKS    gabapentin (Neurontin) 300 mg capsule One in am, one midday, two at bedtime    gabapentin (NEURONTIN) 300 mg, oral, 3 times daily    glycopyrrolate-formoteroL (Bevespi Aerosphere) 9-4.8 mcg HFA aerosol inhaler 2 puffs, oral, 2 times daily     "hydrocortisone acetate 1 % ointment 1 Application, Topical, 2 times daily PRN    mag/aluminum/sod bicarb/alginc (GAVISCON ORAL) As needed    magnesium hydroxide (Milk of Magnesia) 400 mg/5 mL suspension Every 24 hours    multivitamin tablet 1 tablet, Daily    onabotulinumtoxinA (Botox) 200 unit injection To be administered by provider. Inject 155 Units into the muscle every 3 months. Discard remainder.    ondansetron ODT (ZOFRAN-ODT) 4 mg, oral, Every 8 hours PRN    oxybutynin XL (DITROPAN-XL) 10 mg, oral, 2 times daily    polyethylene glycol 1000,bulk, powder Every 24 hours    SUMAtriptan (IMITREX) 50 mg, oral, Once as needed, May repeat after 2 hours.    traZODone (Desyrel) 100 mg tablet 1 tablet, Nightly    Ubrelvy 100 mg, oral, Once as needed, Can repeat in 2 hours if no response. Not to exceed 200mg per 24 hours.       ROS    Vital Signs:  Vitals:    03/10/25 0929   BP: 137/83   BP Location: Right arm   Patient Position: Sitting   BP Cuff Size: Adult   Pulse: 67   SpO2: 98%   Weight: 54.2 kg (119 lb 8 oz)   Height: 1.549 m (5' 1\")     Wt Readings from Last 2 Encounters:   03/10/25 54.2 kg (119 lb 8 oz)   02/11/25 54.9 kg (121 lb)     Body mass index is 22.58 kg/m².     Physical Exam:    Pleasant, talkative.  No carotid bruits noted.  No audible wheezes or rhonchi.  Heart sounds regular, loud S1.  No murmur.  No edema.     Lab Review:  CBC:  Lab Results   Component Value Date    WBC 9.2 01/11/2025    HGB 10.3 (L) 01/11/2025    HCT 31.6 (L) 01/11/2025    MCV 95 01/11/2025     01/11/2025       CMP:  Recent Labs     01/11/25  0610 01/10/25  0607 01/02/25  1209   GLUCOSE 91   < > 81   *   < > 138   K 3.6   < > 4.3      < > 101   CO2 26   < > 26   ANIONGAP 11   < > 15   BUN 10   < > 8   CREATININE 0.71   < > 1.05   EGFR >90   < > 58*   ALBUMIN  --   --  4.2   ALKPHOS  --   --  121   PROT  --   --  7.0   ALT  --   --  6*   AST  --   --  14   BILITOT  --   --  0.8    < > = values in this interval not " displayed.         LIPID PANEL:  Lab Results   Component Value Date    CHOL 157 04/23/2024    HDL 77.0 04/23/2024    CHHDL 2.0 04/23/2024    VLDL 23 04/23/2024    TRIG 114 04/23/2024    NHDL 80 04/23/2024       HEME/ENDO:  Lab Results   Component Value Date    HGBA1C 5.2 04/23/2024    TSH 1.98 04/23/2024         Recent Labs     10/02/19  0008 09/15/19  1046 08/24/19  1038   * 166* 369*     Recent Cardiology Tests:    ECG:    Last ECG, below, reviewed today.    Results for orders placed during the hospital encounter of 12/27/24    ECG 12 lead    Narrative  Sinus bradycardia  Septal infarct , age undetermined  Abnormal ECG  When compared with ECG of 20-NOV-2024 20:02,  Vent. rate has decreased BY  27 BPM  Septal infarct is now Present  Nonspecific T wave abnormality has replaced inverted T waves in Inferior leads  T wave inversion no longer evident in Anterior leads  QT has shortened      See ED provider note for full interpretation and clinical correlation  Confirmed by Liz Hale ([id number]) on 12/28/2024 6:03:22 AM       Echo:  Echo Results:  Transthoracic Echo (TTE) Complete 04/15/2024    [facility], [address] [place] [address]  Tel [phone] and Fax [phone]    TRANSTHORACIC ECHOCARDIOGRAM REPORT      Patient Name:      DEX BELLE     Reading Physician:    [id number] Ace Guardado MD  Study Date:        4/15/2024           Ordering Provider:    [id number] HARRISON PRECIADO  MRN/PID:           [id number]            Fellow:  Accession#:        [id number]        Nurse:  Date of Birth/Age: 1955 / 68      Sonographer:          Lucy Pang RDCS  years  Gender:            F                   Additional Staff:  Height:            154.94 cm           Admit Date:           4/15/2024  Weight:            61.69 kg            Admission Status:     Observation -  Priority discharge  BSA / BMI:         1.60 m2 / 25.70     Encounter#:           [id number]  kg/m2  Department Location:   [facility] Non  Invasive  Blood Pressure: 151 /74 mmHg    Study Type:    TRANSTHORACIC ECHO (TTE) COMPLETE  Diagnosis/ICD: Cerebral infarction due to embolism of left middle cerebral  artery-I63.412  Indication:    Cerebrovascular Accident  CPT Code:      Echo Complete w Full Doppler-[id number]    Patient History:  MI Location/Type:  Non-ST Elevation MI  Smoker:            Current.  Valve Disorders:   Mitral Valve Replacement.  Pertinent History: HTN, Hyperlipidemia and COPD. 29mm Epic Porcine MVR 8/7/19.    Study Detail: The following Echo studies were performed: 2D, M-Mode, Doppler and  color flow. Agitated saline used as a contrast agent for  intraseptal flow evaluation.      PHYSICIAN INTERPRETATION:  Left Ventricle: The left ventricular systolic function is normal, with an estimated ejection fraction of 60%. There are no regional wall motion abnormalities. The left ventricular cavity size is normal. There is left ventricular concentric remodeling. Abnormal (paradoxical) septal motion consistent with post-operative status. Spectral Doppler shows a pseudonormal pattern of left ventricular diastolic filling.  Left Atrium: The left atrium is moderately dilated. A bubble study using agitated saline was performed. Bubble study is negative.  Right Ventricle: The right ventricle is normal in size. There is normal right ventricular global systolic function.  Right Atrium: The right atrium is normal in size.  Aortic Valve: The aortic valve is trileaflet. There is no evidence of aortic valve regurgitation. The peak instantaneous gradient of the aortic valve is 9.8 mmHg. The mean gradient of the aortic valve is 5.0 mmHg.  Mitral Valve: There is a prosthetic mitral valve present. Echo findings are consistent with normal mitral valve prosthesis structure and function. There is a Epic mitral valve bioprosthesis with a 29 mm reported size. There is no evidence of mitral valve regurgitation. Chordae tendinae / papillary muscle  remnant noted attached to the left ventricular wall (not significantly changed compared with TTE 12/2022).  Tricuspid Valve: The tricuspid valve is structurally normal. There is trace to mild tricuspid regurgitation. The Doppler estimated RVSP is slightly elevated at 33.7 mmHg.  Pulmonic Valve: The pulmonic valve is structurally normal. There is no indication of pulmonic valve regurgitation.  Pericardium: There is no pericardial effusion noted.  Aorta: The aortic root is normal.  Systemic Veins: The inferior vena cava appears to be of normal size. There is IVC inspiratory collapse greater than 50%.  In comparison to the previous echocardiogram(s): Compared with study from 12/30/2022, no significant change.      CONCLUSIONS:  1. Left ventricular systolic function is normal with a 60% estimated ejection fraction.  2. Abnormal septal motion consistent with post-operative status.  3. Spectral Doppler shows a pseudonormal pattern of left ventricular diastolic filling.  4. The left atrium is moderately dilated.  5. Chordae tendinae / papillary muscle remnant noted attached to the left ventricular wall (not significantly changed compared with TTE 12/2022).  6. Slightly elevated RVSP.    QUANTITATIVE DATA SUMMARY:  2D MEASUREMENTS:  Normal Ranges:  LAs:           4.08 cm   (2.7-4.0cm)  IVSd:          0.95 cm   (0.6-1.1cm)  LVPWd:         0.95 cm   (0.6-1.1cm)  LVIDd:         4.20 cm   (3.9-5.9cm)  LVIDs:         2.99 cm  LV Mass Index: 79.9 g/m2  LV % FS        28.7 %    LA VOLUME:  Normal Ranges:  LA Vol A4C:        63.3 ml    (22+/-6mL/m2)  LA Vol A2C:        57.9 ml  LA Vol BP:         62.5 ml  LA Vol Index A4C:  39.5ml/m2  LA Vol Index A2C:  36.1 ml/m2  LA Vol Index BP:   39.0 ml/m2  LA Area A4C:       19.1 cm2  LA Area A2C:       17.7 cm2  LA Major Axis A4C: 4.9 cm  LA Major Axis A2C: 4.6 cm  LA Volume Index:   38.8 ml/m2  LA Vol A4C:        57.6 ml  LA Vol A2C:        56.4 ml    RA VOLUME BY A/L METHOD:  Normal  Ranges:  RA Vol A4C:        30.0 ml    (8.3-19.5ml)  RA Vol Index A4C:  18.7 ml/m2  RA Area A4C:       12.6 cm2  RA Major Axis A4C: 4.5 cm    M-MODE MEASUREMENTS:  Normal Ranges:  Ao Root: 3.20 cm (2.0-3.7cm)  LAs:     4.13 cm (2.7-4.0cm)    AORTA MEASUREMENTS:  Normal Ranges:  Ao Sinus, d: 3.00 cm (2.1-3.5cm)  Ao STJ, d:   2.00 cm (1.7-3.4cm)  Asc Ao, d:   2.90 cm (2.1-3.4cm)    LV SYSTOLIC FUNCTION BY 2D PLANIMETRY (MOD):  Normal Ranges:  EF-A4C View: 61.9 % (>=55%)  EF-A2C View: 62.2 %  EF-Biplane:  61.9 %    LV DIASTOLIC FUNCTION:  Normal Ranges:  MV Peak E:        1.45 m/s    (0.7-1.2 m/s)  MV Peak A:        1.28 m/s    (0.42-0.7 m/s)  E/A Ratio:        1.13        (1.0-2.2)  MV e'             0.08 m/s    (>8.0)  MV lateral e'     0.08 m/s  MV medial e'      0.05 m/s  MV A Dur:         108.42 msec  E/e' Ratio:       18.08       (<8.0)  a'                0.03 m/s  PulmV Sys Jamarcus:    41.69 cm/s  PulmV Swift Jamarcus:   40.88 cm/s  PulmV S/D Jamarcus:    1.02  PulmV A Revs Jamarcus: 21.55 cm/s  PulmV A Revs Dur: 85.35 msec    MITRAL VALVE:  Normal Ranges:  MV Vmax:    1.71 m/s  (<=1.3m/s)  MV peak P.6 mmHg (<5mmHg)  MV mean PG: 3.3 mmHg  (<2mmHg)  MV VTI:     57.19 cm  (10-13cm)  MV DT:      332 msec  (150-240msec)    AORTIC VALVE:  Normal Ranges:  AoV Vmax:                1.57 m/s (<=1.7m/s)  AoV Peak P.8 mmHg (<20mmHg)  AoV Mean P.0 mmHg (1.7-11.5mmHg)  LVOT Max Jamarcus:            1.09 m/s (<=1.1m/s)  AoV VTI:                 36.90 cm (18-25cm)  LVOT VTI:                24.28 cm  LVOT Diameter:           1.83 cm  (1.8-2.4cm)  AoV Area, VTI:           1.73 cm2 (2.5-5.5cm2)  AoV Area,Vmax:           1.82 cm2 (2.5-4.5cm2)  AoV Dimensionless Index: 0.66      RIGHT VENTRICLE:  RV Basal 3.50 cm  RV Mid   3.10 cm  RV Major 6.2 cm  TAPSE:   18.0 mm  RV s'    0.11 m/s    TRICUSPID VALVE/RVSP:  Normal Ranges:  Peak TR Velocity: 2.77 m/s  Est. RA Pressure: 3 mmHg  RV Syst Pressure: 33.7 mmHg (<  30mmHg)  IVC Diam:         1.40 cm    PULMONIC VALVE:  Normal Ranges:  RVOT Vmax:  0.61 m/s (0.6-0.9m/s)  PV Max Jamarcus: 0.7 m/s  (0.6-0.9m/s)  PV Max P.8 mmHg    Pulmonary Veins:  PulmV A Revs Dur: 85.35 msec  PulmV A Revs Jamarcus: 21.55 cm/s  PulmV Swift Jamarcus:   40.88 cm/s  PulmV S/D Jamarcus:    1.02  PulmV Sys Jamarcus:    41.69 cm/s    AORTA:  Asc Ao Diam 2.94 cm      [id number] Ace Guardado MD  Electronically signed on 4/15/2024 at 5:44:33 PM        ** Final **       Cath:      Stress Test:  Stress Results:  No results found for this or any previous visit from the past 365 days.         Cardiac Imaging:        Assessment/Plan   Cardiac status stable, 5 years status post mitral valve replacement with a bioprosthetic valve.  Lipids well-controlled on a PCSK9 inhibitor.  Blood pressure is at goal on monotherapy with diltiazem.  Will need rheumatology follow-up for reassessment of treatment options for osteoporosis.  Continue physical therapy, continue pain management with gabapentin.    No need for any cardiac testing.  We reviewed the  echocardiogram with the patient at today's visit.  Will renew Repatha, repeat lipids at annual intervals.    Orders:  No orders of the defined types were placed in this encounter.     Followup Appts:  Future Appointments   Date Time Provider Department Center   3/11/2025 10:15 AM Cathleen Nichols PT [email or web address]   3/17/2025  3:00 PM Cathleen Nichols PT [email or web address]   3/18/2025  9:45 AM Raegan Cosme MD [facility]OPCP[facility]   3/19/2025  3:00 PM Cathleen Nichols PT [email or web address]   3/20/2025  1:00 PM Deacon Vera MD [id number] [facility]   3/24/2025  3:00 PM Moy Busch PTA [id number]   3/28/2025  8:30 AM Moy Busch PTA [id number]   3/31/2025  2:15 PM Cathleen Nichols PT [email or web address]   2025  8:45 AM Cathleen Nichols, PT [email or web address]   2025  1:00 PM Guillermo Roberts MD [id number] [facility]   10/9/2025 11:30 AM Deacon Vera MD [id number] [facility]   3/10/2026 11:00 AM Amanda HONG  MD Kam [id number] [facility]           ____________________________________________________________  Gurpreet Wood MD    Senior Attending Physician  [facility] [place]  [facility]    [facility]  [employer]   Advance diet or consider nutritional supplement if Clear liquid diet prolonged as medically feasible

## 2025-03-10 ENCOUNTER — OFFICE VISIT (OUTPATIENT)
Dept: CARDIOLOGY | Facility: CLINIC | Age: 70
End: 2025-03-10
Payer: COMMERCIAL

## 2025-03-10 VITALS
HEART RATE: 67 BPM | DIASTOLIC BLOOD PRESSURE: 83 MMHG | HEIGHT: 61 IN | OXYGEN SATURATION: 98 % | WEIGHT: 119.5 LBS | SYSTOLIC BLOOD PRESSURE: 137 MMHG | BODY MASS INDEX: 22.56 KG/M2

## 2025-03-10 DIAGNOSIS — Z95.2 S/P MVR (MITRAL VALVE REPLACEMENT): ICD-10-CM

## 2025-03-10 DIAGNOSIS — I63.9 CEREBROVASCULAR ACCIDENT (CVA), UNSPECIFIED MECHANISM (MULTI): ICD-10-CM

## 2025-03-10 DIAGNOSIS — I10 PRIMARY HYPERTENSION: ICD-10-CM

## 2025-03-10 DIAGNOSIS — E78.5 DYSLIPIDEMIA: ICD-10-CM

## 2025-03-10 DIAGNOSIS — E78.00 PURE HYPERCHOLESTEROLEMIA: Primary | ICD-10-CM

## 2025-03-10 PROCEDURE — G2211 COMPLEX E/M VISIT ADD ON: HCPCS | Performed by: INTERNAL MEDICINE

## 2025-03-10 PROCEDURE — 1159F MED LIST DOCD IN RCRD: CPT | Performed by: INTERNAL MEDICINE

## 2025-03-10 PROCEDURE — 1160F RVW MEDS BY RX/DR IN RCRD: CPT | Performed by: INTERNAL MEDICINE

## 2025-03-10 PROCEDURE — 1123F ACP DISCUSS/DSCN MKR DOCD: CPT | Performed by: INTERNAL MEDICINE

## 2025-03-10 PROCEDURE — 3075F SYST BP GE 130 - 139MM HG: CPT | Performed by: INTERNAL MEDICINE

## 2025-03-10 PROCEDURE — 99214 OFFICE O/P EST MOD 30 MIN: CPT | Performed by: INTERNAL MEDICINE

## 2025-03-10 PROCEDURE — 3008F BODY MASS INDEX DOCD: CPT | Performed by: INTERNAL MEDICINE

## 2025-03-10 PROCEDURE — 3079F DIAST BP 80-89 MM HG: CPT | Performed by: INTERNAL MEDICINE

## 2025-03-10 PROCEDURE — 1126F AMNT PAIN NOTED NONE PRSNT: CPT | Performed by: INTERNAL MEDICINE

## 2025-03-10 RX ORDER — ASPIRIN 81 MG/1
1 TABLET ORAL DAILY
COMMUNITY

## 2025-03-10 RX ORDER — ADHESIVE BANDAGE
BANDAGE TOPICAL EVERY 24 HOURS
COMMUNITY

## 2025-03-10 RX ORDER — BISACODYL 10 MG/1
SUPPOSITORY RECTAL EVERY 24 HOURS
COMMUNITY

## 2025-03-10 RX ORDER — POLYETHYLENE GLYCOL 1000
POWDER (GRAM) MISCELLANEOUS EVERY 24 HOURS
COMMUNITY

## 2025-03-10 RX ORDER — TRAZODONE HYDROCHLORIDE 100 MG/1
1 TABLET ORAL NIGHTLY
COMMUNITY
Start: 2024-04-02

## 2025-03-10 ASSESSMENT — ENCOUNTER SYMPTOMS
OCCASIONAL FEELINGS OF UNSTEADINESS: 0
DEPRESSION: 0
LOSS OF SENSATION IN FEET: 0

## 2025-03-10 ASSESSMENT — PATIENT HEALTH QUESTIONNAIRE - PHQ9
1. LITTLE INTEREST OR PLEASURE IN DOING THINGS: NOT AT ALL
2. FEELING DOWN, DEPRESSED OR HOPELESS: NOT AT ALL
SUM OF ALL RESPONSES TO PHQ9 QUESTIONS 1 AND 2: 0

## 2025-03-10 ASSESSMENT — PAIN SCALES - GENERAL: PAINLEVEL_OUTOF10: 0-NO PAIN

## 2025-03-10 NOTE — PATIENT INSTRUCTIONS
You are doing well.  Monitor your blood pressure at home on a periodic basis.  Continue physical therapy, pain management, follow-up with rheumatology.  No need for any cardiac testing.    We did place an order for a FASTING lipid panel to be performed at your convenience.  This should be done once yearly so that we can continue to renew your Repatha prescription.  It can be combined with other laboratory tests performed by your  physicians.  Any laboratory, 10-hour fast, black coffee and water are fine.  Call with questions.

## 2025-03-11 ENCOUNTER — TREATMENT (OUTPATIENT)
Dept: PHYSICAL THERAPY | Facility: CLINIC | Age: 70
End: 2025-03-11
Payer: COMMERCIAL

## 2025-03-11 DIAGNOSIS — S42.292A CLOSED 3-PART FRACTURE OF PROXIMAL HUMERUS, LEFT, INITIAL ENCOUNTER: ICD-10-CM

## 2025-03-11 DIAGNOSIS — S42.342A CLOSED DISPLACED SPIRAL FRACTURE OF SHAFT OF LEFT HUMERUS, INITIAL ENCOUNTER: ICD-10-CM

## 2025-03-11 PROCEDURE — 97110 THERAPEUTIC EXERCISES: CPT | Mod: GP

## 2025-03-11 PROCEDURE — 97140 MANUAL THERAPY 1/> REGIONS: CPT | Mod: GP

## 2025-03-11 ASSESSMENT — PAIN SCALES - GENERAL: PAINLEVEL_OUTOF10: 10 - WORST POSSIBLE PAIN

## 2025-03-11 NOTE — PROGRESS NOTES
Physical Therapy Treatment    Patient Name: Judith Bush  MRN: [id number]  Encounter Date: 3/11/2025  Time Calculation  Start Time: 1015  Stop Time: 1100  Time Calculation (min): 45 min    Insurance:   Visit number: 6  Approved number of visits:  MN  Insurance: [employer]  Medicare cert date 2/10/25  to 5/8/25      Current Problem  1. Closed 3-part fracture of proximal humerus, left, initial encounter  Follow Up In Physical Therapy      2. Closed displaced spiral fracture of shaft of left humerus, initial encounter  Follow Up In Physical Therapy          General  Reason for Referral: L shoulder ORIF  Referred By: Dani  Precautions  Precautions  Precautions Comment: fall risk  Pain  0-10 (Numeric) Pain Score: 10 - Worst possible pain    Subjective:     Patient reports very sad and depressed as she feels like she should be further along than she is.    Worked on sat was sore Sunday, the worst day was yesterday and didn't do anything out of the norm.  Pain in shoulder, chest, and behind arm.  Pain about 10 feels like both muscle and bone/post op stiffness.      Has an appointment with Dr. Lynch tomorrow to discuss her shoulder.    Compliant with HEP? yes    Objective:   Shoulder ROM  Flexion  99  Abd 97  ER  40  IR 41  Behind back small of back  Supine flexion 103        Treatments:   Pulley 3 min  STM: triceps, RTC, scar, biceps, wrist extensors  PROM: flexion abd, ER, IR      Charges: man x 2, TE x 1    Assessment:  very tender RTC,  improved pain rating post treatment. Some pain seemed more from DOMS due to recent job.        Plan: PROM repeat STM if helpful

## 2025-03-12 ENCOUNTER — APPOINTMENT (OUTPATIENT)
Dept: ORTHOPEDIC SURGERY | Facility: HOSPITAL | Age: 70
End: 2025-03-12
Payer: COMMERCIAL

## 2025-03-12 NOTE — PROGRESS NOTES
It looks like patient is requiring about infusion therapy for her pains.  Looks she had left cliff-implant humeral shaft fracture with ORIF on 1/9/2025 by Dr. Roberts.     History of Present Complaint:  The patient was referred to us by Referring Provider: ROSANA Ortiz. this is 69 y.o.  female with a past history of anxiety/depression, bipolar disorder, ADHD, hypertension, smoker, s/p MVR, history of old MI, polyosteoarthritis, s/p CVA, migraine headache, fibromyalgia on gabapentin 300 mg 3 times daily and off oxycodone 5 mg x 4 daily from [facility]  presenting with significant history of longstanding fibromyalgia and multiple arthralgia with headaches complicated recently with left arm pain after extensive fracture and extensive repair and continued to have postoperative chronic pain.  The patient has some ulnar nerve involvement but everything is coming along she is in aggressive physical therapy.  PCP just started prednisone 15mg is helping        Procedures:   None    Portions of record reviewed for pertinent issues: active problem list, medication list, allergies, family history, social history, notes from last encounter, encounters, lab results, imaging and other available records.    I have personally reviewed the OARRS report for this patient. This report is scanned into the electronic medical record. I have considered the risks of abuse, dependence, addiction and diversion. It showed: gabapentin 300 mg 3 times daily and oxycodone 5 mg x 4 daily from [facility]  OPIOID RISK ASSESSMENT SCORE 3/26  Aberrant behavior: none  My patient has no underlying substance abuse or alcohol abuse and there's no mental health conditions contributing to the patient's pain.        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 denies smoking drinking use of illicit drugs      Review of Systems   HENT: Negative.     Eyes: Negative.    Respiratory: Negative.     Cardiovascular: Negative.    Gastrointestinal: Negative.    Endocrine: Negative.    Genitourinary: Negative.    Musculoskeletal:  Positive for arthralgias and myalgias.   Skin: Negative.    Neurological: Negative.    Hematological: Negative.    Psychiatric/Behavioral: Negative.            Physical Exam  Constitutional:               Assessment  Pleasant 69 years old with significant history of fibromyalgia she recently had major fracture of her left humerus and that was major surgery now she is recovering gradually but she has significant postoperative pain and because of her fibromyalgia she was questioning about our infusion therapy.  The patient heard about Journavx and I ordered that for her and gave her the coupon.  We are going to adjust her gabapentin to titration and I gave her titration schedule  Opioid sparing infusion once every 2 weeks when the patient able to do it         Plan  At least 50% of the visit was involved in the discussion of the options for treatment. We discussed exercises, medication, interventional therapies and surgery. Healthy life style is essential with patient hard work to achieve the wellness. In addition; discussion with the patient and/or family about any of the diagnostic results, impressions and/or recommended diagnostic studies, prognosis, risks and benefits of treatment options, instructions for treatment and/or follow-up, importance of compliance with chosen treatment options, risk-factor reduction, and patient/family education.           Recommended Pool therapy, walking in the pool, at least 3x per week for 30 minutes  Recommend self-directed physical therapy with at least daily exercises for minimum of 20-minute, brochure was handed  to the patient  Patient to continue physical therapy and Occupational Therapy for now till her strength comes back and arm  Gabapentin 300 mg titration schedule was handed to the patient  Journavx 50 mg twice daily for postoperative pain  Gabapentin 300 mg titration schedule  Opioid sparing infusion when patient calls  Healthy lifestyle and anti-inflammatory diet in addition to weight control discussed with the patient  Alternative chronic pain therapies was discussed, encouraged and information was handed  Return to Clinic 3 months       *Please note this report has been produced using speech recognition software and may contain errors related to that system including grammar, punctuation and spelling as well as words and phrases that may be inappropriate. If there are questions or concerns, please feel free to contact me to clarify.    Raegan Cosme MD

## 2025-03-13 ENCOUNTER — SPECIALTY PHARMACY (OUTPATIENT)
Dept: PHARMACY | Facility: CLINIC | Age: 70
End: 2025-03-13

## 2025-03-13 ENCOUNTER — APPOINTMENT (OUTPATIENT)
Facility: HOSPITAL | Age: 70
End: 2025-03-13
Payer: COMMERCIAL

## 2025-03-13 PROCEDURE — RXMED WILLOW AMBULATORY MEDICATION CHARGE

## 2025-03-14 ENCOUNTER — OFFICE VISIT (OUTPATIENT)
Dept: PRIMARY CARE | Facility: CLINIC | Age: 70
End: 2025-03-14
Payer: COMMERCIAL

## 2025-03-14 ENCOUNTER — PHARMACY VISIT (OUTPATIENT)
Dept: PHARMACY | Facility: CLINIC | Age: 70
End: 2025-03-14
Payer: COMMERCIAL

## 2025-03-14 VITALS
SYSTOLIC BLOOD PRESSURE: 143 MMHG | DIASTOLIC BLOOD PRESSURE: 83 MMHG | HEIGHT: 61 IN | BODY MASS INDEX: 22.09 KG/M2 | OXYGEN SATURATION: 98 % | WEIGHT: 117 LBS | HEART RATE: 74 BPM

## 2025-03-14 DIAGNOSIS — M54.50 CHRONIC LOW BACK PAIN, UNSPECIFIED BACK PAIN LATERALITY, UNSPECIFIED WHETHER SCIATICA PRESENT: Primary | ICD-10-CM

## 2025-03-14 DIAGNOSIS — G89.29 CHRONIC LOW BACK PAIN, UNSPECIFIED BACK PAIN LATERALITY, UNSPECIFIED WHETHER SCIATICA PRESENT: Primary | ICD-10-CM

## 2025-03-14 DIAGNOSIS — M54.16 LUMBAR RADICULOPATHY: ICD-10-CM

## 2025-03-14 PROCEDURE — 99213 OFFICE O/P EST LOW 20 MIN: CPT | Performed by: FAMILY MEDICINE

## 2025-03-14 PROCEDURE — 1160F RVW MEDS BY RX/DR IN RCRD: CPT | Performed by: FAMILY MEDICINE

## 2025-03-14 PROCEDURE — 3008F BODY MASS INDEX DOCD: CPT | Performed by: FAMILY MEDICINE

## 2025-03-14 PROCEDURE — 3079F DIAST BP 80-89 MM HG: CPT | Performed by: FAMILY MEDICINE

## 2025-03-14 PROCEDURE — 1123F ACP DISCUSS/DSCN MKR DOCD: CPT | Performed by: FAMILY MEDICINE

## 2025-03-14 PROCEDURE — 1159F MED LIST DOCD IN RCRD: CPT | Performed by: FAMILY MEDICINE

## 2025-03-14 PROCEDURE — 3077F SYST BP >= 140 MM HG: CPT | Performed by: FAMILY MEDICINE

## 2025-03-14 PROCEDURE — G2211 COMPLEX E/M VISIT ADD ON: HCPCS | Performed by: FAMILY MEDICINE

## 2025-03-14 PROCEDURE — RXMED WILLOW AMBULATORY MEDICATION CHARGE

## 2025-03-14 RX ORDER — PREDNISONE 5 MG/1
TABLET ORAL
Qty: 30 TABLET | Refills: 0 | Status: SHIPPED | OUTPATIENT
Start: 2025-03-14

## 2025-03-14 NOTE — PROGRESS NOTES
"Subjective   Patient ID: Judith Bush is a 69 y.o. female who presents for Fibromyalgia.    HPI     The patient is taking tylenol as needed, albuterol and Flonase as needed. She is taking flexeril daily, Gabapentin for her neuropathy, naproxen twice daily, Oxybutynin for OAB , Oxycodone for post op pain. She is also taking Tymlos for osteoporosis , Ubrelvy as needed for migraines. She is not any medications for her blood pressures. She is not taking Repatha for her cholesterol.      She is s/p open reduction internal fixation of the left humerus on 1/9/2025 with Dr. Roberts. She is doing physical therapy.  Today she presents with concerns over a fibromyalgia flare.  She is dealing with a lot of muscle and joint pain. She has radiating pain from lower back to the right leg down.  She has fallen in November 2024. Reports she is using Voltaren, tiger balm. She is not taking gabapentin right now.  She will get Botox for her migraines next week.          Review of Systems  Constitutional: No fever or chills  Cardiovascular: no chest pain, no palpitations and no syncope.   Respiratory: no cough, no shortness of breath during exertion and no shortness of breath at rest.   Gastrointestinal: no abdominal pain, no nausea and no vomiting.  Neuro: No Headache, no dizziness    Objective   /83   Pulse 74   Ht 1.549 m (5' 1\")   Wt 53.1 kg (117 lb)   SpO2 98%   BMI 22.11 kg/m²     Physical Exam  Constitutional: Alert and in no acute distress. Well developed, well nourished  Head and Face: Head and face: Normal.    Cardiovascular: Heart rate and rhythm were normal, normal S1 and S2. No peripheral edema.   Pulmonary: No respiratory distress. Clear bilateral breath sounds.  Musculoskeletal: Gait and station: Normal. Muscle strength/tone: Normal.   Skin: Normal skin color and pigmentation, normal skin turgor, and no rash.    Psychiatric: Judgment and insight: Intact. Mood and affect: Normal.      Lab Results   Component " Value Date    WBC 9.2 01/11/2025    HGB 10.3 (L) 01/11/2025    HCT 31.6 (L) 01/11/2025     01/11/2025    CHOL 157 04/23/2024    TRIG 114 04/23/2024    HDL 77.0 04/23/2024    ALT 6 (L) 01/02/2025    AST 14 01/02/2025     (L) 01/11/2025    K 3.6 01/11/2025     01/11/2025    CREATININE 0.71 01/11/2025    BUN 10 01/11/2025    CO2 26 01/11/2025    TSH 1.98 04/23/2024    INR 1.0 12/28/2024    HGBA1C 5.2 04/23/2024       XR wrist left 1-2 views  Narrative: Interpreted By:  Adilia Arguello,   STUDY:  Left wrist, two views.      INDICATION:  Signs/Symptoms:pain.      COMPARISON:  03/11/2024      ACCESSION NUMBER(S):  [id number]      ORDERING CLINICIAN:  SNEHA GUILLORY      FINDINGS:  No acute fracture or malalignment.  Positive ulnar variance with cystic changes/sclerosis of the lunate  likely representing carpal impaction. Mild distal radioulnar and 1st  CMC joint degenerative changes with osteophytes.      Impression: 1. As above      MACRO:  None.      Signed by: Adilia Arguello 2/18/2025 6:47 PM  Dictation workstation:   [id number]  XR elbow left 3+ views  Narrative: Interpreted By:  Adilia Arguello,   STUDY:  Left elbow, 4 views.      INDICATION:  Signs/Symptoms:elbow pain.      COMPARISON:  12/28/2024      ACCESSION NUMBER(S):  [id number]      ORDERING CLINICIAN:  SNEHA GUILLORY      FINDINGS:  No acute fracture or malalignment.  No elbow joint effusion or abnormal fat pad elevation.  No significant degenerative changes. Partially visualized plate and  screw fixation changes of distal humerus.      Impression: 1. Unremarkable radiographic evaluation of the left elbow.      MACRO:  None.      Signed by: Adilia Arguello 2/18/2025 6:46 PM  Dictation workstation:   [id number]      Assessment/Plan   Assessment & Plan  Chronic low back pain, unspecified back pain laterality, unspecified whether sciatica present  Start prednisone and take gabapentin.    Orders:    predniSONE (Deltasone) 5 mg  tablet; Take 5 tabs (25 mg) by mouth daily for 2 days, then 4 tabs (20 mg) daily for 2 days, then 3 tabs (15 mg) daily for 2 days, then 2 tabs (10 mg) daily for 2 days, then 1 tab (5 mg) daily for 2 days.    Lumbar radiculopathy  Start prednisone and take gabapentin.    Orders:    predniSONE (Deltasone) 5 mg tablet; Take 5 tabs (25 mg) by mouth daily for 2 days, then 4 tabs (20 mg) daily for 2 days, then 3 tabs (15 mg) daily for 2 days, then 2 tabs (10 mg) daily for 2 days, then 1 tab (5 mg) daily for 2 days.      Please call me if any questions arise from now until your next visit. I will call you after I am done seeing patients. A Doctor is always available by phone when the office is closed. Please feel free to call for help with any problem that you feel shouldn't wait until the office re-opens.     Scribe Attestation  By signing my name below, I, Abad Cordova   attest that this documentation has been prepared under the direction and in the presence of Cee Coates MD.

## 2025-03-14 NOTE — ASSESSMENT & PLAN NOTE
Start prednisone and take gabapentin.    Orders:    predniSONE (Deltasone) 5 mg tablet; Take 5 tabs (25 mg) by mouth daily for 2 days, then 4 tabs (20 mg) daily for 2 days, then 3 tabs (15 mg) daily for 2 days, then 2 tabs (10 mg) daily for 2 days, then 1 tab (5 mg) daily for 2 days.

## 2025-03-17 NOTE — PROGRESS NOTES
No chief complaint on file.    History of Present Complaint:  The patient was referred to us by Referring Provider:  ROSANA Ortiz . this is 69 y.o.  female  with a past history of anxiety/depression, bipolar disorder, ADHD, hypertension, smoker, s/p MVR, history of old MI, polyosteoarthritis, s/p CVA, migraine headache, on gabapentin 300 mg 3 times daily and oxycodone 5 mg x 4 daily from [facility]  presenting with Left arm pain /fibromyalgia     Pain started 11/21/2024 after a fracture .  Pain is better opioids .  Pain is worst lifting , or carrying anything .         The pain is described as achiness, stabbing, and burning   .    Prior Pain Therapies: chronic opioid therapy   and physical Therapy      Past surgical history:   open heart surgery , hysterectomy , lumbar surgery , ORIF  ORIF is a surgical procedure used to repair broken bones (fractures). Humerus .    Employment/disability/litigation:   and caterer     Social history: Finished high school and Higher education Masters in [employer] science     Diagnostic studies:  xrays . Ct scan         Scott Each Box that Applies Female Male   FAMILY HISTORY OF SUBSTANCE ABUSE  Scott the boxes that applies   Alcohol ?  1    ? 3   Illegal drugs ?  2 ? 3   Rx drugs ?  4 ? 4   PERSONAL HISTORY OF SUBSTNACE ABUSE   Alcohol ?  3 ?  3   Illegal drugs ?  4 ?  4    Rx drugs ?  5 ?  5   Age Between 16-45 years ?  1 ?  1   History of Preadolescent Sexual Abuse ?  3 ?  0   PSYCHOLOGIC DISEASE   ADD, OCD, bipolar, schizophrenia   x  2 add bipolar rapid cycling unmediated  ?  2   Depression x  1 ?  1   Scoring Totals 3      Scoring (Risk)  0-3 - Low  4-7 - Moderate  8 - High    Opioid Risk Assessment Score 3/26

## 2025-03-18 ENCOUNTER — OFFICE VISIT (OUTPATIENT)
Dept: PAIN MEDICINE | Facility: CLINIC | Age: 70
End: 2025-03-18
Payer: COMMERCIAL

## 2025-03-18 VITALS
TEMPERATURE: 98.2 F | HEIGHT: 61 IN | SYSTOLIC BLOOD PRESSURE: 157 MMHG | HEART RATE: 78 BPM | BODY MASS INDEX: 22.09 KG/M2 | DIASTOLIC BLOOD PRESSURE: 86 MMHG | WEIGHT: 117 LBS | OXYGEN SATURATION: 96 % | RESPIRATION RATE: 16 BRPM

## 2025-03-18 DIAGNOSIS — G89.28 CHRONIC POSTOPERATIVE PAIN: Primary | ICD-10-CM

## 2025-03-18 DIAGNOSIS — G89.11 ACUTE PAIN DUE TO INJURY: ICD-10-CM

## 2025-03-18 DIAGNOSIS — S42.202A CLOSED FRACTURE OF PROXIMAL END OF LEFT HUMERUS, UNSPECIFIED FRACTURE MORPHOLOGY, INITIAL ENCOUNTER: ICD-10-CM

## 2025-03-18 DIAGNOSIS — M79.7 FIBROMYALGIA: ICD-10-CM

## 2025-03-18 PROCEDURE — 3077F SYST BP >= 140 MM HG: CPT | Performed by: ANESTHESIOLOGY

## 2025-03-18 PROCEDURE — 3079F DIAST BP 80-89 MM HG: CPT | Performed by: ANESTHESIOLOGY

## 2025-03-18 PROCEDURE — 1123F ACP DISCUSS/DSCN MKR DOCD: CPT | Performed by: ANESTHESIOLOGY

## 2025-03-18 PROCEDURE — 99214 OFFICE O/P EST MOD 30 MIN: CPT | Performed by: ANESTHESIOLOGY

## 2025-03-18 PROCEDURE — 99204 OFFICE O/P NEW MOD 45 MIN: CPT | Performed by: ANESTHESIOLOGY

## 2025-03-18 PROCEDURE — 3008F BODY MASS INDEX DOCD: CPT | Performed by: ANESTHESIOLOGY

## 2025-03-18 PROCEDURE — 1159F MED LIST DOCD IN RCRD: CPT | Performed by: ANESTHESIOLOGY

## 2025-03-18 PROCEDURE — 1125F AMNT PAIN NOTED PAIN PRSNT: CPT | Performed by: ANESTHESIOLOGY

## 2025-03-18 RX ORDER — SUZETRIGINE 50 MG/1
50 TABLET, FILM COATED ORAL 2 TIMES DAILY
Qty: 61 TABLET | Refills: 1 | Status: SHIPPED | OUTPATIENT
Start: 2025-03-18

## 2025-03-18 RX ORDER — ALBUTEROL SULFATE 0.83 MG/ML
3 SOLUTION RESPIRATORY (INHALATION) AS NEEDED
OUTPATIENT
Start: 2025-03-18

## 2025-03-18 RX ORDER — FAMOTIDINE 10 MG/ML
20 INJECTION, SOLUTION INTRAVENOUS ONCE AS NEEDED
OUTPATIENT
Start: 2025-03-18

## 2025-03-18 RX ORDER — KETAMINE HCL IN NACL, ISO-OSM 100MG/10ML
SYRINGE (ML) INJECTION ONCE
OUTPATIENT
Start: 2025-03-18

## 2025-03-18 RX ORDER — KETOROLAC TROMETHAMINE 30 MG/ML
30 INJECTION, SOLUTION INTRAMUSCULAR; INTRAVENOUS ONCE
OUTPATIENT
Start: 2025-03-18 | End: 2025-03-18

## 2025-03-18 RX ORDER — DIPHENHYDRAMINE HYDROCHLORIDE 50 MG/ML
50 INJECTION, SOLUTION INTRAMUSCULAR; INTRAVENOUS AS NEEDED
OUTPATIENT
Start: 2025-03-18

## 2025-03-18 RX ORDER — EPINEPHRINE 1 MG/ML
0.3 INJECTION, SOLUTION, CONCENTRATE INTRAVENOUS EVERY 5 MIN PRN
OUTPATIENT
Start: 2025-03-18

## 2025-03-18 RX ORDER — GABAPENTIN 300 MG/1
600 CAPSULE ORAL 3 TIMES DAILY
Qty: 180 CAPSULE | Refills: 11 | Status: SHIPPED | OUTPATIENT
Start: 2025-03-18 | End: 2026-03-18

## 2025-03-18 SDOH — ECONOMIC STABILITY: FOOD INSECURITY: WITHIN THE PAST 12 MONTHS, YOU WORRIED THAT YOUR FOOD WOULD RUN OUT BEFORE YOU GOT MONEY TO BUY MORE.: NEVER TRUE

## 2025-03-18 SDOH — ECONOMIC STABILITY: FOOD INSECURITY: WITHIN THE PAST 12 MONTHS, THE FOOD YOU BOUGHT JUST DIDN'T LAST AND YOU DIDN'T HAVE MONEY TO GET MORE.: NEVER TRUE

## 2025-03-18 ASSESSMENT — ENCOUNTER SYMPTOMS
PSYCHIATRIC NEGATIVE: 1
CARDIOVASCULAR NEGATIVE: 1
NEUROLOGICAL NEGATIVE: 1
OCCASIONAL FEELINGS OF UNSTEADINESS: 0
EYES NEGATIVE: 1
ENDOCRINE NEGATIVE: 1
RESPIRATORY NEGATIVE: 1
MYALGIAS: 1
ARTHRALGIAS: 1
DEPRESSION: 1
GASTROINTESTINAL NEGATIVE: 1
HEMATOLOGIC/LYMPHATIC NEGATIVE: 1

## 2025-03-18 ASSESSMENT — PAIN SCALES - GENERAL
PAINLEVEL_OUTOF10: 4
PAINLEVEL_OUTOF10: 4

## 2025-03-18 ASSESSMENT — LIFESTYLE VARIABLES
SKIP TO QUESTIONS 9-10: 1
TOTAL SCORE: 3
HOW OFTEN DO YOU HAVE A DRINK CONTAINING ALCOHOL: NEVER
HOW OFTEN DO YOU HAVE SIX OR MORE DRINKS ON ONE OCCASION: NEVER
HOW MANY STANDARD DRINKS CONTAINING ALCOHOL DO YOU HAVE ON A TYPICAL DAY: PATIENT DOES NOT DRINK
AUDIT-C TOTAL SCORE: 0

## 2025-03-18 ASSESSMENT — PAIN DESCRIPTION - DESCRIPTORS: DESCRIPTORS: ACHING;STABBING;BURNING

## 2025-03-18 ASSESSMENT — PAIN - FUNCTIONAL ASSESSMENT: PAIN_FUNCTIONAL_ASSESSMENT: 0-10

## 2025-03-18 NOTE — PATIENT INSTRUCTIONS
Gabapentin titration     Please take Gabapentin 300 mg capsules as follows:              AM         PM    Bedtime       Day 1 0 0 1   Day 2 0 0 1   Day 3 0 1 1   Day 4 0 1 1   Day 5 1 1 1   Day 6 1 1 1   Day 7 1 1 1   Day 8 1 1 2   Day 9 1 1 2   Day 10 1 2 2   Day 11 1 2 2   Day 12 2 2 2   Day 13 2 2 2   Day 14 2 2 3   Day 15 2 2 3   Day 16 2 3 3   Day 17 2 3 3   Day 18 3 3 3   Day 19 3 3 3   Day 20 3 3 4   Day 21 3 3 4   Day 22 3 4 4   Day 23 3 4 4   Day 24 4 4 4     Do not exceed 3600mg per day.   Stop and maintain dose when symptoms resolve.  Reduce as instructed if side effects not tolerated.     Opioid sparing infusion     The infusion is consistent of : Lidocaine 300 mg + Ketamine 30 mg + Propofol 100 mg +/- Toradol 30 mg in 500 cc fluids  Opioid sparing infusion is used to treat chronic pain. This pain is caused by nerves that have been injured, damaged or not functioning right.    Disease state examples include but not limited:   Peripheral neuropathy  Fibromyalgia  Complex Regional Pain Syndrome  Post-herpetic neuralgia  Phantom limb pain  Pain following a stroke or a spinal cord injury  Cervical/thoracic/lumbar postlaminectomy syndrome  Other pain disorders    Opioid sparing infusion works by multiple mechanisms:  The lidocaine works by suppressing the refiring of nerve impulses that cause pain.   Ketorolac is anti-inflammatory medication which help with decreasing inflammation in the nervous system and the periphery by suppressing certain enzymes  Ketamine is an anesthetic agent which help suppress the chronic pain hormones at the level of the spine and the brain and stop the vicious cycle of stimulation that resulted in pain  Propofol is an anesthetic agent helps offset the side effect of the ketamine in addition it has a special effect on the hormones that involved with the pain at the level of the spine and the brain    Pain relief varies from patient to patient. Pain relief can start in as little as  15 minutes and last up to several months.  During the infusion, you will be monitored closely. You will be connected to a monitor and your blood pressure, heart rate and oxygen and carbon dioxide levels will be taken during the infusion. The infusion will be given over 30 minutes to an hour depending on your response.  You may experience fatigue, dizziness, and sleepiness.  Some temporary side effects may include but are not limited to: Numbness in arms or legs, tingling around the mouth, ringing in the ears, and confusion.  Rare risks include: Heart arrhythmias, seizures.  You must have a responsible adult with you as well as a ride home or the infusion will be cancelled.

## 2025-03-19 ENCOUNTER — TREATMENT (OUTPATIENT)
Dept: PHYSICAL THERAPY | Facility: CLINIC | Age: 70
End: 2025-03-19
Payer: COMMERCIAL

## 2025-03-19 DIAGNOSIS — S42.292A CLOSED 3-PART FRACTURE OF PROXIMAL HUMERUS, LEFT, INITIAL ENCOUNTER: ICD-10-CM

## 2025-03-19 DIAGNOSIS — S42.342A CLOSED DISPLACED SPIRAL FRACTURE OF SHAFT OF LEFT HUMERUS, INITIAL ENCOUNTER: ICD-10-CM

## 2025-03-19 PROCEDURE — 97110 THERAPEUTIC EXERCISES: CPT | Mod: GP

## 2025-03-19 PROCEDURE — 97140 MANUAL THERAPY 1/> REGIONS: CPT | Mod: GP

## 2025-03-19 NOTE — PROGRESS NOTES
Physical Therapy Treatment    Patient Name: Judith Bush  MRN: [id number]  Encounter Date: 3/19/2025  Time Calculation  Start Time: 1500  Stop Time: 1545  Time Calculation (min): 45 min    Insurance:   Visit number: 7  Approved number of visits:  [place]  Insurance: [employer]  Medicare cert date 2/10/25  to 5/8/25         Current Problem  1. Closed 3-part fracture of proximal humerus, left, initial encounter  Follow Up In Physical Therapy      2. Closed displaced spiral fracture of shaft of left humerus, initial encounter  Follow Up In Physical Therapy          General  Reason for Referral: L shoulder ORIF  Referred By: Polifrone  Precautions  Precautions  Precautions Comment: fall risk  Pain       Subjective:     Patient reports doing much better.  Saw pain management     Compliant with HEP?    Objective:   Shoulder ROM  Flexion 103  Abd  ER 47  IR  Behind back sacrum  Supine flexion        Treatments:   UBE 5 min  Supine shoulder flexion 1# 10 x 2  Supine protraction 1# 10 x 2  Row RTB 10 x 3  Biceps curls 3# 10 x 3  PROM: L flexion abd ER, IR  STM to scar    Charges: man x 1 te x 2    Assessment: ROM improved since last visit and able to perform strengthening with only mild discomfort      Plan: PROM

## 2025-03-20 ENCOUNTER — OFFICE VISIT (OUTPATIENT)
Dept: NEUROLOGY | Facility: CLINIC | Age: 70
End: 2025-03-20
Payer: COMMERCIAL

## 2025-03-20 VITALS
WEIGHT: 117 LBS | DIASTOLIC BLOOD PRESSURE: 70 MMHG | BODY MASS INDEX: 22.09 KG/M2 | HEIGHT: 61 IN | HEART RATE: 78 BPM | RESPIRATION RATE: 16 BRPM | SYSTOLIC BLOOD PRESSURE: 120 MMHG | TEMPERATURE: 97 F

## 2025-03-20 DIAGNOSIS — G43.709 CHRONIC MIGRAINE WITHOUT AURA WITHOUT STATUS MIGRAINOSUS, NOT INTRACTABLE: Primary | ICD-10-CM

## 2025-03-20 PROCEDURE — 1126F AMNT PAIN NOTED NONE PRSNT: CPT | Performed by: STUDENT IN AN ORGANIZED HEALTH CARE EDUCATION/TRAINING PROGRAM

## 2025-03-20 PROCEDURE — 3008F BODY MASS INDEX DOCD: CPT | Performed by: STUDENT IN AN ORGANIZED HEALTH CARE EDUCATION/TRAINING PROGRAM

## 2025-03-20 PROCEDURE — 64615 CHEMODENERV MUSC MIGRAINE: CPT | Performed by: STUDENT IN AN ORGANIZED HEALTH CARE EDUCATION/TRAINING PROGRAM

## 2025-03-20 PROCEDURE — 1123F ACP DISCUSS/DSCN MKR DOCD: CPT | Performed by: STUDENT IN AN ORGANIZED HEALTH CARE EDUCATION/TRAINING PROGRAM

## 2025-03-20 PROCEDURE — 3078F DIAST BP <80 MM HG: CPT | Performed by: STUDENT IN AN ORGANIZED HEALTH CARE EDUCATION/TRAINING PROGRAM

## 2025-03-20 PROCEDURE — 3074F SYST BP LT 130 MM HG: CPT | Performed by: STUDENT IN AN ORGANIZED HEALTH CARE EDUCATION/TRAINING PROGRAM

## 2025-03-20 PROCEDURE — 1159F MED LIST DOCD IN RCRD: CPT | Performed by: STUDENT IN AN ORGANIZED HEALTH CARE EDUCATION/TRAINING PROGRAM

## 2025-03-20 PROCEDURE — RXMED WILLOW AMBULATORY MEDICATION CHARGE

## 2025-03-20 ASSESSMENT — ENCOUNTER SYMPTOMS
OCCASIONAL FEELINGS OF UNSTEADINESS: 0
LOSS OF SENSATION IN FEET: 0
DEPRESSION: 0

## 2025-03-20 ASSESSMENT — PAIN SCALES - GENERAL: PAINLEVEL_OUTOF10: 0-NO PAIN

## 2025-03-20 NOTE — PROGRESS NOTES
Neurology Botulinum Injection    Subjective   Judith Bush is an 69 y.o. female here for medical botulinum injection for Chronic migraine without aura without status migrainosus, not intractable [G43.709].     Due to multiple medical conditions going on including multiple ORs for fractures, headaches has been poor -- still with daily headaches, 30/30 days per month. Mix of tension type and migraines. Ubrelvy is helping as rescue method.      Objective   Neurological Exam  Physical Exam  General    Date/Time: 3/20/2025 12:55 PM    Performed by: Deacon Vera MD  Authorized by: Deacon Vera MD    Procedure specific details:      Procedure  Verbal informed consent: The risks, benefits, and alternatives of onabotulinumtoxinA (Botox) injection were discussed. The risks that were discussed include bleeding, infection, damage to local structures, over-weakness or under-weakness including ptosis, facial droop, or neck weakness, dysphagia, and rare incidents of systemic side effects including dysphagia or diplopia. The alternatives that were discussed include migraine prophylactic medications, migraine abortive-type medications, or no treatment at all. The patient gave verbal informed consent for this procedure.    The patient was prepped in the usual sterile fashion, Onabotulinumtoxin A (Botox) was injected as follows:    Total Dose Sites Muscle  1. 20 units 4 Frontalis  2. 10 units 2   3. 5 units 1 Procerus  4. 30 units 6 Occipitalis  5. 40 units 8 Temporalis  6. 30 units 6 Trapezius  7. 20 units 4 Cervical Paraspinals    Total amount of botulinum toxin used was 155 units.    The procedure was well tolerated. The patient will return in approximately 3 months for repeat injection.     NDC 7320-4456-16  Lot [id number]  Exp 2027/04  Discard after: 3/14/2026   (47) [id number]        Assessment/Plan     Diagnoses and all orders for this visit:  Chronic migraine without aura without status migrainosus, not intractable  -      onabotulinumtoxinA (Botox) injection 155 Units  Other orders  -     General  Botox injection in 3 months  Continue ubrelvy for rescue   Pt is on cardizem, gabapentin     Deacon Vera MD   Neurology and Vascular Neurology

## 2025-03-23 ENCOUNTER — TELEPHONE (OUTPATIENT)
Dept: PAIN MEDICINE | Facility: CLINIC | Age: 70
End: 2025-03-23
Payer: COMMERCIAL

## 2025-03-23 RX ORDER — SUZETRIGINE 50 MG/1
50 TABLET, FILM COATED ORAL 2 TIMES DAILY PRN
Qty: 60 TABLET | Refills: 1 | Status: SHIPPED | OUTPATIENT
Start: 2025-03-23

## 2025-03-24 ENCOUNTER — TREATMENT (OUTPATIENT)
Dept: PHYSICAL THERAPY | Facility: CLINIC | Age: 70
End: 2025-03-24
Payer: COMMERCIAL

## 2025-03-24 DIAGNOSIS — S42.342A CLOSED DISPLACED SPIRAL FRACTURE OF SHAFT OF LEFT HUMERUS, INITIAL ENCOUNTER: ICD-10-CM

## 2025-03-24 DIAGNOSIS — S42.292A CLOSED 3-PART FRACTURE OF PROXIMAL HUMERUS, LEFT, INITIAL ENCOUNTER: ICD-10-CM

## 2025-03-24 PROCEDURE — 97110 THERAPEUTIC EXERCISES: CPT | Mod: GP,CQ | Performed by: SPECIALIST/TECHNOLOGIST

## 2025-03-24 PROCEDURE — 97140 MANUAL THERAPY 1/> REGIONS: CPT | Mod: GP,CQ | Performed by: SPECIALIST/TECHNOLOGIST

## 2025-03-24 NOTE — PROGRESS NOTES
Physical Therapy Treatment    Patient Name: Judith Bush  MRN: [id number]  Encounter Date: 3/24/2025  Time Calculation  Start Time: 1455  Stop Time: 1545  Time Calculation (min): 50 min    Insurance:   Visit number: 7  Approved number of visits:  [place]  Insurance: [employer]  Medicare cert date 2/10/25  to 5/8/25         Current Problem  1. Closed 3-part fracture of proximal humerus, left, initial encounter  Follow Up In Physical Therapy      2. Closed displaced spiral fracture of shaft of left humerus, initial encounter  Follow Up In Physical Therapy          General  Reason for Referral: L shoulder ORIF  Referred By: Polifrone  Precautions  Precautions  Precautions Comment: fall risk  Pain  0-10 (Numeric) Pain Score:  (4-5)    Subjective:     Patient reports shoulder is moving a little better but still has in deep pain and unable to reach under other arm or behind back.    Compliant with HEP?    Objective:   Shoulder ROM  Flexion 104  Abd 82  ER R 90° L 15°  IR R 60° L 25°   Behind back sacrum  Supine flexion        Treatments:   UBE 5 min  Bravo row 2.5# 20x, stand ext 2.5# 20x   Standing 1# flexion 10x  Standing 3# biceps 2x10   Wall slide stability 4 pos 10x R/L   Cross fiber pecs   SS pecs R/L 1'   SS R int rot, L int/ext rot   L shoulder mobs inferior/posterior (immediate improvement in I/E rot)   PROM: L flexion abd ER, IR  SS R/L HF 1'   Swisswing hands, Delt, axilla 2' each     Charges: man x 1 te x 2    Assessment: ROM improved since last visit and able to perform strengthening with only mild discomfort noting pain 2 of 10 post treatment.       Plan: Continue to improve PROM, anterior flexibility and scapular stability to improve posture and ADL

## 2025-03-26 ENCOUNTER — PATIENT OUTREACH (OUTPATIENT)
Dept: PRIMARY CARE | Facility: CLINIC | Age: 70
End: 2025-03-26
Payer: COMMERCIAL

## 2025-03-27 ENCOUNTER — TELEPHONE (OUTPATIENT)
Dept: PAIN MEDICINE | Facility: CLINIC | Age: 70
End: 2025-03-27
Payer: COMMERCIAL

## 2025-03-27 ENCOUNTER — PHARMACY VISIT (OUTPATIENT)
Dept: PHARMACY | Facility: CLINIC | Age: 70
End: 2025-03-27
Payer: COMMERCIAL

## 2025-03-28 ENCOUNTER — APPOINTMENT (OUTPATIENT)
Dept: PHYSICAL THERAPY | Facility: CLINIC | Age: 70
End: 2025-03-28
Payer: COMMERCIAL

## 2025-03-31 ENCOUNTER — OFFICE VISIT (OUTPATIENT)
Dept: ORTHOPEDIC SURGERY | Facility: HOSPITAL | Age: 70
End: 2025-03-31
Payer: COMMERCIAL

## 2025-03-31 ENCOUNTER — APPOINTMENT (OUTPATIENT)
Dept: PHYSICAL THERAPY | Facility: CLINIC | Age: 70
End: 2025-03-31
Payer: COMMERCIAL

## 2025-03-31 DIAGNOSIS — G89.29 CHRONIC LEFT SHOULDER PAIN: Primary | ICD-10-CM

## 2025-03-31 DIAGNOSIS — M25.512 CHRONIC LEFT SHOULDER PAIN: Primary | ICD-10-CM

## 2025-03-31 PROCEDURE — 1125F AMNT PAIN NOTED PAIN PRSNT: CPT | Performed by: ORTHOPAEDIC SURGERY

## 2025-03-31 PROCEDURE — 1159F MED LIST DOCD IN RCRD: CPT | Performed by: ORTHOPAEDIC SURGERY

## 2025-03-31 PROCEDURE — 99213 OFFICE O/P EST LOW 20 MIN: CPT | Performed by: ORTHOPAEDIC SURGERY

## 2025-03-31 PROCEDURE — 99213 OFFICE O/P EST LOW 20 MIN: CPT | Mod: 25 | Performed by: ORTHOPAEDIC SURGERY

## 2025-03-31 PROCEDURE — 20610 DRAIN/INJ JOINT/BURSA W/O US: CPT | Mod: LT | Performed by: ORTHOPAEDIC SURGERY

## 2025-03-31 PROCEDURE — 1123F ACP DISCUSS/DSCN MKR DOCD: CPT | Performed by: ORTHOPAEDIC SURGERY

## 2025-03-31 PROCEDURE — 2500000004 HC RX 250 GENERAL PHARMACY W/ HCPCS (ALT 636 FOR OP/ED): Performed by: ORTHOPAEDIC SURGERY

## 2025-03-31 RX ORDER — LIDOCAINE HYDROCHLORIDE 10 MG/ML
1 INJECTION, SOLUTION INFILTRATION; PERINEURAL
Status: COMPLETED | OUTPATIENT
Start: 2025-03-31 | End: 2025-03-31

## 2025-03-31 RX ORDER — TRIAMCINOLONE ACETONIDE 40 MG/ML
10 INJECTION, SUSPENSION INTRA-ARTICULAR; INTRAMUSCULAR
Status: COMPLETED | OUTPATIENT
Start: 2025-03-31 | End: 2025-03-31

## 2025-03-31 RX ADMIN — TRIAMCINOLONE ACETONIDE 10 MG: 400 INJECTION, SUSPENSION INTRA-ARTICULAR; INTRAMUSCULAR at 13:17

## 2025-03-31 RX ADMIN — LIDOCAINE HYDROCHLORIDE 1 ML: 10 INJECTION, SOLUTION INFILTRATION; PERINEURAL at 13:17

## 2025-03-31 ASSESSMENT — PAIN SCALES - GENERAL: PAINLEVEL_OUTOF10: 4

## 2025-03-31 ASSESSMENT — PAIN - FUNCTIONAL ASSESSMENT: PAIN_FUNCTIONAL_ASSESSMENT: 0-10

## 2025-03-31 NOTE — PROGRESS NOTES
The patient is here for evaluation of her left shoulder she has had trauma to her left shoulder proximal humeral fracture treated by internal fixation by Dr. Guillermo Negrete.  This was followed by another fall and a fracture at the tip of the hardware and right feet ORIF of the mid humeral shaft.  She has been recovering very gradually but has persistent pain she has been in pain management and physical therapy.  She was interested in considering an injection for stiffness and pain.  She is here for assessment and possible injection.    The patient is pleasant and cooperative.  The patient is alert and oriented ×3.  Auditory function is intact.  The patient is a good historian.  The patient is not in acute distress.  Eye exam significant for nonicteric sclera, intact ocular muscle movement.  Breathing is rhythmic symmetric and nonlabored.  Patient is somewhat tearful during our interview.  She is healthy appearing.  She has well-healed scars over the anterior lateral aspect of the left shoulder there is no peripheral edema the contour of her arm is essentially normal, with very little atrophy.  There is diffuse tenderness of the upper arm.  Left radial pulse easily palpable brisk capillary refill.  She has full range of motion of her elbow her shoulder motion is actually quite good, elevation 100 degrees external rotation 50 degrees internal rotation PSIS.  Motor power in abduction 4 external rotation 4 internal rotation 4.  Elbow flexion 5/5  strength 5/5.    Radiographs demonstrate minimal degenerative changes in the glenohumeral joint preservation of the humeral head hardware in place in the proximal and mid humerus.    Humeral fracture    Patient is progressing well anticipated course after complex history with 2 fractures of the left upper extremity.  There is no evidence of avascular necrosis or collapse of the humeral head.  There is no significant arthritis of the glenohumeral joint.  Patient's  stiffness and pain are as anticipated after this injury and have improved significantly and will be expected to continue to improve with a very good prognosis.    We discussed the potential merit of intra-articular corticosteroid injection and the patient elected to have an injection.  This may help suppress inflammation and reduce adhesions and allow greater progression in therapy.  Patient was given an intra-articular injection tolerated very well and she is going to follow-up with  next week.    This was dictated using voice recognition software and not corrected for grammatical or spelling errors.    L Inj/Asp: L glenohumeral on 3/31/2025 1:17 PM  Indications: pain  Details: 22 G needle, posterior approach  Medications: 10 mg triamcinolone acetonide 40 mg/mL; 1 mL lidocaine 10 mg/mL (1 %)

## 2025-04-02 ENCOUNTER — APPOINTMENT (OUTPATIENT)
Dept: PHYSICAL THERAPY | Facility: CLINIC | Age: 70
End: 2025-04-02
Payer: COMMERCIAL

## 2025-04-02 ENCOUNTER — PATIENT MESSAGE (OUTPATIENT)
Dept: ORTHOPEDIC SURGERY | Facility: CLINIC | Age: 70
End: 2025-04-02
Payer: COMMERCIAL

## 2025-04-02 ENCOUNTER — TELEPHONE (OUTPATIENT)
Dept: PAIN MEDICINE | Facility: CLINIC | Age: 70
End: 2025-04-02
Payer: COMMERCIAL

## 2025-04-02 DIAGNOSIS — R52 ACUTE PAIN: Primary | ICD-10-CM

## 2025-04-02 RX ORDER — SUZETRIGINE 50 MG/1
1 TABLET, FILM COATED ORAL 2 TIMES DAILY
Qty: 61 TABLET | Refills: 0 | Status: SHIPPED | OUTPATIENT
Start: 2025-04-02

## 2025-04-07 ENCOUNTER — OFFICE VISIT (OUTPATIENT)
Dept: ORTHOPEDIC SURGERY | Facility: CLINIC | Age: 70
End: 2025-04-07
Payer: COMMERCIAL

## 2025-04-07 ENCOUNTER — HOSPITAL ENCOUNTER (OUTPATIENT)
Dept: RADIOLOGY | Facility: CLINIC | Age: 70
Discharge: HOME | End: 2025-04-07
Payer: COMMERCIAL

## 2025-04-07 DIAGNOSIS — S42.342A CLOSED DISPLACED SPIRAL FRACTURE OF SHAFT OF LEFT HUMERUS, INITIAL ENCOUNTER: Primary | ICD-10-CM

## 2025-04-07 DIAGNOSIS — S42.202A CLOSED FRACTURE OF PROXIMAL END OF LEFT HUMERUS, UNSPECIFIED FRACTURE MORPHOLOGY, INITIAL ENCOUNTER: ICD-10-CM

## 2025-04-07 DIAGNOSIS — M25.612 POST-TRAUMATIC STIFFNESS OF LEFT SHOULDER JOINT: ICD-10-CM

## 2025-04-07 PROCEDURE — 99024 POSTOP FOLLOW-UP VISIT: CPT | Performed by: ORTHOPAEDIC SURGERY

## 2025-04-07 PROCEDURE — 73030 X-RAY EXAM OF SHOULDER: CPT | Mod: LT

## 2025-04-07 PROCEDURE — G2211 COMPLEX E/M VISIT ADD ON: HCPCS | Performed by: ORTHOPAEDIC SURGERY

## 2025-04-07 PROCEDURE — 1123F ACP DISCUSS/DSCN MKR DOCD: CPT | Performed by: ORTHOPAEDIC SURGERY

## 2025-04-07 PROCEDURE — 99214 OFFICE O/P EST MOD 30 MIN: CPT | Performed by: ORTHOPAEDIC SURGERY

## 2025-04-07 PROCEDURE — 73060 X-RAY EXAM OF HUMERUS: CPT | Mod: LEFT SIDE | Performed by: RADIOLOGY

## 2025-04-07 PROCEDURE — 73060 X-RAY EXAM OF HUMERUS: CPT | Mod: LT

## 2025-04-07 PROCEDURE — 73030 X-RAY EXAM OF SHOULDER: CPT | Mod: LEFT SIDE | Performed by: RADIOLOGY

## 2025-04-07 NOTE — PROGRESS NOTES
Subjective    Patient ID: Judith Bush is a 69 y.o. female.    Chief Complaint: Follow-up of the Left Shoulder (Lt. Humerus ORIF SR24)     Last Surgery: Open Reduction Internal Fixation Humerus - Left - Left  Last Surgery Date: 2025    HPI  Patient is a 69 y.o. female who is s/p left proximal humerus ORIF on 2024 with subsequent left cliff-hardware humeral shaft ORIF on 2025. Patient got an cortisone injection in her shoulder with  with benefits in her pain complaints.   Patient continues with therapy sessions, performing exercise program at home. States that her swelling has reduced, and scar has healed well. Patient denies fever or chills, N/T or arm pain.     Patient is endorsing right hip pain laterally and along the groin. This is causing her to trip and had a hx of fall due to this. She notes legs length discrepancy. She has a hx of Previous ORIF of an intertrochanteric right hip fracture again noted unchanged. Xray from  showed Left hip  mild osteoarthritis. Small trochanteric spurs.    ROS: All other systems have been reviewed and are negative except as previously noted in history of present illness.      IMP:  Problem List Items Addressed This Visit       Closed fracture of left proximal humerus    Relevant Orders    XR shoulder left 2+ views    XR humerus left     Objective     General: Alert and oriented x 3, NAD, respirations easy and unlabored with no audible wheezes, skin warm and dry, speech and dress appropriate for noted age, affect euthymic.     Musculoskeletal: left upper extremity  incision well-healed  compartments soft  mild swelling to upper extremity  sensation intact to light touch  motor intact including R/U/M/AIN/PIN nn.  palpable radial pulse 2+   Subcutaneous suture noted to be sticking out at medial proximal humerus incision  Shoulder active forward flexion 120 degrees, passively to 140 degrees.    X-ray: Images of left shoulder reviewed  personally by me today and reveal good alignment of her fracture and interval fracture healing.  The glenohumeral joint is well-maintained with no arthrosis.  Assessment/Plan   Encounter Diagnoses:  Closed fracture of proximal end of left humerus, unspecified fracture morphology, initial encounter    PLAN: Patient is s/p left proximal humerus ORIF on 11/21/2024 with subsequent left cliff-hardware humeral shaft ORIF on 1/9/2025. Suture was removed at medial incision. Patient is overall doing well. Patient got an cortisone injection in her shoulder with  3/31 with benefits in her pain complaints.   Patient continues with therapy sessions, performing exercise program at home.  Working with home PT. Imaging reveals normal bony exam of the left elbow and wrist with no acute fractures or deformities. Patient is educated that she does not have any fractures at her elbow or wrist.  Patient is educated that she may fully weight bear as tolerated on the left arm. She will continue to work with PT on shoulder and elbow ROM, arm strengthening, and stretching exercises. I discussed she can get repeat cortisone injection in 3 months time.  Patient is endorsing right hip pain laterally and along the groin. This is causing her to trip and had a hx of fall due to this. She has a hx of Previous ORIF of an intertrochanteric right hip fracture again noted unchanged. Xray from 2022 showed Left hip  mild osteoarthritis. Small trochanteric spurs. I recommend following up for right hip evaluation. In the interim she will start physical therapy for left hip post traumatic arthritis and deconditioning , quad strengthening, gait training, and weight bearing.  . Patient will follow up in 3 months. Patient is in agreement with this plan. Xrays of the left shoulder and humerus will be needed.         Orders Placed This Encounter    XR shoulder left 2+ views    XR humerus left     No follow-ups on file.    Scribe Attestation  By  signing my name below, I, Abad Briceño attest that this documentation has been prepared under the direction and in the presence of Guillermo Roberts MD. All medical record entries made by the Scribe were at my direction or personally dictated by me. I have reviewed the chart and agree that the record accurately reflects my personal performance of the history, physical exam, discussion and plan.

## 2025-04-16 PROCEDURE — RXMED WILLOW AMBULATORY MEDICATION CHARGE

## 2025-04-18 ENCOUNTER — PHARMACY VISIT (OUTPATIENT)
Dept: PHARMACY | Facility: CLINIC | Age: 70
End: 2025-04-18
Payer: COMMERCIAL

## 2025-04-21 DIAGNOSIS — J44.9 CHRONIC OBSTRUCTIVE PULMONARY DISEASE, UNSPECIFIED: ICD-10-CM

## 2025-04-21 PROCEDURE — RXMED WILLOW AMBULATORY MEDICATION CHARGE

## 2025-04-21 RX ORDER — ALBUTEROL SULFATE 90 UG/1
2 INHALANT RESPIRATORY (INHALATION) EVERY 4 HOURS PRN
Qty: 18 G | Refills: 11 | Status: SHIPPED | OUTPATIENT
Start: 2025-04-21

## 2025-04-21 RX ORDER — GLYCOPYRROLATE AND FORMOTEROL FUMARATE 9; 4.8 UG/1; UG/1
2 AEROSOL, METERED RESPIRATORY (INHALATION) 2 TIMES DAILY
Qty: 32.1 G | Refills: 3 | Status: SHIPPED | OUTPATIENT
Start: 2025-04-21

## 2025-04-23 ENCOUNTER — TREATMENT (OUTPATIENT)
Dept: PHYSICAL THERAPY | Facility: CLINIC | Age: 70
End: 2025-04-23
Payer: COMMERCIAL

## 2025-04-23 DIAGNOSIS — S42.342A CLOSED DISPLACED SPIRAL FRACTURE OF SHAFT OF LEFT HUMERUS, INITIAL ENCOUNTER: ICD-10-CM

## 2025-04-23 DIAGNOSIS — M25.512 LEFT SHOULDER PAIN: ICD-10-CM

## 2025-04-23 DIAGNOSIS — S42.292A CLOSED 3-PART FRACTURE OF PROXIMAL HUMERUS, LEFT, INITIAL ENCOUNTER: Primary | ICD-10-CM

## 2025-04-23 PROCEDURE — 97110 THERAPEUTIC EXERCISES: CPT | Mod: GP

## 2025-04-23 ASSESSMENT — PAIN SCALES - GENERAL: PAINLEVEL_OUTOF10: 3

## 2025-04-23 NOTE — PROGRESS NOTES
Physical Therapy Treatment    Patient Name: Judith Bush  MRN: [id number]  Encounter Date: 4/23/2025  Time Calculation  Start Time: 1500  Stop Time: 1545  Time Calculation (min): 45 min    Insurance:     Visit number: 8  Approved number of visits:  mn  Insurance:  medicare  Medicare cert date: 2/10/25  to 5/8/25    Current Problem  1. Closed 3-part fracture of proximal humerus, left, initial encounter        2. Closed displaced spiral fracture of shaft of left humerus, initial encounter        3. Left shoulder pain            General  Reason for Referral: L shoulder ORIF  Referred By: Dani  Precautions  Precautions  Precautions Comment: fall risk  Pain  0-10 (Numeric) Pain Score: 3    Subjective:     Patient reports doing ok.  Started being able to work more.  Saw MD and looked at xrays wondering why she falls,  said she was out of alignment and needs some strengthening.   Will work on that after the shoulder gets taken care of.    After last visit feels like she was pushed too far with too much weight, had a real set back and a lot of pain so didn't come back to therapy, instead went to ortho and got injection. felt like it helped, will go back every 3 months if she has increased pain.      Having a good day pain is much better. 3/10    Pushing or reaching, getting pillow cases on shoving towels into bags still hurt.      Compliant with HEP? partially    Objective:     Shoulder ROM  Flexion 111  Abd 116  ER  56  IR 61  Behind back side of waist   Supine flexion    UE Strength  Shoulder flexion  NT  Shoulder abd NT  Biceps 5/5  Triceps 5/4  ER 5/4  IR 5/4    Treatments:   Iso flexion, abd, ext L 10 x 5 sec hold  ER iso 10 x 2   Triceps ext 5# 10 x 3  Shoulder low shoulder flexion YTB 10 x 3   [place] carry 2 kg 30 ' x 4 L  PROM: flexion, scaption, ER, IR  Chest press wand 10 x 3    Charges: te x 3    Assessment: overall seems to be in a good place where she can tolerate     Goals updated:  1 ease with putting  on pillow cases to make her own bed by 6 weeks  2. Put laundry in basket for home or bag for work without discomfort or issues by 6 weeks  3. Reach to  laundry, dropped items in car, glasses and plates for work with ease by 6 weeks    Plan: resume PT focus on strengthening

## 2025-04-24 PROCEDURE — RXMED WILLOW AMBULATORY MEDICATION CHARGE

## 2025-04-28 ENCOUNTER — TREATMENT (OUTPATIENT)
Dept: PHYSICAL THERAPY | Facility: CLINIC | Age: 70
End: 2025-04-28
Payer: COMMERCIAL

## 2025-04-28 ENCOUNTER — PHARMACY VISIT (OUTPATIENT)
Dept: PHARMACY | Facility: CLINIC | Age: 70
End: 2025-04-28
Payer: COMMERCIAL

## 2025-04-28 DIAGNOSIS — S42.292A CLOSED 3-PART FRACTURE OF PROXIMAL HUMERUS, LEFT, INITIAL ENCOUNTER: ICD-10-CM

## 2025-04-28 DIAGNOSIS — S42.342A CLOSED DISPLACED SPIRAL FRACTURE OF SHAFT OF LEFT HUMERUS, INITIAL ENCOUNTER: ICD-10-CM

## 2025-04-28 PROCEDURE — 97110 THERAPEUTIC EXERCISES: CPT | Mod: GP

## 2025-04-28 PROCEDURE — 97140 MANUAL THERAPY 1/> REGIONS: CPT | Mod: GP

## 2025-04-28 ASSESSMENT — PAIN SCALES - GENERAL: PAINLEVEL_OUTOF10: 0 - NO PAIN

## 2025-04-28 NOTE — PROGRESS NOTES
Physical Therapy Treatment    Patient Name: Judith Bush  MRN: [id number]  Encounter Date: 4/28/2025  Time Calculation  Start Time: 1545  Stop Time: 1630  Time Calculation (min): 45 min    Insurance:     Visit number: 9  Approved number of visits: MN  Insurance: [employer]  Medicare cert date: 2/10/25  to 5/8/25    Current Problem  1. Closed 3-part fracture of proximal humerus, left, initial encounter  Follow Up In Physical Therapy      2. Closed displaced spiral fracture of shaft of left humerus, initial encounter  Follow Up In Physical Therapy          General  Reason for Referral: L shoulder ORIF  Referred By: Polifrone  Precautions  Precautions  Precautions Comment: fall risk  Pain  0-10 (Numeric) Pain Score: 0 - No pain    Subjective:     Patient reports not bad but not feeling very energetic today but did work on Sunday all day.    Is sore along lower scar feels like there something there.      No pain just discomfort.      Compliant with HEP? yes    Objective:     Ttp mid scar along biceps    Treatments:   UBE 5 min  Scar mob   Chest press 3# 10x, 2# 10 x 2  SL ER 1# 10 x 2  Sitting shoulder flexion 1# 10 x 3  Seated shoulder abd 1# 10 x 3   Biceps curl 3# 10 x 3      Charges: te x 2 man x 1    Assessment: able to tolerate strengthening without increased pain.  Scar seems tethered right in middle.  Encouraged continue STM with scar elevation and mob      Plan: general strengthening

## 2025-05-02 ENCOUNTER — APPOINTMENT (OUTPATIENT)
Dept: PHYSICAL THERAPY | Facility: CLINIC | Age: 70
End: 2025-05-02
Payer: COMMERCIAL

## 2025-05-05 ENCOUNTER — APPOINTMENT (OUTPATIENT)
Dept: PHYSICAL THERAPY | Facility: CLINIC | Age: 70
End: 2025-05-05
Payer: COMMERCIAL

## 2025-05-06 PROCEDURE — RXMED WILLOW AMBULATORY MEDICATION CHARGE

## 2025-05-07 ENCOUNTER — APPOINTMENT (OUTPATIENT)
Dept: CARDIOLOGY | Facility: CLINIC | Age: 70
End: 2025-05-07
Payer: COMMERCIAL

## 2025-05-09 ENCOUNTER — PHARMACY VISIT (OUTPATIENT)
Dept: PHARMACY | Facility: CLINIC | Age: 70
End: 2025-05-09
Payer: COMMERCIAL

## 2025-05-12 ENCOUNTER — PHARMACY VISIT (OUTPATIENT)
Dept: PHARMACY | Facility: CLINIC | Age: 70
End: 2025-05-12

## 2025-05-14 ENCOUNTER — TREATMENT (OUTPATIENT)
Dept: PHYSICAL THERAPY | Facility: CLINIC | Age: 70
End: 2025-05-14
Payer: COMMERCIAL

## 2025-05-14 ENCOUNTER — APPOINTMENT (OUTPATIENT)
Dept: PHYSICAL THERAPY | Facility: CLINIC | Age: 70
End: 2025-05-14
Payer: COMMERCIAL

## 2025-05-14 DIAGNOSIS — S42.342A CLOSED DISPLACED SPIRAL FRACTURE OF SHAFT OF LEFT HUMERUS, INITIAL ENCOUNTER: ICD-10-CM

## 2025-05-14 DIAGNOSIS — M25.512 LEFT SHOULDER PAIN: Primary | ICD-10-CM

## 2025-05-14 DIAGNOSIS — S42.292A CLOSED 3-PART FRACTURE OF PROXIMAL HUMERUS, LEFT, INITIAL ENCOUNTER: ICD-10-CM

## 2025-05-14 PROCEDURE — 97110 THERAPEUTIC EXERCISES: CPT | Mod: GP

## 2025-05-14 ASSESSMENT — PAIN SCALES - GENERAL: PAINLEVEL_OUTOF10: 2

## 2025-05-14 NOTE — PROGRESS NOTES
Physical Therapy Treatment    Patient Name: Judith Bush  MRN: [id number]  Encounter Date: 5/14/2025  Time Calculation  Start Time: 1107  Stop Time: 1152  Time Calculation (min): 45 min    Insurance:     Visit number: 10  Approved number of visits: [place]  Insurance: medicare  Medicare cert date: 2/10/25  to 5/8/25  Medicare re-cert date: 5/9/25 to 8/3/25    Current Problem  1. Left shoulder pain        2. Closed 3-part fracture of proximal humerus, left, initial encounter  Follow Up In Physical Therapy      3. Closed displaced spiral fracture of shaft of left humerus, initial encounter  Follow Up In Physical Therapy          General  Reason for Referral: L shoulder ORIF  Referred By: Polifrone  Precautions  Precautions  Precautions Comment: fall risk  Pain  0-10 (Numeric) Pain Score: 2    Subjective:     Patient reports feels like the shoulder is froze for about 3-4 days.  Pain in biceps triceps and elbow.   Frustrated as she can't do her ADLs without assistance.   Pain is tolerable today.    Compliant with HEP? yes    Objective:   Shoulder ROM  Flexion 120  Abd 114  ER  IR  Behind back side of waist  Supine flexion    UE Strength  Shoulder flexion 5/4  Shoulder abd 5/5  Biceps 5/5  Triceps 55/5  ER 5/3  IR 5/3        Treatments:     Pulley 5 min  Recheck   Standing TI 1# 10 x 3  Upright row 2# 10 x 3  Biceps curls 2# 10 x 3  IR YTB 10 x 3  ER YTB 10 x 3  PROM: L flex, scaption, ER, IR  STM to scar biceps     Charges: te x 3    Assessment: reported feeling better post treatment.  Making progress toward goals.  May be limited in abd and ER ROM due to hardware. Would benefit from continued PT to address updated goals     GOALS:  Shoulder [id number] abd 120, behind back to sacrum, ER to 70, IR to 70 by 10-12 weeks   MMT 5/5 B UE by 8-12 weeks - progressed  Sleep on side by 6-8 weeks - partially met  Dress with ease by 4-6 weeks - MET  ADLs independently by 8 weeks - progressing  Lift carry and push pull to return to work  safely 10-12 weeks - progressing  Quick dash improved by 10 points by 12 weeks    Goals updated:  1 ease with putting on pillow cases to make her own bed by 6 weeks  2. Put laundry in basket for home or bag for work without discomfort or issues by 6 weeks  3. Reach to  laundry, dropped items in car, glasses and plates for work with ease by 6 weeks    Plan: PROM general strengthening wall wash

## 2025-05-16 ENCOUNTER — APPOINTMENT (OUTPATIENT)
Dept: PHYSICAL THERAPY | Facility: CLINIC | Age: 70
End: 2025-05-16
Payer: COMMERCIAL

## 2025-05-19 ENCOUNTER — TREATMENT (OUTPATIENT)
Dept: PHYSICAL THERAPY | Facility: CLINIC | Age: 70
End: 2025-05-19
Payer: COMMERCIAL

## 2025-05-19 DIAGNOSIS — S42.292A CLOSED 3-PART FRACTURE OF PROXIMAL HUMERUS, LEFT, INITIAL ENCOUNTER: ICD-10-CM

## 2025-05-19 DIAGNOSIS — S42.342A CLOSED DISPLACED SPIRAL FRACTURE OF SHAFT OF LEFT HUMERUS, INITIAL ENCOUNTER: ICD-10-CM

## 2025-05-19 PROCEDURE — 97110 THERAPEUTIC EXERCISES: CPT | Mod: GP

## 2025-05-19 ASSESSMENT — PAIN SCALES - GENERAL: PAINLEVEL_OUTOF10: 2

## 2025-05-19 NOTE — PROGRESS NOTES
Physical Therapy Treatment    Patient Name: Judith Bush  MRN: [id number]  Encounter Date: 5/19/2025  Time Calculation  Start Time: 1015  Stop Time: 1100  Time Calculation (min): 45 min    Insurance:     Visit number: 11  Approved number of visits:  [place]  Insurance: medicare  Medicare cert date: 2/10/25  to 5/8/25  Medicare re-cert date: 5/9/25 to 8/3/25    Current Problem  1. Closed 3-part fracture of proximal humerus, left, initial encounter  Follow Up In Physical Therapy      2. Closed displaced spiral fracture of shaft of left humerus, initial encounter  Follow Up In Physical Therapy          General  Reason for Referral: L shoulder ORIF  Referred By: Polione  Precautions  Precautions  Precautions Comment: fall risk  Pain  0-10 (Numeric) Pain Score: 2    Subjective:     Patient reports she is wanting to be discharged from therapy at this time as she is having a hard time dealing with loss of work, depression, and lack of ROM.      Compliant with HEP? yes    Objective:   Shoulder ROM  Flexion  116  Abd 113  ER 71  IR 53  Behind back side of hip L    MMT  UE Strength  Shoulder flexion 5/4  Shoulder abd 5/5  Biceps 5/5  Triceps 55/5  ER 5/3  IR 5/3    Treatments:   UBE 5 min  recheck  Seated TYI 2# 10 x 3  SL ER 0# 10 x 3      Charges: te x 3    Assessment: updated HEP to include exercises to continue on own.        Plan: discharge to independent HEP

## 2025-05-21 ENCOUNTER — APPOINTMENT (OUTPATIENT)
Dept: PHYSICAL THERAPY | Facility: CLINIC | Age: 70
End: 2025-05-21
Payer: COMMERCIAL

## 2025-05-22 DIAGNOSIS — Z12.11 COLON CANCER SCREENING: ICD-10-CM

## 2025-05-22 PROCEDURE — RXMED WILLOW AMBULATORY MEDICATION CHARGE

## 2025-05-22 RX ORDER — POLYETHYLENE GLYCOL 3350, SODIUM SULFATE ANHYDROUS, SODIUM BICARBONATE, SODIUM CHLORIDE, POTASSIUM CHLORIDE 236; 22.74; 6.74; 5.86; 2.97 G/4L; G/4L; G/4L; G/4L; G/4L
POWDER, FOR SOLUTION ORAL
Qty: 4000 ML | Refills: 0 | Status: SHIPPED | OUTPATIENT
Start: 2025-05-22

## 2025-05-29 ENCOUNTER — PHARMACY VISIT (OUTPATIENT)
Dept: PHARMACY | Facility: CLINIC | Age: 70
End: 2025-05-29
Payer: COMMERCIAL

## 2025-06-06 PROCEDURE — RXMED WILLOW AMBULATORY MEDICATION CHARGE

## 2025-06-09 PROCEDURE — RXMED WILLOW AMBULATORY MEDICATION CHARGE

## 2025-06-10 ENCOUNTER — PHARMACY VISIT (OUTPATIENT)
Dept: PHARMACY | Facility: CLINIC | Age: 70
End: 2025-06-10
Payer: COMMERCIAL

## 2025-06-10 DIAGNOSIS — S42.392A OTHER FRACTURE OF SHAFT OF LEFT HUMERUS, INITIAL ENCOUNTER FOR CLOSED FRACTURE: ICD-10-CM

## 2025-06-11 RX ORDER — CYCLOBENZAPRINE HCL 10 MG
10 TABLET ORAL 3 TIMES DAILY PRN
Qty: 90 TABLET | Refills: 0 | OUTPATIENT
Start: 2025-06-11

## 2025-06-16 ENCOUNTER — HOSPITAL ENCOUNTER (OUTPATIENT)
Dept: RADIOLOGY | Facility: CLINIC | Age: 70
Discharge: HOME | End: 2025-06-16
Payer: COMMERCIAL

## 2025-06-16 ENCOUNTER — OFFICE VISIT (OUTPATIENT)
Dept: URGENT CARE | Age: 70
End: 2025-06-16
Payer: COMMERCIAL

## 2025-06-16 VITALS
BODY MASS INDEX: 22.67 KG/M2 | OXYGEN SATURATION: 98 % | SYSTOLIC BLOOD PRESSURE: 144 MMHG | DIASTOLIC BLOOD PRESSURE: 84 MMHG | TEMPERATURE: 97.8 F | HEART RATE: 66 BPM | WEIGHT: 120 LBS

## 2025-06-16 DIAGNOSIS — M79.672 LEFT FOOT PAIN: ICD-10-CM

## 2025-06-16 DIAGNOSIS — M79.672 LEFT FOOT PAIN: Primary | ICD-10-CM

## 2025-06-16 PROCEDURE — 73630 X-RAY EXAM OF FOOT: CPT | Mod: LEFT SIDE | Performed by: STUDENT IN AN ORGANIZED HEALTH CARE EDUCATION/TRAINING PROGRAM

## 2025-06-16 PROCEDURE — 1159F MED LIST DOCD IN RCRD: CPT | Performed by: NURSE PRACTITIONER

## 2025-06-16 PROCEDURE — 99213 OFFICE O/P EST LOW 20 MIN: CPT | Performed by: NURSE PRACTITIONER

## 2025-06-16 PROCEDURE — 4004F PT TOBACCO SCREEN RCVD TLK: CPT | Performed by: NURSE PRACTITIONER

## 2025-06-16 PROCEDURE — 1125F AMNT PAIN NOTED PAIN PRSNT: CPT | Performed by: NURSE PRACTITIONER

## 2025-06-16 PROCEDURE — 3079F DIAST BP 80-89 MM HG: CPT | Performed by: NURSE PRACTITIONER

## 2025-06-16 PROCEDURE — 3077F SYST BP >= 140 MM HG: CPT | Performed by: NURSE PRACTITIONER

## 2025-06-16 PROCEDURE — 73630 X-RAY EXAM OF FOOT: CPT | Mod: LT

## 2025-06-16 PROCEDURE — 1160F RVW MEDS BY RX/DR IN RCRD: CPT | Performed by: NURSE PRACTITIONER

## 2025-06-16 ASSESSMENT — ENCOUNTER SYMPTOMS
PALPITATIONS: 0
NECK PAIN: 0
SINUS PAIN: 0
FEVER: 0
DIARRHEA: 0
VOMITING: 0
TROUBLE SWALLOWING: 0
BACK PAIN: 0
MYALGIAS: 0
FATIGUE: 0
CHEST TIGHTNESS: 0
DIZZINESS: 0
WOUND: 0
COUGH: 0
HEADACHES: 0
CONSTIPATION: 0
SHORTNESS OF BREATH: 0
SORE THROAT: 0
DIFFICULTY URINATING: 0
CHILLS: 0
RHINORRHEA: 0
SINUS PRESSURE: 0
NAUSEA: 0
ARTHRALGIAS: 0
EYE PAIN: 0
ABDOMINAL PAIN: 0
APPETITE CHANGE: 0

## 2025-06-16 ASSESSMENT — PATIENT HEALTH QUESTIONNAIRE - PHQ9
SUM OF ALL RESPONSES TO PHQ9 QUESTIONS 1 AND 2: 0
1. LITTLE INTEREST OR PLEASURE IN DOING THINGS: NOT AT ALL
2. FEELING DOWN, DEPRESSED OR HOPELESS: NOT AT ALL

## 2025-06-16 ASSESSMENT — VISUAL ACUITY: OU: 1

## 2025-06-16 ASSESSMENT — PAIN SCALES - GENERAL: PAINLEVEL_OUTOF10: 4

## 2025-06-16 NOTE — PROGRESS NOTES
Subjective   Patient ID: Judith Bush is a 69 y.o. female. They present today with a chief complaint of Foot Pain (Woke up with L foot pain).    History of Present Illness  The patient is a 70yo female who presents with left foot pain. Patient denies injury. States she woke up out of her sleep in pain.           Past Medical History  Allergies as of 06/16/2025 - Reviewed 06/16/2025   Allergen Reaction Noted    Asparagus Shortness of breath 09/08/2011    Benzalkonium chloride Rash 04/02/2019    Cephalosporins Anaphylaxis 04/18/2023    Penicillin Anaphylaxis 09/10/2024    Neomycin-polymyxin-gramicidin Rash 04/18/2023       Prescriptions Prior to Admission[1]     Medical History[2]    Surgical History[3]     reports that she has been smoking cigarettes. She has a 20 pack-year smoking history. She has never used smokeless tobacco. She reports that she does not currently use alcohol after a past usage of about 3.0 standard drinks of alcohol per week. She reports that she does not currently use drugs after having used the following drugs: Marijuana.    Review of Systems  Review of Systems   Constitutional:  Negative for appetite change, chills, fatigue and fever.   HENT:  Negative for congestion, dental problem, ear pain, hearing loss, postnasal drip, rhinorrhea, sinus pressure, sinus pain, sneezing, sore throat and trouble swallowing.    Eyes:  Negative for pain.   Respiratory:  Negative for cough, chest tightness and shortness of breath.    Cardiovascular:  Negative for chest pain and palpitations.   Gastrointestinal:  Negative for abdominal pain, constipation, diarrhea, nausea and vomiting.   Genitourinary:  Negative for difficulty urinating.   Musculoskeletal:  Positive for gait problem. Negative for arthralgias, back pain, myalgias and neck pain.   Skin:  Negative for rash and wound.   Neurological:  Negative for dizziness and headaches.   Psychiatric/Behavioral:  Negative for self-injury and suicidal ideas.                                    Objective    Vitals:    06/16/25 0815   BP: 144/84   Pulse: 66   Temp: 36.6 °C (97.8 °F)   SpO2: 98%   Weight: 54.4 kg (120 lb)     No LMP recorded. Patient is postmenopausal.    Physical Exam  Vitals reviewed.   Constitutional:       General: She is awake. She is not in acute distress.     Appearance: Normal appearance. She is well-developed, well-groomed and normal weight. She is not ill-appearing.   HENT:      Head: Normocephalic and atraumatic.      Right Ear: Hearing and external ear normal.      Left Ear: Hearing and external ear normal.      Nose: Nose normal. No nasal deformity or signs of injury.      Mouth/Throat:      Lips: Pink.   Eyes:      General: Lids are normal. Vision grossly intact.   Cardiovascular:      Rate and Rhythm: Normal rate and regular rhythm.      Heart sounds: Normal heart sounds, S1 normal and S2 normal. Heart sounds not distant. No murmur heard.  Pulmonary:      Effort: Pulmonary effort is normal. No tachypnea, bradypnea, accessory muscle usage, prolonged expiration, respiratory distress or retractions.      Breath sounds: Normal breath sounds and air entry. No stridor, decreased air movement or transmitted upper airway sounds. No decreased breath sounds.   Chest:      Chest wall: No deformity.   Musculoskeletal:      Right foot: Normal.      Left foot: Normal range of motion and normal capillary refill. Prominent metatarsal heads and tenderness present. No swelling, deformity, bunion, Charcot foot, foot drop, laceration, bony tenderness or crepitus. Normal pulse.   Skin:     General: Skin is warm and dry.      Capillary Refill: Capillary refill takes less than 2 seconds.   Neurological:      General: No focal deficit present.      Mental Status: She is alert.      Gait: Gait is intact.   Psychiatric:         Attention and Perception: Attention and perception normal.         Mood and Affect: Mood and affect normal.         Speech: Speech normal.          Behavior: Behavior normal. Behavior is cooperative.         Procedures    Point of Care Test & Imaging Results from this visit  No results found for this visit on 06/16/25.   Imaging  XR foot left 3+ views  Result Date: 6/16/2025    No acute osseous abnormality.   MACRO   None   Signed by: Malcolm Swenson 6/16/2025 8:59 AM Dictation workstation:   [id number]      Cardiology, Vascular, and Other Imaging  No other imaging results found for the past 2 days      Diagnostic study results (if any) were reviewed by KOKO Hardin.    Assessment/Plan   Allergies, medications, history, and pertinent labs/EKGs/Imaging reviewed by KOKO Hardin.     Medical Decision Making  Imaging shows no acute osseous abnormality. Advised RICE and orthotics (OTC). Ibuprofen for pain relief.  Follow up with PCP or ortho if problem persists.     Risks, benefits, and alternatives of the medications and treatment plan prescribed today were discussed, and patient expressed understanding. Plan follow up as discussed or as needed if any worsening symptoms or change in condition. Reinforced red flags including (but not limited to): severe or worsening abdominal pain; difficulty swallowing; stiff neck; shortness of breath; coughing or vomiting blood; chest pain; and new or increased fever are indications to go to the Emergency Department.    The patient voices understanding of all medications. No barriers to adherence. Patient is taking all medications as prescribed and tolerating well. For any new medications, the patient was instructed of directions for and consequences of not taking medication and they were informed about the potential side effects and drug interactions. The after-visit summary was given to the patient and care instructions were reviewed with the patient. All questions were answered and the patient verbalized understanding of the plan of care for today.          Orders and  Diagnoses  Diagnoses and all orders for this visit:  Left foot pain  -     XR foot left 3+ views; Future      Medical Admin Record      Patient disposition: Home    Electronically signed by KOKO Hardin  9:14 AM           [1] (Not in a hospital admission)   [2]   Past Medical History:  Diagnosis Date    Abdominal distension (gaseous) 08/13/2018    ADHD (attention deficit hyperactivity disorder)     Anxiety     Arthritis     Bipolar disorder     Carpal tunnel syndrome, right upper limb 08/13/2018    Cataract 8/22    Collagenous colitis 08/13/2018    Compression fracture of cervical spine     COPD (chronic obstructive pulmonary disease) (Multi)     Depression, unspecified 08/13/2018    Dermatitis, unspecified 07/09/2020    eczematoid    Dermatochalasis of unspecified eye, unspecified eyelid 10/25/2022    Displaced comminuted fracture of left patella, initial encounter for closed fracture 08/02/2018    Displaced comminuted fracture of right patella, initial encounter for closed fracture 08/13/2018    Dizziness     GERD (gastroesophageal reflux disease)     Headache     Hyperlipidemia     Hypertension     Hypokalemia 08/13/2018    Low back pain, unspecified 08/13/2018    Migraine     No blood products     Noninfective gastroenteritis and colitis, unspecified 08/02/2018    Chronic colitis    Nutritional deficiency, unspecified 08/02/2018    Poor diet    Old myocardial infarction 03/31/2014    NSTEMI    Other chest pain 08/28/2017    Atypical chest pain    Other fracture of right patella, sequela 08/13/2018    Patellar sleeve fracture, right, sequela    Other visual disturbances 04/04/2018    Blurred vision, bilateral    Pain in unspecified hip 03/31/2014    Pericarditis ([facility]) 2019    Pleural effusion     Polyosteoarthritis, unspecified 08/02/2018    Presence of intraocular lens 09/20/2018    Pseudophakia of right eye    Sleep apnea     Small intestinal bacterial overgrowth (SIBO)     Stroke (Multi)  04/2024    Tobacco abuse     Unspecified fall, sequela 08/13/2018   [3]   Past Surgical History:  Procedure Laterality Date    CARDIAC CATHETERIZATION      CATARACT EXTRACTION      CHOLECYSTECTOMY      COLONOSCOPY      CT ABDOMEN ANGIOGRAM W AND/OR WO IV CONTRAST  07/28/2022    CT ABDOMEN ANGIOGRAM W AND/OR WO IV CONTRAST 7/28/2022 [facility]    FEMUR FRACTURE SURGERY  07/16/2018    Femur Repair    FOOT SURGERY  09/13/2024    LEFT PROXIMAL  HALLUX PARTIAL EXCISION    HIP SURGERY      HYSTERECTOMY      MITRAL VALVE REPLACEMENT  08/07/2019    OTHER SURGICAL HISTORY  07/16/2018    Oophorectomy - Bilateral (Removal Of Both Ovaries)    OTHER SURGICAL HISTORY  05/27/2020    Radius fracture repair    SHOULDER SURGERY  07/16/2018    Shoulder Surgery    UPPER GASTROINTESTINAL ENDOSCOPY

## 2025-06-17 ENCOUNTER — SPECIALTY PHARMACY (OUTPATIENT)
Dept: PHARMACY | Facility: CLINIC | Age: 70
End: 2025-06-17

## 2025-06-18 ENCOUNTER — PHARMACY VISIT (OUTPATIENT)
Dept: PHARMACY | Facility: CLINIC | Age: 70
End: 2025-06-18
Payer: COMMERCIAL

## 2025-06-23 ENCOUNTER — PROCEDURE VISIT (OUTPATIENT)
Dept: NEUROLOGY | Facility: CLINIC | Age: 70
End: 2025-06-23
Payer: COMMERCIAL

## 2025-06-23 DIAGNOSIS — G43.709 CHRONIC MIGRAINE W/O AURA W/O STATUS MIGRAINOSUS, NOT INTRACTABLE: Primary | ICD-10-CM

## 2025-06-23 PROCEDURE — 96372 THER/PROPH/DIAG INJ SC/IM: CPT | Mod: 59 | Performed by: STUDENT IN AN ORGANIZED HEALTH CARE EDUCATION/TRAINING PROGRAM

## 2025-06-23 PROCEDURE — 64615 CHEMODENERV MUSC MIGRAINE: CPT | Performed by: STUDENT IN AN ORGANIZED HEALTH CARE EDUCATION/TRAINING PROGRAM

## 2025-06-23 PROCEDURE — 2500000004 HC RX 250 GENERAL PHARMACY W/ HCPCS (ALT 636 FOR OP/ED): Mod: JZ | Performed by: STUDENT IN AN ORGANIZED HEALTH CARE EDUCATION/TRAINING PROGRAM

## 2025-06-23 RX ADMIN — ONABOTULINUMTOXINA 155 UNITS: 200 INJECTION, POWDER, LYOPHILIZED, FOR SOLUTION INTRADERMAL; INTRAMUSCULAR at 09:19

## 2025-06-23 NOTE — PROGRESS NOTES
Subjective   Since last Botox, having 12/30 HA days per month. Denies any side effects to Botox and would like to continue.     Botox    Date/Time: 6/23/2025 9:08 AM    Performed by: Chi Li DO  Authorized by: Chi Li DO    Procedure specific details:      Procedure Note: PREEMPT Botox    Indications: Chronic Migraine    Informed consent was obtained (explaining the procedure and risks and benefits of procedure) from patient: the signed consent form was placed in the medical record.  A time out was completed, verifying correct patient, procedure,site, positioning, and implants or special equipment.    200 units of OnabotulinumtoxinA were reconstituted with saline. Sites of injection were identified via PREEMPT protocol and cleaned with alcohol pads. Using a 30 gauge needle, patient received following injections:    5 units in procerus  5 units in each left and right   10 units divided between 2 sites of L frontalis  10 units divided between 2 sites of R frontalis  20 units divided between 4 sites of L temporalis  20 units divided between 4 sites of R temporalis  15 units divided between 3 sites of L occipitalis  15 units divided between 3 sites of R occipitalis  10 units divided between 2 sites of L paracervical muscles  10 units divided between 2 sites of R paracervical muscles  15 units divided between 3 sites of L trapezius  15 units divided between 3 sites of R trapezius    Additional Sites:  none    Used: 155u  Wasted: 45u    There were no complications. Patient was comfortable and left without complaint.     OnabotulinumtoxinA  Lot #: [id number]  Exp: 10/2027

## 2025-06-30 NOTE — PROGRESS NOTES
Subjective    Patient ID: Judith Bush is a 69 y.o. female.    Chief Complaint: Follow-up of the Left Shoulder (Lt. Humerus ORIF SR24)     Last Surgery: Open Reduction Internal Fixation Humerus - Left - Left  Last Surgery Date: 2025    HPI  Patient is a 69 y.o. female who is s/p left proximal humerus ORIF on 2024 with subsequent left cliff-hardware humeral shaft ORIF on 2025.  Patient continues with therapy sessions, performing exercise program at home. States that her swelling has reduced, and scar has healed well. Patient denies fever or chills, N/T or arm pain. She reports having more good days than bad. She rates pain at a 2-3/10. She is working on ROM. She continues to have restrictions such needing two hands to make left turns while driving.   Overall patient reports that her shoulder is doing much better and she is pleased with her progress so far.  ROS: All other systems have been reviewed and are negative except as previously noted in history of present illness.      IMP:  Problem List Items Addressed This Visit          Musculoskeletal and Injuries    Closed fracture of left proximal humerus    Relevant Orders    XR shoulder left 2+ views    XR humerus left    Disability Placard       Objective     General: Alert and oriented x 3, NAD, respirations easy and unlabored with no audible wheezes, skin warm and dry, speech and dress appropriate for noted age, affect euthymic.     Musculoskeletal: left upper extremity  incision well-healed  compartments soft  mild swelling to upper extremity  sensation intact to light touch  motor intact including R/U/M/AIN/PIN nn.  palpable radial pulse 2+     Shoulder active forward flexion 130 degrees, passively to 140 degrees. External rotation 45 to the L2.       X-ray: Images of left shoulder reviewed personally by me today and reveal good alignment of her fracture and interval fracture healing.  The glenohumeral joint is well-maintained with no  arthrosis.    Assessment/Plan   Encounter Diagnoses:  Closed fracture of proximal end of left humerus, unspecified fracture morphology, initial encounter    PLAN: Patient is s/p left proximal humerus ORIF on 11/21/2024 with subsequent left cliff-hardware humeral shaft ORIF on 1/9/2025. Patient is overall doing well noting more good days than bad. Patient will continue working on shoulder range of motion stretching and strengthening exercises.  She has no restrictions at this time activity as tolerated.  Discussed risk of posttraumatic arthritis however this is not apparent at this time we will continue to observe. Patient will follow up in 6 months. Patient is in agreement with this plan. Xrays of the left shoulder and humerus will be needed.         Orders Placed This Encounter    Disability Placard    XR shoulder left 2+ views    XR humerus left     No follow-ups on file.    Scribe Attestation  By signing my name below, ITapan Scribe attest that this documentation has been prepared under the direction and in the presence of Guillermo Roberts MD. All medical record entries made by the Scribe were at my direction or personally dictated by me. I have reviewed the chart and agree that the record accurately reflects my personal performance of the history, physical exam, discussion and plan.

## 2025-07-06 PROBLEM — M25.559 HIP PAIN: Status: ACTIVE | Noted: 2018-04-04
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
## 2025-07-06 PROBLEM — I63.9 CEREBRAL INFARCTION: Status: ACTIVE | Noted: 2024-04-30

## 2025-07-06 PROBLEM — M79.9 DISORDER OF MUSCULOSKELETAL SYSTEM: Status: ACTIVE | Noted: 2025-07-06

## 2025-07-06 PROBLEM — M16.10 PRIMARY LOCALIZED OSTEOARTHRITIS OF PELVIC REGION AND THIGH: Status: ACTIVE | Noted: 2025-07-06

## 2025-07-06 PROBLEM — R53.1 WEAKNESS: Status: ACTIVE | Noted: 2023-03-23

## 2025-07-06 PROBLEM — M62.81 MUSCLE WEAKNESS: Status: ACTIVE | Noted: 2024-11-26

## 2025-07-06 PROBLEM — I67.2 CEREBRAL ATHEROSCLEROSIS: Status: ACTIVE | Noted: 2025-07-06

## 2025-07-06 PROBLEM — F10.229 ALCOHOL DEPENDENCE WITH INTOXICATION WITH COMPLICATION (MULTI): Status: ACTIVE | Noted: 2024-04-25

## 2025-07-06 PROBLEM — I11.9 HYPERTENSIVE HEART DISEASE WITHOUT CONGESTIVE HEART FAILURE: Status: ACTIVE | Noted: 2024-11-27

## 2025-07-06 PROBLEM — R26.2 DISABILITY OF WALKING: Status: ACTIVE | Noted: 2025-07-06

## 2025-07-06 PROBLEM — Z95.2 PRESENCE OF TRANSPLANTED HEART VALVE: Status: ACTIVE | Noted: 2023-08-15

## 2025-07-07 ENCOUNTER — OFFICE VISIT (OUTPATIENT)
Dept: ORTHOPEDIC SURGERY | Facility: CLINIC | Age: 70
End: 2025-07-07
Payer: COMMERCIAL

## 2025-07-07 ENCOUNTER — TELEPHONE (OUTPATIENT)
Dept: PRIMARY CARE | Facility: CLINIC | Age: 70
End: 2025-07-07

## 2025-07-07 ENCOUNTER — HOSPITAL ENCOUNTER (OUTPATIENT)
Dept: RADIOLOGY | Facility: CLINIC | Age: 70
Discharge: HOME | End: 2025-07-07
Payer: COMMERCIAL

## 2025-07-07 DIAGNOSIS — M25.612 POST-TRAUMATIC STIFFNESS OF LEFT SHOULDER JOINT: ICD-10-CM

## 2025-07-07 DIAGNOSIS — R26.2 DISABILITY OF WALKING: ICD-10-CM

## 2025-07-07 DIAGNOSIS — M46.1 SACROILIITIS: ICD-10-CM

## 2025-07-07 DIAGNOSIS — J44.9 CHRONIC OBSTRUCTIVE PULMONARY DISEASE, UNSPECIFIED COPD TYPE (MULTI): ICD-10-CM

## 2025-07-07 DIAGNOSIS — S42.392A OTHER FRACTURE OF SHAFT OF LEFT HUMERUS, INITIAL ENCOUNTER FOR CLOSED FRACTURE: Primary | ICD-10-CM

## 2025-07-07 DIAGNOSIS — M54.16 LUMBAR RADICULOPATHY: Primary | ICD-10-CM

## 2025-07-07 DIAGNOSIS — S42.202A CLOSED FRACTURE OF PROXIMAL END OF LEFT HUMERUS, UNSPECIFIED FRACTURE MORPHOLOGY, INITIAL ENCOUNTER: ICD-10-CM

## 2025-07-07 DIAGNOSIS — M16.11 ARTHRITIS OF RIGHT HIP: ICD-10-CM

## 2025-07-07 PROCEDURE — 73060 X-RAY EXAM OF HUMERUS: CPT | Mod: LT

## 2025-07-07 PROCEDURE — G2211 COMPLEX E/M VISIT ADD ON: HCPCS | Performed by: ORTHOPAEDIC SURGERY

## 2025-07-07 PROCEDURE — 73060 X-RAY EXAM OF HUMERUS: CPT | Mod: LEFT SIDE | Performed by: RADIOLOGY

## 2025-07-07 PROCEDURE — 73030 X-RAY EXAM OF SHOULDER: CPT | Mod: LT

## 2025-07-07 PROCEDURE — 99214 OFFICE O/P EST MOD 30 MIN: CPT | Performed by: ORTHOPAEDIC SURGERY

## 2025-07-07 PROCEDURE — 99212 OFFICE O/P EST SF 10 MIN: CPT | Performed by: ORTHOPAEDIC SURGERY

## 2025-07-07 PROCEDURE — 73030 X-RAY EXAM OF SHOULDER: CPT | Mod: LEFT SIDE | Performed by: RADIOLOGY

## 2025-07-10 ENCOUNTER — APPOINTMENT (OUTPATIENT)
Dept: OPHTHALMOLOGY | Facility: CLINIC | Age: 70
End: 2025-07-10
Payer: COMMERCIAL

## 2025-07-21 ENCOUNTER — APPOINTMENT (OUTPATIENT)
Dept: ORTHOPEDIC SURGERY | Facility: CLINIC | Age: 70
End: 2025-07-21
Payer: COMMERCIAL

## 2025-07-21 VITALS — BODY MASS INDEX: 22.66 KG/M2 | WEIGHT: 120 LBS | HEIGHT: 61 IN

## 2025-07-21 DIAGNOSIS — S42.392A OTHER FRACTURE OF SHAFT OF LEFT HUMERUS, INITIAL ENCOUNTER FOR CLOSED FRACTURE: Primary | ICD-10-CM

## 2025-07-21 DIAGNOSIS — S42.202D CLOSED FRACTURE OF PROXIMAL END OF LEFT HUMERUS WITH ROUTINE HEALING, UNSPECIFIED FRACTURE MORPHOLOGY, SUBSEQUENT ENCOUNTER: ICD-10-CM

## 2025-07-21 DIAGNOSIS — M25.612 POST-TRAUMATIC STIFFNESS OF LEFT SHOULDER JOINT: ICD-10-CM

## 2025-07-21 PROCEDURE — 20610 DRAIN/INJ JOINT/BURSA W/O US: CPT | Performed by: PHYSICIAN ASSISTANT

## 2025-07-21 PROCEDURE — 99212 OFFICE O/P EST SF 10 MIN: CPT | Performed by: PHYSICIAN ASSISTANT

## 2025-07-21 PROCEDURE — RXMED WILLOW AMBULATORY MEDICATION CHARGE

## 2025-07-21 PROCEDURE — 3008F BODY MASS INDEX DOCD: CPT | Performed by: PHYSICIAN ASSISTANT

## 2025-07-21 PROCEDURE — 1159F MED LIST DOCD IN RCRD: CPT | Performed by: PHYSICIAN ASSISTANT

## 2025-07-21 RX ORDER — TRIAMCINOLONE ACETONIDE 40 MG/ML
40 INJECTION, SUSPENSION INTRA-ARTICULAR; INTRAMUSCULAR
Status: COMPLETED | OUTPATIENT
Start: 2025-07-21 | End: 2025-07-21

## 2025-07-21 RX ORDER — LIDOCAINE HYDROCHLORIDE 10 MG/ML
4 INJECTION, SOLUTION INFILTRATION; PERINEURAL
Status: COMPLETED | OUTPATIENT
Start: 2025-07-21 | End: 2025-07-21

## 2025-07-21 RX ADMIN — TRIAMCINOLONE ACETONIDE 40 MG: 40 INJECTION, SUSPENSION INTRA-ARTICULAR; INTRAMUSCULAR at 15:12

## 2025-07-21 RX ADMIN — LIDOCAINE HYDROCHLORIDE 4 ML: 10 INJECTION, SOLUTION INFILTRATION; PERINEURAL at 15:12

## 2025-07-21 NOTE — PROGRESS NOTES
Patient returns to clinic today for recurrent left shoulder discomfort.  History of proximal humerus and humeral shaft fracture treated ORIF by Dr. Roberts within 2 months of each other.  Has been having some continued left shoulder discomfort.  Received a intra-articular steroid injection by Dr. Lynch on 3/31 and feels she did well with this.  Is requesting a new injection today.    Past medical history, medications, allergies, surgical history and review of systems have been reviewed with the patient. Pertinent changes are documented in the HPI. Otherwise they are unchanged when compared to last visit     Physical Examination Findings:  Constitutional: Appears well-developed and well-nourished.  Head: Normocephalic and atraumatic.  Eyes: Pupils are equal and round.  Cardiovascular: Intact distal pulses.   Respiratory: Effort normal. No respiratory distress.  Neurologic: Alert and oriented to person, place, and time.  Skin: Skin is warm and dry.  Hematologic / Lymphatic: No lymphedema, lymphangitis.  Psychiatric: normal mood and affect. Behavior is normal.   Musculoskeletal: Left shoulder forward arm elevation 90 degrees abduction 90 degrees external rotation at side 10 degrees internal rotation posterior iliac crest.  Motor power 4/5 in all directions.    Impression: Sequela of left humerus fracture    Plan: Repeat left shoulder intra-articular steroid injection was given today.  She will continue to monitor her symptoms and continue to progress with home therapy exercises.  Will return to our office as needed.    Patient ID: Judith Bush is a 69 y.o. female.    L Inj/Asp: L glenohumeral on 7/21/2025 3:12 PM  Details: 22 G needle, posterior approach  Medications: 40 mg triamcinolone acetonide 40 mg/mL; 4 mL lidocaine 10 mg/mL (1 %)  Procedure, treatment alternatives, risks and benefits explained, specific risks discussed. Immediately prior to procedure a time out was called to verify the correct patient,  procedure, equipment, support staff and site/side marked as required.           Shanelle Taveras PA-C  Department of Orthopaedic Surgery  [facility]    Dictation performed with the use of voice recognition software. Syntax and grammatical errors may exist.

## 2025-07-22 PROCEDURE — RXMED WILLOW AMBULATORY MEDICATION CHARGE

## 2025-07-23 ENCOUNTER — PHARMACY VISIT (OUTPATIENT)
Dept: PHARMACY | Facility: CLINIC | Age: 70
End: 2025-07-23
Payer: COMMERCIAL

## 2025-07-23 ENCOUNTER — APPOINTMENT (OUTPATIENT)
Dept: GASTROENTEROLOGY | Facility: EXTERNAL LOCATION | Age: 70
End: 2025-07-23
Payer: COMMERCIAL

## 2025-07-23 DIAGNOSIS — D12.0 BENIGN NEOPLASM OF CECUM: ICD-10-CM

## 2025-07-23 DIAGNOSIS — Z12.11 SCREEN FOR COLON CANCER: Primary | ICD-10-CM

## 2025-07-23 DIAGNOSIS — Z12.11 SCREEN FOR COLON CANCER: ICD-10-CM

## 2025-07-23 DIAGNOSIS — Z86.0101 HISTORY OF ADENOMATOUS POLYP OF COLON: ICD-10-CM

## 2025-07-23 PROCEDURE — 45385 COLONOSCOPY W/LESION REMOVAL: CPT | Performed by: INTERNAL MEDICINE

## 2025-07-23 PROCEDURE — 88305 TISSUE EXAM BY PATHOLOGIST: CPT

## 2025-07-23 PROCEDURE — 0753T DGTZ GLS MCRSCP SLD LEVEL IV: CPT

## 2025-07-23 PROCEDURE — 88305 TISSUE EXAM BY PATHOLOGIST: CPT | Performed by: PATHOLOGY

## 2025-07-24 ENCOUNTER — LAB REQUISITION (OUTPATIENT)
Dept: LAB | Facility: HOSPITAL | Age: 70
End: 2025-07-24
Payer: COMMERCIAL

## 2025-07-24 ENCOUNTER — PATIENT MESSAGE (OUTPATIENT)
Dept: PRIMARY CARE | Facility: CLINIC | Age: 70
End: 2025-07-24
Payer: COMMERCIAL

## 2025-07-24 DIAGNOSIS — Z87.891 PERSONAL HISTORY OF NICOTINE DEPENDENCE: ICD-10-CM

## 2025-07-24 DIAGNOSIS — Z12.31 VISIT FOR SCREENING MAMMOGRAM: Primary | ICD-10-CM

## 2025-07-24 DIAGNOSIS — Z12.11 ENCOUNTER FOR SCREENING FOR MALIGNANT NEOPLASM OF COLON: ICD-10-CM

## 2025-07-30 LAB
LABORATORY COMMENT REPORT: NORMAL
PATH REPORT.FINAL DX SPEC: NORMAL
PATH REPORT.GROSS SPEC: NORMAL
PATH REPORT.RELEVANT HX SPEC: NORMAL
PATH REPORT.TOTAL CANCER: NORMAL

## 2025-09-03 ENCOUNTER — HOSPITAL ENCOUNTER (OUTPATIENT)
Dept: RADIOLOGY | Facility: CLINIC | Age: 70
Discharge: HOME | End: 2025-09-03
Payer: COMMERCIAL

## 2025-09-03 DIAGNOSIS — Z87.891 PERSONAL HISTORY OF NICOTINE DEPENDENCE: ICD-10-CM

## 2025-09-03 PROCEDURE — 71271 CT THORAX LUNG CANCER SCR C-: CPT

## 2025-09-03 PROCEDURE — 71271 CT THORAX LUNG CANCER SCR C-: CPT | Performed by: RADIOLOGY

## 2025-09-03 PROCEDURE — RXMED WILLOW AMBULATORY MEDICATION CHARGE

## 2025-09-04 ENCOUNTER — PHARMACY VISIT (OUTPATIENT)
Dept: PHARMACY | Facility: CLINIC | Age: 70
End: 2025-09-04
Payer: COMMERCIAL

## 2025-09-04 ENCOUNTER — TELEPHONE (OUTPATIENT)
Dept: CARDIOLOGY | Facility: CLINIC | Age: 70
End: 2025-09-04
Payer: COMMERCIAL

## 2025-09-05 ENCOUNTER — HOSPITAL ENCOUNTER (OUTPATIENT)
Dept: RADIOLOGY | Facility: CLINIC | Age: 70
Discharge: HOME | End: 2025-09-05
Payer: COMMERCIAL

## 2025-09-05 DIAGNOSIS — Z12.31 VISIT FOR SCREENING MAMMOGRAM: ICD-10-CM

## 2025-09-05 PROCEDURE — 77063 BREAST TOMOSYNTHESIS BI: CPT

## 2026-03-10 ENCOUNTER — APPOINTMENT (OUTPATIENT)
Dept: OPHTHALMOLOGY | Facility: CLINIC | Age: 71
End: 2026-03-10
Payer: COMMERCIAL

## (undated) DEVICE — TIP, SUCTION, FRAZIER, W/CONTROL VENT, 12 FR

## (undated) DEVICE — COVER, BACK TABLE, 65 X 90, HVY REINFORCED

## (undated) DEVICE — Device

## (undated) DEVICE — APPLICATOR, PREP, CHLORAPREP, W/ORANGE TINT, 10.5ML

## (undated) DEVICE — NEEDLE, HYPODERMIC, 25 G X 2 IN

## (undated) DEVICE — BANDAGE, ELASTIC, MATRIX, SELF-CLOSURE, 6 IN X 5 YD, LF

## (undated) DEVICE — BANDAGE, ELASTIC, MATRIX, SELF-CLOSURE, 4 IN X 5 YD, LF

## (undated) DEVICE — PADDING, WEBRIL, UNDERCAST, STERILE, 4 IN

## (undated) DEVICE — CUFF, TOURNIQUET 18 DUAL PORT/SNGL BLAD"

## (undated) DEVICE — SUTURE, MONOSOF, 2-0, 30 IN, C15, BLACK

## (undated) DEVICE — ADHESIVE, SKIN, DERMABOND ADVANCED, 15CM, PEN-STYLE

## (undated) DEVICE — SUTURE, MONOCRYL, 4-0, 18 IN, PS2, UNDYED

## (undated) DEVICE — IRRIGATION SET, Y, LARGE BORE

## (undated) DEVICE — BANDAGE, COFLEX, 4 X 5 YDS, TAN, STERILE, LF

## (undated) DEVICE — BIT, DRILL, QUICK-COUPLING, 3.2 X 145 MM, STAINLESS STEEL

## (undated) DEVICE — ELECTRODE, ELECTROSURGICAL, BLADE, INSULATED, ENT/IMA, STERILE

## (undated) DEVICE — COVER, CART, 45 X 27 X 48 IN, CLEAR

## (undated) DEVICE — DRAPE, INCISE, ANTIMICROBIAL, IOBAN 2, LARGE, 17 X 23 IN, DISPOSABLE, STERILE

## (undated) DEVICE — SPONGE, LAP, XRAY DECT, 18IN X 18IN, W/LOOP, STERILE

## (undated) DEVICE — DRILL BIT, 2.0MM, QC, 140MM, W/DEPTH MARK

## (undated) DEVICE — DRAPE, SHEET, U, W/ADHESIVE STRIP, IMPERVIOUS, 60 X 70 IN, DISPOSABLE, LF, STERILE

## (undated) DEVICE — BANDAGE, ESMARK 4 IN X 9 FT, STERILE

## (undated) DEVICE — DRAPE, SHEET, 17 X 23 IN

## (undated) DEVICE — SUTURE, PDS II, 0, 27 IN, CT-2, VIOLET

## (undated) DEVICE — COVER, C-ARM W/CLIPS, OEC GE

## (undated) DEVICE — HANDLE COVER, LIGHT, RIGID, DISP, LF

## (undated) DEVICE — RASP, RECIPROCATING, CROSSCUT, TEAR, LARGE, 14 X 7 MM

## (undated) DEVICE — BANDAGE, GAUZE, CONFORMING, KERLIX, 6 PLY, 4.5 IN X 4.1 YD

## (undated) DEVICE — BIT, DRILL, QUICK-COUPLING, 4.3 X 180 MM

## (undated) DEVICE — DRESSING, MEPILEX BORDER, POST-OP AG, 4 X 10 IN

## (undated) DEVICE — STAPLER, SKIN PROXIMATE, 35 WIDE

## (undated) DEVICE — APPLICATOR, CHLORAPREP, W/ORANGE TINT, 26ML

## (undated) DEVICE — BLADE, OSCILLATING/SAGITTAL, 25MM X 9MM

## (undated) DEVICE — BIT, DRILL, QUICK-CONNECT, THREE FLUTE, 2.7 X 125 MM

## (undated) DEVICE — SUTURE, MONOCRYL, 2-0, 36 IN, CT-1, UNDYED

## (undated) DEVICE — TOWEL, SURGICAL, NEURO, O/R, 16 X 26, BLUE, STERILE

## (undated) DEVICE — SLING, ARM, BERGSCHULT UNIVERSAL

## (undated) DEVICE — DRAPE, SHEET, THREE QUARTER, FAN FOLD, 57 X 77 IN

## (undated) DEVICE — NEEDLE, SAFETY, 19 G X 1.5 IN